# Patient Record
Sex: FEMALE | Race: BLACK OR AFRICAN AMERICAN | NOT HISPANIC OR LATINO
[De-identification: names, ages, dates, MRNs, and addresses within clinical notes are randomized per-mention and may not be internally consistent; named-entity substitution may affect disease eponyms.]

---

## 2017-01-05 ENCOUNTER — OTHER (OUTPATIENT)
Age: 67
End: 2017-01-05

## 2017-02-22 ENCOUNTER — RX RENEWAL (OUTPATIENT)
Age: 67
End: 2017-02-22

## 2017-05-26 ENCOUNTER — MEDICATION RENEWAL (OUTPATIENT)
Age: 67
End: 2017-05-26

## 2017-08-04 ENCOUNTER — MEDICATION RENEWAL (OUTPATIENT)
Age: 67
End: 2017-08-04

## 2017-08-08 ENCOUNTER — RX RENEWAL (OUTPATIENT)
Age: 67
End: 2017-08-08

## 2017-10-18 ENCOUNTER — MEDICATION RENEWAL (OUTPATIENT)
Age: 67
End: 2017-10-18

## 2017-10-18 ENCOUNTER — OTHER (OUTPATIENT)
Age: 67
End: 2017-10-18

## 2017-10-23 ENCOUNTER — APPOINTMENT (OUTPATIENT)
Dept: OPHTHALMOLOGY | Facility: CLINIC | Age: 67
End: 2017-10-23
Payer: MEDICARE

## 2017-10-23 PROCEDURE — 92132 CPTRZD OPH DX IMG ANT SGM: CPT

## 2017-10-23 PROCEDURE — 92004 COMPRE OPH EXAM NEW PT 1/>: CPT

## 2017-10-23 PROCEDURE — 92015 DETERMINE REFRACTIVE STATE: CPT

## 2017-11-13 ENCOUNTER — APPOINTMENT (OUTPATIENT)
Dept: OPHTHALMOLOGY | Facility: CLINIC | Age: 67
End: 2017-11-13
Payer: MEDICARE

## 2017-11-13 PROCEDURE — 92012 INTRM OPH EXAM EST PATIENT: CPT

## 2017-11-13 PROCEDURE — 92083 EXTENDED VISUAL FIELD XM: CPT

## 2017-11-21 ENCOUNTER — APPOINTMENT (OUTPATIENT)
Dept: INTERNAL MEDICINE | Facility: CLINIC | Age: 67
End: 2017-11-21
Payer: MEDICARE

## 2017-11-21 VITALS
SYSTOLIC BLOOD PRESSURE: 130 MMHG | HEIGHT: 62 IN | BODY MASS INDEX: 51.53 KG/M2 | HEART RATE: 82 BPM | OXYGEN SATURATION: 94 % | TEMPERATURE: 98.9 F | DIASTOLIC BLOOD PRESSURE: 70 MMHG | WEIGHT: 280 LBS

## 2017-11-21 PROCEDURE — 90662 IIV NO PRSV INCREASED AG IM: CPT

## 2017-11-21 PROCEDURE — G0008: CPT

## 2017-11-21 PROCEDURE — 36415 COLL VENOUS BLD VENIPUNCTURE: CPT

## 2017-11-21 PROCEDURE — 99214 OFFICE O/P EST MOD 30 MIN: CPT | Mod: 25

## 2017-11-22 LAB
ALBUMIN SERPL ELPH-MCNC: 4 G/DL
ALP BLD-CCNC: 119 U/L
ALT SERPL-CCNC: 11 U/L
ANION GAP SERPL CALC-SCNC: 14 MMOL/L
AST SERPL-CCNC: 12 U/L
BASOPHILS # BLD AUTO: 0.02 K/UL
BASOPHILS NFR BLD AUTO: 0.3 %
BILIRUB SERPL-MCNC: 0.4 MG/DL
BUN SERPL-MCNC: 10 MG/DL
CALCIUM SERPL-MCNC: 9.2 MG/DL
CHLORIDE SERPL-SCNC: 103 MMOL/L
CHOLEST SERPL-MCNC: 160 MG/DL
CHOLEST/HDLC SERPL: 2.9 RATIO
CO2 SERPL-SCNC: 27 MMOL/L
CREAT SERPL-MCNC: 0.71 MG/DL
EOSINOPHIL # BLD AUTO: 0.18 K/UL
EOSINOPHIL NFR BLD AUTO: 3.1
GLUCOSE SERPL-MCNC: 97 MG/DL
HBA1C MFR BLD HPLC: 5.3 %
HCT VFR BLD CALC: 37.3 %
HDLC SERPL-MCNC: 56 MG/DL
HGB BLD-MCNC: 11.6 G/DL
IMM GRANULOCYTES NFR BLD AUTO: 0.2 %
LDLC SERPL CALC-MCNC: 91 MG/DL
LYMPHOCYTES # BLD AUTO: 1.85 K/UL
LYMPHOCYTES NFR BLD AUTO: 31.4 %
MAN DIFF?: NORMAL
MCHC RBC-ENTMCNC: 27 PG
MCHC RBC-ENTMCNC: 31.1 GM/DL
MCV RBC AUTO: 86.7 FL
MONOCYTES # BLD AUTO: 0.56 K/UL
MONOCYTES NFR BLD AUTO: 9.5 %
NEUTROPHILS # BLD AUTO: 3.27 K/UL
NEUTROPHILS NFR BLD AUTO: 55.5 %
PLATELET # BLD AUTO: 320 K/UL
POTASSIUM SERPL-SCNC: 4.4 MMOL/L
PROT SERPL-MCNC: 7.6 G/DL
RBC # BLD: 4.3 M/UL
RBC # FLD: 14.5 %
SODIUM SERPL-SCNC: 144 MMOL/L
TRIGL SERPL-MCNC: 67 MG/DL
TSH SERPL-ACNC: 1.57 UIU/ML
WBC # FLD AUTO: 5.89 K/UL

## 2017-12-18 ENCOUNTER — APPOINTMENT (OUTPATIENT)
Dept: OPHTHALMOLOGY | Facility: CLINIC | Age: 67
End: 2017-12-18
Payer: MEDICARE

## 2017-12-18 PROCEDURE — 92012 INTRM OPH EXAM EST PATIENT: CPT

## 2017-12-18 PROCEDURE — 92132 CPTRZD OPH DX IMG ANT SGM: CPT

## 2017-12-18 PROCEDURE — 92133 CPTRZD OPH DX IMG PST SGM ON: CPT

## 2018-01-30 ENCOUNTER — APPOINTMENT (OUTPATIENT)
Dept: OPHTHALMOLOGY | Facility: CLINIC | Age: 68
End: 2018-01-30

## 2018-03-08 ENCOUNTER — APPOINTMENT (OUTPATIENT)
Dept: OPHTHALMOLOGY | Facility: CLINIC | Age: 68
End: 2018-03-08
Payer: MEDICARE

## 2018-03-08 DIAGNOSIS — H34.8320 TRIBUTARY (BRANCH) RETINAL VEIN OCCLUSION, LEFT EYE, WITH MACULAR EDEMA: ICD-10-CM

## 2018-03-08 PROCEDURE — 92012 INTRM OPH EXAM EST PATIENT: CPT

## 2018-03-08 RX ORDER — BRIMONIDINE TARTRATE 2 MG/MG
0.2 SOLUTION/ DROPS OPHTHALMIC EVERY 8 HOURS
Qty: 15 | Refills: 3 | Status: ACTIVE | COMMUNITY
Start: 2018-03-08 | End: 1900-01-01

## 2018-03-08 RX ORDER — LATANOPROST/PF 0.005 %
0.01 DROPS OPHTHALMIC (EYE)
Qty: 2.5 | Refills: 3 | Status: ACTIVE | COMMUNITY
Start: 2018-03-08 | End: 1900-01-01

## 2018-03-08 RX ORDER — DORZOLAMIDE HYDROCHLORIDE 20 MG/ML
2 SOLUTION OPHTHALMIC 3 TIMES DAILY
Qty: 15 | Refills: 3 | Status: ACTIVE | COMMUNITY
Start: 2018-03-08 | End: 1900-01-01

## 2018-03-09 ENCOUNTER — RX RENEWAL (OUTPATIENT)
Age: 68
End: 2018-03-09

## 2018-03-09 DIAGNOSIS — H40.033 ANATOMICAL NARROW ANGLE, BILATERAL: ICD-10-CM

## 2018-04-09 ENCOUNTER — APPOINTMENT (OUTPATIENT)
Dept: OPHTHALMOLOGY | Facility: CLINIC | Age: 68
End: 2018-04-09

## 2018-05-07 ENCOUNTER — RX RENEWAL (OUTPATIENT)
Age: 68
End: 2018-05-07

## 2018-05-14 ENCOUNTER — APPOINTMENT (OUTPATIENT)
Dept: OPHTHALMOLOGY | Facility: CLINIC | Age: 68
End: 2018-05-14
Payer: MEDICARE

## 2018-05-14 DIAGNOSIS — H25.091 OTHER AGE-RELATED INCIPIENT CATARACT, RIGHT EYE: ICD-10-CM

## 2018-05-14 PROCEDURE — 92083 EXTENDED VISUAL FIELD XM: CPT

## 2018-05-14 PROCEDURE — 92012 INTRM OPH EXAM EST PATIENT: CPT

## 2018-08-10 ENCOUNTER — MEDICATION RENEWAL (OUTPATIENT)
Age: 68
End: 2018-08-10

## 2018-08-10 ENCOUNTER — RX RENEWAL (OUTPATIENT)
Age: 68
End: 2018-08-10

## 2018-08-22 ENCOUNTER — APPOINTMENT (OUTPATIENT)
Dept: INTERNAL MEDICINE | Facility: CLINIC | Age: 68
End: 2018-08-22

## 2018-09-27 ENCOUNTER — RX RENEWAL (OUTPATIENT)
Age: 68
End: 2018-09-27

## 2018-10-09 ENCOUNTER — APPOINTMENT (OUTPATIENT)
Dept: INTERNAL MEDICINE | Facility: CLINIC | Age: 68
End: 2018-10-09
Payer: MEDICARE

## 2018-10-09 VITALS
HEIGHT: 62 IN | WEIGHT: 264 LBS | SYSTOLIC BLOOD PRESSURE: 140 MMHG | DIASTOLIC BLOOD PRESSURE: 70 MMHG | BODY MASS INDEX: 48.58 KG/M2 | HEART RATE: 92 BPM | OXYGEN SATURATION: 95 %

## 2018-10-09 DIAGNOSIS — M79.89 OTHER SPECIFIED SOFT TISSUE DISORDERS: ICD-10-CM

## 2018-10-09 DIAGNOSIS — Z80.9 FAMILY HISTORY OF MALIGNANT NEOPLASM, UNSPECIFIED: ICD-10-CM

## 2018-10-09 DIAGNOSIS — M15.9 POLYOSTEOARTHRITIS, UNSPECIFIED: ICD-10-CM

## 2018-10-09 PROCEDURE — G0444 DEPRESSION SCREEN ANNUAL: CPT | Mod: 59

## 2018-10-09 PROCEDURE — G0008: CPT

## 2018-10-09 PROCEDURE — 90670 PCV13 VACCINE IM: CPT

## 2018-10-09 PROCEDURE — G0439: CPT | Mod: 25

## 2018-10-09 PROCEDURE — G0442 ANNUAL ALCOHOL SCREEN 15 MIN: CPT | Mod: 59

## 2018-10-09 PROCEDURE — G0009: CPT

## 2018-10-09 PROCEDURE — 90662 IIV NO PRSV INCREASED AG IM: CPT

## 2018-10-09 NOTE — PHYSICAL EXAM
[No Acute Distress] : no acute distress [Well Nourished] : well nourished [Well Developed] : well developed [Normal Sclera/Conjunctiva] : normal sclera/conjunctiva [PERRL] : pupils equal round and reactive to light [Normal Outer Ear/Nose] : the outer ears and nose were normal in appearance [Normal Oropharynx] : the oropharynx was normal [No JVD] : no jugular venous distention [Supple] : supple [No Lymphadenopathy] : no lymphadenopathy [No Respiratory Distress] : no respiratory distress  [Clear to Auscultation] : lungs were clear to auscultation bilaterally [No Accessory Muscle Use] : no accessory muscle use [Normal Rate] : normal rate  [Regular Rhythm] : with a regular rhythm [Normal S1, S2] : normal S1 and S2 [No Varicosities] : no varicosities [No Edema] : there was no peripheral edema [No Extremity Clubbing/Cyanosis] : no extremity clubbing/cyanosis [Soft] : abdomen soft [Non Tender] : non-tender [No HSM] : no HSM [Normal Bowel Sounds] : normal bowel sounds [Normal Supraclavicular Nodes] : no supraclavicular lymphadenopathy [Normal Posterior Cervical Nodes] : no posterior cervical lymphadenopathy [Normal Femoral Nodes] : no femoral lymphadenopathy [Normal Anterior Cervical Nodes] : no anterior cervical lymphadenopathy [No CVA Tenderness] : no CVA  tenderness [No Spinal Tenderness] : no spinal tenderness [No Joint Swelling] : no joint swelling [Grossly Normal Strength/Tone] : grossly normal strength/tone [No Rash] : no rash [No Skin Lesions] : no skin lesions [Normal Gait] : normal gait [Coordination Grossly Intact] : coordination grossly intact [No Focal Deficits] : no focal deficits [Speech Grossly Normal] : speech grossly normal [Memory Grossly Normal] : memory grossly normal [Normal Mood] : the mood was normal [Normal Insight/Judgement] : insight and judgment were intact

## 2018-10-09 NOTE — COUNSELING
[Weight management counseling provided] : Weight management [Healthy eating counseling provided] : healthy eating [Activity counseling provided] : activity [ - Annual Alcohol Misuse Screening] : Annual Alcohol Misuse Screening [ - Annual Depression Screening] : Annual Depression Screening

## 2018-10-09 NOTE — REVIEW OF SYSTEMS
[Recent Change In Weight] : ~T recent weight change [Vision Problems] : vision problems [Nasal Discharge] : nasal discharge [Joint Pain] : joint pain [Negative] : Psychiatric [FreeTextEntry2] : lost 15 ibs

## 2018-10-09 NOTE — ASSESSMENT
[FreeTextEntry1] : # CPE \par \par - diet / exercise discussed \par - check lipid and BG \par /- Needs mammo / pap / f.u colonoscopy ( last one had polyps) \par - BMD testing \par - flu shot and prevnar \par - optho f/u \par \par \par # weight loss\par - ? stress / pt does appear stressed but reports no change in appetite \par - will check TSH \par - needs screening for cancer \par \par # L calf is bigger than R \par - no trauma, no pain , no redness \par - get Doppler / if negative may need MRI Leg \par - The lateral side of the L calf is firm \par \par # OA\par - vct use of cane \par - gait and balance training \par - weight loss encouraged \par \par f/u in 6 week

## 2018-10-09 NOTE — HEALTH RISK ASSESSMENT
[Good] : ~his/her~  mood as  good [] : No [No falls in past year] : Patient reported no falls in the past year [0] : 1) Little interest or pleasure doing things: Not at all (0) [1] : 2) Feeling down, depressed, or hopeless for several days (1) [Patient reported colonoscopy was abnormal] : Patient reported colonoscopy was abnormal [MammogramDate] : needs  [BoneDensityDate] : needs  [ColonoscopyDate] : 2014 [ColonoscopyComments] : polyp [Discussed at today's visit] : Advance Directives Discussed at today's visit [Name: ___] : Health Care Proxy's Name: [unfilled]  [Relationship: ___] : Relationship: [unfilled]

## 2018-10-10 LAB
ALBUMIN SERPL ELPH-MCNC: 4.3 G/DL
ALP BLD-CCNC: 116 U/L
ALT SERPL-CCNC: 11 U/L
ANION GAP SERPL CALC-SCNC: 14 MMOL/L
AST SERPL-CCNC: 15 U/L
BASOPHILS # BLD AUTO: 0.02 K/UL
BASOPHILS NFR BLD AUTO: 0.3 %
BILIRUB SERPL-MCNC: 0.6 MG/DL
BUN SERPL-MCNC: 9 MG/DL
CALCIUM SERPL-MCNC: 9.7 MG/DL
CHLORIDE SERPL-SCNC: 103 MMOL/L
CHOLEST SERPL-MCNC: 160 MG/DL
CHOLEST/HDLC SERPL: 2.5 RATIO
CO2 SERPL-SCNC: 25 MMOL/L
CREAT SERPL-MCNC: 0.77 MG/DL
EOSINOPHIL # BLD AUTO: 0.19 K/UL
EOSINOPHIL NFR BLD AUTO: 3 %
GLUCOSE SERPL-MCNC: 102 MG/DL
HBA1C MFR BLD HPLC: 5.4 %
HCT VFR BLD CALC: 39.7 %
HDLC SERPL-MCNC: 63 MG/DL
HGB BLD-MCNC: 11.9 G/DL
IMM GRANULOCYTES NFR BLD AUTO: 0.2 %
LDLC SERPL CALC-MCNC: 84 MG/DL
LYMPHOCYTES # BLD AUTO: 1.92 K/UL
LYMPHOCYTES NFR BLD AUTO: 29.8 %
MAN DIFF?: NORMAL
MCHC RBC-ENTMCNC: 26.7 PG
MCHC RBC-ENTMCNC: 30 GM/DL
MCV RBC AUTO: 89.2 FL
MONOCYTES # BLD AUTO: 0.58 K/UL
MONOCYTES NFR BLD AUTO: 9 %
NEUTROPHILS # BLD AUTO: 3.72 K/UL
NEUTROPHILS NFR BLD AUTO: 57.7 %
PLATELET # BLD AUTO: 358 K/UL
POTASSIUM SERPL-SCNC: 4.5 MMOL/L
PROT SERPL-MCNC: 7.8 G/DL
RBC # BLD: 4.45 M/UL
RBC # FLD: 14.9 %
SODIUM SERPL-SCNC: 142 MMOL/L
TRIGL SERPL-MCNC: 65 MG/DL
TSH SERPL-ACNC: 1.49 UIU/ML
WBC # FLD AUTO: 6.44 K/UL

## 2018-10-24 ENCOUNTER — FORM ENCOUNTER (OUTPATIENT)
Age: 68
End: 2018-10-24

## 2018-10-25 ENCOUNTER — APPOINTMENT (OUTPATIENT)
Dept: ULTRASOUND IMAGING | Facility: CLINIC | Age: 68
End: 2018-10-25
Payer: MEDICARE

## 2018-10-25 ENCOUNTER — OUTPATIENT (OUTPATIENT)
Dept: OUTPATIENT SERVICES | Facility: HOSPITAL | Age: 68
LOS: 1 days | End: 2018-10-25
Payer: MEDICARE

## 2018-10-25 DIAGNOSIS — M79.89 OTHER SPECIFIED SOFT TISSUE DISORDERS: ICD-10-CM

## 2018-10-25 PROCEDURE — 93971 EXTREMITY STUDY: CPT | Mod: 26

## 2018-10-25 PROCEDURE — 93971 EXTREMITY STUDY: CPT

## 2018-11-03 ENCOUNTER — FORM ENCOUNTER (OUTPATIENT)
Age: 68
End: 2018-11-03

## 2018-11-04 ENCOUNTER — OUTPATIENT (OUTPATIENT)
Dept: OUTPATIENT SERVICES | Facility: HOSPITAL | Age: 68
LOS: 1 days | End: 2018-11-04
Payer: MEDICARE

## 2018-11-04 ENCOUNTER — APPOINTMENT (OUTPATIENT)
Dept: MRI IMAGING | Facility: IMAGING CENTER | Age: 68
End: 2018-11-04
Payer: MEDICARE

## 2018-11-04 DIAGNOSIS — Z00.8 ENCOUNTER FOR OTHER GENERAL EXAMINATION: ICD-10-CM

## 2018-11-04 PROCEDURE — 73718 MRI LOWER EXTREMITY W/O DYE: CPT

## 2018-11-04 PROCEDURE — 73718 MRI LOWER EXTREMITY W/O DYE: CPT | Mod: 26,LT

## 2018-11-11 ENCOUNTER — FORM ENCOUNTER (OUTPATIENT)
Age: 68
End: 2018-11-11

## 2018-11-12 ENCOUNTER — APPOINTMENT (OUTPATIENT)
Dept: RADIOLOGY | Facility: IMAGING CENTER | Age: 68
End: 2018-11-12
Payer: MEDICARE

## 2018-11-12 ENCOUNTER — OUTPATIENT (OUTPATIENT)
Dept: OUTPATIENT SERVICES | Facility: HOSPITAL | Age: 68
LOS: 1 days | End: 2018-11-12
Payer: MEDICARE

## 2018-11-12 ENCOUNTER — APPOINTMENT (OUTPATIENT)
Dept: MAMMOGRAPHY | Facility: IMAGING CENTER | Age: 68
End: 2018-11-12
Payer: MEDICARE

## 2018-11-12 DIAGNOSIS — Z00.8 ENCOUNTER FOR OTHER GENERAL EXAMINATION: ICD-10-CM

## 2018-11-12 PROCEDURE — 77080 DXA BONE DENSITY AXIAL: CPT | Mod: 26

## 2018-11-12 PROCEDURE — 77063 BREAST TOMOSYNTHESIS BI: CPT | Mod: 26

## 2018-11-12 PROCEDURE — 77080 DXA BONE DENSITY AXIAL: CPT

## 2018-11-12 PROCEDURE — 77067 SCR MAMMO BI INCL CAD: CPT | Mod: 26

## 2018-11-12 PROCEDURE — 77067 SCR MAMMO BI INCL CAD: CPT

## 2018-11-12 PROCEDURE — 77063 BREAST TOMOSYNTHESIS BI: CPT

## 2018-11-28 ENCOUNTER — FORM ENCOUNTER (OUTPATIENT)
Age: 68
End: 2018-11-28

## 2018-11-29 ENCOUNTER — OUTPATIENT (OUTPATIENT)
Dept: OUTPATIENT SERVICES | Facility: HOSPITAL | Age: 68
LOS: 1 days | End: 2018-11-29
Payer: MEDICARE

## 2018-11-29 ENCOUNTER — APPOINTMENT (OUTPATIENT)
Dept: MAMMOGRAPHY | Facility: IMAGING CENTER | Age: 68
End: 2018-11-29
Payer: MEDICARE

## 2018-11-29 ENCOUNTER — APPOINTMENT (OUTPATIENT)
Dept: ULTRASOUND IMAGING | Facility: IMAGING CENTER | Age: 68
End: 2018-11-29
Payer: MEDICARE

## 2018-11-29 DIAGNOSIS — Z00.8 ENCOUNTER FOR OTHER GENERAL EXAMINATION: ICD-10-CM

## 2018-11-29 PROCEDURE — 76642 ULTRASOUND BREAST LIMITED: CPT | Mod: 26,LT

## 2018-11-29 PROCEDURE — G0279: CPT | Mod: 26

## 2018-11-29 PROCEDURE — 77065 DX MAMMO INCL CAD UNI: CPT

## 2018-11-29 PROCEDURE — G0279: CPT

## 2018-11-29 PROCEDURE — 77065 DX MAMMO INCL CAD UNI: CPT | Mod: 26,LT

## 2018-11-29 PROCEDURE — 76642 ULTRASOUND BREAST LIMITED: CPT

## 2018-12-07 ENCOUNTER — OTHER (OUTPATIENT)
Age: 68
End: 2018-12-07

## 2018-12-07 DIAGNOSIS — Z00.00 ENCOUNTER FOR GENERAL ADULT MEDICAL EXAMINATION W/OUT ABNORMAL FINDINGS: ICD-10-CM

## 2018-12-09 ENCOUNTER — FORM ENCOUNTER (OUTPATIENT)
Age: 68
End: 2018-12-09

## 2018-12-10 ENCOUNTER — RESULT REVIEW (OUTPATIENT)
Age: 68
End: 2018-12-10

## 2018-12-10 ENCOUNTER — APPOINTMENT (OUTPATIENT)
Dept: MAMMOGRAPHY | Facility: IMAGING CENTER | Age: 68
End: 2018-12-10
Payer: MEDICARE

## 2018-12-10 ENCOUNTER — OUTPATIENT (OUTPATIENT)
Dept: OUTPATIENT SERVICES | Facility: HOSPITAL | Age: 68
LOS: 1 days | End: 2018-12-10
Payer: MEDICARE

## 2018-12-10 DIAGNOSIS — Z00.00 ENCOUNTER FOR GENERAL ADULT MEDICAL EXAMINATION WITHOUT ABNORMAL FINDINGS: ICD-10-CM

## 2018-12-10 PROCEDURE — 88305 TISSUE EXAM BY PATHOLOGIST: CPT | Mod: 26

## 2018-12-10 PROCEDURE — 19081 BX BREAST 1ST LESION STRTCTC: CPT | Mod: LT

## 2018-12-10 PROCEDURE — A4648: CPT

## 2018-12-10 PROCEDURE — 77065 DX MAMMO INCL CAD UNI: CPT | Mod: 26,LT

## 2018-12-10 PROCEDURE — 77065 DX MAMMO INCL CAD UNI: CPT

## 2018-12-10 PROCEDURE — 88305 TISSUE EXAM BY PATHOLOGIST: CPT

## 2018-12-10 PROCEDURE — 19081 BX BREAST 1ST LESION STRTCTC: CPT

## 2018-12-11 LAB — SURGICAL PATHOLOGY STUDY: SIGNIFICANT CHANGE UP

## 2019-01-23 ENCOUNTER — APPOINTMENT (OUTPATIENT)
Dept: INTERNAL MEDICINE | Facility: CLINIC | Age: 69
End: 2019-01-23

## 2019-03-26 ENCOUNTER — APPOINTMENT (OUTPATIENT)
Dept: INTERNAL MEDICINE | Facility: CLINIC | Age: 69
End: 2019-03-26
Payer: MEDICARE

## 2019-03-26 VITALS
SYSTOLIC BLOOD PRESSURE: 150 MMHG | WEIGHT: 272 LBS | HEART RATE: 93 BPM | BODY MASS INDEX: 50.05 KG/M2 | DIASTOLIC BLOOD PRESSURE: 80 MMHG | OXYGEN SATURATION: 96 % | HEIGHT: 62 IN

## 2019-03-26 DIAGNOSIS — M17.0 BILATERAL PRIMARY OSTEOARTHRITIS OF KNEE: ICD-10-CM

## 2019-03-26 DIAGNOSIS — R27.0 ATAXIA, UNSPECIFIED: ICD-10-CM

## 2019-03-26 DIAGNOSIS — M54.9 DORSALGIA, UNSPECIFIED: ICD-10-CM

## 2019-03-26 DIAGNOSIS — G89.29 DORSALGIA, UNSPECIFIED: ICD-10-CM

## 2019-03-26 DIAGNOSIS — J45.909 UNSPECIFIED ASTHMA, UNCOMPLICATED: ICD-10-CM

## 2019-03-26 PROCEDURE — 99214 OFFICE O/P EST MOD 30 MIN: CPT

## 2019-03-26 NOTE — ASSESSMENT
[FreeTextEntry1] : 1) HTN \par - add cozaar \par - f/u in 3 month\par \par 2) OA \par - ct tylenol \par \par 3) asthma\par - stable\par -ct meds \par -  will need pneumovax \par \par 4) upper back pain \par - chronic \par - very large breasts \par - support bras have not helped \par - I think that she is a candidate for breast reduction sx

## 2019-03-26 NOTE — REVIEW OF SYSTEMS
[Joint Pain] : joint pain [Muscle Pain] : muscle pain [Back Pain] : back pain [Negative] : Neurological

## 2019-03-26 NOTE — PHYSICAL EXAM
[No Acute Distress] : no acute distress [Well Nourished] : well nourished [Well Developed] : well developed [Normal Outer Ear/Nose] : the outer ears and nose were normal in appearance [Normal Oropharynx] : the oropharynx was normal [No JVD] : no jugular venous distention [Supple] : supple [No Lymphadenopathy] : no lymphadenopathy [No Respiratory Distress] : no respiratory distress  [Clear to Auscultation] : lungs were clear to auscultation bilaterally [No Accessory Muscle Use] : no accessory muscle use [Normal Rate] : normal rate  [Regular Rhythm] : with a regular rhythm [Normal S1, S2] : normal S1 and S2 [Soft] : abdomen soft [Non Tender] : non-tender [No HSM] : no HSM [Normal Bowel Sounds] : normal bowel sounds

## 2019-03-26 NOTE — ADDENDUM
[FreeTextEntry1] : c/o ataxia-  feels like she will fall to one side or the other, zx 6 month\par mostly when walking \par cerebellar testing - nl \par MRI R/o mass

## 2019-03-26 NOTE — HISTORY OF PRESENT ILLNESS
[FreeTextEntry1] : f./u  [de-identified] : 1) HTN \par - reports compliance with meds \par -  not compliant with low salt diet \par \par 2) asthma\par - well controlled\par - no nocturnal awakening , uses rescue inhaler 2 x / week \par - no exposure to smoke / dust/ pets\par \par 3) OA \par - knees , restricted activity \par \par 4) ch upper back pain \par - has large breasts \par - has tried support bras, but hard to find in her size and do not work \par \par \par  mammo / BMD - 12/19 \par - colonoscopy 2014 - polyps , was supposed to get f/u colonoscopy in 3 yrs \par no bleeding , pain \par \par  no chest pain \par very limited activity , occasional wheezing ,

## 2019-03-27 ENCOUNTER — MEDICATION RENEWAL (OUTPATIENT)
Age: 69
End: 2019-03-27

## 2019-05-15 ENCOUNTER — RX RENEWAL (OUTPATIENT)
Age: 69
End: 2019-05-15

## 2019-05-20 ENCOUNTER — FORM ENCOUNTER (OUTPATIENT)
Age: 69
End: 2019-05-20

## 2019-05-21 ENCOUNTER — OUTPATIENT (OUTPATIENT)
Dept: OUTPATIENT SERVICES | Facility: HOSPITAL | Age: 69
LOS: 1 days | End: 2019-05-21
Payer: MEDICARE

## 2019-05-21 ENCOUNTER — APPOINTMENT (OUTPATIENT)
Dept: MRI IMAGING | Facility: IMAGING CENTER | Age: 69
End: 2019-05-21
Payer: MEDICARE

## 2019-05-21 DIAGNOSIS — R27.0 ATAXIA, UNSPECIFIED: ICD-10-CM

## 2019-05-21 PROCEDURE — 70551 MRI BRAIN STEM W/O DYE: CPT

## 2019-05-21 PROCEDURE — 70551 MRI BRAIN STEM W/O DYE: CPT | Mod: 26

## 2019-05-23 ENCOUNTER — APPOINTMENT (OUTPATIENT)
Dept: PLASTIC SURGERY | Facility: CLINIC | Age: 69
End: 2019-05-23
Payer: MEDICARE

## 2019-05-23 ENCOUNTER — OTHER (OUTPATIENT)
Age: 69
End: 2019-05-23

## 2019-05-23 VITALS
OXYGEN SATURATION: 95 % | DIASTOLIC BLOOD PRESSURE: 83 MMHG | HEIGHT: 62 IN | BODY MASS INDEX: 50.42 KG/M2 | HEART RATE: 84 BPM | SYSTOLIC BLOOD PRESSURE: 136 MMHG | RESPIRATION RATE: 19 BRPM | TEMPERATURE: 98.5 F | WEIGHT: 274 LBS

## 2019-05-23 DIAGNOSIS — Z86.69 PERSONAL HISTORY OF OTHER DISEASES OF THE NERVOUS SYSTEM AND SENSE ORGANS: ICD-10-CM

## 2019-05-23 DIAGNOSIS — N62 HYPERTROPHY OF BREAST: ICD-10-CM

## 2019-05-23 PROCEDURE — 99201 OFFICE OUTPATIENT NEW 10 MINUTES: CPT

## 2019-05-23 RX ORDER — ASPIRIN 81 MG/1
81 TABLET ORAL
Refills: 0 | Status: ACTIVE | COMMUNITY
Start: 2019-05-23

## 2019-05-23 RX ORDER — DORZOLAMIDE HYDROCHLORIDE 20 MG/ML
2 SOLUTION OPHTHALMIC TWICE DAILY
Qty: 10 | Refills: 3 | Status: DISCONTINUED | COMMUNITY
Start: 2017-11-13 | End: 2019-05-23

## 2019-05-23 RX ORDER — BRIMONIDINE TARTRATE 2 MG/MG
0.2 SOLUTION/ DROPS OPHTHALMIC EVERY 8 HOURS
Qty: 15 | Refills: 3 | Status: DISCONTINUED | COMMUNITY
Start: 2017-10-23 | End: 2019-05-23

## 2019-05-23 RX ORDER — LATANOPROST/PF 0.005 %
0.01 DROPS OPHTHALMIC (EYE)
Qty: 3 | Refills: 3 | Status: DISCONTINUED | COMMUNITY
Start: 2017-10-23 | End: 2019-05-23

## 2019-05-23 NOTE — PHYSICAL EXAM
[Bra Size: _______] : Bra Size: [unfilled] [de-identified] : Morbidly obese, teary eyed, NAD, A&Ox3 [de-identified] : + Cane

## 2019-05-23 NOTE — REASON FOR VISIT
[Consultation] : a consultation visit [FreeTextEntry1] : Patient present here at the request of Josette Leach for bilateral breast reduction. Patient current bra size 50 DDD. Patient reports bilateral  shoulder indentation, tension headaches,occasional  back pain, neck pain, and denies taking any pain medications.Patient describes pain level 5/10. Patient denies any personnel or family history of breast cancer. Patient denies any rashes. Patient states she has three children with normal delivery. Patient not sure what cup size desired.

## 2019-05-24 ENCOUNTER — APPOINTMENT (OUTPATIENT)
Dept: MRI IMAGING | Facility: HOSPITAL | Age: 69
End: 2019-05-24

## 2019-05-24 ENCOUNTER — FORM ENCOUNTER (OUTPATIENT)
Age: 69
End: 2019-05-24

## 2019-05-25 ENCOUNTER — OUTPATIENT (OUTPATIENT)
Dept: OUTPATIENT SERVICES | Facility: HOSPITAL | Age: 69
LOS: 1 days | End: 2019-05-25
Payer: MEDICARE

## 2019-05-25 ENCOUNTER — APPOINTMENT (OUTPATIENT)
Dept: MRI IMAGING | Facility: IMAGING CENTER | Age: 69
End: 2019-05-25
Payer: MEDICARE

## 2019-05-25 DIAGNOSIS — R90.0 INTRACRANIAL SPACE-OCCUPYING LESION FOUND ON DIAGNOSTIC IMAGING OF CENTRAL NERVOUS SYSTEM: ICD-10-CM

## 2019-05-25 PROCEDURE — A9585: CPT

## 2019-05-25 PROCEDURE — 70553 MRI BRAIN STEM W/O & W/DYE: CPT

## 2019-05-25 PROCEDURE — 70553 MRI BRAIN STEM W/O & W/DYE: CPT | Mod: 26

## 2019-05-28 ENCOUNTER — APPOINTMENT (OUTPATIENT)
Dept: INTERNAL MEDICINE | Facility: CLINIC | Age: 69
End: 2019-05-28
Payer: MEDICARE

## 2019-05-28 VITALS
DIASTOLIC BLOOD PRESSURE: 68 MMHG | TEMPERATURE: 98.4 F | SYSTOLIC BLOOD PRESSURE: 132 MMHG | HEART RATE: 101 BPM | WEIGHT: 274 LBS | BODY MASS INDEX: 50.42 KG/M2 | OXYGEN SATURATION: 94 % | HEIGHT: 62 IN

## 2019-05-28 PROCEDURE — 99213 OFFICE O/P EST LOW 20 MIN: CPT

## 2019-05-28 NOTE — PHYSICAL EXAM
[No Acute Distress] : no acute distress [Well Nourished] : well nourished [Well Developed] : well developed [No Respiratory Distress] : no respiratory distress  [Clear to Auscultation] : lungs were clear to auscultation bilaterally [Normal Rate] : normal rate  [Regular Rhythm] : with a regular rhythm [Normal S1, S2] : normal S1 and S2 [Normal Posterior Cervical Nodes] : no posterior cervical lymphadenopathy [Normal Supraclavicular Nodes] : no supraclavicular lymphadenopathy [Coordination Grossly Intact] : coordination grossly intact [Normal Anterior Cervical Nodes] : no anterior cervical lymphadenopathy [No Focal Deficits] : no focal deficits

## 2019-05-28 NOTE — ASSESSMENT
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

## 2019-05-28 NOTE — HISTORY OF PRESENT ILLNESS
[FreeTextEntry1] : pt was called in to discuss the abn MRI report \par the brain MRI shows possible metastatic lesion and she has now had an MRI w/ contrast \par she has had mammo- w/ bx - 2018 - neg \par colonoscopy - UTD \par  non smoker \par MRI also showed possible nasopharyngeal lesion \par

## 2019-05-31 ENCOUNTER — OTHER (OUTPATIENT)
Age: 69
End: 2019-05-31

## 2019-06-11 ENCOUNTER — APPOINTMENT (OUTPATIENT)
Dept: NEUROSURGERY | Facility: CLINIC | Age: 69
End: 2019-06-11
Payer: MEDICARE

## 2019-06-11 VITALS
DIASTOLIC BLOOD PRESSURE: 74 MMHG | HEART RATE: 98 BPM | HEIGHT: 62 IN | BODY MASS INDEX: 49.69 KG/M2 | SYSTOLIC BLOOD PRESSURE: 150 MMHG | RESPIRATION RATE: 18 BRPM | WEIGHT: 270 LBS

## 2019-06-11 PROCEDURE — 99205 OFFICE O/P NEW HI 60 MIN: CPT

## 2019-06-18 ENCOUNTER — OUTPATIENT (OUTPATIENT)
Dept: OUTPATIENT SERVICES | Facility: HOSPITAL | Age: 69
LOS: 1 days | End: 2019-06-18
Payer: MEDICARE

## 2019-06-18 VITALS
SYSTOLIC BLOOD PRESSURE: 132 MMHG | TEMPERATURE: 98 F | WEIGHT: 270.07 LBS | OXYGEN SATURATION: 97 % | HEIGHT: 62 IN | RESPIRATION RATE: 16 BRPM | DIASTOLIC BLOOD PRESSURE: 78 MMHG | HEART RATE: 76 BPM

## 2019-06-18 DIAGNOSIS — Z87.19 PERSONAL HISTORY OF OTHER DISEASES OF THE DIGESTIVE SYSTEM: Chronic | ICD-10-CM

## 2019-06-18 DIAGNOSIS — Z91.013 ALLERGY TO SEAFOOD: ICD-10-CM

## 2019-06-18 DIAGNOSIS — Z29.9 ENCOUNTER FOR PROPHYLACTIC MEASURES, UNSPECIFIED: ICD-10-CM

## 2019-06-18 DIAGNOSIS — J45.909 UNSPECIFIED ASTHMA, UNCOMPLICATED: ICD-10-CM

## 2019-06-18 DIAGNOSIS — Z87.81 PERSONAL HISTORY OF (HEALED) TRAUMATIC FRACTURE: Chronic | ICD-10-CM

## 2019-06-18 DIAGNOSIS — R90.0 INTRACRANIAL SPACE-OCCUPYING LESION FOUND ON DIAGNOSTIC IMAGING OF CENTRAL NERVOUS SYSTEM: ICD-10-CM

## 2019-06-18 DIAGNOSIS — I10 ESSENTIAL (PRIMARY) HYPERTENSION: ICD-10-CM

## 2019-06-18 DIAGNOSIS — S42.409A UNSPECIFIED FRACTURE OF LOWER END OF UNSPECIFIED HUMERUS, INITIAL ENCOUNTER FOR CLOSED FRACTURE: Chronic | ICD-10-CM

## 2019-06-18 LAB
ANION GAP SERPL CALC-SCNC: 14 MMOL/L — SIGNIFICANT CHANGE UP (ref 5–17)
BLD GP AB SCN SERPL QL: NEGATIVE — SIGNIFICANT CHANGE UP
BUN SERPL-MCNC: 16 MG/DL — SIGNIFICANT CHANGE UP (ref 7–23)
CALCIUM SERPL-MCNC: 9.9 MG/DL — SIGNIFICANT CHANGE UP (ref 8.4–10.5)
CHLORIDE SERPL-SCNC: 100 MMOL/L — SIGNIFICANT CHANGE UP (ref 96–108)
CO2 SERPL-SCNC: 25 MMOL/L — SIGNIFICANT CHANGE UP (ref 22–31)
CREAT SERPL-MCNC: 0.83 MG/DL — SIGNIFICANT CHANGE UP (ref 0.5–1.3)
GLUCOSE SERPL-MCNC: 106 MG/DL — HIGH (ref 70–99)
POTASSIUM SERPL-MCNC: 3.6 MMOL/L — SIGNIFICANT CHANGE UP (ref 3.5–5.3)
POTASSIUM SERPL-SCNC: 3.6 MMOL/L — SIGNIFICANT CHANGE UP (ref 3.5–5.3)
RH IG SCN BLD-IMP: POSITIVE — SIGNIFICANT CHANGE UP
SODIUM SERPL-SCNC: 139 MMOL/L — SIGNIFICANT CHANGE UP (ref 135–145)

## 2019-06-18 PROCEDURE — 86850 RBC ANTIBODY SCREEN: CPT

## 2019-06-18 PROCEDURE — 80048 BASIC METABOLIC PNL TOTAL CA: CPT

## 2019-06-18 PROCEDURE — G0463: CPT

## 2019-06-18 PROCEDURE — 87640 STAPH A DNA AMP PROBE: CPT

## 2019-06-18 PROCEDURE — 86901 BLOOD TYPING SEROLOGIC RH(D): CPT

## 2019-06-18 PROCEDURE — 87641 MR-STAPH DNA AMP PROBE: CPT

## 2019-06-18 PROCEDURE — 86900 BLOOD TYPING SEROLOGIC ABO: CPT

## 2019-06-18 PROCEDURE — 85027 COMPLETE CBC AUTOMATED: CPT

## 2019-06-18 RX ORDER — CEFAZOLIN SODIUM 1 G
3000 VIAL (EA) INJECTION ONCE
Refills: 0 | Status: COMPLETED | OUTPATIENT
Start: 2019-06-24 | End: 2019-06-24

## 2019-06-18 NOTE — H&P PST ADULT - NSICDXPROBLEM_GEN_ALL_CORE_FT
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    Problem: Prophylactic measure  Assessment and Plan: The Caprini score indicates this patient is at risk for a VTE event (score 3-5).  Most surgical patients in this group would benefit from pharmacologic prophylaxis.  The surgical team will determine the balance between VTE risk and bleeding risk    Problem: Asthma  Assessment and Plan: Instructed to continue meds     Problem: HTN (hypertension)  Assessment and Plan: Instructed to continue meds &  take with sips of water in AM the day of surgery

## 2019-06-18 NOTE — H&P PST ADULT - NSICDXPASTSURGICALHX_GEN_ALL_CORE_FT
PAST SURGICAL HISTORY:  Fractured elbow left elbow fracture repair,2007    H/O appendicitis h/o appendicectomy    History of ankle fracture h/o repair

## 2019-06-18 NOTE — H&P PST ADULT - HISTORY OF PRESENT ILLNESS
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

## 2019-06-18 NOTE — H&P PST ADULT - NEGATIVE OPHTHALMOLOGIC SYMPTOMS
no blurred vision L/no loss of vision R/no blurred vision R/no diplopia/no lacrimation L/no lacrimation R/no photophobia/no loss of vision L

## 2019-06-18 NOTE — H&P PST ADULT - NSICDXPASTMEDICALHX_GEN_ALL_CORE_FT
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

## 2019-06-19 ENCOUNTER — NON-APPOINTMENT (OUTPATIENT)
Age: 69
End: 2019-06-19

## 2019-06-19 ENCOUNTER — APPOINTMENT (OUTPATIENT)
Dept: INTERNAL MEDICINE | Facility: CLINIC | Age: 69
End: 2019-06-19
Payer: MEDICARE

## 2019-06-19 VITALS
HEART RATE: 82 BPM | SYSTOLIC BLOOD PRESSURE: 136 MMHG | TEMPERATURE: 98.4 F | BODY MASS INDEX: 49.69 KG/M2 | OXYGEN SATURATION: 95 % | DIASTOLIC BLOOD PRESSURE: 60 MMHG | WEIGHT: 270 LBS | HEIGHT: 62 IN

## 2019-06-19 DIAGNOSIS — H40.10X3 UNSPECIFIED OPEN-ANGLE GLAUCOMA, SEVERE STAGE: ICD-10-CM

## 2019-06-19 DIAGNOSIS — Z01.818 ENCOUNTER FOR OTHER PREPROCEDURAL EXAMINATION: ICD-10-CM

## 2019-06-19 DIAGNOSIS — E66.9 OBESITY, UNSPECIFIED: ICD-10-CM

## 2019-06-19 DIAGNOSIS — Z87.09 PERSONAL HISTORY OF OTHER DISEASES OF THE RESPIRATORY SYSTEM: ICD-10-CM

## 2019-06-19 DIAGNOSIS — I10 ESSENTIAL (PRIMARY) HYPERTENSION: ICD-10-CM

## 2019-06-19 LAB
HCT VFR BLD CALC: 37.2 % — SIGNIFICANT CHANGE UP (ref 34.5–45)
HGB BLD-MCNC: 11.4 G/DL — LOW (ref 11.5–15.5)
MCHC RBC-ENTMCNC: 26.6 PG — LOW (ref 27–34)
MCHC RBC-ENTMCNC: 30.6 GM/DL — LOW (ref 32–36)
MCV RBC AUTO: 86.9 FL — SIGNIFICANT CHANGE UP (ref 80–100)
MRSA PCR RESULT.: SIGNIFICANT CHANGE UP
PLATELET # BLD AUTO: 327 K/UL — SIGNIFICANT CHANGE UP (ref 150–400)
RBC # BLD: 4.28 M/UL — SIGNIFICANT CHANGE UP (ref 3.8–5.2)
RBC # FLD: 14.2 % — SIGNIFICANT CHANGE UP (ref 10.3–14.5)
S AUREUS DNA NOSE QL NAA+PROBE: SIGNIFICANT CHANGE UP
WBC # BLD: 7.07 K/UL — SIGNIFICANT CHANGE UP (ref 3.8–10.5)
WBC # FLD AUTO: 7.07 K/UL — SIGNIFICANT CHANGE UP (ref 3.8–10.5)

## 2019-06-19 PROCEDURE — 93000 ELECTROCARDIOGRAM COMPLETE: CPT

## 2019-06-19 PROCEDURE — 36415 COLL VENOUS BLD VENIPUNCTURE: CPT

## 2019-06-19 PROCEDURE — 99214 OFFICE O/P EST MOD 30 MIN: CPT | Mod: 25

## 2019-06-19 NOTE — PHYSICAL EXAM
[No Acute Distress] : no acute distress [Well Developed] : well developed [Well Nourished] : well nourished [Normal Oropharynx] : the oropharynx was normal [Well-Appearing] : well-appearing [No JVD] : no jugular venous distention [Supple] : supple [No Respiratory Distress] : no respiratory distress  [Clear to Auscultation] : lungs were clear to auscultation bilaterally [No Accessory Muscle Use] : no accessory muscle use [Regular Rhythm] : with a regular rhythm [Normal S1, S2] : normal S1 and S2 [Normal Rate] : normal rate  [No Varicosities] : no varicosities [No Edema] : there was no peripheral edema [Non Tender] : non-tender [Soft] : abdomen soft [No HSM] : no HSM [Normal Supraclavicular Nodes] : no supraclavicular lymphadenopathy [Normal Bowel Sounds] : normal bowel sounds [Normal Anterior Cervical Nodes] : no anterior cervical lymphadenopathy [Normal Posterior Cervical Nodes] : no posterior cervical lymphadenopathy [No CVA Tenderness] : no CVA  tenderness [No Joint Swelling] : no joint swelling [No Spinal Tenderness] : no spinal tenderness [Grossly Normal Strength/Tone] : grossly normal strength/tone

## 2019-06-19 NOTE — PLAN
[FreeTextEntry1] : Will get EKG / lab reports \par HTN / asthma- controlled\par no C/I to sx \par sx of course is relatively high risk given the location of the mass and the obesity \par

## 2019-06-19 NOTE — HISTORY OF PRESENT ILLNESS
[Asthma] : asthma [(Patient denies any chest pain, claudication, dyspnea on exertion, orthopnea, palpitations or syncope)] : Patient denies any chest pain, claudication, dyspnea on exertion, orthopnea, palpitations or syncope [Poor (<4 METs)] : Poor (<4 METs) [Aortic Stenosis] : no aortic stenosis [Atrial Fibrillation] : no atrial fibrillation [Coronary Artery Disease] : no coronary artery disease [Recent Myocardial Infarction] : no recent myocardial infarction [Implantable Device/Pacemaker] : no implantable device/pacemaker [COPD] : no COPD [Sleep Apnea] : no sleep apnea [Smoker] : not a smoker [Family Member] : no family member with adverse anesthesia reaction/sudden death [Self] : no previous adverse anesthesia reaction [FreeTextEntry1] : removal of intracranial tumor  [FreeTextEntry2] : 6/24/19 [FreeTextEntry3] : DR Monroy

## 2019-06-19 NOTE — ASSESSMENT
[As per surgery] : as per surgery [FreeTextEntry4] : no C/I to sx based on H / P \par will review the labs / EKG

## 2019-06-20 ENCOUNTER — FORM ENCOUNTER (OUTPATIENT)
Age: 69
End: 2019-06-20

## 2019-06-20 PROBLEM — I10 ESSENTIAL (PRIMARY) HYPERTENSION: Chronic | Status: ACTIVE | Noted: 2019-06-18

## 2019-06-20 PROBLEM — H40.9 UNSPECIFIED GLAUCOMA: Chronic | Status: ACTIVE | Noted: 2019-06-18

## 2019-06-20 PROBLEM — R42 DIZZINESS AND GIDDINESS: Chronic | Status: ACTIVE | Noted: 2019-06-18

## 2019-06-20 PROBLEM — J45.909 UNSPECIFIED ASTHMA, UNCOMPLICATED: Chronic | Status: ACTIVE | Noted: 2019-06-18

## 2019-06-20 PROBLEM — R90.0: Chronic | Status: ACTIVE | Noted: 2019-06-18

## 2019-06-21 ENCOUNTER — APPOINTMENT (OUTPATIENT)
Dept: CT IMAGING | Facility: HOSPITAL | Age: 69
End: 2019-06-21

## 2019-06-21 ENCOUNTER — OUTPATIENT (OUTPATIENT)
Dept: OUTPATIENT SERVICES | Facility: HOSPITAL | Age: 69
LOS: 1 days | End: 2019-06-21
Payer: MEDICARE

## 2019-06-21 ENCOUNTER — APPOINTMENT (OUTPATIENT)
Dept: MRI IMAGING | Facility: HOSPITAL | Age: 69
End: 2019-06-21

## 2019-06-21 DIAGNOSIS — S42.409A UNSPECIFIED FRACTURE OF LOWER END OF UNSPECIFIED HUMERUS, INITIAL ENCOUNTER FOR CLOSED FRACTURE: Chronic | ICD-10-CM

## 2019-06-21 DIAGNOSIS — Z87.19 PERSONAL HISTORY OF OTHER DISEASES OF THE DIGESTIVE SYSTEM: Chronic | ICD-10-CM

## 2019-06-21 DIAGNOSIS — G93.9 DISORDER OF BRAIN, UNSPECIFIED: ICD-10-CM

## 2019-06-21 DIAGNOSIS — R90.0 INTRACRANIAL SPACE-OCCUPYING LESION FOUND ON DIAGNOSTIC IMAGING OF CENTRAL NERVOUS SYSTEM: ICD-10-CM

## 2019-06-21 DIAGNOSIS — Z87.81 PERSONAL HISTORY OF (HEALED) TRAUMATIC FRACTURE: Chronic | ICD-10-CM

## 2019-06-21 PROCEDURE — 71260 CT THORAX DX C+: CPT

## 2019-06-21 PROCEDURE — 70553 MRI BRAIN STEM W/O & W/DYE: CPT

## 2019-06-21 PROCEDURE — A9585: CPT

## 2019-06-21 PROCEDURE — 74177 CT ABD & PELVIS W/CONTRAST: CPT | Mod: 26

## 2019-06-21 PROCEDURE — 70553 MRI BRAIN STEM W/O & W/DYE: CPT | Mod: 26

## 2019-06-21 PROCEDURE — 71260 CT THORAX DX C+: CPT | Mod: 26

## 2019-06-21 PROCEDURE — 74177 CT ABD & PELVIS W/CONTRAST: CPT

## 2019-06-23 ENCOUNTER — TRANSCRIPTION ENCOUNTER (OUTPATIENT)
Age: 69
End: 2019-06-23

## 2019-06-23 VITALS — HEIGHT: 62 IN | WEIGHT: 270.07 LBS

## 2019-06-23 NOTE — PRE-ANESTHESIA EVALUATION ADULT - NSANTHPMHFT_GEN_ALL_CORE
Morbid obesity  Lightheadedness, gait disturbances leading to finding of 4th ventricle tumor  No hx of cardiac disease

## 2019-06-24 ENCOUNTER — RESULT REVIEW (OUTPATIENT)
Age: 69
End: 2019-06-24

## 2019-06-24 ENCOUNTER — APPOINTMENT (OUTPATIENT)
Dept: NEUROSURGERY | Facility: CLINIC | Age: 69
End: 2019-06-24

## 2019-06-24 ENCOUNTER — INPATIENT (INPATIENT)
Facility: HOSPITAL | Age: 69
LOS: 15 days | Discharge: NURSING, REHAB OR LONG-TERM CARE | DRG: 25 | End: 2019-07-10
Attending: NEUROLOGICAL SURGERY | Admitting: NEUROLOGICAL SURGERY
Payer: MEDICARE

## 2019-06-24 DIAGNOSIS — R90.0 INTRACRANIAL SPACE-OCCUPYING LESION FOUND ON DIAGNOSTIC IMAGING OF CENTRAL NERVOUS SYSTEM: ICD-10-CM

## 2019-06-24 DIAGNOSIS — S42.409A UNSPECIFIED FRACTURE OF LOWER END OF UNSPECIFIED HUMERUS, INITIAL ENCOUNTER FOR CLOSED FRACTURE: Chronic | ICD-10-CM

## 2019-06-24 DIAGNOSIS — Z87.81 PERSONAL HISTORY OF (HEALED) TRAUMATIC FRACTURE: Chronic | ICD-10-CM

## 2019-06-24 DIAGNOSIS — Z87.19 PERSONAL HISTORY OF OTHER DISEASES OF THE DIGESTIVE SYSTEM: Chronic | ICD-10-CM

## 2019-06-24 LAB
APTT BLD: 31.4 SEC — SIGNIFICANT CHANGE UP (ref 27.5–36.3)
GAS PNL BLDA: SIGNIFICANT CHANGE UP
HCT VFR BLD CALC: 36.1 % — SIGNIFICANT CHANGE UP (ref 34.5–45)
HGB BLD-MCNC: 11.5 G/DL — SIGNIFICANT CHANGE UP (ref 11.5–15.5)
INR BLD: 1.08 RATIO — SIGNIFICANT CHANGE UP (ref 0.88–1.16)
MCHC RBC-ENTMCNC: 27.3 PG — SIGNIFICANT CHANGE UP (ref 27–34)
MCHC RBC-ENTMCNC: 31.7 GM/DL — LOW (ref 32–36)
MCV RBC AUTO: 85.9 FL — SIGNIFICANT CHANGE UP (ref 80–100)
PLATELET # BLD AUTO: 288 K/UL — SIGNIFICANT CHANGE UP (ref 150–400)
PROTHROM AB SERPL-ACNC: 12.4 SEC — SIGNIFICANT CHANGE UP (ref 10–12.9)
RBC # BLD: 4.2 M/UL — SIGNIFICANT CHANGE UP (ref 3.8–5.2)
RBC # FLD: 12.8 % — SIGNIFICANT CHANGE UP (ref 10.3–14.5)
RH IG SCN BLD-IMP: POSITIVE — SIGNIFICANT CHANGE UP
WBC # BLD: 14.6 K/UL — HIGH (ref 3.8–10.5)
WBC # FLD AUTO: 14.6 K/UL — HIGH (ref 3.8–10.5)

## 2019-06-24 PROCEDURE — 88307 TISSUE EXAM BY PATHOLOGIST: CPT | Mod: 26

## 2019-06-24 PROCEDURE — 69990 MICROSURGERY ADD-ON: CPT | Mod: 59

## 2019-06-24 PROCEDURE — 88341 IMHCHEM/IMCYTCHM EA ADD ANTB: CPT | Mod: 26,59

## 2019-06-24 PROCEDURE — 88331 PATH CONSLTJ SURG 1 BLK 1SPC: CPT | Mod: 26

## 2019-06-24 PROCEDURE — 61781 SCAN PROC CRANIAL INTRA: CPT

## 2019-06-24 PROCEDURE — 70450 CT HEAD/BRAIN W/O DYE: CPT | Mod: 26

## 2019-06-24 PROCEDURE — 88360 TUMOR IMMUNOHISTOCHEM/MANUAL: CPT | Mod: 26

## 2019-06-24 PROCEDURE — 71045 X-RAY EXAM CHEST 1 VIEW: CPT | Mod: 26

## 2019-06-24 PROCEDURE — 61520 REMOVAL OF BRAIN LESION: CPT | Mod: 22

## 2019-06-24 PROCEDURE — 99291 CRITICAL CARE FIRST HOUR: CPT

## 2019-06-24 PROCEDURE — 70460 CT HEAD/BRAIN W/DYE: CPT | Mod: 26

## 2019-06-24 PROCEDURE — 88342 IMHCHEM/IMCYTCHM 1ST ANTB: CPT | Mod: 26,59

## 2019-06-24 PROCEDURE — 88334 PATH CONSLTJ SURG CYTO XM EA: CPT | Mod: 26,59

## 2019-06-24 RX ORDER — CHLORHEXIDINE GLUCONATE 213 G/1000ML
15 SOLUTION TOPICAL
Refills: 0 | Status: DISCONTINUED | OUTPATIENT
Start: 2019-06-24 | End: 2019-06-28

## 2019-06-24 RX ORDER — CHLORHEXIDINE GLUCONATE 213 G/1000ML
1 SOLUTION TOPICAL
Refills: 0 | Status: DISCONTINUED | OUTPATIENT
Start: 2019-06-24 | End: 2019-06-30

## 2019-06-24 RX ORDER — CEFAZOLIN SODIUM 1 G
2000 VIAL (EA) INJECTION EVERY 8 HOURS
Refills: 0 | Status: COMPLETED | OUTPATIENT
Start: 2019-06-24 | End: 2019-06-25

## 2019-06-24 RX ORDER — ONDANSETRON 8 MG/1
4 TABLET, FILM COATED ORAL EVERY 6 HOURS
Refills: 0 | Status: DISCONTINUED | OUTPATIENT
Start: 2019-06-24 | End: 2019-06-24

## 2019-06-24 RX ORDER — LATANOPROST 0.05 MG/ML
1 SOLUTION/ DROPS OPHTHALMIC; TOPICAL AT BEDTIME
Refills: 0 | Status: DISCONTINUED | OUTPATIENT
Start: 2019-06-24 | End: 2019-06-27

## 2019-06-24 RX ORDER — BRIMONIDINE TARTRATE 2 MG/MG
1 SOLUTION/ DROPS OPHTHALMIC
Refills: 0 | Status: DISCONTINUED | OUTPATIENT
Start: 2019-06-24 | End: 2019-06-27

## 2019-06-24 RX ORDER — NICARDIPINE HYDROCHLORIDE 30 MG/1
5 CAPSULE, EXTENDED RELEASE ORAL
Qty: 40 | Refills: 0 | Status: DISCONTINUED | OUTPATIENT
Start: 2019-06-24 | End: 2019-06-27

## 2019-06-24 RX ORDER — ALBUTEROL 90 UG/1
2 AEROSOL, METERED ORAL EVERY 6 HOURS
Refills: 0 | Status: DISCONTINUED | OUTPATIENT
Start: 2019-06-24 | End: 2019-07-06

## 2019-06-24 RX ORDER — AMLODIPINE BESYLATE 2.5 MG/1
10 TABLET ORAL DAILY
Refills: 0 | Status: DISCONTINUED | OUTPATIENT
Start: 2019-06-24 | End: 2019-06-25

## 2019-06-24 RX ORDER — SODIUM CHLORIDE 9 MG/ML
1000 INJECTION INTRAMUSCULAR; INTRAVENOUS; SUBCUTANEOUS
Refills: 0 | Status: DISCONTINUED | OUTPATIENT
Start: 2019-06-24 | End: 2019-06-26

## 2019-06-24 RX ORDER — SODIUM CHLORIDE 9 MG/ML
3 INJECTION INTRAMUSCULAR; INTRAVENOUS; SUBCUTANEOUS EVERY 8 HOURS
Refills: 0 | Status: DISCONTINUED | OUTPATIENT
Start: 2019-06-24 | End: 2019-06-24

## 2019-06-24 RX ORDER — DEXAMETHASONE 0.5 MG/5ML
6 ELIXIR ORAL EVERY 6 HOURS
Refills: 0 | Status: DISCONTINUED | OUTPATIENT
Start: 2019-06-24 | End: 2019-06-27

## 2019-06-24 RX ORDER — CALCIUM GLUCONATE 100 MG/ML
2 VIAL (ML) INTRAVENOUS ONCE
Refills: 0 | Status: COMPLETED | OUTPATIENT
Start: 2019-06-24 | End: 2019-06-25

## 2019-06-24 RX ORDER — DEXMEDETOMIDINE HYDROCHLORIDE IN 0.9% SODIUM CHLORIDE 4 UG/ML
0.2 INJECTION INTRAVENOUS
Qty: 200 | Refills: 0 | Status: DISCONTINUED | OUTPATIENT
Start: 2019-06-24 | End: 2019-06-27

## 2019-06-24 RX ORDER — CHLORHEXIDINE GLUCONATE 213 G/1000ML
1 SOLUTION TOPICAL ONCE
Refills: 0 | Status: COMPLETED | OUTPATIENT
Start: 2019-06-24 | End: 2019-06-24

## 2019-06-24 RX ORDER — ASPIRIN/CALCIUM CARB/MAGNESIUM 324 MG
1 TABLET ORAL
Qty: 0 | Refills: 0 | DISCHARGE

## 2019-06-24 RX ORDER — LIDOCAINE HCL 20 MG/ML
0.2 VIAL (ML) INJECTION ONCE
Refills: 0 | Status: COMPLETED | OUTPATIENT
Start: 2019-06-24 | End: 2019-06-24

## 2019-06-24 RX ORDER — PREDNISOLONE SODIUM PHOSPHATE 1 %
1 DROPS OPHTHALMIC (EYE)
Refills: 0 | Status: DISCONTINUED | OUTPATIENT
Start: 2019-06-24 | End: 2019-07-02

## 2019-06-24 RX ADMIN — Medication 0.2 MILLILITER(S): at 07:18

## 2019-06-24 RX ADMIN — Medication 100 MILLIGRAM(S): at 21:38

## 2019-06-24 RX ADMIN — LATANOPROST 1 DROP(S): 0.05 SOLUTION/ DROPS OPHTHALMIC; TOPICAL at 21:37

## 2019-06-24 RX ADMIN — CHLORHEXIDINE GLUCONATE 1 APPLICATION(S): 213 SOLUTION TOPICAL at 21:39

## 2019-06-24 RX ADMIN — SODIUM CHLORIDE 75 MILLILITER(S): 9 INJECTION INTRAMUSCULAR; INTRAVENOUS; SUBCUTANEOUS at 21:38

## 2019-06-24 RX ADMIN — SODIUM CHLORIDE 3 MILLILITER(S): 9 INJECTION INTRAMUSCULAR; INTRAVENOUS; SUBCUTANEOUS at 07:18

## 2019-06-24 RX ADMIN — NICARDIPINE HYDROCHLORIDE 25 MG/HR: 30 CAPSULE, EXTENDED RELEASE ORAL at 21:38

## 2019-06-24 NOTE — PROVIDER CONTACT NOTE (OTHER) - ASSESSMENT
Patient from OR with erythema and blister on chin from positioning support. Patient also has swollen tongue, blister on the underside of tongue and bloody gums.

## 2019-06-24 NOTE — HISTORY OF PRESENT ILLNESS
[Unknown] : unknown [FreeTextEntry1] : Intraventricular mass [de-identified] : 69 year old black female who presents with a history of lightheadedness and dizziness for two to three months.  At the time she was under the care  of an ophthalmologist for treatment of glaucoma and medications were being adjusted.  In addition to dizziness, she reports recent balances disturbances.  She denies any other symptoms and reports no vomiting, headaches, tinnitus, no blurry vision, eye pressure or weakness of her extremities.   The dizziness was progressing and her PCP prescribed an MRI of the brain.  The MRI of the brain shows a fourth ventricular mass.  She is here to discuss treatment options and recommendations.

## 2019-06-24 NOTE — PROGRESS NOTE ADULT - ASSESSMENT
----- Message from Carri Pacheco MD sent at 11/28/2018  7:02 PM CST -----  Please let patient know that MRI showed very minimal arthritic changes otherwise normal MRI. If patient still having pain, recommend physical therapy     ASSESSMENT/PLAN:     NEURO:  CT Head in AM, MRI post-op, Surgical drains per NSGY, Pain control  Activity: [] OOB as tolerated [] Bedrest [] PT [] OT [] PMNR    PULM:  Incentive spirometry, mobilize as tolerated    CV:  -150mmHg, d/c a-line in AM    RENAL:  IVF until good PO intake, d/c purdy in AM    GI:  Diet: Dysphagia screen and then advance diet as tolerated  GI prophylaxis [] not indicated [] PPI [] other:  Bowel regimen [] colace [] senna [] other:    ENDO:   Goal euglycemia (-180)    HEME/ONC:  VTE prophylaxis: [] SCDs [] chemoprophylaxis [] hold chemoprophylaxis due to: [] high risk of DVT/PE on admission due to:    ID:  Vikki-op antibiotics    MISC:    SOCIAL/FAMILY:  [] awaiting [] updated at bedside [] family meeting    CODE STATUS:  [] Full Code [] DNR [] DNI [] Palliative/Comfort Care    DISPOSITION:  [] ICU [] Stroke Unit [] Floor [] EMU [] RCU [] PCU    [] Patient is at high risk of neurologic deterioration/death due to:     Time seen:  Time spent: ___ [] critical care minutes    Contact: [phone] ASSESSMENT/PLAN: 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 Monroy  Continue BP meds    RENAL:  IVF for now    GI:  Diet: NPO for possible extubation  GI prophylaxis [] not indicated [x] PPI: vented [] other:  Bowel regimen [] colace [] senna [] other:    ENDO:   Goal euglycemia (-180)    HEME/ONC:  VTE prophylaxis: [x] SCDs [] chemoprophylaxis [x] hold chemoprophylaxis due to: fresh post-op [x] high risk of DVT/PE on admission due to: brain tumour    ID:  Vikki-op antibiotics    MISC:  Ophtho: glaucoma - continue eye drops    SOCIAL/FAMILY:  [x] awaiting [] updated at bedside [] family meeting    CODE STATUS:  [x] Full Code [] DNR [] DNI [] Palliative/Comfort Care    DISPOSITION:  [x] ICU [] Stroke Unit [] Floor [] EMU [] RCU [] PCU    [x] Patient is at high risk of neurologic deterioration/death due to: brain edema, brain compression    Time spent: 30 [x] critical care minutes    Contact: [phone] ASSESSMENT/PLAN: 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 Monroy  Continue BP meds    RENAL:  IVF for now    GI:  Diet: NPO for possible extubation  GI prophylaxis [] not indicated [x] PPI: vented [] other:  Bowel regimen [] colace [] senna [] other:    ENDO:   Goal euglycemia (-180)    HEME/ONC:  VTE prophylaxis: [x] SCDs [] chemoprophylaxis [x] hold chemoprophylaxis due to: fresh post-op [x] high risk of DVT/PE on admission due to: brain tumour    ID:  Vikki-op antibiotics    MISC:  Ophtho: glaucoma - continue eye drops    SOCIAL/FAMILY:  [x] awaiting [] updated at bedside [] family meeting    CODE STATUS:  [x] Full Code [] DNR [] DNI [] Palliative/Comfort Care    DISPOSITION:  [x] ICU [] Stroke Unit [] Floor [] EMU [] RCU [] PCU    [x] Patient is at high risk of neurologic deterioration/death due to: brain edema, brain compression    Time spent: 30 [x] critical care minutes    Contact: [phone]

## 2019-06-24 NOTE — PROVIDER CONTACT NOTE (OTHER) - REASON
Patient from OR with erythema and blister on chin from positioning support. Patient also has swollen tongue, blister on the underside of tongue and bloody gums. Patient also with skin tear from tape removal on right shoulder.

## 2019-06-24 NOTE — PHYSICAL EXAM
[General Appearance - Alert] : alert [General Appearance - In No Acute Distress] : in no acute distress [General Appearance - Well Nourished] : well nourished [General Appearance - Well Developed] : well developed [Oriented To Time, Place, And Person] : oriented to person, place, and time [Impaired Insight] : insight and judgment were intact [Affect] : the affect was normal [Mood] : the mood was normal [Person] : oriented to person [Place] : oriented to place [Time] : oriented to time [Short Term Intact] : short term memory intact [Remote Intact] : remote memory intact [Registration Intact] : recent registration memory intact [Cranial Nerves Oculomotor (III)] : extraocular motion intact [Cranial Nerves Accessory (XI - Cranial And Spinal)] : head turning and shoulder shrug symmetric [Cranial Nerves Hypoglossal (XII)] : there was no tongue deviation with protrusion [Motor Tone] : muscle tone was normal in all four extremities [Motor Strength] : muscle strength was normal in all four extremities [Involuntary Movements] : no involuntary movements were seen [Motor Handedness Right-Handed] : the patient is right hand dominant [Sensation Tactile Decrease] : light touch was intact [Limited Balance] : the patient's balance was impaired [Coordination - Dysmetria Impaired Finger-to-Nose Bilateral] : present bilaterally [No Visual Abnormalities] : no visible abnormailities [Normal] : normal [Sclera] : the sclera and conjunctiva were normal [Outer Ear] : the ears and nose were normal in appearance [Neck Appearance] : the appearance of the neck was normal [] : no respiratory distress [Abnormal Walk] : normal gait [Skin Color & Pigmentation] : normal skin color and pigmentation [Tremor] : no tremor present [FreeTextEntry8] : Unable to walk on toes

## 2019-06-24 NOTE — REASON FOR VISIT
[New Patient Visit] : a new patient visit [Referred By: _________] : Patient was referred by EVELYN [Spouse] : spouse [FreeTextEntry1] : Fourth intraventricular mass

## 2019-06-24 NOTE — ASSESSMENT
[FreeTextEntry1] : 2019\par \par \par \par Re:	Nataliia Aparicio \par :	48\par \par I saw this 69 year old female who was diagnosed with a fourth ventricular tumor and is referred for evaluation.  Ms. Aparicio presented with a recent history of lightheadedness, vertigo and some unstable gait and the primary care ordered a brain MRI, which showed a posterior fossa mass and therefore she is referred for evaluation.  She does not have any other complaints including no double vision, no incontinence and she mentioned that she might have a little memory loss; however, the memory overall is okay. \par \par Past medical history is positive for asthma and hypertension under treatment.  There is no other significant past medical history.  \par \par Details of review of systems and social history, family history and medication is in [employer]. \par \par Neurological exam reveals a patient who is obese, oriented x3.  Cranial nerves:  No deficits.  Visual fields are full to confrontation.  Sensory and motor exam of the extremities is grossly unremarkable.  The gait is overall stable.  However, she mentions that she feels that she is bearing on one side and she might fall, although she has never fallen. \par \par Imaging:  I reviewed in detail the recent brain MRI showing a 2 to 3 cm fourth ventricular mass with heterogeneous enhancement and an encasement of the branches or PICA bilaterally.  The tumor does not have any significant swelling around it.  There is a slight hydrocephalus, which might be due to obstruction with some transependymal resorption of CSF visible on FLARE imaging.  The differential diagnoses are ependymoma, subependymoma, choroid meningioma or a choroid plexus papilloma.  There is also a possibility for a brain metastasis.  I do not feel that any other diagnosis is likely.  \par \par Recommendations:  This 69-year-old woman presents with some relatively minor symptoms; however, presents with a significant lesion in the fourth ventricle with the differential diagnosis discussed above.  I think for the sake of completeness, we need to do an abdominal and pelvis CT scan to rule out the possibility of a metastasis.  I will discuss her case at our Tumor Board tomorrow to get the opinion of the group regarding the management of this lesion.  I do not think that any of our radiation oncologist colleagues would be in favor of direct radiation for this tumor.  A needle biopsy is risky at this location.  The options would basically be observation versus surgical removal.  Considering the size of this mass, the latter is not unreasonable.  However, I will also discuss at our Tumor Board and after finalization of the chest, abdomen, and pelvis CT scan, we will make a determination as to the next step for management.  \par \par Thanks so much for you allowing me to contribute to Nataliia’s care.\par \par Sincerely,\par \par \par \par Joelle Monroy MD, FACS\par \par Addendum:\par \par Tumor board is in favor of surgical removal of the lesion. We will get CT body and plan resection.\par \par Assess:\par Fourth ventricular mass enhancing with transpendomal absorption \par Lightheadedness and dizziness\par \par PLAN:\par Discussed surgical options \par Risks of removal of mass discussed and understood\par Due to her limited symptoms, images will be discussed at tumor board and then treatment options will be reviewed with patient  \par

## 2019-06-24 NOTE — PROGRESS NOTE ADULT - SUBJECTIVE AND OBJECTIVE BOX
SUMMARY:  69 year-old right handed morbidly obese woman with a history of asthma, glaucoma, hypertension and osteoarthritis who presented with three months of light headedness, dizziness and balance disturbance and was found to have posterior fossa mass/fourth ventricular mass for which she underwent stereotatic suboccipital craniotomy for resection on 06/24/19    HOSPITAL COURSE:  6/24: suboccipital craniectomy for mass resection; left intubated given extensive floor of fourth ventricle work; admitted to [facility]    24 HOUR EVENTS:   Left intubated due to possible lower cranial neuropathies due to intra-operative manipulation. Paralytic not reversed.     ALLERGIES: Allergies  No Known Drug Allergies  Shellfish (Angioedema; Rash)    VITALS/DATA/ORDERS: [x] Reviewed    DEVICES:   [] Restraints [] PIVs [] ET tube [] central line [] PICC [] arterial line [] purdy [] NGT/OGT [] EVD [] LD [] JONAH/HMV [] LiCOX [] ICP monitor [] Trach [] PEG [] Chest Tube [] other:    EXAMINATION:  General: No acute distress  HEENT: Anicteric sclerae  Cardiac: [id number]  Lungs: Clear  Abdomen: Soft, non-tender, +BS  Extremities: No c/c/e  Skin/Incision Site: Clean, dry and intact  Neurologic: SUMMARY:  69 year-old right handed morbidly obese woman with a history of asthma, glaucoma, hypertension and osteoarthritis who presented with three months of light headedness, dizziness and balance disturbance and was found to have posterior fossa mass/fourth ventricular mass for which she underwent stereotatic suboccipital craniotomy for resection on 06/24/19    HOSPITAL COURSE:  6/24: suboccipital craniectomy for mass resection; left intubated given extensive floor of fourth ventricle work; admitted to [facility]    24 HOUR EVENTS:   Frozen: subependymoma. Left intubated due to possible lower cranial neuropathies due to intra-operative manipulation. Paralytic not reversed.     ALLERGIES: Allergies  No Known Drug Allergies  Shellfish (Angioedema; Rash)    VITALS/DATA/ORDERS: [x] Reviewed    EXAMINATION:  General: No acute distress  HEENT: Anicteric sclerae  Cardiac: [id number]  Lungs: Clear  Abdomen: Soft, non-tender, +BS  Extremities: No c/c/e  Skin/Incision Site: Clean, dry and intact  Neurologic: SUMMARY:  69 year-old right handed morbidly obese woman with a history of asthma, glaucoma, hypertension and osteoarthritis who presented with three months of light headedness, dizziness and balance disturbance and was found to have posterior fossa mass/fourth ventricular mass for which she underwent stereotatic suboccipital craniotomy for resection on 06/24/19    HOSPITAL COURSE:  6/24: suboccipital craniectomy for mass resection; left intubated given extensive floor of fourth ventricle work; admitted to [facility]    24 HOUR EVENTS:   Frozen: subependymoma. Left intubated due to possible lower cranial neuropathies due to intra-operative manipulation. Paralytic not reversed.     ALLERGIES: Allergies  No Known Drug Allergies  Shellfish (Angioedema; Rash)    VITALS/DATA/ORDERS: [x] Reviewed    EXAMINATION:  General: No acute distress  HEENT: Anicteric sclerae  Cardiac: [id number]  Lungs: Clear  Abdomen: Soft, non-tender, +BS  Extremities: No c/c/e  Skin/Incision Site: Clean, dry and intact  Neurologic: Follows on the right only, PERRL, moves all extremities antigravity arms>legs SUMMARY:  69 year-old right handed morbidly obese woman with a history of asthma, glaucoma, hypertension and osteoarthritis who presented with three months of light headedness, dizziness and balance disturbance and was found to have posterior fossa mass/fourth ventricular mass for which she underwent stereotatic suboccipital craniotomy for resection on 06/24/19    HOSPITAL COURSE:  6/24: suboccipital craniectomy for mass resection; left intubated given extensive floor of fourth ventricle work; admitted to [facility]    24 HOUR EVENTS:   Frozen: subependymoma. Left intubated due to possible lower cranial neuropathies due to intra-operative manipulation. Paralytic not reversed.     ALLERGIES: Allergies  No Known Drug Allergies  Shellfish (Angioedema; Rash)    VITALS/DATA/ORDERS: [x] Reviewed    EXAMINATION:  General: No acute distress  HEENT: Anicteric sclerae  Cardiac: [id number]  Lungs: Clear  Abdomen: Soft, non-tender, +BS  Extremities: No c/c/e  Skin/Incision Site: Clean, dry and intact  Neurologic: Follows, PERRL, +cough/gag, at times apneic but usually breathes above vent set rate, moves all extremities antigravity arms>legs

## 2019-06-25 LAB
ANION GAP SERPL CALC-SCNC: 12 MMOL/L — SIGNIFICANT CHANGE UP (ref 5–17)
ANION GAP SERPL CALC-SCNC: 14 MMOL/L — SIGNIFICANT CHANGE UP (ref 5–17)
BUN SERPL-MCNC: 10 MG/DL — SIGNIFICANT CHANGE UP (ref 7–23)
BUN SERPL-MCNC: 8 MG/DL — SIGNIFICANT CHANGE UP (ref 7–23)
CALCIUM SERPL-MCNC: 8.4 MG/DL — SIGNIFICANT CHANGE UP (ref 8.4–10.5)
CALCIUM SERPL-MCNC: 8.5 MG/DL — SIGNIFICANT CHANGE UP (ref 8.4–10.5)
CHLORIDE SERPL-SCNC: 104 MMOL/L — SIGNIFICANT CHANGE UP (ref 96–108)
CHLORIDE SERPL-SCNC: 104 MMOL/L — SIGNIFICANT CHANGE UP (ref 96–108)
CO2 SERPL-SCNC: 20 MMOL/L — LOW (ref 22–31)
CO2 SERPL-SCNC: 20 MMOL/L — LOW (ref 22–31)
CREAT SERPL-MCNC: 0.6 MG/DL — SIGNIFICANT CHANGE UP (ref 0.5–1.3)
CREAT SERPL-MCNC: 0.6 MG/DL — SIGNIFICANT CHANGE UP (ref 0.5–1.3)
GAS PNL BLDA: SIGNIFICANT CHANGE UP
GLUCOSE SERPL-MCNC: 170 MG/DL — HIGH (ref 70–99)
GLUCOSE SERPL-MCNC: 195 MG/DL — HIGH (ref 70–99)
HCT VFR BLD CALC: 34.7 % — SIGNIFICANT CHANGE UP (ref 34.5–45)
HGB BLD-MCNC: 11.4 G/DL — LOW (ref 11.5–15.5)
MAGNESIUM SERPL-MCNC: 1.7 MG/DL — SIGNIFICANT CHANGE UP (ref 1.6–2.6)
MAGNESIUM SERPL-MCNC: 2.3 MG/DL — SIGNIFICANT CHANGE UP (ref 1.6–2.6)
MCHC RBC-ENTMCNC: 28.2 PG — SIGNIFICANT CHANGE UP (ref 27–34)
MCHC RBC-ENTMCNC: 32.7 GM/DL — SIGNIFICANT CHANGE UP (ref 32–36)
MCV RBC AUTO: 86.2 FL — SIGNIFICANT CHANGE UP (ref 80–100)
PHOSPHATE SERPL-MCNC: 2.5 MG/DL — SIGNIFICANT CHANGE UP (ref 2.5–4.5)
PHOSPHATE SERPL-MCNC: 3.7 MG/DL — SIGNIFICANT CHANGE UP (ref 2.5–4.5)
PLATELET # BLD AUTO: 263 K/UL — SIGNIFICANT CHANGE UP (ref 150–400)
POTASSIUM SERPL-MCNC: 3.7 MMOL/L — SIGNIFICANT CHANGE UP (ref 3.5–5.3)
POTASSIUM SERPL-MCNC: 4 MMOL/L — SIGNIFICANT CHANGE UP (ref 3.5–5.3)
POTASSIUM SERPL-SCNC: 3.7 MMOL/L — SIGNIFICANT CHANGE UP (ref 3.5–5.3)
POTASSIUM SERPL-SCNC: 4 MMOL/L — SIGNIFICANT CHANGE UP (ref 3.5–5.3)
RBC # BLD: 4.03 M/UL — SIGNIFICANT CHANGE UP (ref 3.8–5.2)
RBC # FLD: 12.8 % — SIGNIFICANT CHANGE UP (ref 10.3–14.5)
SODIUM SERPL-SCNC: 136 MMOL/L — SIGNIFICANT CHANGE UP (ref 135–145)
SODIUM SERPL-SCNC: 138 MMOL/L — SIGNIFICANT CHANGE UP (ref 135–145)
WBC # BLD: 17 K/UL — HIGH (ref 3.8–10.5)
WBC # FLD AUTO: 17 K/UL — HIGH (ref 3.8–10.5)

## 2019-06-25 PROCEDURE — 73610 X-RAY EXAM OF ANKLE: CPT | Mod: 26,RT

## 2019-06-25 PROCEDURE — 99292 CRITICAL CARE ADDL 30 MIN: CPT

## 2019-06-25 PROCEDURE — 31575 DIAGNOSTIC LARYNGOSCOPY: CPT

## 2019-06-25 PROCEDURE — 99222 1ST HOSP IP/OBS MODERATE 55: CPT | Mod: 25

## 2019-06-25 PROCEDURE — 73080 X-RAY EXAM OF ELBOW: CPT | Mod: 26,LT

## 2019-06-25 PROCEDURE — 70450 CT HEAD/BRAIN W/O DYE: CPT | Mod: 26

## 2019-06-25 PROCEDURE — 99291 CRITICAL CARE FIRST HOUR: CPT

## 2019-06-25 RX ORDER — MAGNESIUM SULFATE 500 MG/ML
2 VIAL (ML) INJECTION ONCE
Refills: 0 | Status: COMPLETED | OUTPATIENT
Start: 2019-06-25 | End: 2019-06-25

## 2019-06-25 RX ORDER — AMLODIPINE BESYLATE 2.5 MG/1
10 TABLET ORAL DAILY
Refills: 0 | Status: DISCONTINUED | OUTPATIENT
Start: 2019-06-25 | End: 2019-07-08

## 2019-06-25 RX ORDER — LOSARTAN POTASSIUM 100 MG/1
25 TABLET, FILM COATED ORAL DAILY
Refills: 0 | Status: DISCONTINUED | OUTPATIENT
Start: 2019-06-25 | End: 2019-07-09

## 2019-06-25 RX ORDER — POTASSIUM CHLORIDE 20 MEQ
40 PACKET (EA) ORAL ONCE
Refills: 0 | Status: COMPLETED | OUTPATIENT
Start: 2019-06-25 | End: 2019-06-25

## 2019-06-25 RX ORDER — ENOXAPARIN SODIUM 100 MG/ML
30 INJECTION SUBCUTANEOUS
Refills: 0 | Status: DISCONTINUED | OUTPATIENT
Start: 2019-06-25 | End: 2019-06-26

## 2019-06-25 RX ORDER — PANTOPRAZOLE SODIUM 20 MG/1
40 TABLET, DELAYED RELEASE ORAL DAILY
Refills: 0 | Status: DISCONTINUED | OUTPATIENT
Start: 2019-06-25 | End: 2019-06-29

## 2019-06-25 RX ORDER — ACETAMINOPHEN 500 MG
650 TABLET ORAL EVERY 6 HOURS
Refills: 0 | Status: DISCONTINUED | OUTPATIENT
Start: 2019-06-25 | End: 2019-06-26

## 2019-06-25 RX ORDER — ENOXAPARIN SODIUM 100 MG/ML
40 INJECTION SUBCUTANEOUS
Refills: 0 | Status: DISCONTINUED | OUTPATIENT
Start: 2019-06-25 | End: 2019-06-25

## 2019-06-25 RX ADMIN — Medication 50 GRAM(S): at 00:56

## 2019-06-25 RX ADMIN — BRIMONIDINE TARTRATE 1 DROP(S): 2 SOLUTION/ DROPS OPHTHALMIC at 17:22

## 2019-06-25 RX ADMIN — Medication 1 DROP(S): at 23:10

## 2019-06-25 RX ADMIN — Medication 6 MILLIGRAM(S): at 23:09

## 2019-06-25 RX ADMIN — BRIMONIDINE TARTRATE 1 DROP(S): 2 SOLUTION/ DROPS OPHTHALMIC at 05:16

## 2019-06-25 RX ADMIN — Medication 100 MILLIGRAM(S): at 05:17

## 2019-06-25 RX ADMIN — Medication 40 MILLIEQUIVALENT(S): at 00:55

## 2019-06-25 RX ADMIN — Medication 6 MILLIGRAM(S): at 12:24

## 2019-06-25 RX ADMIN — Medication 650 MILLIGRAM(S): at 23:09

## 2019-06-25 RX ADMIN — DEXMEDETOMIDINE HYDROCHLORIDE IN 0.9% SODIUM CHLORIDE 6.12 MICROGRAM(S)/KG/HR: 4 INJECTION INTRAVENOUS at 05:18

## 2019-06-25 RX ADMIN — DEXMEDETOMIDINE HYDROCHLORIDE IN 0.9% SODIUM CHLORIDE 6.12 MICROGRAM(S)/KG/HR: 4 INJECTION INTRAVENOUS at 00:58

## 2019-06-25 RX ADMIN — Medication 1 DROP(S): at 17:22

## 2019-06-25 RX ADMIN — Medication 100 MILLIGRAM(S): at 14:56

## 2019-06-25 RX ADMIN — Medication 6 MILLIGRAM(S): at 17:22

## 2019-06-25 RX ADMIN — NICARDIPINE HYDROCHLORIDE 25 MG/HR: 30 CAPSULE, EXTENDED RELEASE ORAL at 21:11

## 2019-06-25 RX ADMIN — Medication 1 DROP(S): at 14:56

## 2019-06-25 RX ADMIN — ENOXAPARIN SODIUM 30 MILLIGRAM(S): 100 INJECTION SUBCUTANEOUS at 17:23

## 2019-06-25 RX ADMIN — Medication 1 DROP(S): at 05:16

## 2019-06-25 RX ADMIN — Medication 200 GRAM(S): at 01:15

## 2019-06-25 RX ADMIN — SODIUM CHLORIDE 75 MILLILITER(S): 9 INJECTION INTRAMUSCULAR; INTRAVENOUS; SUBCUTANEOUS at 21:12

## 2019-06-25 RX ADMIN — Medication 6 MILLIGRAM(S): at 05:17

## 2019-06-25 RX ADMIN — CHLORHEXIDINE GLUCONATE 1 APPLICATION(S): 213 SOLUTION TOPICAL at 21:12

## 2019-06-25 RX ADMIN — Medication 6 MILLIGRAM(S): at 00:31

## 2019-06-25 RX ADMIN — CHLORHEXIDINE GLUCONATE 15 MILLILITER(S): 213 SOLUTION TOPICAL at 17:22

## 2019-06-25 RX ADMIN — Medication 1 DROP(S): at 00:30

## 2019-06-25 RX ADMIN — LATANOPROST 1 DROP(S): 0.05 SOLUTION/ DROPS OPHTHALMIC; TOPICAL at 21:12

## 2019-06-25 RX ADMIN — CHLORHEXIDINE GLUCONATE 15 MILLILITER(S): 213 SOLUTION TOPICAL at 05:17

## 2019-06-25 RX ADMIN — NICARDIPINE HYDROCHLORIDE 25 MG/HR: 30 CAPSULE, EXTENDED RELEASE ORAL at 12:48

## 2019-06-25 NOTE — PROGRESS NOTE ADULT - SUBJECTIVE AND OBJECTIVE BOX
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 (18 Jun 2019 18:39)    OVERNIGHT EVENTS:   Significant facial/tongue swelling/edema from OR positioning    VITALS:  T(C): , Max: 37.3 (06-24-19 @ 19:00)  HR:  (56 - 96)  BP: --  ABP:  (123/46 - 205/91)  RR:  (14 - 30)  SpO2:  (100% - 100%)  Wt(kg): --  Device: Avea, Mode: AC/ CMV (Assist Control/ Continuous Mandatory Ventilation), RR (machine): 14, TV (machine): 500, FiO2: 40, PEEP: 5, ITime: 1, MAP: 10, PIP: 22    06-24-19 @ 07:01  -  06-25-19 @ 07:00  --------------------------------------------------------  IN: 1576.7 mL / OUT: 805 mL / NET: 771.7 mL    06-25-19 @ 07:01  -  06-25-19 @ 09:51  --------------------------------------------------------  IN: 200 mL / OUT: 100 mL / NET: 100 mL      LABS:  Na: 138 (06-24 @ 23:37)  K: 3.7 (06-24 @ 23:37)  Cl: 104 (06-24 @ 23:37)  CO2: 20 (06-24 @ 23:37)  BUN: 8 (06-24 @ 23:37)  Cr: 0.60 (06-24 @ 23:37)  Glu: 195(06-24 @ 23:37)    Hgb: 11.5 (06-24 @ 23:37)  Hct: 36.1 (06-24 @ 23:37)  WBC: 14.6 (06-24 @ 23:37)  Plt: 288 (06-24 @ 23:37)    INR: 1.08 06-24-19 @ 06:42  PTT: 31.4 06-24-19 @ 06:42    IMAGING:   Recent imaging studies were reviewed.    MEDICATIONS:  ALBUTerol    90 MICROgram(s) HFA Inhaler 2 Puff(s) Inhalation every 6 hours PRN  amLODIPine   Tablet 10 milliGRAM(s) Oral daily  brimonidine 0.2% Ophthalmic Solution 1 Drop(s) Both EYES two times a day  ceFAZolin   IVPB 2000 milliGRAM(s) IV Intermittent every 8 hours  chlorhexidine 0.12% Liquid 15 milliLiter(s) Oral Mucosa two times a day  chlorhexidine 4% Liquid 1 Application(s) Topical <User Schedule>  dexamethasone  Injectable 6 milliGRAM(s) IV Push every 6 hours  dexmedetomidine Infusion 0.2 MICROgram(s)/kG/Hr IV Continuous <Continuous>  latanoprost 0.005% Ophthalmic Solution 1 Drop(s) Both EYES at bedtime  niCARdipine Infusion 5 mG/Hr IV Continuous <Continuous>  prednisoLONE acetate 1% Suspension 1 Drop(s) Both EYES four times a day  sodium chloride 0.9%. 1000 milliLiter(s) IV Continuous <Continuous>    EXAMINATION:  General:  calm  HEENT:  MMM  Neuro:  lethargic, opens eyes to voice, FC, CONTRERAS, bilateral 6th nerve palsies, PERRL  Cards:  RRR  Respiratory:  no respiratory distress  Adomen:  soft  Extremities:  no edema  Skin:  warm/dry

## 2019-06-25 NOTE — PROGRESS NOTE ADULT - ASSESSMENT
Brain tumor status post resection, post-operative day 1  - MRI in AM  - Wean to extubate as tongue swelling lessens  - -150mmHg  - Steroids for cerebral edema  - eye drops for glaucoma  - wound care    30 minutes critical care time

## 2019-06-25 NOTE — PROGRESS NOTE ADULT - ASSESSMENT
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    NEURO:  CT head today with b/l IVH  MRI post-op  Sedation: precedex PRN  Pain control  Steroids for cerebral edema  Activity: [] OOB as tolerated [x] Bedrest for now [] PT [] OT [] PMNR    PULM:  Pressure support trials and attempt to wean  VAP bundle  Aspiration precautions  CXR, ABG  Nebs PRN    CV:  -150mmHg  Continue BP meds    RENAL:  NS @ 75    GI:  Diet: keep NPO though cannot extubate right now  GI prophylaxis [] not indicated [x] PPI: vented [] other:  Bowel regimen [] colace [] senna [] other:    ENDO:   Goal euglycemia (-180)    HEME/ONC:  VTE prophylaxis: [x] SCDs [] chemoprophylaxis [x] hold chemoprophylaxis due to: new IVH [x] high risk of DVT/PE on admission due to: brain tumour    ID:  Vikki-op antibiotics    MISC:  Ophtho: glaucoma - continue eye drops    SOCIAL/FAMILY:  [x] awaiting [] updated at bedside [] family meeting    CODE STATUS:  [x] Full Code [] DNR [] DNI [] Palliative/Comfort Care    DISPOSITION:  [x] ICU [] Stroke Unit [] Floor [] EMU [] RCU [] PCU    [x] Patient is at high risk of neurologic deterioration/death due to: brain edema, brain compression    Time spent: 45 [x] critical care minutes    Contact: [phone]

## 2019-06-25 NOTE — PROGRESS NOTE ADULT - SUBJECTIVE AND OBJECTIVE BOX
New b/l CN VI palsies today. IVH on CT.     Intubated, PERRL, b/l CN VI palsies, follows commands, moves all limbs symmetrically and strongly, tongue swollen, lower gum line soft tissue swelling, blistering on chin

## 2019-06-25 NOTE — CHART NOTE - NSCHARTNOTEFT_GEN_A_CORE
Wound Care Service Note:    RN request for wound care consult for stage 1 pressure injury received. Request referred back to the unit based [facility].    Priya Coburn, RODRIGO-C, CWOCN

## 2019-06-25 NOTE — PROGRESS NOTE ADULT - SUBJECTIVE AND OBJECTIVE BOX
Visit Summary: Patient seen and evaluated at bedside.    Overnight Events: attempting to wean ET tube    Exam:  T(C): 36.8 (06-24-19 @ 23:00), Max: 37.3 (06-24-19 @ 19:00)  HR: 59 (06-25-19 @ 00:09) (59 - 96)  BP: 128/80 (06-24-19 @ 06:52) (128/80 - 128/80)  RR: 15 (06-25-19 @ 00:00) (14 - 30)  SpO2: 100% (06-25-19 @ 00:09) (97% - 100%)  Wt(kg): --    CONTRERAS antigravity  FC RUE  +cough/gag  PERRL  Tracks                         11.5   14.6  )-----------( 288      ( 24 Jun 2019 23:37 )             36.1     06-24    138  |  104  |  8   ----------------------------<  195<H>  3.7   |  20<L>  |  0.60    Ca    8.4      24 Jun 2019 23:37  Phos  3.7     06-24  Mg     1.7     06-24    PT/INR - ( 24 Jun 2019 06:42 )   PT: 12.4 sec;   INR: 1.08 ratio         PTT - ( 24 Jun 2019 06:42 )  PTT:31.4 sec

## 2019-06-26 LAB
ANION GAP SERPL CALC-SCNC: 12 MMOL/L — SIGNIFICANT CHANGE UP (ref 5–17)
BUN SERPL-MCNC: 12 MG/DL — SIGNIFICANT CHANGE UP (ref 7–23)
CALCIUM SERPL-MCNC: 9 MG/DL — SIGNIFICANT CHANGE UP (ref 8.4–10.5)
CHLORIDE SERPL-SCNC: 106 MMOL/L — SIGNIFICANT CHANGE UP (ref 96–108)
CO2 SERPL-SCNC: 24 MMOL/L — SIGNIFICANT CHANGE UP (ref 22–31)
CREAT SERPL-MCNC: 0.59 MG/DL — SIGNIFICANT CHANGE UP (ref 0.5–1.3)
GAS PNL BLDA: SIGNIFICANT CHANGE UP
GLUCOSE BLDC GLUCOMTR-MCNC: 149 MG/DL — HIGH (ref 70–99)
GLUCOSE BLDC GLUCOMTR-MCNC: 158 MG/DL — HIGH (ref 70–99)
GLUCOSE SERPL-MCNC: 172 MG/DL — HIGH (ref 70–99)
MAGNESIUM SERPL-MCNC: 2.3 MG/DL — SIGNIFICANT CHANGE UP (ref 1.6–2.6)
PHOSPHATE SERPL-MCNC: 2.1 MG/DL — LOW (ref 2.5–4.5)
POTASSIUM SERPL-MCNC: 3.9 MMOL/L — SIGNIFICANT CHANGE UP (ref 3.5–5.3)
POTASSIUM SERPL-SCNC: 3.9 MMOL/L — SIGNIFICANT CHANGE UP (ref 3.5–5.3)
SODIUM SERPL-SCNC: 142 MMOL/L — SIGNIFICANT CHANGE UP (ref 135–145)

## 2019-06-26 PROCEDURE — 99292 CRITICAL CARE ADDL 30 MIN: CPT

## 2019-06-26 PROCEDURE — 99291 CRITICAL CARE FIRST HOUR: CPT

## 2019-06-26 PROCEDURE — 71045 X-RAY EXAM CHEST 1 VIEW: CPT | Mod: 26

## 2019-06-26 PROCEDURE — 70450 CT HEAD/BRAIN W/O DYE: CPT | Mod: 26

## 2019-06-26 RX ORDER — SENNA PLUS 8.6 MG/1
5 TABLET ORAL AT BEDTIME
Refills: 0 | Status: DISCONTINUED | OUTPATIENT
Start: 2019-06-26 | End: 2019-07-10

## 2019-06-26 RX ORDER — ENOXAPARIN SODIUM 100 MG/ML
30 INJECTION SUBCUTANEOUS EVERY 12 HOURS
Refills: 0 | Status: DISCONTINUED | OUTPATIENT
Start: 2019-06-26 | End: 2019-07-10

## 2019-06-26 RX ORDER — FUROSEMIDE 40 MG
10 TABLET ORAL ONCE
Refills: 0 | Status: COMPLETED | OUTPATIENT
Start: 2019-06-26 | End: 2019-06-26

## 2019-06-26 RX ORDER — INSULIN LISPRO 100/ML
VIAL (ML) SUBCUTANEOUS EVERY 6 HOURS
Refills: 0 | Status: DISCONTINUED | OUTPATIENT
Start: 2019-06-26 | End: 2019-07-09

## 2019-06-26 RX ORDER — DOCUSATE SODIUM 100 MG
100 CAPSULE ORAL
Refills: 0 | Status: DISCONTINUED | OUTPATIENT
Start: 2019-06-26 | End: 2019-07-10

## 2019-06-26 RX ORDER — ACETAMINOPHEN 500 MG
650 TABLET ORAL EVERY 6 HOURS
Refills: 0 | Status: DISCONTINUED | OUTPATIENT
Start: 2019-06-26 | End: 2019-07-10

## 2019-06-26 RX ADMIN — NICARDIPINE HYDROCHLORIDE 25 MG/HR: 30 CAPSULE, EXTENDED RELEASE ORAL at 07:00

## 2019-06-26 RX ADMIN — Medication 6 MILLIGRAM(S): at 05:02

## 2019-06-26 RX ADMIN — Medication 650 MILLIGRAM(S): at 15:48

## 2019-06-26 RX ADMIN — Medication 1 DROP(S): at 11:45

## 2019-06-26 RX ADMIN — CHLORHEXIDINE GLUCONATE 15 MILLILITER(S): 213 SOLUTION TOPICAL at 05:01

## 2019-06-26 RX ADMIN — Medication 1: at 17:00

## 2019-06-26 RX ADMIN — Medication 650 MILLIGRAM(S): at 05:01

## 2019-06-26 RX ADMIN — LATANOPROST 1 DROP(S): 0.05 SOLUTION/ DROPS OPHTHALMIC; TOPICAL at 23:14

## 2019-06-26 RX ADMIN — PANTOPRAZOLE SODIUM 40 MILLIGRAM(S): 20 TABLET, DELAYED RELEASE ORAL at 11:45

## 2019-06-26 RX ADMIN — Medication 6 MILLIGRAM(S): at 11:45

## 2019-06-26 RX ADMIN — SENNA PLUS 5 MILLILITER(S): 8.6 TABLET ORAL at 22:38

## 2019-06-26 RX ADMIN — Medication 650 MILLIGRAM(S): at 15:18

## 2019-06-26 RX ADMIN — BRIMONIDINE TARTRATE 1 DROP(S): 2 SOLUTION/ DROPS OPHTHALMIC at 17:01

## 2019-06-26 RX ADMIN — SODIUM CHLORIDE 75 MILLILITER(S): 9 INJECTION INTRAMUSCULAR; INTRAVENOUS; SUBCUTANEOUS at 00:05

## 2019-06-26 RX ADMIN — Medication 1 DROP(S): at 05:03

## 2019-06-26 RX ADMIN — LOSARTAN POTASSIUM 25 MILLIGRAM(S): 100 TABLET, FILM COATED ORAL at 05:02

## 2019-06-26 RX ADMIN — Medication 6 MILLIGRAM(S): at 17:01

## 2019-06-26 RX ADMIN — DEXMEDETOMIDINE HYDROCHLORIDE IN 0.9% SODIUM CHLORIDE 6.12 MICROGRAM(S)/KG/HR: 4 INJECTION INTRAVENOUS at 07:00

## 2019-06-26 RX ADMIN — Medication 1 DROP(S): at 17:01

## 2019-06-26 RX ADMIN — Medication 100 MILLIGRAM(S): at 17:01

## 2019-06-26 RX ADMIN — AMLODIPINE BESYLATE 10 MILLIGRAM(S): 2.5 TABLET ORAL at 05:02

## 2019-06-26 RX ADMIN — CHLORHEXIDINE GLUCONATE 15 MILLILITER(S): 213 SOLUTION TOPICAL at 17:01

## 2019-06-26 RX ADMIN — BRIMONIDINE TARTRATE 1 DROP(S): 2 SOLUTION/ DROPS OPHTHALMIC at 05:02

## 2019-06-26 RX ADMIN — NICARDIPINE HYDROCHLORIDE 25 MG/HR: 30 CAPSULE, EXTENDED RELEASE ORAL at 00:04

## 2019-06-26 RX ADMIN — Medication 10 MILLIGRAM(S): at 11:58

## 2019-06-26 RX ADMIN — CHLORHEXIDINE GLUCONATE 1 APPLICATION(S): 213 SOLUTION TOPICAL at 22:38

## 2019-06-26 RX ADMIN — ENOXAPARIN SODIUM 30 MILLIGRAM(S): 100 INJECTION SUBCUTANEOUS at 05:01

## 2019-06-26 RX ADMIN — ENOXAPARIN SODIUM 30 MILLIGRAM(S): 100 INJECTION SUBCUTANEOUS at 17:01

## 2019-06-26 NOTE — DIETITIAN INITIAL EVALUATION ADULT. - DIET TYPE
Pt with nasogastric tube in place; EN feeds initiated today (6/26). Infusing at 10ml/hr at time of RD visit.

## 2019-06-26 NOTE — DIETITIAN INITIAL EVALUATION ADULT. - ENTERAL
Recommend Glucerna 1.2 at 65ml/hr x 24 hrs. To provide: 1560ml, 1872kcal, 94g protein. To provide (based on upper IBW 54.8kg): 34kcal/kg, 1.7g protein/kg.

## 2019-06-26 NOTE — DIETITIAN INITIAL EVALUATION ADULT. - PERTINENT MEDS FT
Lovenox, Peridex, Hibiclens, Humalog, Colace, Norvasc, Cozaar, Protonix, Decadron, Precedex, Nicardipine

## 2019-06-26 NOTE — DIETITIAN INITIAL EVALUATION ADULT. - REASON INDICATOR FOR ASSESSMENT
Nutrition Assessment warranted for length of stay.  Information obtained from: Interdisciplinary medical rounds, PA, RN. Pt intubated/sedated; no family present at bedside.   Per chart: Pt is a 70 yo F with brain tumor s/p suboccipital craniotomy for resection, post-operative day 2. Noted with small IVH post-op. PMH of extrinsic asthma, glaucoma, HTN & OA,  reports having recent light headedness, dizziness & some balance disturbance for about three months.

## 2019-06-26 NOTE — DIETITIAN INITIAL EVALUATION ADULT. - ENERGY NEEDS
Ht: 62"  Wt: 270 lbs   BMI: 49.4 kg/m2   IBW: 110 lbs (+/-10%)    245 % IBW  Edema: none noted        Skin: surgical incision for suboccipital crani

## 2019-06-26 NOTE — PROGRESS NOTE ADULT - SUBJECTIVE AND OBJECTIVE BOX
Visit Summary: Patient seen and evaluated at bedside.    Overnight Events: none    Exam:  T(C): 36.7 (06-25-19 @ 23:00), Max: 37.9 (06-25-19 @ 15:00)  HR: 74 (06-26-19 @ 00:15) (56 - 96)  BP: --  RR: 14 (06-26-19 @ 00:15) (11 - 24)  SpO2: 100% (06-26-19 @ 00:15) (100% - 100%)  Wt(kg): --    Intubated, PERRL, b/l CN VI palsies, follows commands, moves all limbs symmetrically and strongly, tongue swollen                        11.4   17.0  )-----------( 263      ( 25 Jun 2019 20:52 )             34.7     06-25    136  |  104  |  10  ----------------------------<  170<H>  4.0   |  20<L>  |  0.60    Ca    8.5      25 Jun 2019 20:52  Phos  2.5     06-25  Mg     2.3     06-25    PT/INR - ( 24 Jun 2019 06:42 )   PT: 12.4 sec;   INR: 1.08 ratio         PTT - ( 24 Jun 2019 06:42 )  PTT:31.4 sec

## 2019-06-26 NOTE — DIETITIAN INITIAL EVALUATION ADULT. - FEEDING SKILL
Baseline feeding skill unknown at this time. Currently, requires total assistance via nasogastric tube.

## 2019-06-26 NOTE — PROGRESS NOTE ADULT - ASSESSMENT
Brain tumor status post resection, post-operative day 2  - Wean to extubate as tongue swelling lessens  - -150mmHg  - Steroids for cerebral edema  - eye drops for glaucoma  - wound care  - VTE prophylaxis     35 minutes critical care time

## 2019-06-26 NOTE — DIETITIAN INITIAL EVALUATION ADULT. - OTHER INFO
INFORMATION PTA:   Subjective wt/diet history unknown at this time 2/2 pt intubated/sedated, no family present at bedside. All information obtained via comprehensive review of chart/EMR.     Per review of PST H&P, pt with no indication of prior oral vitamin/supplement use. Denied history of wt changes (loss vs gain). Dosing wt 270 lbs; Ht: 62". BMI: 49.4 - obese. Noted with food allergy for shellfish. Baseline tolerance to chewing/swallowing unknown at this time.     INFORMATION THIS ADMISSION  ·Last BM: None noted in-house at this time. On bowel regimen as ordered.     Other: Pt has received 30ml of Glucerna 1.2 (10ml/hr) at time of RD visit.     ·Therapeutic Diet Education Provided: None; inappropriate at this time.

## 2019-06-26 NOTE — DIETITIAN INITIAL EVALUATION ADULT. - PHYSICAL APPEARANCE
BMI 49.4; unable to conduct full Nutrition Focused Physical Assessment, however no overt s/s of body fat or muscle mass depletion based on visual observation./obese

## 2019-06-26 NOTE — DIETITIAN INITIAL EVALUATION ADULT. - ADD RECOMMEND
1) Recommend EN feeds as noted above. Monitor GI tolerance/FSBG. RD to adjust EN formulary, volume/rate PRN. 2) Consider obtaining A1c to determine DM status. 3) Monitor nutrition related labs, skin integrity, hydration status. 4) Obtain subjective diet/wt history as able.

## 2019-06-26 NOTE — PROGRESS NOTE ADULT - SUBJECTIVE AND OBJECTIVE BOX
O:     T(C): 37.3 (06-26-19 @ 07:00), Max: 37.9 (06-25-19 @ 15:00)  HR: 76 (06-26-19 @ 07:00) (60 - 96)  BP: --  RR: 13 (06-26-19 @ 07:00) (5 - 24)  SpO2: 100% (06-26-19 @ 07:00) (100% - 100%)  06-25-19 @ 07:01  -  06-26-19 @ 07:00  --------------------------------------------------------  IN: 2484 mL / OUT: 765 mL / NET: 1719 mL    acetaminophen    Suspension .. 650 milliGRAM(s) Oral every 6 hours  ALBUTerol    90 MICROgram(s) HFA Inhaler 2 Puff(s) Inhalation every 6 hours PRN  amLODIPine   Tablet 10 milliGRAM(s) Oral daily  brimonidine 0.2% Ophthalmic Solution 1 Drop(s) Both EYES two times a day  chlorhexidine 0.12% Liquid 15 milliLiter(s) Oral Mucosa two times a day  chlorhexidine 4% Liquid 1 Application(s) Topical <User Schedule>  dexamethasone  Injectable 6 milliGRAM(s) IV Push every 6 hours  dexmedetomidine Infusion 0.2 MICROgram(s)/kG/Hr IV Continuous <Continuous>  enoxaparin Injectable 30 milliGRAM(s) SubCutaneous <User Schedule>  latanoprost 0.005% Ophthalmic Solution 1 Drop(s) Both EYES at bedtime  losartan 25 milliGRAM(s) Oral daily  niCARdipine Infusion 5 mG/Hr IV Continuous <Continuous>  pantoprazole  Injectable 40 milliGRAM(s) IV Push daily  prednisoLONE acetate 1% Suspension 1 Drop(s) Both EYES four times a day  sodium chloride 0.9%. 1000 milliLiter(s) IV Continuous <Continuous>  Mode: AC/ CMV (Assist Control/ Continuous Mandatory Ventilation), RR (machine): 14, TV (machine): 500, FiO2: 40, PEEP: 5, ITime: 1, MAP: 11, PIP: 25  EXAMINATION:  General:  calm  HEENT:  MMM  Neuro:  lethargic, opens eyes to voice, FC, CONTRERAS, bilateral 6th nerve palsies, PERRL  Cards:  RRR  Respiratory:  no respiratory distress  Adomen:  soft  Extremities:  no edema  Skin:  warm/dry      LABS:  Na: 136 (06-25 @ 20:52), 138 (06-24 @ 23:37)  K: 4.0 (06-25 @ 20:52), 3.7 (06-24 @ 23:37)  Cl: 104 (06-25 @ 20:52), 104 (06-24 @ 23:37)  CO2: 20 (06-25 @ 20:52), 20 (06-24 @ 23:37)  BUN: 10 (06-25 @ 20:52), 8 (06-24 @ 23:37)  Cr: 0.60 (06-25 @ 20:52), 0.60 (06-24 @ 23:37)  Glu: 170(06-25 @ 20:52), 195(06-24 @ 23:37)    Hgb: 11.4 (06-25 @ 20:52), 11.5 (06-24 @ 23:37)  Hct: 34.7 (06-25 @ 20:52), 36.1 (06-24 @ 23:37)  WBC: 17.0 (06-25 @ 20:52), 14.6 (06-24 @ 23:37)  Plt: 263 (06-25 @ 20:52), 288 (06-24 @ 23:37)    INR: 1.08 06-24-19 @ 06:42  PTT: 31.4 06-24-19 @ 06:42            Assessment and Plan:   · Assessment	  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    NEURO:  CT head today with b/l IVH  MRI post-op  Sedation: precedex PRN  Pain control  Steroids for cerebral edema  Activity: [] OOB as tolerated [x] Bedrest for now [] PT [] OT [] PMNR    PULM:  Pressure support trials and attempt to wean  VAP bundle  Aspiration precautions  CXR, ABG  Nebs PRN    CV:  -150mmHg  Continue BP meds    RENAL:  NS @ 75    GI:  Diet: keep NPO though cannot extubate right now  GI prophylaxis [] not indicated [x] PPI: vented [] other:  Bowel regimen [] colace [] senna [] other:    ENDO:   Goal euglycemia (-180)    HEME/ONC:  VTE prophylaxis: [x] SCDs [] chemoprophylaxis [x] hold chemoprophylaxis due to: new IVH [x] high risk of DVT/PE on admission due to: brain tumour    ID:  Vikki-op antibiotics    MISC:  Ophtho: glaucoma - continue eye drops    SOCIAL/FAMILY:  [x] awaiting [] updated at bedside [] family meeting    CODE STATUS:  [x] Full Code [] DNR [] DNI [] Palliative/Comfort Care    DISPOSITION:  [x] ICU [] Stroke Unit [] Floor [] EMU [] RCU [] PCU    [x] Patient is at high risk of neurologic deterioration/death due to: brain edema, brain compression    Time spent: 45 [x] critical care minutes    Contact: [phone] O: low grade fever  no cuff leak     T(C): 37.3 (06-26-19 @ 07:00), Max: 37.9 (06-25-19 @ 15:00)  HR: 76 (06-26-19 @ 07:00) (60 - 96)  BP: --  RR: 13 (06-26-19 @ 07:00) (5 - 24)  SpO2: 100% (06-26-19 @ 07:00) (100% - 100%)  06-25-19 @ 07:01  -  06-26-19 @ 07:00  --------------------------------------------------------  IN: 2484 mL / OUT: 765 mL / NET: 1719 mL    acetaminophen    Suspension .. 650 milliGRAM(s) Oral every 6 hours  ALBUTerol    90 MICROgram(s) HFA Inhaler 2 Puff(s) Inhalation every 6 hours PRN  amLODIPine   Tablet 10 milliGRAM(s) Oral daily  brimonidine 0.2% Ophthalmic Solution 1 Drop(s) Both EYES two times a day  chlorhexidine 0.12% Liquid 15 milliLiter(s) Oral Mucosa two times a day  chlorhexidine 4% Liquid 1 Application(s) Topical <User Schedule>  dexamethasone  Injectable 6 milliGRAM(s) IV Push every 6 hours  dexmedetomidine Infusion 0.2 MICROgram(s)/kG/Hr IV Continuous <Continuous>  enoxaparin Injectable 30 milliGRAM(s) SubCutaneous <User Schedule>  latanoprost 0.005% Ophthalmic Solution 1 Drop(s) Both EYES at bedtime  losartan 25 milliGRAM(s) Oral daily  niCARdipine Infusion 5 mG/Hr IV Continuous <Continuous>  pantoprazole  Injectable 40 milliGRAM(s) IV Push daily  prednisoLONE acetate 1% Suspension 1 Drop(s) Both EYES four times a day  sodium chloride 0.9%. 1000 milliLiter(s) IV Continuous <Continuous>  Mode: AC/ CMV (Assist Control/ Continuous Mandatory Ventilation), RR (machine): 14, TV (machine): 500, FiO2: 40, PEEP: 5, ITime: 1, MAP: 11, PIP: 25    EXAMINATION:  General:  calm  HEENT:  MMM  Neuro:=opens eyes to voice, FC, CONTRERAS, PERRL, antigravity in all 4 extremities, bilateral horizontal gaze palsy  Cards:  RRR  Respiratory:  no respiratory distress  Adomen:  soft  Extremities:  no edema  Skin:  warm/dry      LABS:  Na: 136 (06-25 @ 20:52), 138 (06-24 @ 23:37)  K: 4.0 (06-25 @ 20:52), 3.7 (06-24 @ 23:37)  Cl: 104 (06-25 @ 20:52), 104 (06-24 @ 23:37)  CO2: 20 (06-25 @ 20:52), 20 (06-24 @ 23:37)  BUN: 10 (06-25 @ 20:52), 8 (06-24 @ 23:37)  Cr: 0.60 (06-25 @ 20:52), 0.60 (06-24 @ 23:37)  Glu: 170(06-25 @ 20:52), 195(06-24 @ 23:37)    Hgb: 11.4 (06-25 @ 20:52), 11.5 (06-24 @ 23:37)  Hct: 34.7 (06-25 @ 20:52), 36.1 (06-24 @ 23:37)  WBC: 17.0 (06-25 @ 20:52), 14.6 (06-24 @ 23:37)  Plt: 263 (06-25 @ 20:52), 288 (06-24 @ 23:37)    INR: 1.08 06-24-19 @ 06:42  PTT: 31.4 06-24-19 @ 06:42            Assessment and Plan:   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    NEURO:  CT head today is stable  MRI brain wwo pending  Sedation: precedex PRN  Pain control  decadron  cerebral edema  Activity: [] OOB as tolerated [x] Bedrest for now [] PT [] OT [] PMNR    PULM:  Pressure support trials   no cuff leak, continue decadron, has tongue swelling, will wait till tomorrow   VAP bundle  Aspiration precautions  CXR mild congested   Nebs PRN    CV:  -150mmHg  Continue BP meds    RENAL:  IVL  keep soidum 135-145     GI:  Diet:start TF   NPO after midnight for possible extubation   GI prophylaxis [] not indicated [x] PPI: vented [] other:  Bowel regimen [x] colace [x] senna [] other:    ENDO:   Goal euglycemia (-180)    HEME/ONC:  VTE prophylaxis: [x] SCDs [] chemoprophylaxis [] hold chemoprophylaxis due to: lovenox  [x] high risk of DVT/PE on admission due to: brain tumour    ID:  low grade fever, if spikes will cx     MISC:  Ophtho: glaucoma - continue eye drops    SOCIAL/FAMILY:  [x] awaiting [] updated at bedside [] family meeting    CODE STATUS:  [x] Full Code [] DNR [] DNI [] Palliative/Comfort Care    DISPOSITION:  [x] ICU [] Stroke Unit [] Floor [] EMU [] RCU [] PCU    [x] Patient is at high risk of neurologic deterioration/death due to: brain edema, brain compression    Time spent: 40 [x] critical care minutes    Contact: [phone] O: low grade fever  no cuff leak     T(C): 37.3 (06-26-19 @ 07:00), Max: 37.9 (06-25-19 @ 15:00)  HR: 76 (06-26-19 @ 07:00) (60 - 96)  BP: --  RR: 13 (06-26-19 @ 07:00) (5 - 24)  SpO2: 100% (06-26-19 @ 07:00) (100% - 100%)  06-25-19 @ 07:01  -  06-26-19 @ 07:00  --------------------------------------------------------  IN: 2484 mL / OUT: 765 mL / NET: 1719 mL    acetaminophen    Suspension .. 650 milliGRAM(s) Oral every 6 hours  ALBUTerol    90 MICROgram(s) HFA Inhaler 2 Puff(s) Inhalation every 6 hours PRN  amLODIPine   Tablet 10 milliGRAM(s) Oral daily  brimonidine 0.2% Ophthalmic Solution 1 Drop(s) Both EYES two times a day  chlorhexidine 0.12% Liquid 15 milliLiter(s) Oral Mucosa two times a day  chlorhexidine 4% Liquid 1 Application(s) Topical <User Schedule>  dexamethasone  Injectable 6 milliGRAM(s) IV Push every 6 hours  dexmedetomidine Infusion 0.2 MICROgram(s)/kG/Hr IV Continuous <Continuous>  enoxaparin Injectable 30 milliGRAM(s) SubCutaneous <User Schedule>  latanoprost 0.005% Ophthalmic Solution 1 Drop(s) Both EYES at bedtime  losartan 25 milliGRAM(s) Oral daily  niCARdipine Infusion 5 mG/Hr IV Continuous <Continuous>  pantoprazole  Injectable 40 milliGRAM(s) IV Push daily  prednisoLONE acetate 1% Suspension 1 Drop(s) Both EYES four times a day  sodium chloride 0.9%. 1000 milliLiter(s) IV Continuous <Continuous>  Mode: AC/ CMV (Assist Control/ Continuous Mandatory Ventilation), RR (machine): 14, TV (machine): 500, FiO2: 40, PEEP: 5, ITime: 1, MAP: 11, PIP: 25    EXAMINATION:  General:  calm  HEENT:  MMM  Neuro:=opens eyes to voice, FC, CONTRERAS, PERRL, antigravity in all 4 extremities, bilateral horizontal gaze palsy  Cards:  RRR  Respiratory:  no respiratory distress, intubated  Adomen:  soft  Extremities:  no edema  Skin:  warm/dry      LABS:  Na: 136 (06-25 @ 20:52), 138 (06-24 @ 23:37)  K: 4.0 (06-25 @ 20:52), 3.7 (06-24 @ 23:37)  Cl: 104 (06-25 @ 20:52), 104 (06-24 @ 23:37)  CO2: 20 (06-25 @ 20:52), 20 (06-24 @ 23:37)  BUN: 10 (06-25 @ 20:52), 8 (06-24 @ 23:37)  Cr: 0.60 (06-25 @ 20:52), 0.60 (06-24 @ 23:37)  Glu: 170(06-25 @ 20:52), 195(06-24 @ 23:37)    Hgb: 11.4 (06-25 @ 20:52), 11.5 (06-24 @ 23:37)  Hct: 34.7 (06-25 @ 20:52), 36.1 (06-24 @ 23:37)  WBC: 17.0 (06-25 @ 20:52), 14.6 (06-24 @ 23:37)  Plt: 263 (06-25 @ 20:52), 288 (06-24 @ 23:37)    INR: 1.08 06-24-19 @ 06:42  PTT: 31.4 06-24-19 @ 06:42            Assessment and Plan:   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    NEURO:  neuro checks q 1 hr   CT head today is stable  MRI brain wwo pending  Sedation: precedex PRN  Pain control  decadron  cerebral edema  Activity: [] OOB as tolerated [x] Bedrest for now [] PT [] OT [] PMNR    PULM:  acute respiratory failure, with no cuff leak   Pressure support trials   no cuff leak, continue decadron, has tongue swelling, will wait till tomorrow   VAP bundle  Aspiration precautions  CXR mild congested, if UP doesn't , will give lasix 10 mg once   Nebs PRN    CV:  -150mmHg  Continue BP meds    RENAL:  IVL  keep sodium 135-145     GI:  Diet: start TF   NPO after midnight for possible extubation   GI prophylaxis [] not indicated [x] PPI: vented [] other:  Bowel regimen [x] colace [x] senna [] other:    ENDO:   Goal euglycemia (-180)    HEME/ONC:  VTE prophylaxis: [x] SCDs [] chemoprophylaxis [] hold chemoprophylaxis due to: lovenox  [x] high risk of DVT/PE on admission due to: brain tumour    ID:  low grade fever, if spikes will cx     MISC:  Ophtho: glaucoma - continue eye drops    SOCIAL/FAMILY:  [x] awaiting [] updated at bedside [] family meeting    CODE STATUS:  [x] Full Code [] DNR [] DNI [] Palliative/Comfort Care    DISPOSITION:  [x] ICU [] Stroke Unit [] Floor [] EMU [] RCU [] PCU    [x] Patient is at high risk of neurologic deterioration/death due to: brain edema, brain compression    Time spent: 40 [x] critical care minutes    Contact: [phone] O: low grade fever  no cuff leak     T(C): 37.3 (06-26-19 @ 07:00), Max: 37.9 (06-25-19 @ 15:00)  HR: 76 (06-26-19 @ 07:00) (60 - 96)  BP: --  RR: 13 (06-26-19 @ 07:00) (5 - 24)  SpO2: 100% (06-26-19 @ 07:00) (100% - 100%)  06-25-19 @ 07:01  -  06-26-19 @ 07:00  --------------------------------------------------------  IN: 2484 mL / OUT: 765 mL / NET: 1719 mL    acetaminophen    Suspension .. 650 milliGRAM(s) Oral every 6 hours  ALBUTerol    90 MICROgram(s) HFA Inhaler 2 Puff(s) Inhalation every 6 hours PRN  amLODIPine   Tablet 10 milliGRAM(s) Oral daily  brimonidine 0.2% Ophthalmic Solution 1 Drop(s) Both EYES two times a day  chlorhexidine 0.12% Liquid 15 milliLiter(s) Oral Mucosa two times a day  chlorhexidine 4% Liquid 1 Application(s) Topical <User Schedule>  dexamethasone  Injectable 6 milliGRAM(s) IV Push every 6 hours  dexmedetomidine Infusion 0.2 MICROgram(s)/kG/Hr IV Continuous <Continuous>  enoxaparin Injectable 30 milliGRAM(s) SubCutaneous <User Schedule>  latanoprost 0.005% Ophthalmic Solution 1 Drop(s) Both EYES at bedtime  losartan 25 milliGRAM(s) Oral daily  niCARdipine Infusion 5 mG/Hr IV Continuous <Continuous>  pantoprazole  Injectable 40 milliGRAM(s) IV Push daily  prednisoLONE acetate 1% Suspension 1 Drop(s) Both EYES four times a day  sodium chloride 0.9%. 1000 milliLiter(s) IV Continuous <Continuous>  Mode: AC/ CMV (Assist Control/ Continuous Mandatory Ventilation), RR (machine): 14, TV (machine): 500, FiO2: 40, PEEP: 5, ITime: 1, MAP: 11, PIP: 25    EXAMINATION:  General:  calm  HEENT:  MMM  Neuro:=opens eyes to voice, FC, CONTRERAS, PERRL, antigravity in all 4 extremities, bilateral horizontal gaze palsy  Cards:  RRR  Respiratory:  no respiratory distress, intubated  Adomen:  soft  Extremities:  no edema  Skin:  warm/dry      LABS:  Na: 136 (06-25 @ 20:52), 138 (06-24 @ 23:37)  K: 4.0 (06-25 @ 20:52), 3.7 (06-24 @ 23:37)  Cl: 104 (06-25 @ 20:52), 104 (06-24 @ 23:37)  CO2: 20 (06-25 @ 20:52), 20 (06-24 @ 23:37)  BUN: 10 (06-25 @ 20:52), 8 (06-24 @ 23:37)  Cr: 0.60 (06-25 @ 20:52), 0.60 (06-24 @ 23:37)  Glu: 170(06-25 @ 20:52), 195(06-24 @ 23:37)    Hgb: 11.4 (06-25 @ 20:52), 11.5 (06-24 @ 23:37)  Hct: 34.7 (06-25 @ 20:52), 36.1 (06-24 @ 23:37)  WBC: 17.0 (06-25 @ 20:52), 14.6 (06-24 @ 23:37)  Plt: 263 (06-25 @ 20:52), 288 (06-24 @ 23:37)    INR: 1.08 06-24-19 @ 06:42  PTT: 31.4 06-24-19 @ 06:42            Assessment and Plan:   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  with small IVH post-op    NEURO:  neuro checks q 1 hr   CT head today is stable  MRI brain wwo pending, will discuss with radiology if screws are MRI compatible   Sedation: precedex PRN  Pain control  decadron  cerebral edema  Activity: [] OOB as tolerated [x] Bedrest for now [] PT [] OT [] PMNR    PULM:  acute respiratory failure, with no cuff leak   Pressure support trials   no cuff leak, continue decadron, has tongue swelling, will wait till tomorrow   VAP bundle  Aspiration precautions  CXR mild congested, if UP doesn't , will give lasix 10 mg once   Nebs PRN    CV:  -150mmHg  Continue BP meds    RENAL:  IVL  keep sodium 135-145     GI:  Diet: start TF   NPO after midnight for possible extubation   GI prophylaxis [] not indicated [x] PPI: vented [] other:  Bowel regimen [x] colace [x] senna [] other:    ENDO:   Goal euglycemia (-180)    HEME/ONC:  VTE prophylaxis: [x] SCDs [] chemoprophylaxis [] hold chemoprophylaxis due to: lovenox  [x] high risk of DVT/PE on admission due to: brain tumour    ID:  low grade fever, if spikes will cx     MISC:  Ophtho: glaucoma - continue eye drops    SOCIAL/FAMILY:  [x] awaiting [] updated at bedside [] family meeting    CODE STATUS:  [x] Full Code [] DNR [] DNI [] Palliative/Comfort Care    DISPOSITION:  [x] ICU [] Stroke Unit [] Floor [] EMU [] RCU [] PCU    [x] Patient is at high risk of neurologic deterioration/death due to: brain edema, brain compression    Time spent: 40 [x] critical care minutes    Contact: [phone] O: low grade fever  no cuff leak     T(C): 37.3 (06-26-19 @ 07:00), Max: 37.9 (06-25-19 @ 15:00)  HR: 76 (06-26-19 @ 07:00) (60 - 96)  BP: --  RR: 13 (06-26-19 @ 07:00) (5 - 24)  SpO2: 100% (06-26-19 @ 07:00) (100% - 100%)  06-25-19 @ 07:01  -  06-26-19 @ 07:00  --------------------------------------------------------  IN: 2484 mL / OUT: 765 mL / NET: 1719 mL    acetaminophen    Suspension .. 650 milliGRAM(s) Oral every 6 hours  ALBUTerol    90 MICROgram(s) HFA Inhaler 2 Puff(s) Inhalation every 6 hours PRN  amLODIPine   Tablet 10 milliGRAM(s) Oral daily  brimonidine 0.2% Ophthalmic Solution 1 Drop(s) Both EYES two times a day  chlorhexidine 0.12% Liquid 15 milliLiter(s) Oral Mucosa two times a day  chlorhexidine 4% Liquid 1 Application(s) Topical <User Schedule>  dexamethasone  Injectable 6 milliGRAM(s) IV Push every 6 hours  dexmedetomidine Infusion 0.2 MICROgram(s)/kG/Hr IV Continuous <Continuous>  enoxaparin Injectable 30 milliGRAM(s) SubCutaneous <User Schedule>  latanoprost 0.005% Ophthalmic Solution 1 Drop(s) Both EYES at bedtime  losartan 25 milliGRAM(s) Oral daily  niCARdipine Infusion 5 mG/Hr IV Continuous <Continuous>  pantoprazole  Injectable 40 milliGRAM(s) IV Push daily  prednisoLONE acetate 1% Suspension 1 Drop(s) Both EYES four times a day  sodium chloride 0.9%. 1000 milliLiter(s) IV Continuous <Continuous>  Mode: AC/ CMV (Assist Control/ Continuous Mandatory Ventilation), RR (machine): 14, TV (machine): 500, FiO2: 40, PEEP: 5, ITime: 1, MAP: 11, PIP: 25    EXAMINATION:  General:  calm  HEENT:  MMM  Neuro:=opens eyes to voice, FC, CONTRERAS, PERRL, antigravity in all 4 extremities, bilateral horizontal gaze palsy  Cards:  RRR  Respiratory:  no respiratory distress, intubated  Adomen:  soft  Extremities:  no edema  Skin:  warm/dry      LABS:  Na: 136 (06-25 @ 20:52), 138 (06-24 @ 23:37)  K: 4.0 (06-25 @ 20:52), 3.7 (06-24 @ 23:37)  Cl: 104 (06-25 @ 20:52), 104 (06-24 @ 23:37)  CO2: 20 (06-25 @ 20:52), 20 (06-24 @ 23:37)  BUN: 10 (06-25 @ 20:52), 8 (06-24 @ 23:37)  Cr: 0.60 (06-25 @ 20:52), 0.60 (06-24 @ 23:37)  Glu: 170(06-25 @ 20:52), 195(06-24 @ 23:37)    Hgb: 11.4 (06-25 @ 20:52), 11.5 (06-24 @ 23:37)  Hct: 34.7 (06-25 @ 20:52), 36.1 (06-24 @ 23:37)  WBC: 17.0 (06-25 @ 20:52), 14.6 (06-24 @ 23:37)  Plt: 263 (06-25 @ 20:52), 288 (06-24 @ 23:37)    INR: 1.08 06-24-19 @ 06:42  PTT: 31.4 06-24-19 @ 06:42            Assessment and Plan:   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  with small IVH post-op    NEURO:  neuro checks q 1 hr   CT head today is stable  MRI brain wwo pending, will discuss with radiology if screws are MRI compatible   Sedation: precedex PRN  Pain control  decadron  cerebral edema  Activity: [] OOB as tolerated [x] Bedrest for now [] PT [] OT [] PMNR    PULM:  acute respiratory failure, with no cuff leak   Pressure support trials   no cuff leak, continue decadron, has tongue swelling, will wait till tomorrow   VAP bundle  Aspiration precautions  CXR mild congested, if UP doesn't , will give lasix 10 mg once   Nebs PRN    CV:  -150mmHg  Continue BP meds  TTE pending    RENAL:  IVL  keep sodium 135-145     GI:  Diet: start TF   NPO after midnight for possible extubation   GI prophylaxis [] not indicated [x] PPI: vented [] other:  Bowel regimen [x] colace [x] senna [] other:    ENDO:   Goal euglycemia (-180)    HEME/ONC:  VTE prophylaxis: [x] SCDs [] chemoprophylaxis [] hold chemoprophylaxis due to: lovenox  [x] high risk of DVT/PE on admission due to: brain tumour    ID:  low grade fever, if spikes will cx     MISC:  Ophtho: glaucoma - continue eye drops    SOCIAL/FAMILY:  [x] awaiting [] updated at bedside [] family meeting    CODE STATUS:  [x] Full Code [] DNR [] DNI [] Palliative/Comfort Care    DISPOSITION:  [x] ICU [] Stroke Unit [] Floor [] EMU [] RCU [] PCU    [x] Patient is at high risk of neurologic deterioration/death due to: brain edema, brain compression    Time spent: 40 [x] critical care minutes    Contact: [phone]

## 2019-06-26 NOTE — PROGRESS NOTE ADULT - SUBJECTIVE AND OBJECTIVE BOX
No cuff leak so left intubated.     Intubated, PERRL, b/l CN VI palsies, follows commands, moves all limbs symmetrically and strongly, tongue swollen, lower gum line soft tissue swelling, blistering on chin

## 2019-06-27 LAB
BASE EXCESS BLDA CALC-SCNC: 4.8 MMOL/L — HIGH (ref -2–2)
CO2 BLDA-SCNC: 31 MMOL/L — HIGH (ref 22–30)
GAS PNL BLDA: SIGNIFICANT CHANGE UP
GLUCOSE BLDC GLUCOMTR-MCNC: 136 MG/DL — HIGH (ref 70–99)
GLUCOSE BLDC GLUCOMTR-MCNC: 152 MG/DL — HIGH (ref 70–99)
GLUCOSE BLDC GLUCOMTR-MCNC: 176 MG/DL — HIGH (ref 70–99)
GLUCOSE BLDC GLUCOMTR-MCNC: 186 MG/DL — HIGH (ref 70–99)
HBA1C BLD-MCNC: 5.1 % — SIGNIFICANT CHANGE UP (ref 4–5.6)
HCO3 BLDA-SCNC: 30 MMOL/L — HIGH (ref 21–29)
HOROWITZ INDEX BLDA+IHG-RTO: 40 — SIGNIFICANT CHANGE UP
PCO2 BLDA: 49 MMHG — HIGH (ref 32–46)
PH BLDA: 7.4 — SIGNIFICANT CHANGE UP (ref 7.35–7.45)
PO2 BLDA: 136 MMHG — HIGH (ref 74–108)
SAO2 % BLDA: 99 % — HIGH (ref 92–96)

## 2019-06-27 PROCEDURE — 93306 TTE W/DOPPLER COMPLETE: CPT | Mod: 26

## 2019-06-27 PROCEDURE — 71045 X-RAY EXAM CHEST 1 VIEW: CPT | Mod: 26

## 2019-06-27 PROCEDURE — 99291 CRITICAL CARE FIRST HOUR: CPT

## 2019-06-27 RX ORDER — SODIUM CHLORIDE 9 MG/ML
1000 INJECTION INTRAMUSCULAR; INTRAVENOUS; SUBCUTANEOUS
Refills: 0 | Status: DISCONTINUED | OUTPATIENT
Start: 2019-06-27 | End: 2019-07-01

## 2019-06-27 RX ORDER — DEXAMETHASONE 0.5 MG/5ML
4 ELIXIR ORAL EVERY 6 HOURS
Refills: 0 | Status: DISCONTINUED | OUTPATIENT
Start: 2019-06-27 | End: 2019-06-28

## 2019-06-27 RX ORDER — POTASSIUM PHOSPHATE, MONOBASIC POTASSIUM PHOSPHATE, DIBASIC 236; 224 MG/ML; MG/ML
15 INJECTION, SOLUTION INTRAVENOUS ONCE
Refills: 0 | Status: COMPLETED | OUTPATIENT
Start: 2019-06-27 | End: 2019-06-27

## 2019-06-27 RX ORDER — LABETALOL HCL 100 MG
10 TABLET ORAL ONCE
Refills: 0 | Status: COMPLETED | OUTPATIENT
Start: 2019-06-27 | End: 2019-06-27

## 2019-06-27 RX ADMIN — SENNA PLUS 5 MILLILITER(S): 8.6 TABLET ORAL at 22:03

## 2019-06-27 RX ADMIN — Medication 1 DROP(S): at 01:21

## 2019-06-27 RX ADMIN — ENOXAPARIN SODIUM 30 MILLIGRAM(S): 100 INJECTION SUBCUTANEOUS at 17:10

## 2019-06-27 RX ADMIN — Medication 100 MILLIGRAM(S): at 17:23

## 2019-06-27 RX ADMIN — Medication 1: at 05:58

## 2019-06-27 RX ADMIN — Medication 10 MILLIGRAM(S): at 23:30

## 2019-06-27 RX ADMIN — CHLORHEXIDINE GLUCONATE 15 MILLILITER(S): 213 SOLUTION TOPICAL at 17:10

## 2019-06-27 RX ADMIN — Medication 1: at 01:21

## 2019-06-27 RX ADMIN — Medication 1: at 11:14

## 2019-06-27 RX ADMIN — CHLORHEXIDINE GLUCONATE 15 MILLILITER(S): 213 SOLUTION TOPICAL at 05:03

## 2019-06-27 RX ADMIN — SODIUM CHLORIDE 75 MILLILITER(S): 9 INJECTION INTRAMUSCULAR; INTRAVENOUS; SUBCUTANEOUS at 07:00

## 2019-06-27 RX ADMIN — LOSARTAN POTASSIUM 25 MILLIGRAM(S): 100 TABLET, FILM COATED ORAL at 05:03

## 2019-06-27 RX ADMIN — BRIMONIDINE TARTRATE 1 DROP(S): 2 SOLUTION/ DROPS OPHTHALMIC at 05:04

## 2019-06-27 RX ADMIN — Medication 10 MILLIGRAM(S): at 19:18

## 2019-06-27 RX ADMIN — NICARDIPINE HYDROCHLORIDE 25 MG/HR: 30 CAPSULE, EXTENDED RELEASE ORAL at 07:00

## 2019-06-27 RX ADMIN — Medication 1 DROP(S): at 11:15

## 2019-06-27 RX ADMIN — Medication 1 DROP(S): at 17:10

## 2019-06-27 RX ADMIN — DEXMEDETOMIDINE HYDROCHLORIDE IN 0.9% SODIUM CHLORIDE 6.12 MICROGRAM(S)/KG/HR: 4 INJECTION INTRAVENOUS at 07:00

## 2019-06-27 RX ADMIN — Medication 650 MILLIGRAM(S): at 16:20

## 2019-06-27 RX ADMIN — POTASSIUM PHOSPHATE, MONOBASIC POTASSIUM PHOSPHATE, DIBASIC 62.5 MILLIMOLE(S): 236; 224 INJECTION, SOLUTION INTRAVENOUS at 01:39

## 2019-06-27 RX ADMIN — Medication 650 MILLIGRAM(S): at 15:50

## 2019-06-27 RX ADMIN — Medication 6 MILLIGRAM(S): at 05:03

## 2019-06-27 RX ADMIN — CHLORHEXIDINE GLUCONATE 1 APPLICATION(S): 213 SOLUTION TOPICAL at 22:03

## 2019-06-27 RX ADMIN — Medication 1 DROP(S): at 05:04

## 2019-06-27 RX ADMIN — ENOXAPARIN SODIUM 30 MILLIGRAM(S): 100 INJECTION SUBCUTANEOUS at 05:01

## 2019-06-27 RX ADMIN — Medication 6 MILLIGRAM(S): at 00:09

## 2019-06-27 RX ADMIN — PANTOPRAZOLE SODIUM 40 MILLIGRAM(S): 20 TABLET, DELAYED RELEASE ORAL at 11:15

## 2019-06-27 RX ADMIN — Medication 100 MILLIGRAM(S): at 05:02

## 2019-06-27 RX ADMIN — Medication 650 MILLIGRAM(S): at 05:30

## 2019-06-27 RX ADMIN — Medication 650 MILLIGRAM(S): at 05:00

## 2019-06-27 RX ADMIN — Medication 4 MILLIGRAM(S): at 11:15

## 2019-06-27 RX ADMIN — Medication 4 MILLIGRAM(S): at 17:10

## 2019-06-27 RX ADMIN — AMLODIPINE BESYLATE 10 MILLIGRAM(S): 2.5 TABLET ORAL at 05:02

## 2019-06-27 NOTE — PROGRESS NOTE ADULT - SUBJECTIVE AND OBJECTIVE BOX
NSC[facility] ATTENDING -- ADDITIONAL PROGRESS NOTE    Nighttime rounds were performed -- please refer to earlier Progress Note for HPI details.    T(C): 36.8 (06-27-19 @ 20:00), Max: 37.7 (06-27-19 @ 07:00)  HR: 105 (06-27-19 @ 21:00) (64 - 111)  BP: --  RR: 13 (06-27-19 @ 21:00) (12 - 26)  SpO2: 100% (06-27-19 @ 21:00) (94% - 100%)  Wt(kg): --    Relevant labwork and imaging reviewed.    Extubated today.  Doing well neurologically though still b/l 6th.  Possibly downgrade out of ICU in AM.

## 2019-06-27 NOTE — AIRWAY REMOVAL NOTE  ADULT & PEDS - ARTIFICAL AIRWAY REMOVAL COMMENTS
extubated with rn jessica valentino present after verifying order and id band verification tolerating well o2 sat 100

## 2019-06-27 NOTE — PROGRESS NOTE ADULT - SUBJECTIVE AND OBJECTIVE BOX
· Subjective and Objective:   O: low grade fever  no cuff leak     T(C): 37.3 (06-26-19 @ 07:00), Max: 37.9 (06-25-19 @ 15:00)  HR: 76 (06-26-19 @ 07:00) (60 - 96)  BP: --  RR: 13 (06-26-19 @ 07:00) (5 - 24)  SpO2: 100% (06-26-19 @ 07:00) (100% - 100%)  06-25-19 @ 07:01  -  06-26-19 @ 07:00  --------------------------------------------------------  IN: 2484 mL / OUT: 765 mL / NET: 1719 mL    acetaminophen    Suspension .. 650 milliGRAM(s) Oral every 6 hours  ALBUTerol    90 MICROgram(s) HFA Inhaler 2 Puff(s) Inhalation every 6 hours PRN  amLODIPine   Tablet 10 milliGRAM(s) Oral daily  brimonidine 0.2% Ophthalmic Solution 1 Drop(s) Both EYES two times a day  chlorhexidine 0.12% Liquid 15 milliLiter(s) Oral Mucosa two times a day  chlorhexidine 4% Liquid 1 Application(s) Topical <User Schedule>  dexamethasone  Injectable 6 milliGRAM(s) IV Push every 6 hours  dexmedetomidine Infusion 0.2 MICROgram(s)/kG/Hr IV Continuous <Continuous>  enoxaparin Injectable 30 milliGRAM(s) SubCutaneous <User Schedule>  latanoprost 0.005% Ophthalmic Solution 1 Drop(s) Both EYES at bedtime  losartan 25 milliGRAM(s) Oral daily  niCARdipine Infusion 5 mG/Hr IV Continuous <Continuous>  pantoprazole  Injectable 40 milliGRAM(s) IV Push daily  prednisoLONE acetate 1% Suspension 1 Drop(s) Both EYES four times a day  sodium chloride 0.9%. 1000 milliLiter(s) IV Continuous <Continuous>  Mode: AC/ CMV (Assist Control/ Continuous Mandatory Ventilation), RR (machine): 14, TV (machine): 500, FiO2: 40, PEEP: 5, ITime: 1, MAP: 11, PIP: 25    EXAMINATION:  General:  calm  HEENT:  MMM  Neuro:=opens eyes to voice, FC, CONTRERAS, PERRL, antigravity in all 4 extremities, bilateral horizontal gaze palsy  Cards:  RRR  Respiratory:  no respiratory distress, intubated  Adomen:  soft  Extremities:  no edema  Skin:  warm/dry      LABS:  Na: 136 (06-25 @ 20:52), 138 (06-24 @ 23:37)  K: 4.0 (06-25 @ 20:52), 3.7 (06-24 @ 23:37)  Cl: 104 (06-25 @ 20:52), 104 (06-24 @ 23:37)  CO2: 20 (06-25 @ 20:52), 20 (06-24 @ 23:37)  BUN: 10 (06-25 @ 20:52), 8 (06-24 @ 23:37)  Cr: 0.60 (06-25 @ 20:52), 0.60 (06-24 @ 23:37)  Glu: 170(06-25 @ 20:52), 195(06-24 @ 23:37)    Hgb: 11.4 (06-25 @ 20:52), 11.5 (06-24 @ 23:37)  Hct: 34.7 (06-25 @ 20:52), 36.1 (06-24 @ 23:37)  WBC: 17.0 (06-25 @ 20:52), 14.6 (06-24 @ 23:37)  Plt: 263 (06-25 @ 20:52), 288 (06-24 @ 23:37)    INR: 1.08 06-24-19 @ 06:42  PTT: 31.4 06-24-19 @ 06:42            Assessment and Plan:   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  with small IVH post-op    NEURO:  neuro checks q 1 hr   CT head today is stable  MRI brain wwo pending, will discuss with radiology if screws are MRI compatible   Sedation: precedex PRN  Pain control  decadron  cerebral edema  Activity: [] OOB as tolerated [x] Bedrest for now [] PT [] OT [] PMNR    PULM:  acute respiratory failure, with no cuff leak   Pressure support trials   no cuff leak, continue decadron, has tongue swelling, will wait till tomorrow   VAP bundle  Aspiration precautions  CXR mild congested, if UP doesn't , will give lasix 10 mg once   Nebs PRN    CV:  -150mmHg  Continue BP meds  TTE pending    RENAL:  IVL  keep sodium 135-145     GI:  Diet: start TF   NPO after midnight for possible extubation   GI prophylaxis [] not indicated [x] PPI: vented [] other:  Bowel regimen [x] colace [x] senna [] other:    ENDO:   Goal euglycemia (-180)    HEME/ONC:  VTE prophylaxis: [x] SCDs [] chemoprophylaxis [] hold chemoprophylaxis due to: lovenox  [x] high risk of DVT/PE on admission due to: brain tumour    ID:  low grade fever, if spikes will cx     MISC:  Ophtho: glaucoma - continue eye drops    SOCIAL/FAMILY:  [x] awaiting [] updated at bedside [] family meeting    CODE STATUS:  [x] Full Code [] DNR [] DNI [] Palliative/Comfort Care    DISPOSITION:  [x] ICU [] Stroke Unit [] Floor [] EMU [] RCU [] PCU    [x] Patient is at high risk of neurologic deterioration/death due to: brain edema, brain compression    Time spent: 40 [x] critical care minutes    Contact: [phone] · Subjective and Objective:   O: remained intubated because no cuff leak     T(C): 37.4 (06-27-19 @ 11:00), Max: 37.7 (06-27-19 @ 07:00)  HR: 94 (06-27-19 @ 11:28) (64 - 111)  BP: --  RR: 19 (06-27-19 @ 11:00) (13 - 26)  SpO2: 100% (06-27-19 @ 11:28) (98% - 100%)  06-26-19 @ 07:01  -  06-27-19 @ 07:00  --------------------------------------------------------  IN: 1075 mL / OUT: 2150 mL / NET: -1075 mL    06-27-19 @ 07:01  -  06-27-19 @ 12:00  --------------------------------------------------------  IN: 300 mL / OUT: 280 mL / NET: 20 mL    acetaminophen    Suspension .. 650 milliGRAM(s) Oral every 6 hours PRN  ALBUTerol    90 MICROgram(s) HFA Inhaler 2 Puff(s) Inhalation every 6 hours PRN  amLODIPine   Tablet 10 milliGRAM(s) Oral daily  brimonidine 0.2% Ophthalmic Solution 1 Drop(s) Both EYES two times a day  chlorhexidine 0.12% Liquid 15 milliLiter(s) Oral Mucosa two times a day  chlorhexidine 4% Liquid 1 Application(s) Topical <User Schedule>  dexamethasone  Injectable 4 milliGRAM(s) IV Push every 6 hours  dexmedetomidine Infusion 0.2 MICROgram(s)/kG/Hr IV Continuous <Continuous>  docusate sodium Liquid 100 milliGRAM(s) Oral two times a day  enoxaparin Injectable 30 milliGRAM(s) SubCutaneous every 12 hours  insulin lispro (HumaLOG) corrective regimen sliding scale   SubCutaneous every 6 hours  latanoprost 0.005% Ophthalmic Solution 1 Drop(s) Both EYES at bedtime  losartan 25 milliGRAM(s) Oral daily  niCARdipine Infusion 5 mG/Hr IV Continuous <Continuous>  pantoprazole  Injectable 40 milliGRAM(s) IV Push daily  prednisoLONE acetate 1% Suspension 1 Drop(s) Both EYES four times a day  senna Syrup 5 milliLiter(s) Oral at bedtime  sodium chloride 0.9%. 1000 milliLiter(s) IV Continuous <Continuous>  Mode: CPAP with PS, FiO2: 40, PEEP: 5, PS: 5, MAP: 7, PIP: 11    EXAMINATION:  General:  calm  HEENT:  MMM  Neuro:=opens eyes to voice, FC, CONTRERAS, PERRL, antigravity in all 4 extremities, bilateral horizontal gaze palsy  Cards:  RRR  Respiratory:  no respiratory distress, intubated  Adomen:  soft  Extremities:  no edema  Skin:  warm/dry      LABS:  Na: 142 (06-26 @ 16:16), 136 (06-25 @ 20:52), 138 (06-24 @ 23:37)  K: 3.9 (06-26 @ 16:16), 4.0 (06-25 @ 20:52), 3.7 (06-24 @ 23:37)  Cl: 106 (06-26 @ 16:16), 104 (06-25 @ 20:52), 104 (06-24 @ 23:37)  CO2: 24 (06-26 @ 16:16), 20 (06-25 @ 20:52), 20 (06-24 @ 23:37)  BUN: 12 (06-26 @ 16:16), 10 (06-25 @ 20:52), 8 (06-24 @ 23:37)  Cr: 0.59 (06-26 @ 16:16), 0.60 (06-25 @ 20:52), 0.60 (06-24 @ 23:37)  Glu: 172(06-26 @ 16:16), 170(06-25 @ 20:52), 195(06-24 @ 23:37)    Hgb: 11.4 (06-25 @ 20:52), 11.5 (06-24 @ 23:37)  Hct: 34.7 (06-25 @ 20:52), 36.1 (06-24 @ 23:37)  WBC: 17.0 (06-25 @ 20:52), 14.6 (06-24 @ 23:37)  Plt: 263 (06-25 @ 20:52), 288 (06-24 @ 23:37)    INR:   PTT:             Assessment and Plan:   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  with small IVH post-op    NEURO:  neuro checks q 1 hr   MRI brain wwo pending   Pain control  decadron  cerebral edema  avoid sedatives   Activity: [] OOB as tolerated [x] Bedrest for now [] PT [] OT [] PMNR    PULM:  acute respiratory failure  has cuff leak   Pressure support    dunonebs   VAP bundle  Aspiration precautions  CXR clear today s/p lasix  Mild CO2 retention, but mentating well, will extubate, she might be a chronic CO2 retainer   Nebs PRN    CV:  -150 mmHg  Continue BP meds  TTE EF 77%     RENAL:  IVL 50  keep sodium 135-145     GI:  Diet:   NPO  for possible extubation   GI prophylaxis [] not indicated [x] PPI: vented [] other:  Bowel regimen [x] colace [x] senna [] other:    ENDO:   Goal euglycemia (-180)    HEME/ONC:  VTE prophylaxis: [x] SCDs [] chemoprophylaxis [] hold chemoprophylaxis due to: lovenox  [x] high risk of DVT/PE on admission due to: brain tumour    ID:  low grade fever, if spikes will cx     MISC:  Ophtho: glaucoma - continue eye drops    SOCIAL/FAMILY:  [x] awaiting [] updated at bedside [] family meeting    CODE STATUS:  [x] Full Code [] DNR [] DNI [] Palliative/Comfort Care    DISPOSITION:  [x] ICU [] Stroke Unit [] Floor [] EMU [] RCU [] PCU    [x] Patient is at high risk of neurologic deterioration/death due to: brain edema, brain compression    Time spent: 40 [x] critical care minutes    Contact: [phone]

## 2019-06-27 NOTE — PROGRESS NOTE ADULT - SUBJECTIVE AND OBJECTIVE BOX
Visit Summary: Patient seen and evaluated at bedside.    Overnight Events: none    Exam:  T(C): 37.5 (06-26-19 @ 19:00), Max: 37.5 (06-26-19 @ 19:00)  HR: 90 (06-26-19 @ 22:00) (68 - 111)  BP: --  RR: 14 (06-26-19 @ 22:00) (5 - 24)  SpO2: 100% (06-26-19 @ 22:00) (100% - 100%)  Wt(kg): --    Intubated, PERRL, b/l likely CNVI and PPRF abn, follows commands, moves all limbs symmetrically and strongly, tongue swollen, lower gum line soft tissue swelling, blistering on chin                        11.4   17.0  )-----------( 263      ( 25 Jun 2019 20:52 )             34.7     06-26    142  |  106  |  12  ----------------------------<  172<H>  3.9   |  24  |  0.59    Ca    9.0      26 Jun 2019 16:16  Phos  2.1     06-26  Mg     2.3     06-26

## 2019-06-28 LAB
ANION GAP SERPL CALC-SCNC: 10 MMOL/L — SIGNIFICANT CHANGE UP (ref 5–17)
ANION GAP SERPL CALC-SCNC: 12 MMOL/L — SIGNIFICANT CHANGE UP (ref 5–17)
BUN SERPL-MCNC: 13 MG/DL — SIGNIFICANT CHANGE UP (ref 7–23)
BUN SERPL-MCNC: 13 MG/DL — SIGNIFICANT CHANGE UP (ref 7–23)
CALCIUM SERPL-MCNC: 9.1 MG/DL — SIGNIFICANT CHANGE UP (ref 8.4–10.5)
CALCIUM SERPL-MCNC: 9.2 MG/DL — SIGNIFICANT CHANGE UP (ref 8.4–10.5)
CHLORIDE SERPL-SCNC: 101 MMOL/L — SIGNIFICANT CHANGE UP (ref 96–108)
CHLORIDE SERPL-SCNC: 98 MMOL/L — SIGNIFICANT CHANGE UP (ref 96–108)
CO2 SERPL-SCNC: 28 MMOL/L — SIGNIFICANT CHANGE UP (ref 22–31)
CO2 SERPL-SCNC: 29 MMOL/L — SIGNIFICANT CHANGE UP (ref 22–31)
CREAT SERPL-MCNC: 0.56 MG/DL — SIGNIFICANT CHANGE UP (ref 0.5–1.3)
CREAT SERPL-MCNC: 0.57 MG/DL — SIGNIFICANT CHANGE UP (ref 0.5–1.3)
GAS PNL BLDA: SIGNIFICANT CHANGE UP
GLUCOSE BLDC GLUCOMTR-MCNC: 138 MG/DL — HIGH (ref 70–99)
GLUCOSE BLDC GLUCOMTR-MCNC: 157 MG/DL — HIGH (ref 70–99)
GLUCOSE SERPL-MCNC: 160 MG/DL — HIGH (ref 70–99)
GLUCOSE SERPL-MCNC: 165 MG/DL — HIGH (ref 70–99)
HCT VFR BLD CALC: 36 % — SIGNIFICANT CHANGE UP (ref 34.5–45)
HCT VFR BLD CALC: 37.2 % — SIGNIFICANT CHANGE UP (ref 34.5–45)
HGB BLD-MCNC: 11.7 G/DL — SIGNIFICANT CHANGE UP (ref 11.5–15.5)
HGB BLD-MCNC: 11.9 G/DL — SIGNIFICANT CHANGE UP (ref 11.5–15.5)
MAGNESIUM SERPL-MCNC: 2.3 MG/DL — SIGNIFICANT CHANGE UP (ref 1.6–2.6)
MAGNESIUM SERPL-MCNC: 2.3 MG/DL — SIGNIFICANT CHANGE UP (ref 1.6–2.6)
MCHC RBC-ENTMCNC: 27.4 PG — SIGNIFICANT CHANGE UP (ref 27–34)
MCHC RBC-ENTMCNC: 28.4 PG — SIGNIFICANT CHANGE UP (ref 27–34)
MCHC RBC-ENTMCNC: 31.9 GM/DL — LOW (ref 32–36)
MCHC RBC-ENTMCNC: 32.6 GM/DL — SIGNIFICANT CHANGE UP (ref 32–36)
MCV RBC AUTO: 85.9 FL — SIGNIFICANT CHANGE UP (ref 80–100)
MCV RBC AUTO: 87.3 FL — SIGNIFICANT CHANGE UP (ref 80–100)
PHOSPHATE SERPL-MCNC: 2.3 MG/DL — LOW (ref 2.5–4.5)
PHOSPHATE SERPL-MCNC: 2.3 MG/DL — LOW (ref 2.5–4.5)
PLATELET # BLD AUTO: 296 K/UL — SIGNIFICANT CHANGE UP (ref 150–400)
PLATELET # BLD AUTO: 311 K/UL — SIGNIFICANT CHANGE UP (ref 150–400)
POTASSIUM SERPL-MCNC: 3.8 MMOL/L — SIGNIFICANT CHANGE UP (ref 3.5–5.3)
POTASSIUM SERPL-MCNC: 4.1 MMOL/L — SIGNIFICANT CHANGE UP (ref 3.5–5.3)
POTASSIUM SERPL-SCNC: 3.8 MMOL/L — SIGNIFICANT CHANGE UP (ref 3.5–5.3)
POTASSIUM SERPL-SCNC: 4.1 MMOL/L — SIGNIFICANT CHANGE UP (ref 3.5–5.3)
RBC # BLD: 4.12 M/UL — SIGNIFICANT CHANGE UP (ref 3.8–5.2)
RBC # BLD: 4.33 M/UL — SIGNIFICANT CHANGE UP (ref 3.8–5.2)
RBC # FLD: 12.4 % — SIGNIFICANT CHANGE UP (ref 10.3–14.5)
RBC # FLD: 12.5 % — SIGNIFICANT CHANGE UP (ref 10.3–14.5)
SODIUM SERPL-SCNC: 139 MMOL/L — SIGNIFICANT CHANGE UP (ref 135–145)
SODIUM SERPL-SCNC: 139 MMOL/L — SIGNIFICANT CHANGE UP (ref 135–145)
WBC # BLD: 12.8 K/UL — HIGH (ref 3.8–10.5)
WBC # BLD: 20.6 K/UL — HIGH (ref 3.8–10.5)
WBC # FLD AUTO: 12.8 K/UL — HIGH (ref 3.8–10.5)
WBC # FLD AUTO: 20.6 K/UL — HIGH (ref 3.8–10.5)

## 2019-06-28 PROCEDURE — 70551 MRI BRAIN STEM W/O DYE: CPT | Mod: 26

## 2019-06-28 PROCEDURE — 99291 CRITICAL CARE FIRST HOUR: CPT

## 2019-06-28 PROCEDURE — 93010 ELECTROCARDIOGRAM REPORT: CPT

## 2019-06-28 PROCEDURE — 70450 CT HEAD/BRAIN W/O DYE: CPT | Mod: 26

## 2019-06-28 RX ORDER — QUETIAPINE FUMARATE 200 MG/1
12.5 TABLET, FILM COATED ORAL AT BEDTIME
Refills: 0 | Status: COMPLETED | OUTPATIENT
Start: 2019-06-28 | End: 2019-06-29

## 2019-06-28 RX ORDER — POTASSIUM CHLORIDE 20 MEQ
20 PACKET (EA) ORAL ONCE
Refills: 0 | Status: COMPLETED | OUTPATIENT
Start: 2019-06-28 | End: 2019-06-28

## 2019-06-28 RX ORDER — QUETIAPINE FUMARATE 200 MG/1
12.5 TABLET, FILM COATED ORAL AT BEDTIME
Refills: 0 | Status: DISCONTINUED | OUTPATIENT
Start: 2019-06-28 | End: 2019-06-28

## 2019-06-28 RX ORDER — DEXAMETHASONE 0.5 MG/5ML
4 ELIXIR ORAL EVERY 8 HOURS
Refills: 0 | Status: DISCONTINUED | OUTPATIENT
Start: 2019-06-28 | End: 2019-06-29

## 2019-06-28 RX ORDER — DEXMEDETOMIDINE HYDROCHLORIDE IN 0.9% SODIUM CHLORIDE 4 UG/ML
0.2 INJECTION INTRAVENOUS
Qty: 200 | Refills: 0 | Status: DISCONTINUED | OUTPATIENT
Start: 2019-06-28 | End: 2019-06-29

## 2019-06-28 RX ORDER — POTASSIUM PHOSPHATE, MONOBASIC POTASSIUM PHOSPHATE, DIBASIC 236; 224 MG/ML; MG/ML
15 INJECTION, SOLUTION INTRAVENOUS ONCE
Refills: 0 | Status: COMPLETED | OUTPATIENT
Start: 2019-06-28 | End: 2019-06-28

## 2019-06-28 RX ADMIN — CHLORHEXIDINE GLUCONATE 15 MILLILITER(S): 213 SOLUTION TOPICAL at 05:53

## 2019-06-28 RX ADMIN — AMLODIPINE BESYLATE 10 MILLIGRAM(S): 2.5 TABLET ORAL at 05:50

## 2019-06-28 RX ADMIN — Medication 20 MILLIEQUIVALENT(S): at 01:49

## 2019-06-28 RX ADMIN — Medication 4 MILLIGRAM(S): at 14:53

## 2019-06-28 RX ADMIN — Medication 4 MILLIGRAM(S): at 22:05

## 2019-06-28 RX ADMIN — Medication 4 MILLIGRAM(S): at 00:56

## 2019-06-28 RX ADMIN — DEXMEDETOMIDINE HYDROCHLORIDE IN 0.9% SODIUM CHLORIDE 6.12 MICROGRAM(S)/KG/HR: 4 INJECTION INTRAVENOUS at 01:08

## 2019-06-28 RX ADMIN — Medication 1 DROP(S): at 18:32

## 2019-06-28 RX ADMIN — LOSARTAN POTASSIUM 25 MILLIGRAM(S): 100 TABLET, FILM COATED ORAL at 05:52

## 2019-06-28 RX ADMIN — Medication 100 MILLIGRAM(S): at 05:53

## 2019-06-28 RX ADMIN — CHLORHEXIDINE GLUCONATE 1 APPLICATION(S): 213 SOLUTION TOPICAL at 22:05

## 2019-06-28 RX ADMIN — Medication 1 DROP(S): at 12:52

## 2019-06-28 RX ADMIN — SODIUM CHLORIDE 50 MILLILITER(S): 9 INJECTION INTRAMUSCULAR; INTRAVENOUS; SUBCUTANEOUS at 22:06

## 2019-06-28 RX ADMIN — SODIUM CHLORIDE 50 MILLILITER(S): 9 INJECTION INTRAMUSCULAR; INTRAVENOUS; SUBCUTANEOUS at 17:54

## 2019-06-28 RX ADMIN — PANTOPRAZOLE SODIUM 40 MILLIGRAM(S): 20 TABLET, DELAYED RELEASE ORAL at 12:52

## 2019-06-28 RX ADMIN — Medication 1 DROP(S): at 05:52

## 2019-06-28 RX ADMIN — ENOXAPARIN SODIUM 30 MILLIGRAM(S): 100 INJECTION SUBCUTANEOUS at 17:50

## 2019-06-28 RX ADMIN — Medication 62.5 MILLIMOLE(S): at 23:38

## 2019-06-28 RX ADMIN — CHLORHEXIDINE GLUCONATE 15 MILLILITER(S): 213 SOLUTION TOPICAL at 17:53

## 2019-06-28 RX ADMIN — Medication 1: at 12:58

## 2019-06-28 RX ADMIN — POTASSIUM PHOSPHATE, MONOBASIC POTASSIUM PHOSPHATE, DIBASIC 62.5 MILLIMOLE(S): 236; 224 INJECTION, SOLUTION INTRAVENOUS at 01:49

## 2019-06-28 RX ADMIN — Medication 1 DROP(S): at 00:56

## 2019-06-28 RX ADMIN — Medication 1: at 05:51

## 2019-06-28 RX ADMIN — Medication 100 MILLIGRAM(S): at 17:53

## 2019-06-28 RX ADMIN — ENOXAPARIN SODIUM 30 MILLIGRAM(S): 100 INJECTION SUBCUTANEOUS at 05:52

## 2019-06-28 RX ADMIN — Medication 4 MILLIGRAM(S): at 05:50

## 2019-06-28 NOTE — OCCUPATIONAL THERAPY INITIAL EVALUATION ADULT - COGNITIVE, VISUAL PERCEPTUAL, OT EVAL
GOAL: Pt will utilize visual scanning techniques to identify 5/5 objects correctly t/o visual field in 4 weeks

## 2019-06-28 NOTE — OCCUPATIONAL THERAPY INITIAL EVALUATION ADULT - BALANCE TRAINING, PT EVAL
GOAL: Pt will increase dynamic standing balance to good (-) to increase participation in ADLs in 4 weeks
numerical 0-10

## 2019-06-28 NOTE — PHYSICAL THERAPY INITIAL EVALUATION ADULT - PERTINENT HX OF CURRENT PROBLEM, REHAB EVAL
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

## 2019-06-28 NOTE — PROGRESS NOTE ADULT - SUBJECTIVE AND OBJECTIVE BOX
· Subjective and Objective:   O: extubated yesterday  very agitated overnight placed on precedex, she started desaturating, placed on nonrebreather     T(C): 36.4 (06-28-19 @ 07:00), Max: 37.6 (06-27-19 @ 15:00)  HR: 79 (06-28-19 @ 09:00) (67 - 115)  BP: --  RR: 16 (06-28-19 @ 09:00) (7 - 26)  SpO2: 100% (06-28-19 @ 07:00) (91% - 100%)  06-27-19 @ 07:01  -  06-28-19 @ 07:00  --------------------------------------------------------  IN: 1623.9 mL / OUT: 2830 mL / NET: -1206.1 mL    acetaminophen    Suspension .. 650 milliGRAM(s) Oral every 6 hours PRN  ALBUTerol    90 MICROgram(s) HFA Inhaler 2 Puff(s) Inhalation every 6 hours PRN  amLODIPine   Tablet 10 milliGRAM(s) Oral daily  chlorhexidine 0.12% Liquid 15 milliLiter(s) Oral Mucosa two times a day  chlorhexidine 4% Liquid 1 Application(s) Topical <User Schedule>  dexamethasone  Injectable 4 milliGRAM(s) IV Push every 8 hours  dexmedetomidine Infusion 0.2 MICROgram(s)/kG/Hr IV Continuous <Continuous>  docusate sodium Liquid 100 milliGRAM(s) Oral two times a day  enoxaparin Injectable 30 milliGRAM(s) SubCutaneous every 12 hours  insulin lispro (HumaLOG) corrective regimen sliding scale   SubCutaneous every 6 hours  losartan 25 milliGRAM(s) Oral daily  pantoprazole  Injectable 40 milliGRAM(s) IV Push daily  prednisoLONE acetate 1% Suspension 1 Drop(s) Both EYES four times a day  senna Syrup 5 milliLiter(s) Oral at bedtime  sodium chloride 0.9%. 1000 milliLiter(s) IV Continuous <Continuous>  Mode: CPAP with PS, FiO2: 40, PEEP: 5, PS: 5, MAP: 7, PIP: 11      EXAMINATION:  General:  calm  HEENT:  MMM  Neuro agitated, sleepy, oriented x 1, limited eye abduction and adduction, RAJAT, moving all 4 extremities antigravity   Cards:  RRR  Respiratory:  no respiratory distress, intubated  Adomen:  soft  Extremities:  no edema  Skin:  warm/dry      LABS:  Na: 139 (06-28 @ 00:33), 142 (06-26 @ 16:16), 136 (06-25 @ 20:52)  K: 3.8 (06-28 @ 00:33), 3.9 (06-26 @ 16:16), 4.0 (06-25 @ 20:52)  Cl: 101 (06-28 @ 00:33), 106 (06-26 @ 16:16), 104 (06-25 @ 20:52)  CO2: 28 (06-28 @ 00:33), 24 (06-26 @ 16:16), 20 (06-25 @ 20:52)  BUN: 13 (06-28 @ 00:33), 12 (06-26 @ 16:16), 10 (06-25 @ 20:52)  Cr: 0.56 (06-28 @ 00:33), 0.59 (06-26 @ 16:16), 0.60 (06-25 @ 20:52)  Glu: 165(06-28 @ 00:33), 172(06-26 @ 16:16), 170(06-25 @ 20:52)    Hgb: 11.7 (06-28 @ 00:33), 11.4 (06-25 @ 20:52)  Hct: 36.0 (06-28 @ 00:33), 34.7 (06-25 @ 20:52)  WBC: 12.8 (06-28 @ 00:33), 17.0 (06-25 @ 20:52)  Plt: 296 (06-28 @ 00:33), 263 (06-25 @ 20:52)    INR:   PTT:       EXAM:  CT BRAIN                            PROCEDURE DATE:  06/28/2019            INTERPRETATION:  Noncontrast CT of the brain.    CLINICAL INDICATION:  s/p crani for tumor resection    TECHNIQUE : Axial CT scanning of the brain was obtained from the skull   base to the vertex without the administration of intravenous contrast.      COMPARISON: CT brain 6/26/2019    FINDINGS:      Study is limited by motion.    Redemonstration of midline suboccipital craniectomy and surgical clips in   the region of the left mesial cerebellar hemisphere and cerebellar vermis.    Minimal hemorrhage layering in the occipital lobes is slightly decreased.   Ventricles are stable in size, no large hydrocephalus. No midline shift.    No gross evidence for parenchymal hemorrhage or acute territorial infarct.    IMPRESSION:    Slightly decreased minimal hemorrhage layering in the occipital horns.   Interval stable in size. No large hydrocephalus. No midline shift.      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  with small IVH post-op    NEURO:  neuro checks q 1 hr   MRI brain wwo pending   Pain control  decadron for cerebral edema, decreased to 4 mg q 8 hrs   requiring precedex for agitation  will give seroquel 12.5 mg at night for delirium   Activity: [] OOB as tolerated [x] Bedrest for now [] PT [] OT [] PMNR    PULM:  respiratory depression with precedex, will decrease the dosage     dunonebs   on nonrebreather for desaturation     CV:  -150 mmHg  Continue BP meds  TTE EF 77%     RENAL:  IVL 50 ml/hr  keep sodium 135-145     GI:  Diet:   start TF when comes back from MRI   GI prophylaxis [] not indicated [x] PPI: vented [] other:  Bowel regimen [x] colace [x] senna [] other:    ENDO:   Goal euglycemia (-180)    HEME/ONC:  VTE prophylaxis: [x] SCDs [x] chemoprophylaxis lovenox  [x] high risk of DVT/PE on admission due to: brain tumour    ID:  afebrile      MISC:  Ophtho: glaucoma - continue eye drops    SOCIAL/FAMILY:  [x] awaiting [] updated at bedside [] family meeting    CODE STATUS:  [x] Full Code [] DNR [] DNI [] Palliative/Comfort Care    DISPOSITION:  [x] ICU [] Stroke Unit [] Floor [] EMU [] RCU [] PCU    [x] Patient is at high risk of neurologic deterioration/death due to: brain edema, brain compression    Time spent: 35 [x] critical care minutes    Contact: [phone]

## 2019-06-28 NOTE — PHYSICAL THERAPY INITIAL EVALUATION ADULT - ADDITIONAL COMMENTS
6/28/19 CT brain: Slightly decreased minimal hemorrhage layering in the occipital horns. Interval stable in size. No large hydrocephalus. No midline shift.

## 2019-06-28 NOTE — PROGRESS NOTE ADULT - SUBJECTIVE AND OBJECTIVE BOX
Visit Summary: Patient seen and evaluated at bedside.    Overnight Events: none    Exam:  T(C): 36.8 (06-27-19 @ 20:00), Max: 37.7 (06-27-19 @ 07:00)  HR: 105 (06-27-19 @ 21:00) (64 - 111)  BP: --  RR: 13 (06-27-19 @ 21:00) (12 - 26)  SpO2: 100% (06-27-19 @ 21:00) (94% - 100%)  Wt(kg): --    AOX3, pupils 3R ,FC, CONTRERAS , B/L 6NP vs conjugate gaze deficit                           11.7   12.8  )-----------( 296      ( 28 Jun 2019 00:33 )             36.0     06-28    139  |  101  |  13  ----------------------------<  165<H>  3.8   |  28  |  0.56    Ca    9.1      28 Jun 2019 00:33  Phos  2.3     06-28  Mg     2.3     06-28

## 2019-06-28 NOTE — PROGRESS NOTE ADULT - SUBJECTIVE AND OBJECTIVE BOX
NSCU ATTENDING -- ADDITIONAL PROGRESS NOTE    Nighttime rounds were performed -- please refer to earlier Progress Note for HPI details.    T(C): 37.2 (06-28-19 @ 19:00), Max: 37.2 (06-28-19 @ 19:00)  HR: 111 (06-28-19 @ 22:00) (67 - 115)  BP: 161/74 (06-28-19 @ 22:00) (138/88 - 162/83)  RR: 20 (06-28-19 @ 22:00) (7 - 26)  SpO2: 95% (06-28-19 @ 22:00) (84% - 100%)  Wt(kg): --    Relevant labwork and imaging reviewed.    NPO after midnight for MRI with anesthesia tomorrow.  Neurologically unchanged.

## 2019-06-28 NOTE — PHYSICAL THERAPY INITIAL EVALUATION ADULT - CRITERIA FOR SKILLED THERAPEUTIC INTERVENTIONS
functional limitations in following categories/impairments found/rehab potential/predicted duration of therapy intervention/risk reduction/prevention/anticipated discharge recommendation

## 2019-06-28 NOTE — OCCUPATIONAL THERAPY INITIAL EVALUATION ADULT - PERTINENT HX OF CURRENT PROBLEM, REHAB EVAL
69F morbidly obese, , PMH of extrinsic asthma, glaucoma, HTN & OA,  reports having recent light headedness, dizziness & some balance disturbance for about three months,  PMD ordered a brain MRI which showed a 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Craniotomy for brain tumor using navigation system 24-Jun-2019

## 2019-06-28 NOTE — PHYSICAL THERAPY INITIAL EVALUATION ADULT - LIVES WITH, PROFILE
children/As per chart patient resides with spouse, daughter,and grandchildren in a private home with 6 steps to enter, no stairs inside. Patient was independent in ADLs and ambulation prior to admission. At time of evaluation pt reports 3-4 steps to enter with bilateral handrails and cane for ambulation.

## 2019-06-28 NOTE — OCCUPATIONAL THERAPY INITIAL EVALUATION ADULT - PLANNED THERAPY INTERVENTIONS, OT EVAL
ADL retraining/balance training/bed mobility training/strengthening/cognitive, visual perceptual/transfer training

## 2019-06-29 LAB
ANION GAP SERPL CALC-SCNC: 11 MMOL/L — SIGNIFICANT CHANGE UP (ref 5–17)
BUN SERPL-MCNC: 15 MG/DL — SIGNIFICANT CHANGE UP (ref 7–23)
CALCIUM SERPL-MCNC: 9.1 MG/DL — SIGNIFICANT CHANGE UP (ref 8.4–10.5)
CHLORIDE SERPL-SCNC: 98 MMOL/L — SIGNIFICANT CHANGE UP (ref 96–108)
CO2 SERPL-SCNC: 28 MMOL/L — SIGNIFICANT CHANGE UP (ref 22–31)
CREAT SERPL-MCNC: 0.54 MG/DL — SIGNIFICANT CHANGE UP (ref 0.5–1.3)
GLUCOSE BLDC GLUCOMTR-MCNC: 130 MG/DL — HIGH (ref 70–99)
GLUCOSE BLDC GLUCOMTR-MCNC: 134 MG/DL — HIGH (ref 70–99)
GLUCOSE BLDC GLUCOMTR-MCNC: 139 MG/DL — HIGH (ref 70–99)
GLUCOSE BLDC GLUCOMTR-MCNC: 155 MG/DL — HIGH (ref 70–99)
GLUCOSE BLDC GLUCOMTR-MCNC: 156 MG/DL — HIGH (ref 70–99)
GLUCOSE SERPL-MCNC: 169 MG/DL — HIGH (ref 70–99)
HCT VFR BLD CALC: 38.6 % — SIGNIFICANT CHANGE UP (ref 34.5–45)
HGB BLD-MCNC: 12.3 G/DL — SIGNIFICANT CHANGE UP (ref 11.5–15.5)
MAGNESIUM SERPL-MCNC: 2.3 MG/DL — SIGNIFICANT CHANGE UP (ref 1.6–2.6)
MCHC RBC-ENTMCNC: 27.5 PG — SIGNIFICANT CHANGE UP (ref 27–34)
MCHC RBC-ENTMCNC: 31.8 GM/DL — LOW (ref 32–36)
MCV RBC AUTO: 86.2 FL — SIGNIFICANT CHANGE UP (ref 80–100)
PHOSPHATE SERPL-MCNC: 1.8 MG/DL — LOW (ref 2.5–4.5)
PLATELET # BLD AUTO: 286 K/UL — SIGNIFICANT CHANGE UP (ref 150–400)
POTASSIUM SERPL-MCNC: 3.5 MMOL/L — SIGNIFICANT CHANGE UP (ref 3.5–5.3)
POTASSIUM SERPL-SCNC: 3.5 MMOL/L — SIGNIFICANT CHANGE UP (ref 3.5–5.3)
RBC # BLD: 4.47 M/UL — SIGNIFICANT CHANGE UP (ref 3.8–5.2)
RBC # FLD: 12.7 % — SIGNIFICANT CHANGE UP (ref 10.3–14.5)
SODIUM SERPL-SCNC: 137 MMOL/L — SIGNIFICANT CHANGE UP (ref 135–145)
WBC # BLD: 15.5 K/UL — HIGH (ref 3.8–10.5)
WBC # FLD AUTO: 15.5 K/UL — HIGH (ref 3.8–10.5)

## 2019-06-29 PROCEDURE — 99233 SBSQ HOSP IP/OBS HIGH 50: CPT

## 2019-06-29 PROCEDURE — 71045 X-RAY EXAM CHEST 1 VIEW: CPT | Mod: 26

## 2019-06-29 RX ORDER — DEXAMETHASONE 0.5 MG/5ML
4 ELIXIR ORAL EVERY 6 HOURS
Refills: 0 | Status: DISCONTINUED | OUTPATIENT
Start: 2019-06-29 | End: 2019-06-30

## 2019-06-29 RX ORDER — POTASSIUM PHOSPHATE, MONOBASIC POTASSIUM PHOSPHATE, DIBASIC 236; 224 MG/ML; MG/ML
30 INJECTION, SOLUTION INTRAVENOUS ONCE
Refills: 0 | Status: COMPLETED | OUTPATIENT
Start: 2019-06-29 | End: 2019-06-29

## 2019-06-29 RX ORDER — POTASSIUM CHLORIDE 20 MEQ
40 PACKET (EA) ORAL ONCE
Refills: 0 | Status: COMPLETED | OUTPATIENT
Start: 2019-06-29 | End: 2019-06-29

## 2019-06-29 RX ORDER — SODIUM CHLORIDE 9 MG/ML
500 INJECTION INTRAMUSCULAR; INTRAVENOUS; SUBCUTANEOUS ONCE
Refills: 0 | Status: COMPLETED | OUTPATIENT
Start: 2019-06-29 | End: 2019-06-29

## 2019-06-29 RX ORDER — SODIUM CHLORIDE 9 MG/ML
1000 INJECTION INTRAMUSCULAR; INTRAVENOUS; SUBCUTANEOUS ONCE
Refills: 0 | Status: COMPLETED | OUTPATIENT
Start: 2019-06-29 | End: 2019-06-29

## 2019-06-29 RX ORDER — NYSTATIN 500MM UNIT
500000 POWDER (EA) MISCELLANEOUS
Refills: 0 | Status: DISCONTINUED | OUTPATIENT
Start: 2019-06-29 | End: 2019-07-01

## 2019-06-29 RX ADMIN — Medication 1: at 18:25

## 2019-06-29 RX ADMIN — Medication 1 DROP(S): at 20:37

## 2019-06-29 RX ADMIN — Medication 1 DROP(S): at 12:18

## 2019-06-29 RX ADMIN — Medication 4 MILLIGRAM(S): at 05:05

## 2019-06-29 RX ADMIN — QUETIAPINE FUMARATE 12.5 MILLIGRAM(S): 200 TABLET, FILM COATED ORAL at 00:42

## 2019-06-29 RX ADMIN — Medication 4 MILLIGRAM(S): at 13:37

## 2019-06-29 RX ADMIN — Medication 40 MILLIEQUIVALENT(S): at 23:02

## 2019-06-29 RX ADMIN — SODIUM CHLORIDE 1000 MILLILITER(S): 9 INJECTION INTRAMUSCULAR; INTRAVENOUS; SUBCUTANEOUS at 06:50

## 2019-06-29 RX ADMIN — Medication 1 DROP(S): at 00:41

## 2019-06-29 RX ADMIN — SODIUM CHLORIDE 1000 MILLILITER(S): 9 INJECTION INTRAMUSCULAR; INTRAVENOUS; SUBCUTANEOUS at 10:17

## 2019-06-29 RX ADMIN — CHLORHEXIDINE GLUCONATE 1 APPLICATION(S): 213 SOLUTION TOPICAL at 21:02

## 2019-06-29 RX ADMIN — Medication 1 DROP(S): at 23:02

## 2019-06-29 RX ADMIN — SENNA PLUS 5 MILLILITER(S): 8.6 TABLET ORAL at 00:42

## 2019-06-29 RX ADMIN — ENOXAPARIN SODIUM 30 MILLIGRAM(S): 100 INJECTION SUBCUTANEOUS at 05:05

## 2019-06-29 RX ADMIN — LOSARTAN POTASSIUM 25 MILLIGRAM(S): 100 TABLET, FILM COATED ORAL at 05:06

## 2019-06-29 RX ADMIN — Medication 1: at 23:17

## 2019-06-29 RX ADMIN — Medication 1 DROP(S): at 05:06

## 2019-06-29 RX ADMIN — SODIUM CHLORIDE 50 MILLILITER(S): 9 INJECTION INTRAMUSCULAR; INTRAVENOUS; SUBCUTANEOUS at 18:55

## 2019-06-29 RX ADMIN — Medication 100 MILLIGRAM(S): at 05:05

## 2019-06-29 RX ADMIN — Medication 4 MILLIGRAM(S): at 17:43

## 2019-06-29 RX ADMIN — ENOXAPARIN SODIUM 30 MILLIGRAM(S): 100 INJECTION SUBCUTANEOUS at 17:43

## 2019-06-29 RX ADMIN — Medication 1 DROP(S): at 17:44

## 2019-06-29 RX ADMIN — Medication 4 MILLIGRAM(S): at 23:02

## 2019-06-29 RX ADMIN — AMLODIPINE BESYLATE 10 MILLIGRAM(S): 2.5 TABLET ORAL at 05:06

## 2019-06-29 RX ADMIN — POTASSIUM PHOSPHATE, MONOBASIC POTASSIUM PHOSPHATE, DIBASIC 83.33 MILLIMOLE(S): 236; 224 INJECTION, SOLUTION INTRAVENOUS at 23:16

## 2019-06-29 RX ADMIN — Medication 500000 UNIT(S): at 16:29

## 2019-06-29 NOTE — PROGRESS NOTE ADULT - SUBJECTIVE AND OBJECTIVE BOX
Visit Summary: Patient seen and evaluated at bedside.    Overnight Events: none    Exam:  T(C): 37.8 (06-28-19 @ 23:00), Max: 37.8 (06-28-19 @ 23:00)  HR: 118 (06-28-19 @ 23:00) (67 - 118)  BP: 147/71 (06-28-19 @ 23:00) (138/88 - 162/83)  RR: 18 (06-28-19 @ 23:00) (14 - 26)  SpO2: 94% (06-28-19 @ 23:00) (84% - 100%)  Wt(kg): --    AOX3, pupils 3R ,FC, CONTRERAS , B/L 6NP with PPRF deficit                         11.9   20.6  )-----------( 311      ( 28 Jun 2019 21:48 )             37.2     06-28    139  |  98  |  13  ----------------------------<  160<H>  4.1   |  29  |  0.57    Ca    9.2      28 Jun 2019 21:48  Phos  2.3     06-28  Mg     2.3     06-28

## 2019-06-29 NOTE — PROGRESS NOTE ADULT - SUBJECTIVE AND OBJECTIVE BOX
NSCU ATTENDING -- ADDITIONAL PROGRESS NOTE    Nighttime rounds were performed -- please refer to earlier Progress Note for HPI details.    T(C): 38.1 (06-29-19 @ 19:00), Max: 38.1 (06-29-19 @ 19:00)  HR: 118 (06-29-19 @ 19:00) (93 - 118)  BP: 173/81 (06-29-19 @ 19:00) (100/53 - 173/81)  RR: 21 (06-29-19 @ 19:00) (15 - 27)  SpO2: 94% (06-29-19 @ 19:00) (92% - 100%)  Wt(kg): --    Relevant labwork and imaging reviewed.    Awaiting bed out of the ICU.  Protected sleep time [id number].  Off seroquel for now.

## 2019-06-29 NOTE — PROGRESS NOTE ADULT - ASSESSMENT
neuro checks q 4 hr   MRI brain wwo pending   Pain control  decadron  cerebral edema  avoid sedatives   Activity: [] OOB as tolerated [x] Bedrest for now [] PT [] OT [] PMNR    PULM:  extubated  Aspiration precautions  Nebs PRN    CV:  -160 mmHg  Continue BP meds  TTE EF 77%   sinus tach-in negative balance; will volume resuscitate    RENAL:    keep sodium 135-145     GI:  Diet: glucerna at 65; DC Protonix  GI prophylaxis [] not indicated [] PPI: vented [] other:  Bowel regimen [x] colace [x] senna [] other:    ENDO:   Goal euglycemia (-180)    HEME/ONC:  VTE prophylaxis: [x] SCDs [] chemoprophylaxis [] hold chemoprophylaxis due to:  [x] high risk of DVT/PE on admission due to: brain tumour  lovebox 3oBID  ID:  low grade fever, if spikes will cx     MISC:  Ophtho: glaucoma - continue eye drops  ID-nystratin for oral thrush    SOCIAL/FAMILY:  [x] awaiting [] updated at bedside [] family meeting    CODE STATUS:  [x] Full Code [] DNR [] DNI [] Palliative/Comfort Care    DISPOSITION:  [ICU [] Stroke Unit [x] Floor [] EMU [] RCU [] PCU

## 2019-06-29 NOTE — PROGRESS NOTE ADULT - SUBJECTIVE AND OBJECTIVE BOX
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 (18 Jun 2019 18:39)    SURGERY: Craniotomy for brain tumor using navigation system    PAST MEDICAL HISTORY: Intracranial mass  Light-headedness  Glaucoma  Asthma  HTN (hypertension)    PAST SURGICAL HISTORY: History of ankle fracture  Fractured elbow  H/O appendicitis    FAMILY HISTORY:    ALLERGIES: No Known Drug Allergies  shellfish (Angioedema; Rash)    **************************************  **************************************    OVERNIGHT EVENTS: [] None    ROS  Unobtainable due to mental status[] Negative []  Positives:    ADMISSION SCORES: GCS: HH: MF: NIHSS: RASS: CAM-ICU: ICP:    ICU Vital Signs Last 24 Hrs  T(C): 37.4 (29 Jun 2019 07:00), Max: 37.8 (28 Jun 2019 23:00)  T(F): 99.4 (29 Jun 2019 07:00), Max: 100 (28 Jun 2019 23:00)  HR: 107 (29 Jun 2019 08:00) (82 - 118)  BP: 121/68 (29 Jun 2019 08:00) (115/54 - 162/83)  BP(mean): 77 (29 Jun 2019 08:00) (70 - 117)  ABP: 156/65 (28 Jun 2019 16:00) (135/54 - 156/65)  ABP(mean): 98 (28 Jun 2019 16:00) (81 - 100)  RR: 20 (29 Jun 2019 08:00) (15 - 27)  SpO2: 96% (29 Jun 2019 08:00) (91% - 100%)     06-28 @ 07:01  -  06-29 @ 07:00  --------------------------------------------------------  IN: 2150 mL / OUT: 2300 mL / NET: -150 mL    06-29 @ 07:01  - 06-29 @ 10:07  --------------------------------------------------------  IN: 50 mL / OUT: 0 mL / NET: 50 mL           DEVICES: [] Restraints [] JONAH/HMV []LD [] ET tube [] Trach [] Chest Tube [] A-line [] Marcos [] NGT [] Rectal Tube [] EVD [] CVL  [] ICP/LiCOx    NEUROIMAGING:     EEG REPORT:     MEDICATIONS:  acetaminophen    Suspension .. 650 milliGRAM(s) Oral every 6 hours PRN  ALBUTerol    90 MICROgram(s) HFA Inhaler 2 Puff(s) Inhalation every 6 hours PRN  amLODIPine   Tablet 10 milliGRAM(s) Oral daily  chlorhexidine 4% Liquid 1 Application(s) Topical <User Schedule>  dexamethasone  Injectable 4 milliGRAM(s) IV Push every 8 hours  docusate sodium Liquid 100 milliGRAM(s) Oral two times a day  enoxaparin Injectable 30 milliGRAM(s) SubCutaneous every 12 hours  insulin lispro (HumaLOG) corrective regimen sliding scale   SubCutaneous every 6 hours  losartan 25 milliGRAM(s) Oral daily  pantoprazole  Injectable 40 milliGRAM(s) IV Push daily  prednisoLONE acetate 1% Suspension 1 Drop(s) Both EYES four times a day  senna Syrup 5 milliLiter(s) Oral at bedtime  sodium chloride 0.9%. 1000 milliLiter(s) IV Continuous <Continuous>      PHYSICAL EXAM:  General:  calm  HEENT:  MMM  Neuro:=opens eyes to voice, FC, CONRTERAS, PERRL, antigravity in all 4 extremities, bilateral horizontal gaze palsy  Cards:  RRR  Respiratory:  no respiratory distress, intubated  Adomen:  soft  Extremities:  no edema  Skin:  warm/dry    LABS:                        11.9   20.6  )-----------( 311      ( 28 Jun 2019 21:48 )             37.2    06-28    139  |  98  |  13  ----------------------------<  160<H>  4.1   |  29  |  0.57    Ca    9.2      28 Jun 2019 21:48  Phos  2.3     06-28  Mg     2.3     06-28     ABG - ( 28 Jun 2019 05:09 )  pH, Arterial: 7.40  pH, Blood: x     /  pCO2: 52    /  pO2: 83    / HCO3: 32    / Base Excess: 6.4   /  SaO2: 97

## 2019-06-30 LAB
GLUCOSE BLDC GLUCOMTR-MCNC: 136 MG/DL — HIGH (ref 70–99)
GLUCOSE BLDC GLUCOMTR-MCNC: 136 MG/DL — HIGH (ref 70–99)
GLUCOSE BLDC GLUCOMTR-MCNC: 144 MG/DL — HIGH (ref 70–99)

## 2019-06-30 PROCEDURE — 99233 SBSQ HOSP IP/OBS HIGH 50: CPT

## 2019-06-30 PROCEDURE — 93970 EXTREMITY STUDY: CPT | Mod: 26

## 2019-06-30 RX ORDER — DEXAMETHASONE 0.5 MG/5ML
4 ELIXIR ORAL EVERY 8 HOURS
Refills: 0 | Status: DISCONTINUED | OUTPATIENT
Start: 2019-06-30 | End: 2019-07-01

## 2019-06-30 RX ADMIN — Medication 500000 UNIT(S): at 17:54

## 2019-06-30 RX ADMIN — Medication 1 DROP(S): at 11:18

## 2019-06-30 RX ADMIN — ENOXAPARIN SODIUM 30 MILLIGRAM(S): 100 INJECTION SUBCUTANEOUS at 05:19

## 2019-06-30 RX ADMIN — AMLODIPINE BESYLATE 10 MILLIGRAM(S): 2.5 TABLET ORAL at 05:19

## 2019-06-30 RX ADMIN — Medication 1 DROP(S): at 05:19

## 2019-06-30 RX ADMIN — Medication 1 DROP(S): at 18:23

## 2019-06-30 RX ADMIN — Medication 1 DROP(S): at 07:55

## 2019-06-30 RX ADMIN — Medication 4 MILLIGRAM(S): at 05:19

## 2019-06-30 RX ADMIN — SENNA PLUS 5 MILLILITER(S): 8.6 TABLET ORAL at 21:54

## 2019-06-30 RX ADMIN — Medication 4 MILLIGRAM(S): at 11:16

## 2019-06-30 RX ADMIN — Medication 1 DROP(S): at 02:10

## 2019-06-30 RX ADMIN — LOSARTAN POTASSIUM 25 MILLIGRAM(S): 100 TABLET, FILM COATED ORAL at 05:19

## 2019-06-30 RX ADMIN — Medication 1 DROP(S): at 17:54

## 2019-06-30 RX ADMIN — Medication 4 MILLIGRAM(S): at 14:40

## 2019-06-30 RX ADMIN — Medication 100 MILLIGRAM(S): at 05:19

## 2019-06-30 RX ADMIN — ENOXAPARIN SODIUM 30 MILLIGRAM(S): 100 INJECTION SUBCUTANEOUS at 17:54

## 2019-06-30 RX ADMIN — SODIUM CHLORIDE 50 MILLILITER(S): 9 INJECTION INTRAMUSCULAR; INTRAVENOUS; SUBCUTANEOUS at 14:42

## 2019-06-30 RX ADMIN — SODIUM CHLORIDE 50 MILLILITER(S): 9 INJECTION INTRAMUSCULAR; INTRAVENOUS; SUBCUTANEOUS at 06:08

## 2019-06-30 RX ADMIN — Medication 650 MILLIGRAM(S): at 02:37

## 2019-06-30 RX ADMIN — Medication 1 DROP(S): at 11:17

## 2019-06-30 RX ADMIN — Medication 650 MILLIGRAM(S): at 03:07

## 2019-06-30 RX ADMIN — Medication 4 MILLIGRAM(S): at 21:53

## 2019-06-30 RX ADMIN — Medication 500000 UNIT(S): at 05:19

## 2019-06-30 RX ADMIN — Medication 100 MILLIGRAM(S): at 17:54

## 2019-06-30 NOTE — PROGRESS NOTE ADULT - SUBJECTIVE AND OBJECTIVE BOX
Visit Summary: Patient seen and evaluated at bedside.    Overnight Events: none    Vital Signs Last 24 Hrs  T(C): 37.3 (29 Jun 2019 23:00), Max: 38.1 (29 Jun 2019 19:00)  T(F): 99.1 (29 Jun 2019 23:00), Max: 100.6 (29 Jun 2019 19:00)  HR: 112 (29 Jun 2019 23:00) (93 - 118)  BP: 162/67 (29 Jun 2019 23:00) (100/53 - 173/81)  BP(mean): 92 (29 Jun 2019 23:00) (68 - 117)  RR: 19 (29 Jun 2019 23:00) (15 - 27)  SpO2: 94% (29 Jun 2019 23:00) (92% - 100%)    AOX3, pupils 3R ,FC, CONTRERAS , B/L 6NP with PPRF deficit                                    12.3   15.5  )-----------( 286      ( 29 Jun 2019 21:40 )             38.6   06-29    137  |  98  |  15  ----------------------------<  169<H>  3.5   |  28  |  0.54    Ca    9.1      29 Jun 2019 21:40  Phos  1.8     06-29  Mg     2.3     06-29

## 2019-06-30 NOTE — PROGRESS NOTE ADULT - SUBJECTIVE AND OBJECTIVE BOX
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 (18 Jun 2019 18:39)    SURGERY: Craniotomy for brain tumor using navigation system    PAST MEDICAL HISTORY: Intracranial mass  Light-headedness  Glaucoma  Asthma  HTN (hypertension)    PAST SURGICAL HISTORY: History of ankle fracture  Fractured elbow  H/O appendicitis    FAMILY HISTORY:    ALLERGIES: No Known Drug Allergies  shellfish (Angioedema; Rash)    **************************************  **************************************    OVERNIGHT EVENTS: [x] None    ROS  Unobtainable due to mental status[] Negative []  Positives:    ADMISSION SCORES: GCS: HH: MF: NIHSS: RASS: CAM-ICU: ICP:    ICU Vital Signs Last 24 Hrs  T(C): 37.7 (30 Jun 2019 09:00), Max: 38.1 (29 Jun 2019 19:00)  T(F): 99.9 (30 Jun 2019 09:00), Max: 100.6 (29 Jun 2019 19:00)  HR: 100 (30 Jun 2019 07:00) (100 - 118)  BP: 140/72 (30 Jun 2019 07:00) (130/71 - 162/67)  BP(mean): 88 (30 Jun 2019 07:00) (87 - 101)  ABP: --  ABP(mean): --  RR: 16 (30 Jun 2019 07:00) (16 - 21)  SpO2: 100% (30 Jun 2019 07:00) (92% - 100%)     06-29 @ 07:01  -  06-30 @ 07:00  --------------------------------------------------------  IN: 2730 mL / OUT: 2800 mL / NET: -70 mL    06-30 @ 07:01  -  06-30 @ 11:19  --------------------------------------------------------  IN: 150 mL / OUT: 0 mL / NET: 150 mL           DEVICES: [] Restraints [] JONAH/HMV []LD [] ET tube [] Trach [] Chest Tube [] A-line [] Marcos [] NGT [] Rectal Tube [] EVD [] CVL  [] ICP/LiCOx    NEUROIMAGING:     EEG REPORT:     MEDICATIONS:  acetaminophen    Suspension .. 650 milliGRAM(s) Oral every 6 hours PRN  ALBUTerol    90 MICROgram(s) HFA Inhaler 2 Puff(s) Inhalation every 6 hours PRN  amLODIPine   Tablet 10 milliGRAM(s) Oral daily  artificial  tears Solution 1 Drop(s) Right EYE every 6 hours  chlorhexidine 4% Liquid 1 Application(s) Topical <User Schedule>  dexamethasone  Injectable 4 milliGRAM(s) IV Push every 6 hours  docusate sodium Liquid 100 milliGRAM(s) Oral two times a day  enoxaparin Injectable 30 milliGRAM(s) SubCutaneous every 12 hours  insulin lispro (HumaLOG) corrective regimen sliding scale   SubCutaneous every 6 hours  losartan 25 milliGRAM(s) Oral daily  nystatin    Suspension 791962 Unit(s) Oral two times a day  prednisoLONE acetate 1% Suspension 1 Drop(s) Both EYES four times a day  senna Syrup 5 milliLiter(s) Oral at bedtime  sodium chloride 0.9%. 1000 milliLiter(s) IV Continuous <Continuous>      PHYSICAL EXAM:  General:  calm  HEENT:  MMM  Neuro:=opens eyes to voice, FC, CONTRERAS, PERRL, antigravity in all 4 extremities, bilateral horizontal gaze palsy  Cards:  RRR  Respiratory:  no respiratory distress, intubated  Adomen:  soft  Extremities:  no edema  Skin:  warm/dry      LABS:                        12.3   15.5  )-----------( 286      ( 29 Jun 2019 21:40 )             38.6    06-29    137  |  98  |  15  ----------------------------<  169<H>  3.5   |  28  |  0.54    Ca    9.1      29 Jun 2019 21:40  Phos  1.8     06-29  Mg     2.3     06-29

## 2019-06-30 NOTE — PROGRESS NOTE ADULT - ASSESSMENT
neuro checks q 4 hr   MRI brain wwo pending   Pain control  decadron 4q 8 from q6 cerebral edema  avoid sedatives   Activity: [] OOB as tolerated [x] Bedrest for now [] PT [] OT [] PMNR    PULM:  Aspiration precautions  Nebs PRN    CV:  -160 mmHg  Continue BP meds  TTE EF 77%     RENAL:  keep sodium 135-145     GI: s/s consult-may require PEG  Diet: glucerna at 65;   GI prophylaxis [] not indicated [] PPI: vented [] other:  Bowel regimen [x] colace [x] senna [] other:    ENDO:   Goal euglycemia (-180)    HEME/ONC:  VTE prophylaxis: [x] SCDs [] chemoprophylaxis [] hold chemoprophylaxis due to:  [x] high risk of DVT/PE on admission due to: brain tumour  Lovenox 30 BID    ID:  low grade fever, if spikes will cx     MISC:  Ophtho: glaucoma - continue eye drops  ID-nystratin for oral thrush    SOCIAL/FAMILY:  [x] awaiting [] updated at bedside [] family meeting    CODE STATUS:  [x] Full Code [] DNR [] DNI [] Palliative/Comfort Care    DISPOSITION:  [ICU [] Stroke Unit [x] Floor [] EMU [] RCU [] PCU

## 2019-07-01 LAB
ANION GAP SERPL CALC-SCNC: 11 MMOL/L — SIGNIFICANT CHANGE UP (ref 5–17)
BUN SERPL-MCNC: 15 MG/DL — SIGNIFICANT CHANGE UP (ref 7–23)
CALCIUM SERPL-MCNC: 8.9 MG/DL — SIGNIFICANT CHANGE UP (ref 8.4–10.5)
CHLORIDE SERPL-SCNC: 93 MMOL/L — LOW (ref 96–108)
CO2 SERPL-SCNC: 31 MMOL/L — SIGNIFICANT CHANGE UP (ref 22–31)
CREAT SERPL-MCNC: 0.54 MG/DL — SIGNIFICANT CHANGE UP (ref 0.5–1.3)
GLUCOSE BLDC GLUCOMTR-MCNC: 125 MG/DL — HIGH (ref 70–99)
GLUCOSE BLDC GLUCOMTR-MCNC: 136 MG/DL — HIGH (ref 70–99)
GLUCOSE BLDC GLUCOMTR-MCNC: 140 MG/DL — HIGH (ref 70–99)
GLUCOSE BLDC GLUCOMTR-MCNC: 150 MG/DL — HIGH (ref 70–99)
GLUCOSE SERPL-MCNC: 143 MG/DL — HIGH (ref 70–99)
HCT VFR BLD CALC: 37.9 % — SIGNIFICANT CHANGE UP (ref 34.5–45)
HGB BLD-MCNC: 12.4 G/DL — SIGNIFICANT CHANGE UP (ref 11.5–15.5)
MCHC RBC-ENTMCNC: 28.3 PG — SIGNIFICANT CHANGE UP (ref 27–34)
MCHC RBC-ENTMCNC: 32.8 GM/DL — SIGNIFICANT CHANGE UP (ref 32–36)
MCV RBC AUTO: 86.2 FL — SIGNIFICANT CHANGE UP (ref 80–100)
PLATELET # BLD AUTO: 305 K/UL — SIGNIFICANT CHANGE UP (ref 150–400)
POTASSIUM SERPL-MCNC: 4.1 MMOL/L — SIGNIFICANT CHANGE UP (ref 3.5–5.3)
POTASSIUM SERPL-SCNC: 4.1 MMOL/L — SIGNIFICANT CHANGE UP (ref 3.5–5.3)
RBC # BLD: 4.4 M/UL — SIGNIFICANT CHANGE UP (ref 3.8–5.2)
RBC # FLD: 12.8 % — SIGNIFICANT CHANGE UP (ref 10.3–14.5)
SODIUM SERPL-SCNC: 135 MMOL/L — SIGNIFICANT CHANGE UP (ref 135–145)
WBC # BLD: 19.2 K/UL — HIGH (ref 3.8–10.5)
WBC # FLD AUTO: 19.2 K/UL — HIGH (ref 3.8–10.5)

## 2019-07-01 PROCEDURE — 99222 1ST HOSP IP/OBS MODERATE 55: CPT | Mod: GC

## 2019-07-01 PROCEDURE — 99223 1ST HOSP IP/OBS HIGH 75: CPT

## 2019-07-01 RX ORDER — LATANOPROST 0.05 MG/ML
1 SOLUTION/ DROPS OPHTHALMIC; TOPICAL AT BEDTIME
Refills: 0 | Status: DISCONTINUED | OUTPATIENT
Start: 2019-07-01 | End: 2019-07-10

## 2019-07-01 RX ORDER — NYSTATIN 500MM UNIT
500000 POWDER (EA) MISCELLANEOUS EVERY 6 HOURS
Refills: 0 | Status: COMPLETED | OUTPATIENT
Start: 2019-07-01 | End: 2019-07-02

## 2019-07-01 RX ORDER — BRIMONIDINE TARTRATE 2 MG/MG
1 SOLUTION/ DROPS OPHTHALMIC
Refills: 0 | Status: DISCONTINUED | OUTPATIENT
Start: 2019-07-01 | End: 2019-07-02

## 2019-07-01 RX ORDER — DEXAMETHASONE 0.5 MG/5ML
3 ELIXIR ORAL EVERY 8 HOURS
Refills: 0 | Status: DISCONTINUED | OUTPATIENT
Start: 2019-07-01 | End: 2019-07-02

## 2019-07-01 RX ADMIN — Medication 1 DROP(S): at 17:34

## 2019-07-01 RX ADMIN — AMLODIPINE BESYLATE 10 MILLIGRAM(S): 2.5 TABLET ORAL at 05:28

## 2019-07-01 RX ADMIN — Medication 1 APPLICATION(S): at 22:57

## 2019-07-01 RX ADMIN — Medication 1 DROP(S): at 12:51

## 2019-07-01 RX ADMIN — LATANOPROST 1 DROP(S): 0.05 SOLUTION/ DROPS OPHTHALMIC; TOPICAL at 22:57

## 2019-07-01 RX ADMIN — ENOXAPARIN SODIUM 30 MILLIGRAM(S): 100 INJECTION SUBCUTANEOUS at 05:28

## 2019-07-01 RX ADMIN — Medication 1 DROP(S): at 05:29

## 2019-07-01 RX ADMIN — Medication 1 DROP(S): at 00:27

## 2019-07-01 RX ADMIN — Medication 500000 UNIT(S): at 05:29

## 2019-07-01 RX ADMIN — ENOXAPARIN SODIUM 30 MILLIGRAM(S): 100 INJECTION SUBCUTANEOUS at 17:35

## 2019-07-01 RX ADMIN — Medication 500000 UNIT(S): at 17:34

## 2019-07-01 RX ADMIN — Medication 4 MILLIGRAM(S): at 13:09

## 2019-07-01 RX ADMIN — Medication 4 MILLIGRAM(S): at 05:27

## 2019-07-01 RX ADMIN — LOSARTAN POTASSIUM 25 MILLIGRAM(S): 100 TABLET, FILM COATED ORAL at 05:28

## 2019-07-01 RX ADMIN — Medication 1 DROP(S): at 05:28

## 2019-07-01 RX ADMIN — Medication 100 MILLIGRAM(S): at 17:34

## 2019-07-01 RX ADMIN — Medication 3 MILLIGRAM(S): at 22:57

## 2019-07-01 RX ADMIN — SENNA PLUS 5 MILLILITER(S): 8.6 TABLET ORAL at 22:58

## 2019-07-01 NOTE — PROGRESS NOTE ADULT - SUBJECTIVE AND OBJECTIVE BOX
SUBJECTIVE:  Pt evaluated and seen at bedside. Pt complained of dizziness that she believes is due to her brimonidine eye drop and refuses to take that medication. Pt denies headache, blurry vision, N/V, chest pain, shortness of breath, abdominal pain.    Vital Signs Last 24 Hrs  T(C): 37.4 (19 @ 12:19), Max: 37.7 (19 @ 23:19)  T(F): 99.4 (19 @ 12:19), Max: 99.9 (19 @ 23:19)  HR: 104 (19 @ 12:19) (67 - 115)  BP: 147/74 (19 @ 12:19) (142/83 - 156/83)  BP(mean): --  RR: 18 (19 @ 12:19) (18 - 20)  SpO2: 97% (19 @ :19) (94% - 100%)    PHYSICAL EXAM:    Constitutional: No Acute Distress     Neurological: Awake, alert, oriented to person, place and time, speech hypophonic, poor annunciation of words, strength 5/5 all extremities, briskly following commands, no drift, moves all extremities with 5/5 strength, sensation intact to light touch throughout, b/l 6th and 3rd nerve palsy. Only move both eyes up and down. Can not close Rt eye completely. Can not close right side of the mouth. Rt 7 nerve palsy.     Incision: staples CDI    Pulmonary: Clear to Auscultation, No rales, No rhonchi, No wheezes     Cardiovascular: S1, S2, Regular rate and rhythm     Gastrointestinal: Soft, Non-tender, Non-distended, +bowel sounds x 4    Extremities: No calf tenderness bilaterally, no cyanosis, clubbing or edema          LABS:                          12.4   19.2  )-----------( 305      ( 2019 08:21 )             37.9    07-    135  |  93<L>  |  15  ----------------------------<  143<H>  4.1   |  31  |  0.54    Ca    8.9      2019 08:21  Phos  1.8     06-29  Mg     2.3      @ 07: @ 07:00  --------------------------------------------------------  IN: 1895 mL / OUT: 1900 mL / NET: -5 mL     @ 07: @ 13:54  --------------------------------------------------------  IN: 0 mL / OUT: 0 mL / NET: 0 mL        IMAGIN/28 MR Head no contrast noted    MEDICATIONS:  Antibiotics:  nystatin    Suspension 003461 Unit(s) Oral two times a day    Neuro:  acetaminophen    Suspension .. 650 milliGRAM(s) Oral every 6 hours PRN Temp greater or equal to 38.5C (101.3F), Mild Pain (1 - 3)    Cardiac:  amLODIPine   Tablet 10 milliGRAM(s) Oral daily  losartan 25 milliGRAM(s) Oral daily    Pulm:  ALBUTerol    90 MICROgram(s) HFA Inhaler 2 Puff(s) Inhalation every 6 hours PRN for shortness of breath and/or wheezing    GI/:  docusate sodium Liquid 100 milliGRAM(s) Oral two times a day  senna Syrup 5 milliLiter(s) Oral at bedtime    Other:   artificial  tears Solution 1 Drop(s) Right EYE every 6 hours  brimonidine 0.2% Ophthalmic Solution 1 Drop(s) Both EYES four times a day  dexamethasone  Injectable 4 milliGRAM(s) IV Push every 8 hours  enoxaparin Injectable 30 milliGRAM(s) SubCutaneous every 12 hours  insulin lispro (HumaLOG) corrective regimen sliding scale   SubCutaneous every 6 hours  latanoprost 0.005% Ophthalmic Solution 1 Drop(s) Both EYES at bedtime  prednisoLONE acetate 1% Suspension 1 Drop(s) Both EYES four times a day        DIET: NPO after midnight for MRI tomorrow. On tube feeds via NGT

## 2019-07-01 NOTE — CONSULT NOTE ADULT - ATTENDING COMMENTS
Seen and examined with resident. Agree with note.   69y Female who was found to have a posterior fossa mass s/p resection who  is now with dysarthria, dysphagia gait, ADLs, and functional deficits.   Patient will need acute rehabilitation when stable.

## 2019-07-01 NOTE — CONSULT NOTE ADULT - SUBJECTIVE AND OBJECTIVE BOX
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 Course complicated with stage 1 pressure injury, currently on TFs.     Pt currently sitting up in bed, awake, able to follow commands. Questioning why she has needs an NG tube and "why all of this is happening".     Intermittent low grade fever since over the last 2-3 days.     SURGERY: Craniotomy for brain tumor using navigation system    PAST MEDICAL HISTORY: Intracranial mass  Light-headedness  Glaucoma  Asthma  HTN (hypertension)    PAST SURGICAL HISTORY: History of ankle fracture  Fractured elbow  H/O appendicitis    FAMILY HISTORY: No h/o brain tumor in first degree relatives    ALLERGIES: No Known Drug Allergies  shellfish (Angioedema; Rash)    SH: No h/o substance abuse    Vital Signs Last 24 Hrs  T(C): 36.6 (01 Jul 2019 08:58), Max: 37.7 (30 Jun 2019 23:19)  T(F): 97.8 (01 Jul 2019 08:58), Max: 99.9 (30 Jun 2019 23:19)  HR: 67 (01 Jul 2019 08:58) (67 - 115)  BP: 144/81 (01 Jul 2019 08:58) (142/83 - 156/83)  BP(mean): --  RR: 18 (01 Jul 2019 08:58) (18 - 20)  SpO2: 97% (01 Jul 2019 08:58) (94% - 100%)                        12.4   19.2  )-----------( 305      ( 01 Jul 2019 08:21 )             37.9     07-01    135  |  93<L>  |  15  ----------------------------<  143<H>  4.1   |  31  |  0.54    Ca    8.9      01 Jul 2019 08:21  Phos  1.8     06-29  Mg     2.3     06-29      MEDICATIONS  (STANDING):  amLODIPine   Tablet 10 milliGRAM(s) Oral daily  artificial  tears Solution 1 Drop(s) Right EYE every 6 hours  dexamethasone  Injectable 4 milliGRAM(s) IV Push every 8 hours  docusate sodium Liquid 100 milliGRAM(s) Oral two times a day  enoxaparin Injectable 30 milliGRAM(s) SubCutaneous every 12 hours  insulin lispro (HumaLOG) corrective regimen sliding scale   SubCutaneous every 6 hours  losartan 25 milliGRAM(s) Oral daily  nystatin    Suspension 610718 Unit(s) Oral two times a day  prednisoLONE acetate 1% Suspension 1 Drop(s) Both EYES four times a day  senna Syrup 5 milliLiter(s) Oral at bedtime  sodium chloride 0.9%. 1000 milliLiter(s) (50 mL/Hr) IV Continuous <Continuous>    MEDICATIONS  (PRN):  acetaminophen    Suspension .. 650 milliGRAM(s) Oral every 6 hours PRN Temp greater or equal to 38.5C (101.3F), Mild Pain (1 - 3)  ALBUTerol    90 MICROgram(s) HFA Inhaler 2 Puff(s) Inhalation every 6 hours PRN for shortness of breath and/or wheezing    Home Medications:  amLODIPine 10 mg oral tablet: 1 tab(s) orally once a day (24 Jun 2019 06:23)  aspirin 81 mg oral delayed release tablet: 1 tab(s) orally once a day (24 Jun 2019 06:23)  brimonidine 0.2% ophthalmic solution: 1 drop(s) to each affected eye 4 times a day (24 Jun 2019 06:23)  Flovent  mcg/inh inhalation aerosol: 2 puff(s) inhaled 2 times a day, As Needed - for shortness of breath and/or wheezing (24 Jun 2019 06:23)  latanoprost 0.005% ophthalmic solution: 1 drop(s) to each affected eye once a day (in the evening) (24 Jun 2019 06:23)  prednisoLONE acetate 1% ophthalmic suspension: 1 drop(s) to each affected eye 4 times a day (24 Jun 2019 06:23)  ProAir HFA 90 mcg/inh inhalation aerosol: 2 puff(s) inhaled 4 times a day, As Needed - for shortness of breath and/or wheezing (24 Jun 2019 06:23)

## 2019-07-01 NOTE — SWALLOW BEDSIDE ASSESSMENT ADULT - SWALLOW EVAL: DIAGNOSIS
Pt presents with 1) oropharyngeal dysphagia characterized by poor oral management skills, delayed pharyngeal swallows and reduced hyolaryngeal excursion upon palpation. Pt presents with 1) oropharyngeal dysphagia characterized by poor oral management skills, delayed pharyngeal swallows and reduced hyolaryngeal excursion upon palpation. 2) Moderate dysarthria

## 2019-07-01 NOTE — SWALLOW BEDSIDE ASSESSMENT ADULT - ORAL PHASE
Stasis in anterior sulcus/Lingual stasis/Decreased anterior-posterior movement of the bolus/Delayed oral transit time

## 2019-07-01 NOTE — CONSULT NOTE ADULT - SUBJECTIVE AND OBJECTIVE BOX
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 Monroy. Course complicated with stage 1 pressure injury, dysphagia s/p PEG currently on TFs.       REVIEW OF SYSTEMS  [X] Constitutional WNL       [X] HEENT WNL  [X] Cardio WNL              [X] Resp WNL            [X] GI WNL                            [X]  WNL                               [X] MSK WNL            [X] Neuro WNL   [X] Pain WNL  [X] Cognitive WNL   [X] Psych WNL  [X] Skin WNL      [X] Heme WNL              [X] Endo WNL    PAST MEDICAL & SURGICAL HISTORY  Intracranial mass  Light-headedness  Glaucoma  Asthma  HTN (hypertension)  History of ankle fracture  Fractured elbow  H/O appendicitis      SOCIAL HISTORY  Smoking - Denied  EtOH - Denied   Drugs - Denied    FUNCTIONAL HISTORY   As per chart patient resides with spouse, daughter,and grandchildren in a private home with 6 steps to enter, no stairs inside. At time of evaluation pt reports 3-4 steps to enter with bilateral handrails and cane for ambulation.    Independent prior with Ambulation and ADLs.     CURRENT FUNCTIONAL STATUS  - Bed Mobility: min A   - Transfers: min a   - Gait: mod a with 2 person from bed to chair   - ADLs: Pt tolerated 45 min OT initial evaluation well. Pt AxO x4, following all commands. P/w garbled speech, difficulty with tracking/scanning t/o visual field. ?strabismus noted. Pt p/w decreased strength, vision, and balance impeding on ADLs and functional mobility.  Pt completed supine->sit with mod A x2, and sit->stand with min A x2. Pt completed grooming I and UB dressing with min A. Bed->chair with mod A x2.     ALLERGIES  No Known Drug Allergies  shellfish (Angioedema; Rash)      FAMILY HISTORY       MEDICATIONS   acetaminophen    Suspension .. 650 milliGRAM(s) Oral every 6 hours PRN  ALBUTerol    90 MICROgram(s) HFA Inhaler 2 Puff(s) Inhalation every 6 hours PRN  amLODIPine   Tablet 10 milliGRAM(s) Oral daily  artificial  tears Solution 1 Drop(s) Right EYE every 6 hours  dexamethasone  Injectable 4 milliGRAM(s) IV Push every 8 hours  docusate sodium Liquid 100 milliGRAM(s) Oral two times a day  enoxaparin Injectable 30 milliGRAM(s) SubCutaneous every 12 hours  insulin lispro (HumaLOG) corrective regimen sliding scale   SubCutaneous every 6 hours  losartan 25 milliGRAM(s) Oral daily  nystatin    Suspension 906445 Unit(s) Oral two times a day  prednisoLONE acetate 1% Suspension 1 Drop(s) Both EYES four times a day  senna Syrup 5 milliLiter(s) Oral at bedtime  sodium chloride 0.9%. 1000 milliLiter(s) IV Continuous <Continuous>      VITALS  T(C): 36.6 (07-01-19 @ 08:58), Max: 37.7 (06-30-19 @ 23:19)  HR: 67 (07-01-19 @ 08:58) (67 - 115)  BP: 144/81 (07-01-19 @ 08:58) (120/68 - 156/83)  RR: 18 (07-01-19 @ 08:58) (18 - 20)  SpO2: 97% (07-01-19 @ 08:58) (94% - 100%)  Wt(kg): --    RECENT LABS/IMAGING  CBC Full  -  ( 01 Jul 2019 08:21 )  WBC Count : 19.2 K/uL  RBC Count : 4.40 M/uL  Hemoglobin : 12.4 g/dL  Hematocrit : 37.9 %  Platelet Count - Automated : 305 K/uL  Mean Cell Volume : 86.2 fl  Mean Cell Hemoglobin : 28.3 pg  Mean Cell Hemoglobin Concentration : 32.8 gm/dL  Auto Neutrophil # : x  Auto Lymphocyte # : x  Auto Monocyte # : x  Auto Eosinophil # : x  Auto Basophil # : x  Auto Neutrophil % : x  Auto Lymphocyte % : x  Auto Monocyte % : x  Auto Eosinophil % : x  Auto Basophil % : x    07-01    135  |  93<L>  |  15  ----------------------------<  143<H>  4.1   |  31  |  0.54    Ca    8.9      01 Jul 2019 08:21  Phos  1.8     06-29  Mg     2.3     06-29          ---------------------------------------------------------------------------------------- Patient is a 69y old  Female who presents with a chief complaint of "light headed & dizzy  for about 3 months" (18 Jun 2019 18:39)      HPI:  69 year old Woman right hand Dominant, with a PMH of morbidly obesity, asthma, HTN & OA, who had been having light headedness, dizziness & some balance disturbance for about three months,  PMD ordered a brain MRI which showed a 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 Monroy. Course complicated with stage 1 pressure injury, dysphagia.       REVIEW OF SYSTEMS  limited due to +neuro deficits. denies pain.     PAST MEDICAL & SURGICAL HISTORY  Intracranial mass  Light-headedness  Glaucoma  Asthma  HTN (hypertension)  History of ankle fracture  Fractured elbow  H/O appendicitis      SOCIAL HISTORY  Smoking - Denied  EtOH - Denied   Drugs - Denied    FUNCTIONAL HISTORY   As per chart patient resides with spouse, daughter, and grandchildren in a private home with 6 steps to enter, no stairs inside. At time of evaluation pt reports 3-4 steps to enter with bilateral handrails and cane for ambulation.    Independent prior with Ambulation and ADLs.     CURRENT FUNCTIONAL STATUS  - Bed Mobility: min A   - Transfers: min a   - Gait: mod a with 2 person from bed to chair   - ADLs: Pt tolerated 45 min OT initial evaluation well. Pt AxO x4, following all commands. P/w garbled speech, difficulty with tracking/scanning t/o visual field. ?strabismus noted. Pt p/w decreased strength, vision, and balance impeding on ADLs and functional mobility.  Pt completed supine->sit with mod A x2, and sit->stand with min A x2. Pt completed grooming I and UB dressing with min A. Bed->chair with mod A x2.     ALLERGIES  No Known Drug Allergies  shellfish (Angioedema; Rash)      FAMILY HISTORY       MEDICATIONS   acetaminophen    Suspension .. 650 milliGRAM(s) Oral every 6 hours PRN  ALBUTerol    90 MICROgram(s) HFA Inhaler 2 Puff(s) Inhalation every 6 hours PRN  amLODIPine   Tablet 10 milliGRAM(s) Oral daily  artificial  tears Solution 1 Drop(s) Right EYE every 6 hours  dexamethasone  Injectable 4 milliGRAM(s) IV Push every 8 hours  docusate sodium Liquid 100 milliGRAM(s) Oral two times a day  enoxaparin Injectable 30 milliGRAM(s) SubCutaneous every 12 hours  insulin lispro (HumaLOG) corrective regimen sliding scale   SubCutaneous every 6 hours  losartan 25 milliGRAM(s) Oral daily  nystatin    Suspension 955763 Unit(s) Oral two times a day  prednisoLONE acetate 1% Suspension 1 Drop(s) Both EYES four times a day  senna Syrup 5 milliLiter(s) Oral at bedtime  sodium chloride 0.9%. 1000 milliLiter(s) IV Continuous <Continuous>      VITALS  T(C): 36.6 (07-01-19 @ 08:58), Max: 37.7 (06-30-19 @ 23:19)  HR: 67 (07-01-19 @ 08:58) (67 - 115)  BP: 144/81 (07-01-19 @ 08:58) (120/68 - 156/83)  RR: 18 (07-01-19 @ 08:58) (18 - 20)  SpO2: 97% (07-01-19 @ 08:58) (94% - 100%)  Wt(kg): --    RECENT LABS/IMAGING  CBC Full  -  ( 01 Jul 2019 08:21 )  WBC Count : 19.2 K/uL  RBC Count : 4.40 M/uL  Hemoglobin : 12.4 g/dL  Hematocrit : 37.9 %  Platelet Count - Automated : 305 K/uL  Mean Cell Volume : 86.2 fl  Mean Cell Hemoglobin : 28.3 pg  Mean Cell Hemoglobin Concentration : 32.8 gm/dL  Auto Neutrophil # : x  Auto Lymphocyte # : x  Auto Monocyte # : x  Auto Eosinophil # : x  Auto Basophil # : x  Auto Neutrophil % : x  Auto Lymphocyte % : x  Auto Monocyte % : x  Auto Eosinophil % : x  Auto Basophil % : x    07-01    135  |  93<L>  |  15  ----------------------------<  143<H>  4.1   |  31  |  0.54    Ca    8.9      01 Jul 2019 08:21  Phos  1.8     06-29  Mg     2.3     06-29          ---------------------------------------------------------------------------------------- Patient is a 69y old  Female who presents with a chief complaint of "light headed & dizzy  for about 3 months" (18 Jun 2019 18:39)      HPI:  69 year old Woman right hand Dominant, with a PMH of morbidly obesity, asthma, HTN & OA, who had been having light headedness, dizziness & some balance disturbance for about three months,  PMD ordered a brain MRI which showed a 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 Monroy. Course complicated with stage 1 pressure injury, dysphagia.       REVIEW OF SYSTEMS  limited due to +neuro deficits. denies pain.     PAST MEDICAL & SURGICAL HISTORY  Intracranial mass  Light-headedness  Glaucoma  Asthma  HTN (hypertension)  History of ankle fracture  Fractured elbow  H/O appendicitis      SOCIAL HISTORY  Smoking - Denied  EtOH - Denied   Drugs - Denied    FUNCTIONAL HISTORY   As per chart patient resides with spouse, daughter, and grandchildren in a private home with 6 steps to enter, no stairs inside. At time of evaluation pt reports 3-4 steps to enter with bilateral handrails and cane for ambulation.    Independent prior with Ambulation and ADLs.     CURRENT FUNCTIONAL STATUS  - Bed Mobility: min A   - Transfers: min a   - Gait: mod a with 2 person from bed to chair   - ADLs: Pt tolerated 45 min OT initial evaluation well. Pt AxO x4, following all commands. P/w garbled speech, difficulty with tracking/scanning t/o visual field. ?strabismus noted. Pt p/w decreased strength, vision, and balance impeding on ADLs and functional mobility.  Pt completed supine->sit with mod A x2, and sit->stand with min A x2. Pt completed grooming I and UB dressing with min A. Bed->chair with mod A x2.     ALLERGIES  No Known Drug Allergies  shellfish (Angioedema; Rash)      FAMILY HISTORY       MEDICATIONS   acetaminophen    Suspension .. 650 milliGRAM(s) Oral every 6 hours PRN  ALBUTerol    90 MICROgram(s) HFA Inhaler 2 Puff(s) Inhalation every 6 hours PRN  amLODIPine   Tablet 10 milliGRAM(s) Oral daily  artificial  tears Solution 1 Drop(s) Right EYE every 6 hours  dexamethasone  Injectable 4 milliGRAM(s) IV Push every 8 hours  docusate sodium Liquid 100 milliGRAM(s) Oral two times a day  enoxaparin Injectable 30 milliGRAM(s) SubCutaneous every 12 hours  insulin lispro (HumaLOG) corrective regimen sliding scale   SubCutaneous every 6 hours  losartan 25 milliGRAM(s) Oral daily  nystatin    Suspension 857350 Unit(s) Oral two times a day  prednisoLONE acetate 1% Suspension 1 Drop(s) Both EYES four times a day  senna Syrup 5 milliLiter(s) Oral at bedtime  sodium chloride 0.9%. 1000 milliLiter(s) IV Continuous <Continuous>      VITALS  T(C): 36.6 (07-01-19 @ 08:58), Max: 37.7 (06-30-19 @ 23:19)  HR: 67 (07-01-19 @ 08:58) (67 - 115)  BP: 144/81 (07-01-19 @ 08:58) (120/68 - 156/83)  RR: 18 (07-01-19 @ 08:58) (18 - 20)  SpO2: 97% (07-01-19 @ 08:58) (94% - 100%)  Wt(kg): --    RECENT LABS/IMAGING  CBC Full  -  ( 01 Jul 2019 08:21 )  WBC Count : 19.2 K/uL  RBC Count : 4.40 M/uL  Hemoglobin : 12.4 g/dL  Hematocrit : 37.9 %  Platelet Count - Automated : 305 K/uL  Mean Cell Volume : 86.2 fl  Mean Cell Hemoglobin : 28.3 pg  Mean Cell Hemoglobin Concentration : 32.8 gm/dL  Auto Neutrophil # : x  Auto Lymphocyte # : x  Auto Monocyte # : x  Auto Eosinophil # : x  Auto Basophil # : x  Auto Neutrophil % : x  Auto Lymphocyte % : x  Auto Monocyte % : x  Auto Eosinophil % : x  Auto Basophil % : x    07-01    135  |  93<L>  |  15  ----------------------------<  143<H>  4.1   |  31  |  0.54    Ca    8.9      01 Jul 2019 08:21  Phos  1.8     06-29  Mg     2.3     06-29          ----------------------------------------------------------------------------------------        PHYSICAL EXAM  Constitutional - NAD, Comfortable, foul breath  Cardio:  no edema  Pulm: no respiratory distress  GI: +NGT    Neuro Exam:                    Cognitive - AAOx3   Communication - Fluent with dysarthria      Attention- intact - Memory Short term - recall 3/3 and repetition intact.      CN - unable to abduct eyes bilateral, +facial droop      Motor - No focal deficits     Sensory - Intact to LT  Extremities - No calf tenderness  Psychiatric - Mood WNL, Affect WNL    ASSESSMENT/PLAN  69y Female who was found to have a posterior fossa mass s/p resection who  is now with dysarthria, dysphagia gait, ADLs, and functional deficits.     Recommend - Acute Rehab  - Disposition: ACUTE inpatient rehabilitation for functional deficits when the patient is medically stable. The patient will benefit from 3 hours of therapy per day and 24 hour nursing care. The patient will benefit  from daily PMR physician oversight for comorbid medical management morbid obesity and has a high level of medical complexity necessitating the need for daily physician oversight. Patient can tolerate intensive therapy consisting of PT/OT and if needed SLP.  Will continue to follow for ongoing rehab needs and recommendations.  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 Monroy. Course complicated with stage 1 pressure injury, dysphagia.     REVIEW OF SYSTEMS  limited due to +neuro deficits. denies pain.     PAST MEDICAL & SURGICAL HISTORY  Intracranial mass  Light-headedness  Glaucoma  Asthma  HTN (hypertension)  History of ankle fracture  Fractured elbow  H/O appendicitis      SOCIAL HISTORY  Smoking - Denied  EtOH - Denied   Drugs - Denied    FUNCTIONAL HISTORY   As per chart patient resides with spouse, daughter, and grandchildren in a private home with 6 steps to enter, no stairs inside. At time of evaluation pt reports 3-4 steps to enter with bilateral handrails and cane for ambulation.    Independent prior with Ambulation and ADLs.     CURRENT FUNCTIONAL STATUS  - Bed Mobility: min A   - Transfers: min a   - Gait: mod a with 2 person from bed to chair   - ADLs: Pt tolerated 45 min OT initial evaluation well. Pt AxO x4, following all commands. P/w garbled speech, difficulty with tracking/scanning t/o visual field. ?strabismus noted. Pt p/w decreased strength, vision, and balance impeding on ADLs and functional mobility.  Pt completed supine->sit with mod A x2, and sit->stand with min A x2. Pt completed grooming I and UB dressing with min A. Bed->chair with mod A x2.     ALLERGIES  No Known Drug Allergies  shellfish (Angioedema; Rash)      FAMILY HISTORY       MEDICATIONS   acetaminophen    Suspension .. 650 milliGRAM(s) Oral every 6 hours PRN  ALBUTerol    90 MICROgram(s) HFA Inhaler 2 Puff(s) Inhalation every 6 hours PRN  amLODIPine   Tablet 10 milliGRAM(s) Oral daily  artificial  tears Solution 1 Drop(s) Right EYE every 6 hours  dexamethasone  Injectable 4 milliGRAM(s) IV Push every 8 hours  docusate sodium Liquid 100 milliGRAM(s) Oral two times a day  enoxaparin Injectable 30 milliGRAM(s) SubCutaneous every 12 hours  insulin lispro (HumaLOG) corrective regimen sliding scale   SubCutaneous every 6 hours  losartan 25 milliGRAM(s) Oral daily  nystatin    Suspension 167805 Unit(s) Oral two times a day  prednisoLONE acetate 1% Suspension 1 Drop(s) Both EYES four times a day  senna Syrup 5 milliLiter(s) Oral at bedtime  sodium chloride 0.9%. 1000 milliLiter(s) IV Continuous <Continuous>      VITALS  T(C): 36.6 (07-01-19 @ 08:58), Max: 37.7 (06-30-19 @ 23:19)  HR: 67 (07-01-19 @ 08:58) (67 - 115)  BP: 144/81 (07-01-19 @ 08:58) (120/68 - 156/83)  RR: 18 (07-01-19 @ 08:58) (18 - 20)  SpO2: 97% (07-01-19 @ 08:58) (94% - 100%)  Wt(kg): --    RECENT LABS/IMAGING  CBC Full  -  ( 01 Jul 2019 08:21 )  WBC Count : 19.2 K/uL  RBC Count : 4.40 M/uL  Hemoglobin : 12.4 g/dL  Hematocrit : 37.9 %  Platelet Count - Automated : 305 K/uL  Mean Cell Volume : 86.2 fl  Mean Cell Hemoglobin : 28.3 pg  Mean Cell Hemoglobin Concentration : 32.8 gm/dL  Auto Neutrophil # : x  Auto Lymphocyte # : x  Auto Monocyte # : x  Auto Eosinophil # : x  Auto Basophil # : x  Auto Neutrophil % : x  Auto Lymphocyte % : x  Auto Monocyte % : x  Auto Eosinophil % : x  Auto Basophil % : x    07-01    135  |  93<L>  |  15  ----------------------------<  143<H>  4.1   |  31  |  0.54    Ca    8.9      01 Jul 2019 08:21  Phos  1.8     06-29  Mg     2.3     06-29          ----------------------------------------------------------------------------------------        PHYSICAL EXAM  Constitutional - NAD, Comfortable, foul breath  Cardio:  no edema  Pulm: no respiratory distress  GI: +NGT    Neuro Exam:                    Cognitive - AAOx3   Communication - Fluent with dysarthria      Attention- intact - Memory Short term - recall 3/3 and repetition intact.      CN - unable to abduct eyes bilateral, +facial droop      Motor - No focal deficits     Sensory - Intact to LT  Extremities - No calf tenderness  Psychiatric - Mood WNL, Affect WNL    ASSESSMENT/PLAN  69y Female who was found to have a posterior fossa mass s/p resection who  is now with dysarthria, dysphagia gait, ADLs, and functional deficits.     Recommend - Acute Rehab  - Disposition: ACUTE inpatient rehabilitation for functional deficits when the patient is medically stable. The patient will benefit from 3 hours of therapy per day and 24 hour nursing care. The patient will benefit  from daily PMR physician oversight for comorbid medical management morbid obesity and has a high level of medical complexity necessitating the need for daily physician oversight. Patient can tolerate intensive therapy consisting of PT/OT and if needed SLP.  Will continue to follow for ongoing rehab needs and recommendations.  - c/w PT for Bed Mobility, Transfers, Ambulation with AD   - c/w OT for ADLs  - Turn Q2hrs while in bed to prevent Pressure Injury and OOB to chair when possible

## 2019-07-01 NOTE — PROGRESS NOTE ADULT - ASSESSMENT
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    [X]  GCS  15     PLAN:     Neuro:  - MRI Brain with contrast with anesthesia for conscious sedation tomorrow (7/2/19); NPO after midnight  - c/w decadron slow tapering    CV:  - stable  - continue with BP meds    Pulm:  - incentive spirometry  - stable  - aspiration precautions    GI:  - on tube feeds  - NPO after midnight for MRI Brain with contrast and anesthesia tomorrow  - follow up MBS for dysphagia on 7/3/19    Renal:  - IV locked    Heme/onc:  - c/w DVT ppx: Lovenox 30 q12h    Endo:  - c/w insulin sliding scale    ID:  - afebrile  - c/w nystatin for oral thrush until stop date of 7/2/19 HPI:  69 year old right handed morbidly obese female , PMH of extrinsic asthma, glaucoma, HTN & OA,  reports having recent light headedness, dizziness & some balance disturbance for about three months,  PMD ordered a brain MRI which showed a 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

## 2019-07-01 NOTE — CONSULT NOTE ADULT - ASSESSMENT
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 Course complicated with stage 1 pressure injury, dysphagia, currently on TFs.     Intermittent low grade fever since over the last 2-3 days.     1. Posterior fossa tumor- S/p craniotomy and resection as above. F/u path. Rpt MRI under anesthesia tomorrow.     2. Dysphagia    3. Low grade fever    4. HTN    5. 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 Course complicated with stage 1 pressure injury, dysphagia, currently on TFs.     Intermittent low grade fever since over the last 2-3 days.     1. 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 Rpt MRI under anesthesia tomorrow.     2. Dysphagia- NG tube in place. ? Plan for MBS tomorrow.     3. Low grade fever- No clear source of infection. CXR from 6/29 unremarkable. UA ordered.     4. HTN- Continue current meds.     5. Leukocytosis- Likely due to Decadron. Taper per primary team.

## 2019-07-02 DIAGNOSIS — R22.0 LOCALIZED SWELLING, MASS AND LUMP, HEAD: ICD-10-CM

## 2019-07-02 LAB
GLUCOSE BLDC GLUCOMTR-MCNC: 115 MG/DL — HIGH (ref 70–99)
GLUCOSE BLDC GLUCOMTR-MCNC: 136 MG/DL — HIGH (ref 70–99)
GLUCOSE BLDC GLUCOMTR-MCNC: 148 MG/DL — HIGH (ref 70–99)

## 2019-07-02 PROCEDURE — 70543 MRI ORBT/FAC/NCK W/O &W/DYE: CPT | Mod: 26

## 2019-07-02 PROCEDURE — 99233 SBSQ HOSP IP/OBS HIGH 50: CPT

## 2019-07-02 PROCEDURE — 70553 MRI BRAIN STEM W/O & W/DYE: CPT | Mod: 26

## 2019-07-02 RX ORDER — PREDNISOLONE SODIUM PHOSPHATE 1 %
1 DROPS OPHTHALMIC (EYE)
Refills: 0 | Status: DISCONTINUED | OUTPATIENT
Start: 2019-07-02 | End: 2019-07-10

## 2019-07-02 RX ORDER — DEXAMETHASONE 0.5 MG/5ML
2 ELIXIR ORAL EVERY 8 HOURS
Refills: 0 | Status: DISCONTINUED | OUTPATIENT
Start: 2019-07-02 | End: 2019-07-03

## 2019-07-02 RX ORDER — DEXTROSE MONOHYDRATE, SODIUM CHLORIDE, AND POTASSIUM CHLORIDE 50; .745; 4.5 G/1000ML; G/1000ML; G/1000ML
1000 INJECTION, SOLUTION INTRAVENOUS
Refills: 0 | Status: DISCONTINUED | OUTPATIENT
Start: 2019-07-02 | End: 2019-07-03

## 2019-07-02 RX ORDER — ACETAMINOPHEN 500 MG
1000 TABLET ORAL ONCE
Refills: 0 | Status: COMPLETED | OUTPATIENT
Start: 2019-07-02 | End: 2019-07-02

## 2019-07-02 RX ADMIN — Medication 500000 UNIT(S): at 12:08

## 2019-07-02 RX ADMIN — Medication 1 DROP(S): at 05:43

## 2019-07-02 RX ADMIN — LATANOPROST 1 DROP(S): 0.05 SOLUTION/ DROPS OPHTHALMIC; TOPICAL at 23:00

## 2019-07-02 RX ADMIN — Medication 100 MILLIGRAM(S): at 05:42

## 2019-07-02 RX ADMIN — Medication 500000 UNIT(S): at 05:41

## 2019-07-02 RX ADMIN — Medication 1 DROP(S): at 12:08

## 2019-07-02 RX ADMIN — ENOXAPARIN SODIUM 30 MILLIGRAM(S): 100 INJECTION SUBCUTANEOUS at 05:42

## 2019-07-02 RX ADMIN — Medication 500000 UNIT(S): at 00:41

## 2019-07-02 RX ADMIN — Medication 400 MILLIGRAM(S): at 14:10

## 2019-07-02 RX ADMIN — ENOXAPARIN SODIUM 30 MILLIGRAM(S): 100 INJECTION SUBCUTANEOUS at 23:00

## 2019-07-02 RX ADMIN — DEXTROSE MONOHYDRATE, SODIUM CHLORIDE, AND POTASSIUM CHLORIDE 75 MILLILITER(S): 50; .745; 4.5 INJECTION, SOLUTION INTRAVENOUS at 03:16

## 2019-07-02 RX ADMIN — Medication 2 MILLIGRAM(S): at 23:00

## 2019-07-02 RX ADMIN — Medication 1000 MILLIGRAM(S): at 14:56

## 2019-07-02 RX ADMIN — AMLODIPINE BESYLATE 10 MILLIGRAM(S): 2.5 TABLET ORAL at 05:42

## 2019-07-02 RX ADMIN — Medication 1 APPLICATION(S): at 23:00

## 2019-07-02 RX ADMIN — LOSARTAN POTASSIUM 25 MILLIGRAM(S): 100 TABLET, FILM COATED ORAL at 05:42

## 2019-07-02 RX ADMIN — Medication 1 DROP(S): at 00:41

## 2019-07-02 RX ADMIN — Medication 1 DROP(S): at 05:41

## 2019-07-02 RX ADMIN — Medication 3 MILLIGRAM(S): at 05:42

## 2019-07-02 RX ADMIN — Medication 2 MILLIGRAM(S): at 13:55

## 2019-07-02 RX ADMIN — Medication 1 DROP(S): at 23:00

## 2019-07-02 NOTE — PROGRESS NOTE ADULT - SUBJECTIVE AND OBJECTIVE BOX
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 Awake, able to follow commands. C/o throat pain and L ear pain. States keeping eyes closed as the lights bother her. Daughter and  by bedside.     Vital Signs Last 24 Hrs  T(C): 37 (02 Jul 2019 11:29), Max: 37.4 (02 Jul 2019 04:00)  T(F): 98.6 (02 Jul 2019 11:29), Max: 99.4 (02 Jul 2019 04:00)  HR: 91 (02 Jul 2019 11:29) (91 - 106)  BP: 143/81 (02 Jul 2019 11:29) (128/72 - 145/75)  BP(mean): --  RR: 18 (02 Jul 2019 11:29) (17 - 20)  SpO2: 98% (02 Jul 2019 11:29) (97% - 98%)                          12.4   19.2  )-----------( 305      ( 01 Jul 2019 08:21 )             37.9     07-01    135  |  93<L>  |  15  ----------------------------<  143<H>  4.1   |  31  |  0.54    Ca    8.9      01 Jul 2019 08:21      MEDICATIONS  (STANDING):  amLODIPine   Tablet 10 milliGRAM(s) Oral daily  artificial  tears Solution 1 Drop(s) Right EYE every 6 hours  dexamethasone  Injectable 2 milliGRAM(s) IV Push every 8 hours  docusate sodium Liquid 100 milliGRAM(s) Oral two times a day  enoxaparin Injectable 30 milliGRAM(s) SubCutaneous every 12 hours  insulin lispro (HumaLOG) corrective regimen sliding scale   SubCutaneous every 6 hours  latanoprost 0.005% Ophthalmic Solution 1 Drop(s) Both EYES at bedtime  losartan 25 milliGRAM(s) Oral daily  petrolatum Ophthalmic Ointment 1 Application(s) Right EYE at bedtime  prednisoLONE acetate 1% Suspension 1 Drop(s) Both EYES four times a day  senna Syrup 5 milliLiter(s) Oral at bedtime  sodium chloride 0.9% with potassium chloride 20 mEq/L 1000 milliLiter(s) (75 mL/Hr) IV Continuous <Continuous>    MEDICATIONS  (PRN):  acetaminophen    Suspension .. 650 milliGRAM(s) Oral every 6 hours PRN Temp greater or equal to 38.5C (101.3F), Mild Pain (1 - 3)  ALBUTerol    90 MICROgram(s) HFA Inhaler 2 Puff(s) Inhalation every 6 hours PRN for shortness of breath and/or wheezing

## 2019-07-02 NOTE — PROGRESS NOTE ADULT - ASSESSMENT
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      Intermittent low grade fever post-op, now resolved.     1. 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 Rpt MRI under anesthesia today.     2. Dysphagia- NG tube in place. Plan for MBS tomorrow.     3. Low grade fever- Now resolved.    4. HTN- Continue current meds.     5. Leukocytosis- Likely due to Decadron. Taper per primary team.     6. Throat and L ear pain- CT soft tissue neck recommended by ENT. (On indirect laryngoscopy, L base of tongue mass noted and edematous ae fold and arytenoid possibly due to traumatic intubation).

## 2019-07-02 NOTE — PROGRESS NOTE ADULT - ASSESSMENT
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 C/w eye lubricants    CV:  - stable  - c/w medications    Pulm:  - stable; continue with albuterol  - incentive spirometry    GI:  - bowel regimen  - stable  - NPO today 7/2/19, for MRI Brain with contrast with anesthesia  - resume tube feeds post MRI Brain with contrast with anesthesia    Renal:  - c/w normal saline with potassium 20 IV fluids at 75 ml/hr    Heme/onc:  - c/w lovenox    ID:  - Nystatin swish ends today 7/2/19 with continued sore throat pain and new right ear pain  - will consult with ENT    Endo:  - c/w insulin sliding scale HPI:  69 year old right handed morbidly obese female , PMH of extrinsic asthma, glaucoma, HTN & OA,  reports having recent light headedness, dizziness & some balance disturbance for about three months,  PMD ordered a brain MRI which showed a 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 C/w eye lubricants    CV:  - stable  - c/w medications    Pulm:  - stable; continue with albuterol  - incentive spirometry    GI:  - bowel regimen  - stable  - NPO today 7/2/19, for MRI Brain with contrast with anesthesia  - resume tube feeds post MRI Brain with contrast with anesthesia  - S+S reevaluation, possible PEG Friday, 7/5/19    Renal:  - c/w normal saline with potassium 20 IV fluids at 75 ml/hr    Heme/onc:  - c/w lovenox    ID:  - Nystatin swish ends today 7/2/19 with continued sore throat pain and new right ear pain  - will consult with ENT  - leukocytosis 2/2 steroid    Endo:  - c/w insulin sliding scale    dispo: acute rehab for PT and OT HPI:  69 year old right handed morbidly obese female , PMH of extrinsic asthma, glaucoma, HTN & OA,  reports having recent light headedness, dizziness & some balance disturbance for about three months,  PMD ordered a brain MRI which showed a 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     Please Check When Present   [X]  GCS  E   V  M 15    Heart Failure: []Acute, [] acute on chronic , []chronic  Heart Failure:  [] Diastolic (HFpEF), [] Systolic (HFrEF), []Combined (HFpEF and HFrEF), [] RHF, [] Pulm HTN, [] Other    [] BRENDA, [] ATN, [] AIN, [] other  [] CKD1, [] CKD2, [] CKD 3, [] CKD 4, [] CKD 5, []ESRD    Encephalopathy: [] Metabolic, [] Hepatic, [] toxic, [] Neurological, [] Other    Abnormal Nurtitional Status: [] malnurtition (see nutrition note), [ ]underweight: BMI < 19, [X] morbid obesity: BMI >40, [] Cachexia    [] Sepsis  [] hypovolemic shock,[] cardiogenic shock, [] hemorrhagic shock, [] neuogenic shock  [] Acute Respiratory Failure  [X]Cerebral edema, [] Brain compression/ herniation,   [] Functional quadriplegia  [] Acute blood loss anemia            PLAN:     Neuro:  - c/w decadron taper: decadron dose changed to 2q8  - MRI brain with contrast NPO anesthesia today 7/2/19  - Seen by oculoplastics, no gold weight needed now. C/w eye lubricants    CV:  - stable  - c/w medications    Pulm:  - stable; continue with albuterol  - incentive spirometry    GI:  - bowel regimen  - stable  - NPO today 7/2/19, for MRI Brain with contrast with anesthesia  - resume tube feeds post MRI Brain with contrast with anesthesia  - S+S reevaluation, possible PEG Friday, 7/5/19    Renal:  - c/w normal saline with potassium 20 IV fluids at 75 ml/hr    Heme/onc:  - c/w lovenox    ID:  - Nystatin swish ends today 7/2/19 with continued sore throat pain and new right ear pain  - will consult with ENT  - leukocytosis 2/2 steroid    Endo:  - c/w insulin sliding scale    dispo: acute rehab for PT and OT

## 2019-07-02 NOTE — SWALLOW BEDSIDE ASSESSMENT ADULT - CONSISTENCIES ADMINISTERED
PO trials deferred at this time secondary to MD order NPO pending MRI with anesthesia
crushed ice chips x2, 1/2 teaspoon applesauce/puree thin

## 2019-07-02 NOTE — SWALLOW VFSS/MBS ASSESSMENT ADULT - DIAGNOSTIC IMPRESSIONS
As per d/w neurosurgery MARIAM Lao, ASHLEY cancelled at this time. Pt seen by ENT 2/2 c/o ear pain and odynophagia. Per PA, + mass on base of tongue noted. ASHLEY therefore cancelled an patient for possible further workup. This service will continue to follow. As per d/w neurosurgery MARIAM Lao and MARIAM Nieves, MBS cancelled at this time. Pt seen by ENT 2/2 c/o ear pain and odynophagia. Per PA, + mass on base of tongue noted. MBS therefore cancelled. Patient for possible further workup. This service will continue to follow as clinically indicated.

## 2019-07-02 NOTE — SWALLOW BEDSIDE ASSESSMENT ADULT - SWALLOW EVAL: DIAGNOSIS
Patient presents with gonzalez-pharyngeal dysphagia Patient presents with gonzalez-pharyngeal dysphagia characterized by reduced labial/lingual/buccal ROM/strength/coordination, and inability to initiate a dry swallow of secretions. When cued to cough, patient presented with wet cough quality suggestive of possible pooling/penetration of secretions. Patient continues to present with poor oral management skills characterized by reduced labial/lingual/buccal ROM/strength/coordination, and inability to initiate a dry swallow of secretions. When cued to cough, patient presented with wet cough quality suggestive of possible pooling/penetration of secretions. Patient utilizing [employer] 2/2 reports of increased secretions in oral cavity and pharynx. No PO trials given at this time 2/2 MD Order NPO pending MRI with anesthesia.

## 2019-07-02 NOTE — SWALLOW BEDSIDE ASSESSMENT ADULT - ASR SWALLOW RECOMMEND DIAG
VFSS/MBS/pending re-eval of motor function tomorrow morning VFSS/MBS/pending re-eval of swallow mechanism

## 2019-07-02 NOTE — CONSULT NOTE ADULT - PROBLEM SELECTOR RECOMMENDATION 9
-f/u CT soft tissue neck with contrast  -pending Dr. Thakur indirect laryngoscopy -f/u MRI with contrast to R/O BOT mass vs, swelling  No thrush seen on exam

## 2019-07-02 NOTE — SWALLOW BEDSIDE ASSESSMENT ADULT - COMMENTS
Continued: 7/24: s/p SOC for 4th ventricular lesion.   Significant facial/tongue swelling/edema from OR positioning. + Wound care.   New b/l CN VI palsies. +IVH post-op. Wean to extubate as tongue swelling lessens. On steroids for cerebral edema.   Extubated 6/27.   PT/OT evaluations 6/28 recommending acute rehab.
Significant facial/tongue swelling/edema from OR positioning. + Wound care.   New b/l CN VI palsies. +IVH post-op. Wean to extubate as tongue swelling lessens. On steroids for cerebral edema.   Extubated 6/27.   PT/OT evaluations 6/28 recommending acute rehab.  7/1 hospitalist noted low grade fever- No clear source of infection. CXR from 6/29 unremarkable. UA ordered. 7/2 plastic surgery consulted noting: facial symmetry is reasonable rt brow weak, & while the facial nerve action is weaker than normal, she has good [eye] closure and may not need gold weight. 7/2 ENT consulted 2/2 Pt. c/o new left ear pain. As per care coordination, PEG placement (scheduled for 7/5) pending results of MBS.

## 2019-07-02 NOTE — SWALLOW BEDSIDE ASSESSMENT ADULT - SWALLOW EVAL: RECOMMENDED DIET
continue NPO 2/2 pending MRI with anesthesia continue NPO 2/2 pending MRI with anesthesia. This service to re-eval in am on 7/3.

## 2019-07-02 NOTE — SWALLOW BEDSIDE ASSESSMENT ADULT - ASR SWALLOW LINGUAL MOBILITY
impaired left lateral movement/impaired right lateral movement/impaired protrusion/impaired anterior elevation impaired protrusion/impaired left lateral movement/impaired right lateral movement/PA reports possible bite marks on tongue. Pt. noted to have area of white coating and edema along lingual dorsum./impaired anterior elevation

## 2019-07-02 NOTE — SWALLOW BEDSIDE ASSESSMENT ADULT - ASR SWALLOW LABIAL MOBILITY
impaired retraction/impaired coordination/impaired seal/impaired pursing
minimal labial movement. significantly reduced oral aperture, absent labial seal

## 2019-07-02 NOTE — SWALLOW BEDSIDE ASSESSMENT ADULT - SLP GENERAL OBSERVATIONS
Pt found in bed, lethargic. Eyes open to verbal stimulation. +Difficulty remaining engaged in session 2/2 fluctuating attention. +KFT. +Dysarthria, fair speech intelligibility.
Pt. seen in bed A+Ox4 in fair spirits with c/o pain in her left ear; nursing/MD aware. +KFT, +dysarthria with fair speech intelligibility (absent for bilabial sounds). Compared to previous evaluation, today patient was able to attend to task for duration of re-evaluation.

## 2019-07-02 NOTE — CONSULT NOTE ADULT - SUBJECTIVE AND OBJECTIVE BOX
pt observed sleeping with eyes fully closed  pt awakend for exam  facial symmetry is reasonable rt brow weka  rt upper lid closes 95% with volition. rt lower lid without significant ptosis  while the facial nerve action is weaker than normal, she has good closure and may not need gold weight  observe for evolution of facial weakness  continue topical lubrication

## 2019-07-02 NOTE — CONSULT NOTE ADULT - ASSESSMENT
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 PT on steroids and has been treated with nystatin swish and spit for ? thrush without relief x5days. On indirect laryngoscopy, L base of tongue mass noted and edematous ae fold and arytenoid possibly due to traumatic intubation. 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 PT on steroids and has been treated with nystatin swish and spit for ? thrush without relief x5days. On indirect laryngoscopy, L base of tongue mass vs. swelling  noted and edematous ae fold and arytenoid possibly due to traumatic intubation, left Vocal cord paralysis was also noted. Dr. Thakur requested Neck CT with contrast  (pt. was sent to MRI for scan so we added a neck MRI with contrast instead of CT).  Ear exam was normal so the left ear pain is referred pain.

## 2019-07-02 NOTE — PROGRESS NOTE ADULT - SUBJECTIVE AND OBJECTIVE BOX
SUBJECTIVE: Pt seen and evaluated at bedside. Pt complained of Rt ear pain and sore throat she gets when she has to swallow. Pt is holding suction to help with her secretions since she is unable to control her sections. Pt denies headaches, N/V, abdominal pain, chest pain, shortness of breath.    Vital Signs Last 24 Hrs  T(C): 37.4 (19 @ 07:14), Max: 37.4 (19 @ 12:19)  T(F): 99.4 (19 @ 07:14), Max: 99.4 (19 @ 12:19)  HR: 92 (19 @ 07:14) (92 - 106)  BP: 133/78 (19 @ 07:14) (128/72 - 147/74)  BP(mean): --  RR: 18 (19 @ 07:14) (17 - 20)  SpO2: 97% (19 @ 07:14) (97% - 98%)    PHYSICAL EXAM:    Constitutional: No Acute Distress     Neurological: A/Ox3, pupils 3R, b/l 6th nerve palsy, RT 7th nerve palsy (can't close Rt eyelid), FC, no dysmetria, CONTRERAS 5/5    Incision: staples CDI    Pulmonary: Clear to Auscultation, No rales, No rhonchi, No wheezes     Cardiovascular: S1, S2, Regular rate and rhythm     Gastrointestinal: Soft, Non-tender, Non-distended, +bowel sounds x 4    Extremities: No calf tenderness bilaterally, no cyanosis, clubbing or edema          LABS:                          12.4   19.2  )-----------( 305      ( 2019 08:21 )             37.9    07-    135  |  93<L>  |  15  ----------------------------<  143<H>  4.1   |  31  |  0.54    Ca    8.9      2019 08:21        07-01 @ 07:01  -  07-02 @ 07:00  --------------------------------------------------------  IN: 625 mL / OUT: 350 mL / NET: 275 mL        IMAGIN/28 MRI noted    MEDICATIONS:  Antibiotics:  nystatin    Suspension 373993 Unit(s) Oral every 6 hours    Neuro:  acetaminophen    Suspension .. 650 milliGRAM(s) Oral every 6 hours PRN Temp greater or equal to 38.5C (101.3F), Mild Pain (1 - 3)    Cardiac:  amLODIPine   Tablet 10 milliGRAM(s) Oral daily  losartan 25 milliGRAM(s) Oral daily    Pulm:  ALBUTerol    90 MICROgram(s) HFA Inhaler 2 Puff(s) Inhalation every 6 hours PRN for shortness of breath and/or wheezing    GI/:  docusate sodium Liquid 100 milliGRAM(s) Oral two times a day  senna Syrup 5 milliLiter(s) Oral at bedtime    Other:   artificial  tears Solution 1 Drop(s) Right EYE every 6 hours  dexamethasone  Injectable 3 milliGRAM(s) IV Push every 8 hours  enoxaparin Injectable 30 milliGRAM(s) SubCutaneous every 12 hours  insulin lispro (HumaLOG) corrective regimen sliding scale   SubCutaneous every 6 hours  latanoprost 0.005% Ophthalmic Solution 1 Drop(s) Both EYES at bedtime  petrolatum Ophthalmic Ointment 1 Application(s) Right EYE at bedtime  prednisoLONE acetate 1% Suspension 1 Drop(s) Both EYES four times a day  sodium chloride 0.9% with potassium chloride 20 mEq/L 1000 milliLiter(s) IV Continuous <Continuous>        DIET:

## 2019-07-03 LAB
ANION GAP SERPL CALC-SCNC: 11 MMOL/L — SIGNIFICANT CHANGE UP (ref 5–17)
BUN SERPL-MCNC: 26 MG/DL — HIGH (ref 7–23)
CALCIUM SERPL-MCNC: 9.1 MG/DL — SIGNIFICANT CHANGE UP (ref 8.4–10.5)
CHLORIDE SERPL-SCNC: 97 MMOL/L — SIGNIFICANT CHANGE UP (ref 96–108)
CO2 SERPL-SCNC: 30 MMOL/L — SIGNIFICANT CHANGE UP (ref 22–31)
CREAT SERPL-MCNC: 0.72 MG/DL — SIGNIFICANT CHANGE UP (ref 0.5–1.3)
GLUCOSE BLDC GLUCOMTR-MCNC: 112 MG/DL — HIGH (ref 70–99)
GLUCOSE BLDC GLUCOMTR-MCNC: 128 MG/DL — HIGH (ref 70–99)
GLUCOSE BLDC GLUCOMTR-MCNC: 130 MG/DL — HIGH (ref 70–99)
GLUCOSE BLDC GLUCOMTR-MCNC: 137 MG/DL — HIGH (ref 70–99)
GLUCOSE SERPL-MCNC: 134 MG/DL — HIGH (ref 70–99)
HCT VFR BLD CALC: 41.2 % — SIGNIFICANT CHANGE UP (ref 34.5–45)
HGB BLD-MCNC: 13.3 G/DL — SIGNIFICANT CHANGE UP (ref 11.5–15.5)
MCHC RBC-ENTMCNC: 28.2 PG — SIGNIFICANT CHANGE UP (ref 27–34)
MCHC RBC-ENTMCNC: 32.2 GM/DL — SIGNIFICANT CHANGE UP (ref 32–36)
MCV RBC AUTO: 87.6 FL — SIGNIFICANT CHANGE UP (ref 80–100)
PLATELET # BLD AUTO: 352 K/UL — SIGNIFICANT CHANGE UP (ref 150–400)
POTASSIUM SERPL-MCNC: 4.5 MMOL/L — SIGNIFICANT CHANGE UP (ref 3.5–5.3)
POTASSIUM SERPL-SCNC: 4.5 MMOL/L — SIGNIFICANT CHANGE UP (ref 3.5–5.3)
RBC # BLD: 4.7 M/UL — SIGNIFICANT CHANGE UP (ref 3.8–5.2)
RBC # FLD: 13.3 % — SIGNIFICANT CHANGE UP (ref 10.3–14.5)
SODIUM SERPL-SCNC: 138 MMOL/L — SIGNIFICANT CHANGE UP (ref 135–145)
WBC # BLD: 18.2 K/UL — HIGH (ref 3.8–10.5)
WBC # FLD AUTO: 18.2 K/UL — HIGH (ref 3.8–10.5)

## 2019-07-03 PROCEDURE — 31575 DIAGNOSTIC LARYNGOSCOPY: CPT

## 2019-07-03 PROCEDURE — 99223 1ST HOSP IP/OBS HIGH 75: CPT

## 2019-07-03 PROCEDURE — 99232 SBSQ HOSP IP/OBS MODERATE 35: CPT

## 2019-07-03 PROCEDURE — 99232 SBSQ HOSP IP/OBS MODERATE 35: CPT | Mod: 25

## 2019-07-03 RX ORDER — CEFAZOLIN SODIUM 1 G
2000 VIAL (EA) INJECTION ONCE
Refills: 0 | Status: DISCONTINUED | OUTPATIENT
Start: 2019-07-05 | End: 2019-07-07

## 2019-07-03 RX ORDER — DEXAMETHASONE 0.5 MG/5ML
8 ELIXIR ORAL EVERY 8 HOURS
Refills: 0 | Status: DISCONTINUED | OUTPATIENT
Start: 2019-07-03 | End: 2019-07-03

## 2019-07-03 RX ORDER — DEXAMETHASONE 0.5 MG/5ML
8 ELIXIR ORAL EVERY 8 HOURS
Refills: 0 | Status: COMPLETED | OUTPATIENT
Start: 2019-07-03 | End: 2019-07-05

## 2019-07-03 RX ORDER — DEXAMETHASONE 0.5 MG/5ML
2 ELIXIR ORAL EVERY 12 HOURS
Refills: 0 | Status: DISCONTINUED | OUTPATIENT
Start: 2019-07-03 | End: 2019-07-03

## 2019-07-03 RX ORDER — SODIUM CHLORIDE 9 MG/ML
500 INJECTION INTRAMUSCULAR; INTRAVENOUS; SUBCUTANEOUS ONCE
Refills: 0 | Status: COMPLETED | OUTPATIENT
Start: 2019-07-03 | End: 2019-07-03

## 2019-07-03 RX ADMIN — LATANOPROST 1 DROP(S): 0.05 SOLUTION/ DROPS OPHTHALMIC; TOPICAL at 21:40

## 2019-07-03 RX ADMIN — SENNA PLUS 5 MILLILITER(S): 8.6 TABLET ORAL at 21:40

## 2019-07-03 RX ADMIN — Medication 1 DROP(S): at 11:43

## 2019-07-03 RX ADMIN — Medication 1 DROP(S): at 00:00

## 2019-07-03 RX ADMIN — ENOXAPARIN SODIUM 30 MILLIGRAM(S): 100 INJECTION SUBCUTANEOUS at 05:11

## 2019-07-03 RX ADMIN — Medication 1 APPLICATION(S): at 21:40

## 2019-07-03 RX ADMIN — AMLODIPINE BESYLATE 10 MILLIGRAM(S): 2.5 TABLET ORAL at 05:10

## 2019-07-03 RX ADMIN — Medication 100 MILLIGRAM(S): at 05:11

## 2019-07-03 RX ADMIN — SENNA PLUS 5 MILLILITER(S): 8.6 TABLET ORAL at 01:48

## 2019-07-03 RX ADMIN — Medication 1 DROP(S): at 05:11

## 2019-07-03 RX ADMIN — SODIUM CHLORIDE 500 MILLILITER(S): 9 INJECTION INTRAMUSCULAR; INTRAVENOUS; SUBCUTANEOUS at 20:45

## 2019-07-03 RX ADMIN — Medication 101.6 MILLIGRAM(S): at 21:40

## 2019-07-03 RX ADMIN — Medication 1 DROP(S): at 17:15

## 2019-07-03 RX ADMIN — LOSARTAN POTASSIUM 25 MILLIGRAM(S): 100 TABLET, FILM COATED ORAL at 05:11

## 2019-07-03 RX ADMIN — Medication 1 DROP(S): at 17:16

## 2019-07-03 RX ADMIN — ENOXAPARIN SODIUM 30 MILLIGRAM(S): 100 INJECTION SUBCUTANEOUS at 17:16

## 2019-07-03 RX ADMIN — Medication 2 MILLIGRAM(S): at 05:11

## 2019-07-03 RX ADMIN — Medication 2 MILLIGRAM(S): at 17:16

## 2019-07-03 RX ADMIN — DEXTROSE MONOHYDRATE, SODIUM CHLORIDE, AND POTASSIUM CHLORIDE 75 MILLILITER(S): 50; .745; 4.5 INJECTION, SOLUTION INTRAVENOUS at 01:50

## 2019-07-03 RX ADMIN — Medication 100 MILLIGRAM(S): at 17:16

## 2019-07-03 NOTE — PROGRESS NOTE ADULT - ASSESSMENT
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      Intermittent low grade fever post-op, now resolved.     1. 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     Rpt MRI on 7/3- redemonstration of suboccipital craniotomy, extracalvarial CSF signal intensity collection approximating the craniotomy is slightly increased in size, currently measuring 10 mm in greatest depth, previously measuring 7 mm, small amount of hemorrhage and edema in the region of the surgical bed, residual nonspecific 0.8 x 0.5 x 2.1 cm (anterior-posterior by transverse by craniocaudad) focus of enhancement along the posterior aspect of the inferior fourth ventricle may represent postsurgical changes.     Steroid taper per primary team.     2. Dysphagia- NG tube in place. PEG on Friday.    3. Low grade fever- Now resolved.    4. HTN- Continue current meds.     5. Leukocytosis- Likely due to Decadron. Monitor. Rpt labs pending.     6. Throat and L ear pain- Seen by ENT- Indirect laryngoscopy- L base of tongue mass noted and edematous ae fold and arytenoid possibly due to traumatic intubation. MRI soft tissue next done, results pending.

## 2019-07-03 NOTE — CONSULT NOTE ADULT - CONSULT REASON
Left ear pain and odynophagia 2/2 thrush
Medical management
dysphagia
right facial weakness, eyelid closure
Evaluate Rehabilitation Needs

## 2019-07-03 NOTE — CHART NOTE - NSCHARTNOTEFT_GEN_A_CORE
** Left base of tongue mass vs. edema **    MRI reviewed and some BOT asymmetry noted, however, poor quality MRI bc  of patient motion. At this point, recommend steroids x48 hrs (8mg q8) Will reassess Monday 7/8 with rescope and if still asymmetry will recommend CT neck w contrast    Bridgett Kim PA-C  ENT Surgery [id number]

## 2019-07-03 NOTE — PROGRESS NOTE ADULT - SUBJECTIVE AND OBJECTIVE BOX
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 Still hypophonic.     Vital Signs Last 24 Hrs  T(C): 37.2 (03 Jul 2019 11:50), Max: 37.7 (03 Jul 2019 07:09)  T(F): 98.9 (03 Jul 2019 11:50), Max: 99.9 (03 Jul 2019 07:09)  HR: 108 (03 Jul 2019 11:50) (73 - 108)  BP: 143/84 (03 Jul 2019 14:02) (121/78 - 143/84)  BP(mean): --  RR: 18 (03 Jul 2019 11:50) (18 - 20)  SpO2: 97% (03 Jul 2019 11:50) (92% - 100%)    MEDICATIONS  (STANDING):  amLODIPine   Tablet 10 milliGRAM(s) Oral daily  artificial  tears Solution 1 Drop(s) Right EYE every 6 hours  dexamethasone  Injectable 2 milliGRAM(s) IV Push every 12 hours  docusate sodium Liquid 100 milliGRAM(s) Oral two times a day  enoxaparin Injectable 30 milliGRAM(s) SubCutaneous every 12 hours  insulin lispro (HumaLOG) corrective regimen sliding scale   SubCutaneous every 6 hours  latanoprost 0.005% Ophthalmic Solution 1 Drop(s) Both EYES at bedtime  losartan 25 milliGRAM(s) Oral daily  petrolatum Ophthalmic Ointment 1 Application(s) Right EYE at bedtime  prednisoLONE acetate 1% Suspension 1 Drop(s) Both EYES four times a day  senna Syrup 5 milliLiter(s) Oral at bedtime    MEDICATIONS  (PRN):  acetaminophen    Suspension .. 650 milliGRAM(s) Oral every 6 hours PRN Temp greater or equal to 38.5C (101.3F), Mild Pain (1 - 3)  ALBUTerol    90 MICROgram(s) HFA Inhaler 2 Puff(s) Inhalation every 6 hours PRN for shortness of breath and/or wheezing

## 2019-07-03 NOTE — PROGRESS NOTE ADULT - SUBJECTIVE AND OBJECTIVE BOX
SUBJECTIVE: Pt seen and examined in chair. Pt complained of "slight neck pain," and ear pain but otherwise had no complaints. Pt denied headaches, chest pain, shortness of breath, and abdominal pain.    Vital Signs Last 24 Hrs  T(C): 37.2 (07-03-19 @ 11:50), Max: 37.7 (07-03-19 @ 07:09)  T(F): 98.9 (07-03-19 @ 11:50), Max: 99.9 (07-03-19 @ 07:09)  HR: 108 (07-03-19 @ 11:50) (73 - 108)  BP: 143/84 (07-03-19 @ 14:02) (121/78 - 143/84)  BP(mean): --  RR: 18 (07-03-19 @ 11:50) (18 - 20)  SpO2: 97% (07-03-19 @ 11:50) (92% - 100%)    PHYSICAL EXAM:    Constitutional: No Acute Distress     Neurological: A/Ox3 3R, B/L 6NP, Rt 7NP - can't close eyelidm FC, CONTRERAS 5/5    Throat: white, granulated 1 cm patch on Rt side of tongue    Pulmonary: Clear to Auscultation, No rales, No rhonchi, No wheezes     Cardiovascular: S1, S2, Regular rate and rhythm     Gastrointestinal: Soft, Non-tender, Non-distended, +bowel sounds x 4    Extremities: No calf tenderness bilaterally, no cyanosis, clubbing or edema          LABS:             07-02 @ 07:01  -  07-03 @ 07:00  --------------------------------------------------------  IN: 0 mL / OUT: 1000 mL / NET: -1000 mL        IMAGING: < from: MR Head w/wo IV Cont (07.02.19 @ 19:41) >  EXAM:  MR BRAIN WAW IC                            PROCEDURE DATE:  07/02/2019            INTERPRETATION:  Contrast-enhanced MRI of the brain.    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 0 cc were discarded.    COMPARISON: MRI brain 6/28/2019    FINDINGS:      Redemonstration of suboccipital craniotomy. Extracalvarial CSF signal   intensity collection approximating the craniotomy is slightly increased   in size, currently measuring 10 mm in greatest depth, previously   measuring 7 mm.    Small amount of hemorrhage and edema in the region of the surgical bed.   Focus of restricted diffusion along the posterior aspect of the   pontomedullary junction consistent with postsurgical devitalized tissue.    Residual nonspecific 0.8 x 0.5 x 2.1 cm (anterior-posterior by transverse   by craniocaudad) focus of enhancement along the posterior aspect of the   inferior fourth ventricle may represent postsurgical changes.    Ventricles similar in size. No large hydrocephalus. No supratentorial   midline shift. Signal voids are seen within the major intracranial   vessels consistent with their patency.    Bilateral sphenoid sinus mucosal thickening. Mastoid air cells clear.   Orbits and sellar/suprasellar structures unremarkable. 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    IMPRESSION:    Redemonstration of suboccipital craniotomy. Extracalvarial CSF signal   intensity collection approximating the craniotomy is slightly increased   in size, currently measuring 10 mm in greatest depth, previously   measuring 7 mm.    Small amount of hemorrhage and edema in the region of the surgical bed.   Focus of restricted diffusion along the posterior aspect of the   pontomedullary junction consistent with postsurgical devitalized tissue.    Residual nonspecific 0.8 x 0.5 x 2.1 cm (anterior-posterior by transverse   by craniocaudad) focus of enhancement along the posterior aspect of the   inferior fourth ventricle may represent postsurgical changes. Continued   MRI surveillance is recommended.    No large hydrocephalus. No supratentorial midline shift.    < end of copied text >      MEDICATIONS:  Antibiotics:    Neuro:  acetaminophen    Suspension .. 650 milliGRAM(s) Oral every 6 hours PRN Temp greater or equal to 38.5C (101.3F), Mild Pain (1 - 3)    Cardiac:  amLODIPine   Tablet 10 milliGRAM(s) Oral daily  losartan 25 milliGRAM(s) Oral daily    Pulm:  ALBUTerol    90 MICROgram(s) HFA Inhaler 2 Puff(s) Inhalation every 6 hours PRN for shortness of breath and/or wheezing    GI/:  docusate sodium Liquid 100 milliGRAM(s) Oral two times a day  senna Syrup 5 milliLiter(s) Oral at bedtime    Other:   artificial  tears Solution 1 Drop(s) Right EYE every 6 hours  dexamethasone  Injectable 2 milliGRAM(s) IV Push every 12 hours  enoxaparin Injectable 30 milliGRAM(s) SubCutaneous every 12 hours  insulin lispro (HumaLOG) corrective regimen sliding scale   SubCutaneous every 6 hours  latanoprost 0.005% Ophthalmic Solution 1 Drop(s) Both EYES at bedtime  petrolatum Ophthalmic Ointment 1 Application(s) Right EYE at bedtime  prednisoLONE acetate 1% Suspension 1 Drop(s) Both EYES four times a day        DIET: Tube Feeds: Glucerna 1.2

## 2019-07-03 NOTE — PROVIDER CONTACT NOTE (OTHER) - ASSESSMENT
Pt is Tachycardic, Hypotensive: 115, 96/65. Pt Neurologically intact A&Ox4, strong x4. In no apparent distress

## 2019-07-03 NOTE — PROGRESS NOTE ADULT - ASSESSMENT
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 PT on steroids and has been treated with nystatin swish and spit for ? thrush without relief x5days. On indirect laryngoscopy 7/2, L base of tongue mass vs. swelling  noted and edematous ae fold and arytenoid possibly due to traumatic intubation, left Vocal cord paralysis was also noted. Dr. Thakur requested Neck CT with contrast  (pt. was sent to MRI for scan so we added a neck MRI with contrast instead of CT).  Ear exam was normal so the left ear pain is referred pain. 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 PT on steroids and has been treated with nystatin swish and spit for ? thrush without relief x5days. On indirect laryngoscopy 7/2, L base of tongue mass vs. swelling  noted and edematous ae fold and arytenoid possibly due to traumatic intubation, left Vocal cord paralysis was also noted. Dr. Thakur requested Neck CT with contrast  (pt. was sent to MRI for scan so we added a neck MRI with contrast instead of CT).  Ear exam was normal so the left ear pain is referred pain. 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 PT on steroids and has been treated with nystatin swish and spit for ? thrush without relief x5days. On indirect laryngoscopy 7/2, L base of tongue mass vs. swelling  noted and edematous ae fold and arytenoid possibly due to traumatic intubation, left Vocal cord paralysis was also noted. Dr. Thakur requested Neck CT with contrast  (pt. was sent to MRI for scan so we added a neck MRI with contrast instead of CT).  Ear exam was normal so the left ear pain is referred pain.     ***MRI was inconclusive as pt was moving around too much.

## 2019-07-03 NOTE — PROGRESS NOTE ADULT - SUBJECTIVE AND OBJECTIVE BOX
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 Monroy, course complicated by stage 1 pressure injury. ENT consulted on pt after c/o L ear pain and odynophagia thought to be 2/2 thrush with no improvement w nystatin. Laryngoscopy done yesterday 7/2 suspicious for base of tongue fullness and asymmetry [mass vs. swelling] and L AE fold edema and erythema. Denies external tenderness, n/v, tinnitus, dizziness, congestion, recent URI, otorrhea, hearing loss, hx of sx or trauma or recent travel. Pt presently NPO w tube feed. 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  Light-headedness  Glaucoma  Asthma: controlled with meds  HTN (hypertension)  History of ankle fracture: h/o repair  Fractured elbow: left elbow fracture repair,2007  H/O appendicitis: h/o appendicectomy    Allergies    No Known Drug Allergies  shellfish (Angioedema; Rash)    Intolerances      MEDICATIONS  (STANDING):  amLODIPine   Tablet 10 milliGRAM(s) Oral daily  artificial  tears Solution 1 Drop(s) Right EYE every 6 hours  dexamethasone  Injectable 2 milliGRAM(s) IV Push every 8 hours  docusate sodium Liquid 100 milliGRAM(s) Oral two times a day  enoxaparin Injectable 30 milliGRAM(s) SubCutaneous every 12 hours  insulin lispro (HumaLOG) corrective regimen sliding scale   SubCutaneous every 6 hours  latanoprost 0.005% Ophthalmic Solution 1 Drop(s) Both EYES at bedtime  losartan 25 milliGRAM(s) Oral daily  petrolatum Ophthalmic Ointment 1 Application(s) Right EYE at bedtime  prednisoLONE acetate 1% Suspension 1 Drop(s) Both EYES four times a day  senna Syrup 5 milliLiter(s) Oral at bedtime  sodium chloride 0.9% with potassium chloride 20 mEq/L 1000 milliLiter(s) (75 mL/Hr) IV Continuous <Continuous>    MEDICATIONS  (PRN):  acetaminophen    Suspension .. 650 milliGRAM(s) Oral every 6 hours PRN Temp greater or equal to 38.5C (101.3F), Mild Pain (1 - 3)  ALBUTerol    90 MICROgram(s) HFA Inhaler 2 Puff(s) Inhalation every 6 hours PRN for shortness of breath and/or wheezing        ROS:   ENT: all negative except as noted in HPI   Pulm: denies SOB, cough, hemoptysis  Neuro: denies numbness/tingling, loss of sensation  Endo: denies heat/cold intolerance, excessive sweating      Vital Signs Last 24 Hrs  T(C): 37.7 (03 Jul 2019 07:09), Max: 37.7 (03 Jul 2019 07:09)  T(F): 99.9 (03 Jul 2019 07:09), Max: 99.9 (03 Jul 2019 07:09)  HR: 107 (03 Jul 2019 07:09) (73 - 107)  BP: 121/78 (03 Jul 2019 07:09) (121/78 - 143/81)  BP(mean): --  RR: 18 (03 Jul 2019 07:09) (18 - 20)  SpO2: 92% (03 Jul 2019 07:09) (92% - 100%)                          12.4   19.2  )-----------( 305      ( 01 Jul 2019 08:21 )             37.9    07-01    135  |  93<L>  |  15  ----------------------------<  143<H>  4.1   |  31  |  0.54    Ca    8.9      01 Jul 2019 08:21         PHYSICAL EXAM:  Gen: NAD, difficult to understand when she speaks  Skin: No rashes, bruises, or lesions  Head: Normocephalic  Face: ecchymosis noted on chin s/p traumatic intubation, no edema, erythema, or fluctuance. Parotid glands soft without mass  Eyes: no scleral injection  Nose: Nares bilaterally patent, no discharge, Keofeed tube in place  Mouth: Ulceration on buccal side of mandibular & tongue, adherent yellow exudate across anterior tongue, no Stridor / Drooling / Trismus. Mucosa moist, uvula midline.  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 Monroy, course complicated by stage 1 pressure injury. ENT consulted on pt after c/o L ear pain and odynophagia thought to be 2/2 thrush with no improvement w nystatin. Laryngoscopy done yesterday 7/2 suspicious for base of tongue fullness and asymmetry [mass vs. swelling] and L AE fold edema and erythema. Denies external tenderness, n/v, tinnitus, dizziness, congestion, recent URI, otorrhea, hearing loss, hx of sx or trauma or recent travel. Pt presently NPO w tube feed. 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  Light-headedness  Glaucoma  Asthma: controlled with meds  HTN (hypertension)  History of ankle fracture: h/o repair  Fractured elbow: left elbow fracture repair,2007  H/O appendicitis: h/o appendicectomy    Allergies    No Known Drug Allergies  shellfish (Angioedema; Rash)    Intolerances      MEDICATIONS  (STANDING):  amLODIPine   Tablet 10 milliGRAM(s) Oral daily  artificial  tears Solution 1 Drop(s) Right EYE every 6 hours  dexamethasone  Injectable 2 milliGRAM(s) IV Push every 8 hours  docusate sodium Liquid 100 milliGRAM(s) Oral two times a day  enoxaparin Injectable 30 milliGRAM(s) SubCutaneous every 12 hours  insulin lispro (HumaLOG) corrective regimen sliding scale   SubCutaneous every 6 hours  latanoprost 0.005% Ophthalmic Solution 1 Drop(s) Both EYES at bedtime  losartan 25 milliGRAM(s) Oral daily  petrolatum Ophthalmic Ointment 1 Application(s) Right EYE at bedtime  prednisoLONE acetate 1% Suspension 1 Drop(s) Both EYES four times a day  senna Syrup 5 milliLiter(s) Oral at bedtime  sodium chloride 0.9% with potassium chloride 20 mEq/L 1000 milliLiter(s) (75 mL/Hr) IV Continuous <Continuous>    MEDICATIONS  (PRN):  acetaminophen    Suspension .. 650 milliGRAM(s) Oral every 6 hours PRN Temp greater or equal to 38.5C (101.3F), Mild Pain (1 - 3)  ALBUTerol    90 MICROgram(s) HFA Inhaler 2 Puff(s) Inhalation every 6 hours PRN for shortness of breath and/or wheezing        ROS:   ENT: all negative except as noted in HPI   Pulm: denies SOB, cough, hemoptysis  Neuro: denies numbness/tingling, loss of sensation  Endo: denies heat/cold intolerance, excessive sweating      Vital Signs Last 24 Hrs  T(C): 37.7 (03 Jul 2019 07:09), Max: 37.7 (03 Jul 2019 07:09)  T(F): 99.9 (03 Jul 2019 07:09), Max: 99.9 (03 Jul 2019 07:09)  HR: 107 (03 Jul 2019 07:09) (73 - 107)  BP: 121/78 (03 Jul 2019 07:09) (121/78 - 143/81)  BP(mean): --  RR: 18 (03 Jul 2019 07:09) (18 - 20)  SpO2: 92% (03 Jul 2019 07:09) (92% - 100%)                          12.4   19.2  )-----------( 305      ( 01 Jul 2019 08:21 )             37.9    07-01    135  |  93<L>  |  15  ----------------------------<  143<H>  4.1   |  31  |  0.54    Ca    8.9      01 Jul 2019 08:21         PHYSICAL EXAM:  Gen: NAD, difficult to understand when she speaks  Skin: No rashes, bruises, or lesions  Head: Normocephalic  Face: ecchymosis noted on chin s/p traumatic intubation, no edema, erythema, or fluctuance. Parotid glands soft without mass  Eyes: no scleral injection  Nose: Nares bilaterally patent, no discharge, Keofeed tube in place  Mouth: Ulceration on buccal side of mandibular & tongue, adherent yellow exudate across anterior tongue, no Stridor / Drooling / Trismus. Mucosa moist, uvula midline.  Neck: Flat, supple, no lymphadenopathy, trachea midline, no masses  Lymphatic: No lymphadenopathy  Resp: no stridor  CV: no peripheral edema/cyanosis  GI: nondistended   Peripheral vascular: no JVD or edema  Neuro: b/l 6th nerve palsy, R 7th nerve palsy      Laryngoscopy;  Reason for Laryngoscopy: odynophagia    Patient was unable to cooperate with mirror.  Nasopharynx clear, no bleeding. Improving L base of tongue mass vs. swelling noted, visualization of epiglottis. Posterior pharyngeal wall appears normal. Left ae fold and arytenoid edematous and erythematic, possible traumatic intubation, consistent w yesterdays findings. Airway patent, no foreign body visualized. No glottic/supraglottic edema. Pooling of secretions.  Left vocal cord appears paralyzed.      EXAM: MR NECK SOFT TISSUE ONLY [facility]       PROCEDURE DATE: 07/02/2019     INTERPRETATION: CLINICAL INFORMATION: Status post suboccipital craniotomy   for fourth ventricular tumor. History of mass at base of tongue.     TECHNIQUE: Contrast-enhanced MRI of the neck was performed.     Coronal and axial STIR, T1, post contrast-enhanced T1 fat suppression   sagittal STIR sequences were obtained.     10 mls of Gadavist was administered intravenously without complication and 0   mls were discarded.     COMPARISON: MRI brain of the same day, 7/2/2019 MRI brain 5/21/2019.     FINDINGS:     Compared with MRI of the brain of same date 7/2/2019, unchanged suboccipital   craniotomy and C1 laminectomy with subjacent extracalvarial fluid collection   measuring 4.7 x 6.3 x 6 cm, AP, TR, CC better seen on brain MRI, and poorly   delineated on neck MRI because of motion. Differential for fluid collection   includes a CSF leak, meningocele, resolving seroma, hematoma, superimposed   infectious or inflammatory change with abscess formation not excluded.     There is redemonstration of a subjacent subdural collection along the left   posterior fossa measuring 1.3 cm in maximum AP dimension, with resolving   hemorrhage, with edema and mass effect along the midline cerebellar tonsils,   and extension of the hemorrhage into the fourth ventricle, with hemorrhage   layering in the posterior occipital horns of the lateral ventricles. Please   see corresponding MRI brain for brain findings.     There is a partially visualized nasogastric tube in situ. There is dental   susceptibility artifact limiting assessment of the oral cavity. There is a   heterogeneous left lobe of thyroid deviating the trachea rightward, that   could better assessed with ultrasound, the neck soft tissues are severely   limited in assessment due to motion. There are bilateral mastoid air cell   effusions. There is a retention cyst or polyp in the left maxillary sinus   with opacification and bubbly secretions and air-fluid level in the sphenoid   sinus. Please see separate MRI brain report for findings in the posterior   fossa.     IMPRESSION:     Status post suboccipital craniotomy and C1 laminectomy with 4.7 x 6.3 x 6 cm   AP, TR, CC subjacent extra calvarial fluid collection, differential includes   CSF leak, meningocele, resolving seroma, hematoma, subcutaneous infection   infection with abscess formation not excluded. Please see corresponding MRI   brain for findings in the posterior fossa. 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 Monroy, course complicated by stage 1 pressure injury. ENT consulted on pt after c/o L ear pain and odynophagia thought to be 2/2 thrush with no improvement w nystatin. Laryngoscopy done yesterday 7/2 suspicious for base of tongue fullness and asymmetry [mass vs. swelling] and L AE fold edema and erythema. Denies external tenderness, n/v, tinnitus, dizziness, congestion, recent URI, otorrhea, hearing loss, hx of sx or trauma or recent travel. Pt presently NPO w tube feed. 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  Light-headedness  Glaucoma  Asthma: controlled with meds  HTN (hypertension)  History of ankle fracture: h/o repair  Fractured elbow: left elbow fracture repair,2007  H/O appendicitis: h/o appendicectomy    Allergies    No Known Drug Allergies  shellfish (Angioedema; Rash)    Intolerances      MEDICATIONS  (STANDING):  amLODIPine   Tablet 10 milliGRAM(s) Oral daily  artificial  tears Solution 1 Drop(s) Right EYE every 6 hours  dexamethasone  Injectable 2 milliGRAM(s) IV Push every 8 hours  docusate sodium Liquid 100 milliGRAM(s) Oral two times a day  enoxaparin Injectable 30 milliGRAM(s) SubCutaneous every 12 hours  insulin lispro (HumaLOG) corrective regimen sliding scale   SubCutaneous every 6 hours  latanoprost 0.005% Ophthalmic Solution 1 Drop(s) Both EYES at bedtime  losartan 25 milliGRAM(s) Oral daily  petrolatum Ophthalmic Ointment 1 Application(s) Right EYE at bedtime  prednisoLONE acetate 1% Suspension 1 Drop(s) Both EYES four times a day  senna Syrup 5 milliLiter(s) Oral at bedtime  sodium chloride 0.9% with potassium chloride 20 mEq/L 1000 milliLiter(s) (75 mL/Hr) IV Continuous <Continuous>    MEDICATIONS  (PRN):  acetaminophen    Suspension .. 650 milliGRAM(s) Oral every 6 hours PRN Temp greater or equal to 38.5C (101.3F), Mild Pain (1 - 3)  ALBUTerol    90 MICROgram(s) HFA Inhaler 2 Puff(s) Inhalation every 6 hours PRN for shortness of breath and/or wheezing        ROS:   ENT: all negative except as noted in HPI   Pulm: denies SOB, cough, hemoptysis  Neuro: denies numbness/tingling, loss of sensation  Endo: denies heat/cold intolerance, excessive sweating      Vital Signs Last 24 Hrs  T(C): 37.7 (03 Jul 2019 07:09), Max: 37.7 (03 Jul 2019 07:09)  T(F): 99.9 (03 Jul 2019 07:09), Max: 99.9 (03 Jul 2019 07:09)  HR: 107 (03 Jul 2019 07:09) (73 - 107)  BP: 121/78 (03 Jul 2019 07:09) (121/78 - 143/81)  BP(mean): --  RR: 18 (03 Jul 2019 07:09) (18 - 20)  SpO2: 92% (03 Jul 2019 07:09) (92% - 100%)                          12.4   19.2  )-----------( 305      ( 01 Jul 2019 08:21 )             37.9    07-01    135  |  93<L>  |  15  ----------------------------<  143<H>  4.1   |  31  |  0.54    Ca    8.9      01 Jul 2019 08:21         PHYSICAL EXAM:  Gen: NAD, difficult to understand when she speaks  Skin: No rashes, bruises, or lesions  Head: Normocephalic  Face: ecchymosis noted on chin s/p traumatic intubation, no edema, erythema, or fluctuance. Parotid glands soft without mass  Eyes: no scleral injection  Nose: Nares bilaterally patent, no discharge, Keofeed tube in place  Mouth: Ulceration on buccal side of mandibular & tongue, adherent yellow exudate across anterior tongue, no Stridor / Drooling / Trismus. Mucosa moist, uvula midline.  Neck: Flat, supple, no lymphadenopathy, trachea midline, no masses  Lymphatic: No lymphadenopathy  Resp: no stridor  CV: no peripheral edema/cyanosis  GI: nondistended   Peripheral vascular: no JVD or edema  Neuro: b/l 6th nerve palsy, R 7th nerve palsy      Laryngoscopy;  Reason for Laryngoscopy: odynophagia    Patient was unable to cooperate with mirror.  Nasopharynx clear, no bleeding. Minimally improving L base of tongue mass vs. swelling noted, visualization of epiglottis. Posterior pharyngeal wall appears normal. Left ae fold and arytenoid edematous and erythematic, possible traumatic intubation, consistent w yesterdays findings. Airway patent, no foreign body visualized. No glottic/supraglottic edema. Pooling of secretions.  Left vocal cord appears paralyzed.      EXAM: MR NECK SOFT TISSUE ONLY [facility]       PROCEDURE DATE: 07/02/2019     INTERPRETATION: CLINICAL INFORMATION: Status post suboccipital craniotomy   for fourth ventricular tumor. History of mass at base of tongue.     TECHNIQUE: Contrast-enhanced MRI of the neck was performed.     Coronal and axial STIR, T1, post contrast-enhanced T1 fat suppression   sagittal STIR sequences were obtained.     10 mls of Gadavist was administered intravenously without complication and 0   mls were discarded.     COMPARISON: MRI brain of the same day, 7/2/2019 MRI brain 5/21/2019.     FINDINGS:     Compared with MRI of the brain of same date 7/2/2019, unchanged suboccipital   craniotomy and C1 laminectomy with subjacent extracalvarial fluid collection   measuring 4.7 x 6.3 x 6 cm, AP, TR, CC better seen on brain MRI, and poorly   delineated on neck MRI because of motion. Differential for fluid collection   includes a CSF leak, meningocele, resolving seroma, hematoma, superimposed   infectious or inflammatory change with abscess formation not excluded.     There is redemonstration of a subjacent subdural collection along the left   posterior fossa measuring 1.3 cm in maximum AP dimension, with resolving   hemorrhage, with edema and mass effect along the midline cerebellar tonsils,   and extension of the hemorrhage into the fourth ventricle, with hemorrhage   layering in the posterior occipital horns of the lateral ventricles. Please   see corresponding MRI brain for brain findings.     There is a partially visualized nasogastric tube in situ. There is dental   susceptibility artifact limiting assessment of the oral cavity. There is a   heterogeneous left lobe of thyroid deviating the trachea rightward, that   could better assessed with ultrasound, the neck soft tissues are severely   limited in assessment due to motion. There are bilateral mastoid air cell   effusions. There is a retention cyst or polyp in the left maxillary sinus   with opacification and bubbly secretions and air-fluid level in the sphenoid   sinus. Please see separate MRI brain report for findings in the posterior   fossa.     IMPRESSION:     Status post suboccipital craniotomy and C1 laminectomy with 4.7 x 6.3 x 6 cm   AP, TR, CC subjacent extra calvarial fluid collection, differential includes   CSF leak, meningocele, resolving seroma, hematoma, subcutaneous infection   infection with abscess formation not excluded. Please see corresponding MRI   brain for findings in the posterior fossa.

## 2019-07-03 NOTE — PROGRESS NOTE ADULT - PROBLEM SELECTOR PLAN 1
-f/u MRI with contrast to R/O BOT mass vs, swelling  No thrush seen on exam. Likely edema given mild improvement, Cont w Decadron  ENT will continue to follow Likely edema given mild improvement, Cont w Decadron for 48 hours total  ENT will continue to follow

## 2019-07-03 NOTE — PROVIDER CONTACT NOTE (OTHER) - SITUATION
RN questions NG tube placement. Prior charting has NG tube at 60 mm. Current position is at 64 mm. Audible sounds by air injection.

## 2019-07-03 NOTE — PROGRESS NOTE ADULT - ASSESSMENT
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    Please Check When Present   []  GCS  E   V  M     Heart Failure: []Acute, [] acute on chronic , []chronic  Heart Failure:  [] Diastolic (HFpEF), [] Systolic (HFrEF), []Combined (HFpEF and HFrEF), [] RHF, [] Pulm HTN, [] Other    [] BRENDA, [] ATN, [] AIN, [] other  [] CKD1, [] CKD2, [] CKD 3, [] CKD 4, [] CKD 5, []ESRD    Encephalopathy: [] Metabolic, [] Hepatic, [] toxic, [] Neurological, [] Other    Abnormal Nurtitional Status: [] malnurtition (see nutrition note), [ ]underweight: BMI < 19, [X] morbid obesity: BMI >40, [] Cachexia    [] Sepsis  [] hypovolemic shock,[] cardiogenic shock, [] hemorrhagic shock, [] neuogenic shock  [] Acute Respiratory Failure  [X]Cerebral edema, [] Brain compression/ herniation,   [] Functional quadriplegia  [] Acute blood loss anemia      PLAN:     Neuro:  - f/u on official read of MR soft tissue of neck to evaluate if the back of tongue is a mass vs swelling   - MR Head w/wo Contrast demonstrates post surgical changes but no hydrocephalus or supratentorial midline shift. Small amount of hemorrhage and edema in surgical bed. MRI surveillance is recommended.  - c/w decadron slow taper: currently tapered to Decadron 2 q12h  - c/w eye lubricants since [employer] ([employer]) said gold weight is not needed now for Rt eye    ENT:  - ENT to follow up on white patch on side of tongue and official read of MR soft tissue of neck to evaluate if the back of tongue is a mass vs swelling    CV:  - stable  - c/w medications    Pulm:  - stable  - c/w medication    GI:  - PEG procedure planned for this friday, 7/5  - pt will be NPO after midnight the night before surgery    Renal:  - IVL    Endo:  - c/w insulin sliding scale    ID:  - afebrile  - stable    Heme/onc:  - c/w lovenox

## 2019-07-03 NOTE — CONSULT NOTE ADULT - ASSESSMENT
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 Monroy, course complicated with stage 1 pressure injury. Now c/o L ear pain and odynophagia.  Left vocal cord paralysis and possible ?swelling vs mass at left base of tongue (ENT following)    Has failed multiple SLP evaluations, recommend NPO with non oral means for meds and feeds; difficulty managing oral secretions    RECS  -NGT for feeds/meds  -Will discuss with daughter and review risks/benefits/alternatives and if in agreement to proceed with PEG; plan for PEG on Friday  -NPO after MN Thursday for PEG Friday  -check CBC, BMP, coags in am  -Ancef 2 grams IV on call for PEG Friday  -ENT following, work-up/evaluation in progress      Discussed with pt and Neurosurgery team  Thank you for the courtesy of this consult.    Andi Amaya PA-C    [facility]  [phone]  After hours and weekend coverage [phone]

## 2019-07-03 NOTE — CONSULT NOTE ADULT - SUBJECTIVE AND OBJECTIVE BOX
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 Monroy, course complicated with stage 1 pressure injury. Now c/o L ear pain and odynophagia ? 2/2 possible thrush. Pt has been treated with nystatin swish and spit without relief x5days.     s/p laryngoscopy yesterday: Nasopharynx clear, no bleeding. Positive L base of tongue mass vs. swelling noted, visualization of epiglottis. Posterior pharyngeal wall appears normal. Right ae fold and arytenoid edematous and erythematic, possible traumatic intubation. Airway patent, no foreign body visualized. No glottic/supraglottic edema. Pooling of secretions.  Left vocal cord appears paralyzed.      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  Light-headedness  Glaucoma  Asthma: controlled with meds  HTN (hypertension)  History of ankle fracture: h/o repair  Fractured elbow: left elbow fracture repair,2007  H/O appendicitis: h/o appendicectomy      Allergies  No Known Drug Allergies  shellfish (Angioedema; Rash)      MEDICATIONS  (STANDING):  amLODIPine   Tablet 10 milliGRAM(s) Oral daily  artificial  tears Solution 1 Drop(s) Right EYE every 6 hours  dexamethasone  Injectable 2 milliGRAM(s) IV Push every 12 hours  docusate sodium Liquid 100 milliGRAM(s) Oral two times a day  enoxaparin Injectable 30 milliGRAM(s) SubCutaneous every 12 hours  insulin lispro (HumaLOG) corrective regimen sliding scale   SubCutaneous every 6 hours  latanoprost 0.005% Ophthalmic Solution 1 Drop(s) Both EYES at bedtime  losartan 25 milliGRAM(s) Oral daily  petrolatum Ophthalmic Ointment 1 Application(s) Right EYE at bedtime  prednisoLONE acetate 1% Suspension 1 Drop(s) Both EYES four times a day  senna Syrup 5 milliLiter(s) Oral at bedtime    MEDICATIONS  (PRN):  acetaminophen    Suspension .. 650 milliGRAM(s) Oral every 6 hours PRN Temp greater or equal to 38.5C (101.3F), Mild Pain (1 - 3)  ALBUTerol    90 MICROgram(s) HFA Inhaler 2 Puff(s) Inhalation every 6 hours PRN for shortness of breath and/or wheezing      Social History:  no tobacco or ETOH      Advanced Directives: (  X   ) None    (      ) DNR    (     ) DNI    (     ) Health Care Proxy:     Review of Systems:    General:  No wt loss, fevers, chills, night sweats  CV:  No pain, palpitations, hypo/hypertension  Resp:  No dyspnea,  tachypnea, wheezing +vocal cord paralysis  GI:  +dysphagia, on NGTF,  :  No pain, bleeding, incontinence, nocturia  Muscle:  No pain, weakness  Neuro:  see HPI  Psych:  No fatigue, insomnia, mood problems, depression  Endocrine:  No polyuria, polydypsia, cold/heat intolerance  Heme:  No petechiae, ecchymosis, easy bruisability  Skin:  No rash, tattoos, scars, edema      Vital Signs Last 24 Hrs  T(C): 37.2 (03 Jul 2019 11:50), Max: 37.7 (03 Jul 2019 07:09)  T(F): 98.9 (03 Jul 2019 11:50), Max: 99.9 (03 Jul 2019 07:09)  HR: 108 (03 Jul 2019 11:50) (73 - 108)  BP: 141/81 (03 Jul 2019 11:50) (121/78 - 141/81)  BP(mean): --  RR: 18 (03 Jul 2019 11:50) (18 - 20)  SpO2: 97% (03 Jul 2019 11:50) (92% - 100%)    PHYSICAL EXAM:    Constitutional: NAD, +mumbled speech difficult to understand  +NGT +healing ecchymosis on chin +some drooling/secretions  Neck: No LAD, supple  Respiratory: grossly clear, no stridor no wheeze  Cardiovascular: S1 and S2, tachy  Gastrointestinal: BS+, soft, NT/ND  Extremities: No peripheral edema, neg clubbing, cyanosis  Vascular: 2+ peripheral pulses  Neurological: responsive +right sided palsy, unable to close right eyelid  Psychiatric: Normal mood, normal affect  Skin: healing ecchymosis on chin        LABS:    Complete Blood Count (07.01.19 @ 08:21)    WBC Count: 19.2 K/uL    RBC Count: 4.40 M/uL    Hemoglobin: 12.4 g/dL    Hematocrit: 37.9 %    Mean Cell Volume: 86.2 fl    Mean Cell Hemoglobin: 28.3 pg    Mean Cell Hemoglobin Conc: 32.8 gm/dL    Red Cell Distrib Width: 12.8 %    Platelet Count - Automated: 305 K/uL        Prothrombin Time and INR, Plasma in AM (06.24.19 @ 06:42)    Prothrombin Time, Plasma: 12.4:     INR: 1.08        RADIOLOGY & ADDITIONAL TESTS:  < from: MR Head w/wo IV Cont (07.02.19 @ 19:41) >  FINDINGS:      Redemonstration of suboccipital craniotomy. Extracalvarial CSF signal   intensity collection approximating the craniotomy is slightly increased   in size, currently measuring 10 mm in greatest depth, previously   measuring 7 mm.    Small amount of hemorrhage and edema in the region of the surgical bed.   Focus of restricted diffusion along the posterior aspect of the   pontomedullary junction consistent with postsurgical devitalized tissue.    Residual nonspecific 0.8 x 0.5 x 2.1 cm (anterior-posterior by transverse   by craniocaudad) focus of enhancement along the posterior aspect of the   inferior fourth ventricle may represent postsurgical changes.    Ventricles similar in size. No large hydrocephalus. No supratentorial   midline shift. Signal voids are seen within the major intracranial   vessels consistent with their patency.    Bilateral sphenoid sinus mucosal thickening. Mastoid air cells clear.   Orbits and sellar/suprasellar structures unremarkable. 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    IMPRESSION:    Redemonstration of suboccipital craniotomy. Extracalvarial CSF signal   intensity collection approximating the craniotomy is slightly increased   in size, currently measuring 10 mm in greatest depth, previously   measuring 7 mm.    Small amount of hemorrhage and edema in the region of the surgical bed.   Focus of restricted diffusion along the posterior aspect of the   pontomedullary junction consistent with postsurgical devitalized tissue.    Residual nonspecific 0.8 x 0.5 x 2.1 cm (anterior-posterior by transverse   by craniocaudad) focus of enhancement along the posterior aspect of the   inferior fourth ventricle may represent postsurgical changes. Continued   MRI surveillance is recommended.    No large hydrocephalus. No supratentorial midline shift. 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 Monroy, course complicated with stage 1 pressure injury. Now c/o L ear pain and odynophagia ? 2/2 possible thrush. Pt has been treated with nystatin swish and spit without relief x5days.     s/p laryngoscopy yesterday: Nasopharynx clear, no bleeding. Positive L base of tongue mass vs. swelling noted, visualization of epiglottis. Posterior pharyngeal wall appears normal. Right ae fold and arytenoid edematous and erythematic, possible traumatic intubation. Airway patent, no foreign body visualized. No glottic/supraglottic edema. Pooling of secretions.  Left vocal cord appears paralyzed.      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  Light-headedness  Glaucoma  Asthma: controlled with meds  HTN (hypertension)  History of ankle fracture: h/o repair  Fractured elbow: left elbow fracture repair,2007  H/O appendicitis: h/o appendicectomy      Allergies  No Known Drug Allergies  shellfish (Angioedema; Rash)      MEDICATIONS  (STANDING):  amLODIPine   Tablet 10 milliGRAM(s) Oral daily  artificial  tears Solution 1 Drop(s) Right EYE every 6 hours  dexamethasone  Injectable 2 milliGRAM(s) IV Push every 12 hours  docusate sodium Liquid 100 milliGRAM(s) Oral two times a day  enoxaparin Injectable 30 milliGRAM(s) SubCutaneous every 12 hours  insulin lispro (HumaLOG) corrective regimen sliding scale   SubCutaneous every 6 hours  latanoprost 0.005% Ophthalmic Solution 1 Drop(s) Both EYES at bedtime  losartan 25 milliGRAM(s) Oral daily  petrolatum Ophthalmic Ointment 1 Application(s) Right EYE at bedtime  prednisoLONE acetate 1% Suspension 1 Drop(s) Both EYES four times a day  senna Syrup 5 milliLiter(s) Oral at bedtime    MEDICATIONS  (PRN):  acetaminophen    Suspension .. 650 milliGRAM(s) Oral every 6 hours PRN Temp greater or equal to 38.5C (101.3F), Mild Pain (1 - 3)  ALBUTerol    90 MICROgram(s) HFA Inhaler 2 Puff(s) Inhalation every 6 hours PRN for shortness of breath and/or wheezing      Social History:  no tobacco or ETOH      Advanced Directives: (  X   ) None    (      ) DNR    (     ) DNI    (     ) Health Care Proxy:     Review of Systems:    General:  No wt loss, fevers, chills, night sweats  CV:  No pain, palpitations, hypo/hypertension  Resp:  No dyspnea,  tachypnea, wheezing +vocal cord paralysis  GI:  +dysphagia, on NGTF,  :  No pain, bleeding, incontinence, nocturia  Muscle:  No pain, weakness  Neuro:  see HPI  Psych:  No fatigue, insomnia, mood problems, depression  Endocrine:  No polyuria, polydypsia, cold/heat intolerance  Heme:  No petechiae, ecchymosis, easy bruisability  Skin:  No rash, tattoos, scars, edema      Vital Signs Last 24 Hrs  T(C): 37.2 (03 Jul 2019 11:50), Max: 37.7 (03 Jul 2019 07:09)  T(F): 98.9 (03 Jul 2019 11:50), Max: 99.9 (03 Jul 2019 07:09)  HR: 108 (03 Jul 2019 11:50) (73 - 108)  BP: 141/81 (03 Jul 2019 11:50) (121/78 - 141/81)  BP(mean): --  RR: 18 (03 Jul 2019 11:50) (18 - 20)  SpO2: 97% (03 Jul 2019 11:50) (92% - 100%)    PHYSICAL EXAM:    Constitutional: NAD, +mumbled speech difficult to understand  +NGT +healing ecchymosis on chin +some drooling/secretions  HEENT: anicteric  Neck: No LAD, supple  Respiratory: grossly clear, no stridor no wheeze  Cardiovascular: S1 and S2, tachy  Gastrointestinal: BS+, soft, NT/ND  Extremities: No peripheral edema, neg clubbing, cyanosis  Vascular: 2+ peripheral pulses  Neurological: responsive +right sided palsy, unable to close right eyelid  Psychiatric: Normal mood, normal affect  Skin: healing ecchymosis on chin, normal turgor        LABS:    Complete Blood Count (07.01.19 @ 08:21)    WBC Count: 19.2 K/uL    RBC Count: 4.40 M/uL    Hemoglobin: 12.4 g/dL    Hematocrit: 37.9 %    Mean Cell Volume: 86.2 fl    Mean Cell Hemoglobin: 28.3 pg    Mean Cell Hemoglobin Conc: 32.8 gm/dL    Red Cell Distrib Width: 12.8 %    Platelet Count - Automated: 305 K/uL        Prothrombin Time and INR, Plasma in AM (06.24.19 @ 06:42)    Prothrombin Time, Plasma: 12.4:     INR: 1.08        RADIOLOGY & ADDITIONAL TESTS:  < from: MR Head w/wo IV Cont (07.02.19 @ 19:41) >  FINDINGS:      Redemonstration of suboccipital craniotomy. Extracalvarial CSF signal   intensity collection approximating the craniotomy is slightly increased   in size, currently measuring 10 mm in greatest depth, previously   measuring 7 mm.    Small amount of hemorrhage and edema in the region of the surgical bed.   Focus of restricted diffusion along the posterior aspect of the   pontomedullary junction consistent with postsurgical devitalized tissue.    Residual nonspecific 0.8 x 0.5 x 2.1 cm (anterior-posterior by transverse   by craniocaudad) focus of enhancement along the posterior aspect of the   inferior fourth ventricle may represent postsurgical changes.    Ventricles similar in size. No large hydrocephalus. No supratentorial   midline shift. Signal voids are seen within the major intracranial   vessels consistent with their patency.    Bilateral sphenoid sinus mucosal thickening. Mastoid air cells clear.   Orbits and sellar/suprasellar structures unremarkable. 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    IMPRESSION:    Redemonstration of suboccipital craniotomy. Extracalvarial CSF signal   intensity collection approximating the craniotomy is slightly increased   in size, currently measuring 10 mm in greatest depth, previously   measuring 7 mm.    Small amount of hemorrhage and edema in the region of the surgical bed.   Focus of restricted diffusion along the posterior aspect of the   pontomedullary junction consistent with postsurgical devitalized tissue.    Residual nonspecific 0.8 x 0.5 x 2.1 cm (anterior-posterior by transverse   by craniocaudad) focus of enhancement along the posterior aspect of the   inferior fourth ventricle may represent postsurgical changes. Continued   MRI surveillance is recommended.    No large hydrocephalus. No supratentorial midline shift.

## 2019-07-04 LAB
ANION GAP SERPL CALC-SCNC: 10 MMOL/L — SIGNIFICANT CHANGE UP (ref 5–17)
APTT BLD: 30.2 SEC — SIGNIFICANT CHANGE UP (ref 27.5–36.3)
BUN SERPL-MCNC: 25 MG/DL — HIGH (ref 7–23)
CALCIUM SERPL-MCNC: 9 MG/DL — SIGNIFICANT CHANGE UP (ref 8.4–10.5)
CHLORIDE SERPL-SCNC: 99 MMOL/L — SIGNIFICANT CHANGE UP (ref 96–108)
CO2 SERPL-SCNC: 29 MMOL/L — SIGNIFICANT CHANGE UP (ref 22–31)
CREAT SERPL-MCNC: 0.64 MG/DL — SIGNIFICANT CHANGE UP (ref 0.5–1.3)
GLUCOSE BLDC GLUCOMTR-MCNC: 136 MG/DL — HIGH (ref 70–99)
GLUCOSE BLDC GLUCOMTR-MCNC: 137 MG/DL — HIGH (ref 70–99)
GLUCOSE BLDC GLUCOMTR-MCNC: 138 MG/DL — HIGH (ref 70–99)
GLUCOSE BLDC GLUCOMTR-MCNC: 153 MG/DL — HIGH (ref 70–99)
GLUCOSE BLDC GLUCOMTR-MCNC: 157 MG/DL — HIGH (ref 70–99)
GLUCOSE SERPL-MCNC: 152 MG/DL — HIGH (ref 70–99)
HCT VFR BLD CALC: 38 % — SIGNIFICANT CHANGE UP (ref 34.5–45)
HGB BLD-MCNC: 11.5 G/DL — SIGNIFICANT CHANGE UP (ref 11.5–15.5)
INR BLD: 1.12 RATIO — SIGNIFICANT CHANGE UP (ref 0.88–1.16)
MCHC RBC-ENTMCNC: 26.4 PG — LOW (ref 27–34)
MCHC RBC-ENTMCNC: 30.3 GM/DL — LOW (ref 32–36)
MCV RBC AUTO: 87.4 FL — SIGNIFICANT CHANGE UP (ref 80–100)
PLATELET # BLD AUTO: 332 K/UL — SIGNIFICANT CHANGE UP (ref 150–400)
POTASSIUM SERPL-MCNC: 5 MMOL/L — SIGNIFICANT CHANGE UP (ref 3.5–5.3)
POTASSIUM SERPL-SCNC: 5 MMOL/L — SIGNIFICANT CHANGE UP (ref 3.5–5.3)
PROTHROM AB SERPL-ACNC: 12.8 SEC — SIGNIFICANT CHANGE UP (ref 10–13.1)
RBC # BLD: 4.35 M/UL — SIGNIFICANT CHANGE UP (ref 3.8–5.2)
RBC # FLD: 14.2 % — SIGNIFICANT CHANGE UP (ref 10.3–14.5)
SODIUM SERPL-SCNC: 138 MMOL/L — SIGNIFICANT CHANGE UP (ref 135–145)
WBC # BLD: 18.36 K/UL — HIGH (ref 3.8–10.5)
WBC # FLD AUTO: 18.36 K/UL — HIGH (ref 3.8–10.5)

## 2019-07-04 PROCEDURE — 99233 SBSQ HOSP IP/OBS HIGH 50: CPT

## 2019-07-04 RX ADMIN — LOSARTAN POTASSIUM 25 MILLIGRAM(S): 100 TABLET, FILM COATED ORAL at 05:20

## 2019-07-04 RX ADMIN — Medication 1 DROP(S): at 17:59

## 2019-07-04 RX ADMIN — ALBUTEROL 2 PUFF(S): 90 AEROSOL, METERED ORAL at 10:51

## 2019-07-04 RX ADMIN — Medication 101.6 MILLIGRAM(S): at 05:20

## 2019-07-04 RX ADMIN — Medication 650 MILLIGRAM(S): at 05:23

## 2019-07-04 RX ADMIN — AMLODIPINE BESYLATE 10 MILLIGRAM(S): 2.5 TABLET ORAL at 05:20

## 2019-07-04 RX ADMIN — Medication 1: at 00:25

## 2019-07-04 RX ADMIN — Medication 1 APPLICATION(S): at 22:10

## 2019-07-04 RX ADMIN — LATANOPROST 1 DROP(S): 0.05 SOLUTION/ DROPS OPHTHALMIC; TOPICAL at 22:10

## 2019-07-04 RX ADMIN — Medication 101.6 MILLIGRAM(S): at 13:16

## 2019-07-04 RX ADMIN — Medication 1 DROP(S): at 23:50

## 2019-07-04 RX ADMIN — Medication 1 DROP(S): at 05:20

## 2019-07-04 RX ADMIN — Medication 101.6 MILLIGRAM(S): at 22:10

## 2019-07-04 RX ADMIN — Medication 1: at 17:58

## 2019-07-04 RX ADMIN — ENOXAPARIN SODIUM 30 MILLIGRAM(S): 100 INJECTION SUBCUTANEOUS at 18:00

## 2019-07-04 RX ADMIN — Medication 1 DROP(S): at 11:03

## 2019-07-04 RX ADMIN — Medication 1 DROP(S): at 00:25

## 2019-07-04 RX ADMIN — Medication 1 DROP(S): at 18:00

## 2019-07-04 RX ADMIN — ALBUTEROL 2 PUFF(S): 90 AEROSOL, METERED ORAL at 17:59

## 2019-07-04 RX ADMIN — ENOXAPARIN SODIUM 30 MILLIGRAM(S): 100 INJECTION SUBCUTANEOUS at 05:20

## 2019-07-04 NOTE — PROGRESS NOTE ADULT - SUBJECTIVE AND OBJECTIVE BOX
SUBJECTIVE:  Patient seen and examined.  She is agitated.  She refuses to talk to me but follows commands on all 4 but hits my hands away when trying to get her to answer questions.      Vital Signs Last 24 Hrs  T(C): 37.1 (04 Jul 2019 07:14), Max: 38.1 (04 Jul 2019 04:31)  T(F): 98.7 (04 Jul 2019 07:14), Max: 100.5 (04 Jul 2019 04:31)  HR: 75 (04 Jul 2019 07:14) (75 - 115)  BP: 145/75 (04 Jul 2019 07:14) (96/65 - 145/75)  BP(mean): --  RR: 18 (04 Jul 2019 07:14) (18 - 20)  SpO2: 99% (04 Jul 2019 07:14) (97% - 99%)    PHYSICAL EXAM:    Constitutional: No Acute Distress     Neurological: Alert not answering questions, Following Commands, Moving all Extremities with good strength unable to close right eye   Sensation: [x] intact to light touch  [] decreased:   will no open mouth to assess mouth  Pulmonary: Clear to Auscultation, No rales, No rhonchi, No wheezes     Cardiovascular: S1, S2, Regular rate and rhythm     Gastrointestinal: Soft, Non-tender, Non-distended     Extremities: No calf tenderness     Incision: c/d/i  LABS:                        13.3   18.2  )-----------( 352      ( 03 Jul 2019 17:05 )             41.2    07-04    138  |  99  |  25<H>  ----------------------------<  152<H>  5.0   |  29  |  0.64    Ca    9.0      04 Jul 2019 06:30    PT/INR - ( 04 Jul 2019 08:53 )   PT: 12.8 sec;   INR: 1.12 ratio         PTT - ( 04 Jul 2019 08:53 )  PTT:30.2 secHemoglobin A1C, Whole Blood: 5.1 % (06-26-19 @ 22:52)      07-03 @ 07:01  -  07-04 @ 07:00  --------------------------------------------------------  IN: 695 mL / OUT: 450 mL / NET: 245 mL      MEDICATIONS:  Anticoagulation:   enoxaparin Injectable 30 milliGRAM(s) SubCutaneous every 12 hours    Antibiotics:    Endo:  dexamethasone  IVPB 8 milliGRAM(s) IV Intermittent every 8 hours  insulin lispro (HumaLOG) corrective regimen sliding scale   SubCutaneous every 6 hours    Neuro:  acetaminophen    Suspension .. 650 milliGRAM(s) Oral every 6 hours PRN Temp greater or equal to 38.5C (101.3F), Mild Pain (1 - 3)    Cardiac:  amLODIPine   Tablet 10 milliGRAM(s) Oral daily  losartan 25 milliGRAM(s) Oral daily    Pulm:  ALBUTerol    90 MICROgram(s) HFA Inhaler 2 Puff(s) Inhalation every 6 hours PRN for shortness of breath and/or wheezing    GI/:  docusate sodium Liquid 100 milliGRAM(s) Oral two times a day  senna Syrup 5 milliLiter(s) Oral at bedtime    Other:   artificial  tears Solution 1 Drop(s) Right EYE every 6 hours  latanoprost 0.005% Ophthalmic Solution 1 Drop(s) Both EYES at bedtime  petrolatum Ophthalmic Ointment 1 Application(s) Right EYE at bedtime  prednisoLONE acetate 1% Suspension 1 Drop(s) Both EYES four times a day    DIET: glucerna @ 65

## 2019-07-04 NOTE — PROGRESS NOTE ADULT - ASSESSMENT
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      Intermittent low grade fever post-op, now resolved.     1. 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     Rpt MRI on 7/3- redemonstration of suboccipital craniotomy, extracalvarial CSF signal intensity collection approximating the craniotomy is slightly increased in size, currently measuring 10 mm in greatest depth, previously measuring 7 mm, small amount of hemorrhage and edema in the region of the surgical bed, residual nonspecific 0.8 x 0.5 x 2.1 cm (anterior-posterior by transverse by craniocaudad) focus of enhancement along the posterior aspect of the inferior fourth ventricle may represent postsurgical changes.     Steroid taper per primary team.     2. Dysphagia- NG tube in place. PEG on Friday.    3. Low grade fever- tmax of 100.5, monitor for now    4. HTN- Continue current meds.     5. Leukocytosis- Likely due to Decadron. stable, repeat cbc in the am    6. Throat and L ear pain- Seen by ENT- Indirect laryngoscopy- L base of tongue mass noted and edematous ae fold and arytenoid possibly due to traumatic intubation. MRI soft tissue done but poor quality may need ct of the neck.  decadron 8 q 8 for 48 hours

## 2019-07-04 NOTE — PROGRESS NOTE ADULT - SUBJECTIVE AND OBJECTIVE BOX
Patient denies diarrhea, no pain with urination. She is not coughing but does use suctioning occasionally. She is not experiencing any chills. No abdominal pain, no rashes.    Tmax of 100.5 this morning.    GENERAL: No fevers, no chills.  EYES: No blurry vision,  No photophobia  ENT: No sore throat.  No dysphagia  Cardiovascular: No chest pain, palpitations, orthopnea  Pulmonary: No cough, no wheezing. No shortness of breath  Gastrointestinal: No abdominal pain, no diarrhea, no constipation.    : No dysuria.  No hematuria  Musculoskeletal: No weakness.  No myalgias.  Dermatology:  No rashes.  Neuro: No Headache.  No vertigo.  No dizziness.  Psych: No anxiety, no depression.  Denies suicidal thoughts.    Vital Signs Last 24 Hrs  T(C): 36.9 (04 Jul 2019 13:22), Max: 38.1 (04 Jul 2019 04:31)  T(F): 98.4 (04 Jul 2019 13:22), Max: 100.5 (04 Jul 2019 04:31)  HR: 106 (04 Jul 2019 13:22) (75 - 115)  BP: 131/78 (04 Jul 2019 13:22) (96/65 - 145/75)  BP(mean): --  RR: 18 (04 Jul 2019 13:22) (18 - 20)  SpO2: 98% (04 Jul 2019 13:22) (97% - 99%)    GENERAL: NAD, obese  HEAD:  Atraumatic, Normocephalic  EYES: EOMI, PERRLA, conjunctiva and sclera clear  ENT: Pharynx not erythematous  PULMONARY: Clear to auscultation bilaterally; No wheeze  CARDIOVASCULAR: Regular rate and rhythm; No murmurs, rubs, or gallops  ABDOMEN: Soft, Nontender, Nondistended; Bowel sounds present  EXTREMITIES:  2+ Peripheral Pulses, No clubbing, cyanosis, or edema  MUSCULOSKELETAL: generally weak but strength equal  PSYCH: AAOx3, slow affect  SKIN: warm and dry, No rashes or lesions

## 2019-07-04 NOTE — PROGRESS NOTE ADULT - ASSESSMENT
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  decadron 8 q 8 for 48 hours   - unable to close right eye, continue eye drops plastic surgery saw   Respiratory: incentive spirometry  CV: keep sbp 100-160 continue norvasc and losartan for hypertension  Endocrine: euyglycemia   Heme/Onc: a1c 5.1, hiss while on high dose steroids            DVT ppx: lovenox and scds  Renal: fluids tonight when NPO for peg  ID: afebrile  GI:  peg in am continue colace and senna   PT/OT: acute rehab  Will discuss with Dr. Monroy      Assessment:  Please Check When Present   [] Acute blood loss anemia  []Cerebral edema, [] Brain compression/ herniation,     []  GCS  E   V  M     Heart Failure: []Acute, [] acute on chronic , []chronic  Heart Failure:  [] Diastolic (HFpEF), [] Systolic (HFrEF), []Combined (HFpEF and HFrEF), [] RHF, [] Pulm HTN, [] Other    [] BRENDA, [] ATN, [] AIN, [] other  [] CKD1, [] CKD2, [] CKD 3, [] CKD 4, [] CKD 5, []ESRD    Encephalopathy: [] Metabolic, [] Hepatic, [] toxic, [] Neurological, [] Other    Abnormal Nurtitional Status: [] malnurtition (see nutrition note), [ ]underweight: BMI < 19, [] morbid obesity: BMI >40, [] Cachexia  [] Functional quadriplegia  [] Sepsis  [] hypovolemic shock,[] cardiogenic shock, [] hemorrhagic shock, [] neuogenic shock  [] Acute Respiratory Failure          Spectralink # [id number]

## 2019-07-05 LAB
ANION GAP SERPL CALC-SCNC: 12 MMOL/L — SIGNIFICANT CHANGE UP (ref 5–17)
ANION GAP SERPL CALC-SCNC: 14 MMOL/L — SIGNIFICANT CHANGE UP (ref 5–17)
ANION GAP SERPL CALC-SCNC: 14 MMOL/L — SIGNIFICANT CHANGE UP (ref 5–17)
ANION GAP SERPL CALC-SCNC: 9 MMOL/L — SIGNIFICANT CHANGE UP (ref 5–17)
APTT BLD: 30.4 SEC — SIGNIFICANT CHANGE UP (ref 27.5–36.3)
BLD GP AB SCN SERPL QL: NEGATIVE — SIGNIFICANT CHANGE UP
BUN SERPL-MCNC: 24 MG/DL — HIGH (ref 7–23)
BUN SERPL-MCNC: 25 MG/DL — HIGH (ref 7–23)
CALCIUM SERPL-MCNC: 8.9 MG/DL — SIGNIFICANT CHANGE UP (ref 8.4–10.5)
CALCIUM SERPL-MCNC: 9.1 MG/DL — SIGNIFICANT CHANGE UP (ref 8.4–10.5)
CALCIUM SERPL-MCNC: 9.3 MG/DL — SIGNIFICANT CHANGE UP (ref 8.4–10.5)
CALCIUM SERPL-MCNC: 9.3 MG/DL — SIGNIFICANT CHANGE UP (ref 8.4–10.5)
CHLORIDE SERPL-SCNC: 96 MMOL/L — SIGNIFICANT CHANGE UP (ref 96–108)
CHLORIDE SERPL-SCNC: 97 MMOL/L — SIGNIFICANT CHANGE UP (ref 96–108)
CHLORIDE SERPL-SCNC: 98 MMOL/L — SIGNIFICANT CHANGE UP (ref 96–108)
CHLORIDE SERPL-SCNC: 98 MMOL/L — SIGNIFICANT CHANGE UP (ref 96–108)
CO2 SERPL-SCNC: 21 MMOL/L — LOW (ref 22–31)
CO2 SERPL-SCNC: 26 MMOL/L — SIGNIFICANT CHANGE UP (ref 22–31)
CO2 SERPL-SCNC: 31 MMOL/L — SIGNIFICANT CHANGE UP (ref 22–31)
CO2 SERPL-SCNC: 31 MMOL/L — SIGNIFICANT CHANGE UP (ref 22–31)
CREAT SERPL-MCNC: 0.5 MG/DL — SIGNIFICANT CHANGE UP (ref 0.5–1.3)
CREAT SERPL-MCNC: 0.62 MG/DL — SIGNIFICANT CHANGE UP (ref 0.5–1.3)
CREAT SERPL-MCNC: 0.62 MG/DL — SIGNIFICANT CHANGE UP (ref 0.5–1.3)
CREAT SERPL-MCNC: 0.63 MG/DL — SIGNIFICANT CHANGE UP (ref 0.5–1.3)
GLUCOSE BLDC GLUCOMTR-MCNC: 120 MG/DL — HIGH (ref 70–99)
GLUCOSE BLDC GLUCOMTR-MCNC: 133 MG/DL — HIGH (ref 70–99)
GLUCOSE BLDC GLUCOMTR-MCNC: 148 MG/DL — HIGH (ref 70–99)
GLUCOSE SERPL-MCNC: 132 MG/DL — HIGH (ref 70–99)
GLUCOSE SERPL-MCNC: 145 MG/DL — HIGH (ref 70–99)
GLUCOSE SERPL-MCNC: 148 MG/DL — HIGH (ref 70–99)
GLUCOSE SERPL-MCNC: 149 MG/DL — HIGH (ref 70–99)
HCT VFR BLD CALC: 39.9 % — SIGNIFICANT CHANGE UP (ref 34.5–45)
HGB BLD-MCNC: 12.3 G/DL — SIGNIFICANT CHANGE UP (ref 11.5–15.5)
INR BLD: 1.09 RATIO — SIGNIFICANT CHANGE UP (ref 0.88–1.16)
MCHC RBC-ENTMCNC: 27.6 PG — SIGNIFICANT CHANGE UP (ref 27–34)
MCHC RBC-ENTMCNC: 30.9 GM/DL — LOW (ref 32–36)
MCV RBC AUTO: 89.1 FL — SIGNIFICANT CHANGE UP (ref 80–100)
PLATELET # BLD AUTO: 370 K/UL — SIGNIFICANT CHANGE UP (ref 150–400)
POTASSIUM SERPL-MCNC: 4.8 MMOL/L — SIGNIFICANT CHANGE UP (ref 3.5–5.3)
POTASSIUM SERPL-MCNC: 5.1 MMOL/L — SIGNIFICANT CHANGE UP (ref 3.5–5.3)
POTASSIUM SERPL-MCNC: 5.8 MMOL/L — HIGH (ref 3.5–5.3)
POTASSIUM SERPL-MCNC: 5.9 MMOL/L — HIGH (ref 3.5–5.3)
POTASSIUM SERPL-SCNC: 4.8 MMOL/L — SIGNIFICANT CHANGE UP (ref 3.5–5.3)
POTASSIUM SERPL-SCNC: 5.1 MMOL/L — SIGNIFICANT CHANGE UP (ref 3.5–5.3)
POTASSIUM SERPL-SCNC: 5.8 MMOL/L — HIGH (ref 3.5–5.3)
POTASSIUM SERPL-SCNC: 5.9 MMOL/L — HIGH (ref 3.5–5.3)
PROTHROM AB SERPL-ACNC: 12.6 SEC — SIGNIFICANT CHANGE UP (ref 10–12.9)
RBC # BLD: 4.47 M/UL — SIGNIFICANT CHANGE UP (ref 3.8–5.2)
RBC # FLD: 13.7 % — SIGNIFICANT CHANGE UP (ref 10.3–14.5)
RH IG SCN BLD-IMP: POSITIVE — SIGNIFICANT CHANGE UP
SODIUM SERPL-SCNC: 133 MMOL/L — LOW (ref 135–145)
SODIUM SERPL-SCNC: 137 MMOL/L — SIGNIFICANT CHANGE UP (ref 135–145)
SODIUM SERPL-SCNC: 138 MMOL/L — SIGNIFICANT CHANGE UP (ref 135–145)
SODIUM SERPL-SCNC: 139 MMOL/L — SIGNIFICANT CHANGE UP (ref 135–145)
WBC # BLD: 25.1 K/UL — HIGH (ref 3.8–10.5)
WBC # FLD AUTO: 25.1 K/UL — HIGH (ref 3.8–10.5)

## 2019-07-05 PROCEDURE — 43246 EGD PLACE GASTROSTOMY TUBE: CPT | Mod: GC

## 2019-07-05 RX ORDER — LABETALOL HCL 100 MG
10 TABLET ORAL ONCE
Refills: 0 | Status: COMPLETED | OUTPATIENT
Start: 2019-07-05 | End: 2019-07-05

## 2019-07-05 RX ORDER — DEXAMETHASONE 0.5 MG/5ML
2 ELIXIR ORAL EVERY 8 HOURS
Refills: 0 | Status: DISCONTINUED | OUTPATIENT
Start: 2019-07-06 | End: 2019-07-06

## 2019-07-05 RX ORDER — SODIUM CHLORIDE 9 MG/ML
1000 INJECTION INTRAMUSCULAR; INTRAVENOUS; SUBCUTANEOUS
Refills: 0 | Status: DISCONTINUED | OUTPATIENT
Start: 2019-07-05 | End: 2019-07-06

## 2019-07-05 RX ADMIN — AMLODIPINE BESYLATE 10 MILLIGRAM(S): 2.5 TABLET ORAL at 05:42

## 2019-07-05 RX ADMIN — Medication 650 MILLIGRAM(S): at 11:35

## 2019-07-05 RX ADMIN — Medication 1 DROP(S): at 18:14

## 2019-07-05 RX ADMIN — Medication 1 APPLICATION(S): at 21:21

## 2019-07-05 RX ADMIN — Medication 1 DROP(S): at 05:43

## 2019-07-05 RX ADMIN — Medication 101.6 MILLIGRAM(S): at 05:43

## 2019-07-05 RX ADMIN — Medication 650 MILLIGRAM(S): at 12:15

## 2019-07-05 RX ADMIN — LOSARTAN POTASSIUM 25 MILLIGRAM(S): 100 TABLET, FILM COATED ORAL at 05:42

## 2019-07-05 RX ADMIN — Medication 1 DROP(S): at 23:52

## 2019-07-05 RX ADMIN — SODIUM CHLORIDE 75 MILLILITER(S): 9 INJECTION INTRAMUSCULAR; INTRAVENOUS; SUBCUTANEOUS at 19:23

## 2019-07-05 RX ADMIN — Medication 1 DROP(S): at 12:44

## 2019-07-05 RX ADMIN — Medication 10 MILLIGRAM(S): at 23:51

## 2019-07-05 RX ADMIN — Medication 101.6 MILLIGRAM(S): at 18:14

## 2019-07-05 RX ADMIN — LATANOPROST 1 DROP(S): 0.05 SOLUTION/ DROPS OPHTHALMIC; TOPICAL at 21:21

## 2019-07-05 RX ADMIN — ENOXAPARIN SODIUM 30 MILLIGRAM(S): 100 INJECTION SUBCUTANEOUS at 05:43

## 2019-07-05 RX ADMIN — ENOXAPARIN SODIUM 30 MILLIGRAM(S): 100 INJECTION SUBCUTANEOUS at 18:14

## 2019-07-05 RX ADMIN — SODIUM CHLORIDE 75 MILLILITER(S): 9 INJECTION INTRAMUSCULAR; INTRAVENOUS; SUBCUTANEOUS at 08:45

## 2019-07-05 NOTE — PROGRESS NOTE ADULT - ASSESSMENT
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 Patient  failed multiple SLP evaluations, has been tolerating NG tube feeds , NG tube feeds now on hold for pending PEG placement   Labs noted with Leukocytosis  WBC is 25.1  , noted patient on Decadron may be a contributing factor ,, will discuss with GI attending     Recommendations :   NPO/ NG tube feeds held   PEG placement planned for today  Ancef 2 grams IV on call for PEG  Spouse bedside updated on above      Zelda Coto [facility]

## 2019-07-05 NOTE — PROGRESS NOTE ADULT - SUBJECTIVE AND OBJECTIVE BOX
INTERVAL HPI/OVERNIGHT EVENTS:  Patient positioned in bed for comfort and safety, spouse bedside NG tube have been held for pending PEG placement      MEDICATIONS  (STANDING):  amLODIPine   Tablet 10 milliGRAM(s) Oral daily  artificial  tears Solution 1 Drop(s) Right EYE every 6 hours  ceFAZolin   IVPB 2000 milliGRAM(s) IV Intermittent once  dexamethasone  IVPB 8 milliGRAM(s) IV Intermittent every 8 hours  docusate sodium Liquid 100 milliGRAM(s) Oral two times a day  enoxaparin Injectable 30 milliGRAM(s) SubCutaneous every 12 hours  insulin lispro (HumaLOG) corrective regimen sliding scale   SubCutaneous every 6 hours  latanoprost 0.005% Ophthalmic Solution 1 Drop(s) Both EYES at bedtime  losartan 25 milliGRAM(s) Oral daily  petrolatum Ophthalmic Ointment 1 Application(s) Right EYE at bedtime  prednisoLONE acetate 1% Suspension 1 Drop(s) Both EYES four times a day  senna Syrup 5 milliLiter(s) Oral at bedtime  sodium chloride 0.9%. 1000 milliLiter(s) (75 mL/Hr) IV Continuous <Continuous>    MEDICATIONS  (PRN):  acetaminophen    Suspension .. 650 milliGRAM(s) Oral every 6 hours PRN Temp greater or equal to 38.5C (101.3F), Mild Pain (1 - 3)  ALBUTerol    90 MICROgram(s) HFA Inhaler 2 Puff(s) Inhalation every 6 hours PRN for shortness of breath and/or wheezing      Allergies    No Known Drug Allergies  shellfish (Angioedema; Rash)    Intolerances        Review of Systems:  Unable to obtain from patient   General:  No, fevers or chills reported    ENT:  NG tube intact , + dysphagia  Resp:  No dyspnea, cough or  wheezing        Vital Signs Last 24 Hrs  T(C): 36.7 (05 Jul 2019 08:26), Max: 37.4 (04 Jul 2019 20:02)  T(F): 98.1 (05 Jul 2019 08:26), Max: 99.4 (04 Jul 2019 20:02)  HR: 90 (05 Jul 2019 08:26) (90 - 108)  BP: 154/82 (05 Jul 2019 08:26) (113/73 - 154/82)  BP(mean): --  RR: 21 (05 Jul 2019 08:26) (17 - 21)  SpO2: 97% (05 Jul 2019 08:26) (94% - 99%)    PHYSICAL EXAM:    Constitutional: non toxic , NAD, obese, female   Neck:  supple  Respiratory:  anterior chest wall clear to auscultation   Cardiovascular: S1 and S2, RRR, no murmur  Gastrointestinal: soft distended girth +BS   Extremities: No  cyanosis  Skin: No visible rashes      LABS:                        12.3   25.1  )-----------( 370      ( 05 Jul 2019 07:06 )             39.9     07-05    137  |  97  |  25<H>  ----------------------------<  132<H>  5.9<H>   |  26  |  0.63    Ca    9.3      05 Jul 2019 07:06      PT/INR - ( 05 Jul 2019 07:07 )   PT: 12.6 sec;   INR: 1.09 ratio         PTT - ( 05 Jul 2019 07:07 )  PTT:30.4 sec        RADIOLOGY & ADDITIONAL TESTS:

## 2019-07-05 NOTE — PROGRESS NOTE ADULT - SUBJECTIVE AND OBJECTIVE BOX
SUBJECTIVE:  Less agitated today on exam        Vital Signs Last 24 Hrs  T(C): 36.7 (05 Jul 2019 08:26), Max: 37.4 (04 Jul 2019 20:02)  T(F): 98.1 (05 Jul 2019 08:26), Max: 99.4 (04 Jul 2019 20:02)  HR: 90 (05 Jul 2019 08:26) (90 - 108)  BP: 154/82 (05 Jul 2019 08:26) (113/73 - 154/82)  BP(mean): --  RR: 21 (05 Jul 2019 08:26) (17 - 21)  SpO2: 97% (05 Jul 2019 08:26) (94% - 99%)    PHYSICAL EXAM:    Constitutional: No Acute Distress     Neurological: Alert oriented to name and place not date , Following Commands, Moving all Extremities with good strength unable to close right eye                                                  Sensation: [x] intact to light touch  [] decreased:     Pulmonary: Clear to Auscultation, No rales, No rhonchi, No wheezes     Cardiovascular: S1, S2, Regular rate and rhythm     Gastrointestinal: Soft, Non-tender, Non-distended     Extremities: No calf tenderness     Incision: c/d/i   LABS:                        12.3   25.1  )-----------( 370      ( 05 Jul 2019 07:06 )             39.9    07-05    137  |  97  |  25<H>  ----------------------------<  132<H>  5.9<H>   |  26  |  0.63    Ca    9.3      05 Jul 2019 07:06    PT/INR - ( 05 Jul 2019 07:07 )   PT: 12.6 sec;   INR: 1.09 ratio         PTT - ( 05 Jul 2019 07:07 )  PTT:30.4 secHemoglobin A1C, Whole Blood: 5.1 % (06-26-19 @ 22:52)      07-04 @ 07:01  -  07-05 @ 07:00  --------------------------------------------------------  IN: 325 mL / OUT: 0 mL / NET: 325 mL      DRAINS:     MEDICATIONS:  Anticoagulation:   enoxaparin Injectable 30 milliGRAM(s) SubCutaneous every 12 hours    Antibiotics:  ceFAZolin   IVPB 2000 milliGRAM(s) IV Intermittent once    Endo:  dexamethasone  IVPB 8 milliGRAM(s) IV Intermittent every 8 hours  insulin lispro (HumaLOG) corrective regimen sliding scale   SubCutaneous every 6 hours    Neuro:  acetaminophen    Suspension .. 650 milliGRAM(s) Oral every 6 hours PRN Temp greater or equal to 38.5C (101.3F), Mild Pain (1 - 3)    Cardiac:  amLODIPine   Tablet 10 milliGRAM(s) Oral daily  losartan 25 milliGRAM(s) Oral daily    Pulm:  ALBUTerol    90 MICROgram(s) HFA Inhaler 2 Puff(s) Inhalation every 6 hours PRN for shortness of breath and/or wheezing    GI/:  docusate sodium Liquid 100 milliGRAM(s) Oral two times a day  senna Syrup 5 milliLiter(s) Oral at bedtime    Other:   artificial  tears Solution 1 Drop(s) Right EYE every 6 hours  latanoprost 0.005% Ophthalmic Solution 1 Drop(s) Both EYES at bedtime  petrolatum Ophthalmic Ointment 1 Application(s) Right EYE at bedtime  prednisoLONE acetate 1% Suspension 1 Drop(s) Both EYES four times a day  sodium chloride 0.9%. 1000 milliLiter(s) IV Continuous <Continuous>    DIET: NPO

## 2019-07-05 NOTE — PROGRESS NOTE ADULT - ASSESSMENT
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  decadron 8 q 8 for 48 hours than 2 q 8for 24 hours than 2 q 12   - unable to close right eye, continue eye drops plastic surgery saw   Respiratory: incentive spirometry  CV: keep sbp 100-160 continue norvasc and losartan for hypertension  Endocrine: euyglycemia   Heme/Onc: a1c 5.1, hiss while on high dose steroids  hyperkalemia repeat bmp           DVT ppx: lovenox and scds  Renal: fluids tonight when NPO for peg  ID: afebrile wbc elevated secondary to high dose steroids   GI:  peg in am continue colace and senna   PT/OT: acute rehab  Will discuss with Dr. Monroy      Assessment:  Please Check When Present   [] Acute blood loss anemia  []Cerebral edema, [] Brain compression/ herniation,     []  GCS  E   V  M     Heart Failure: []Acute, [] acute on chronic , []chronic  Heart Failure:  [] Diastolic (HFpEF), [] Systolic (HFrEF), []Combined (HFpEF and HFrEF), [] RHF, [] Pulm HTN, [] Other    [] BRENDA, [] ATN, [] AIN, [] other  [] CKD1, [] CKD2, [] CKD 3, [] CKD 4, [] CKD 5, []ESRD    Encephalopathy: [] Metabolic, [] Hepatic, [] toxic, [] Neurological, [] Other    Abnormal Nurtitional Status: [] malnurtition (see nutrition note), [ ]underweight: BMI < 19, [] morbid obesity: BMI >40, [] Cachexia  [] Functional quadriplegia  [] Sepsis  [] hypovolemic shock,[] cardiogenic shock, [] hemorrhagic shock, [] neuogenic shock  [] Acute Respiratory Failure          Spectralink # [id number]

## 2019-07-06 LAB
GLUCOSE BLDC GLUCOMTR-MCNC: 107 MG/DL — HIGH (ref 70–99)
GLUCOSE BLDC GLUCOMTR-MCNC: 115 MG/DL — HIGH (ref 70–99)
GLUCOSE BLDC GLUCOMTR-MCNC: 122 MG/DL — HIGH (ref 70–99)
GLUCOSE BLDC GLUCOMTR-MCNC: 133 MG/DL — HIGH (ref 70–99)
HCT VFR BLD CALC: 36.6 % — SIGNIFICANT CHANGE UP (ref 34.5–45)
HGB BLD-MCNC: 11.5 G/DL — SIGNIFICANT CHANGE UP (ref 11.5–15.5)
MCHC RBC-ENTMCNC: 27.7 PG — SIGNIFICANT CHANGE UP (ref 27–34)
MCHC RBC-ENTMCNC: 31.5 GM/DL — LOW (ref 32–36)
MCV RBC AUTO: 88 FL — SIGNIFICANT CHANGE UP (ref 80–100)
PLATELET # BLD AUTO: 375 K/UL — SIGNIFICANT CHANGE UP (ref 150–400)
RBC # BLD: 4.16 M/UL — SIGNIFICANT CHANGE UP (ref 3.8–5.2)
RBC # FLD: 13.6 % — SIGNIFICANT CHANGE UP (ref 10.3–14.5)
WBC # BLD: 17.7 K/UL — HIGH (ref 3.8–10.5)
WBC # FLD AUTO: 17.7 K/UL — HIGH (ref 3.8–10.5)

## 2019-07-06 PROCEDURE — 99232 SBSQ HOSP IP/OBS MODERATE 35: CPT | Mod: GC

## 2019-07-06 PROCEDURE — 99232 SBSQ HOSP IP/OBS MODERATE 35: CPT

## 2019-07-06 RX ORDER — ALBUTEROL 90 UG/1
2.5 AEROSOL, METERED ORAL EVERY 6 HOURS
Refills: 0 | Status: DISCONTINUED | OUTPATIENT
Start: 2019-07-06 | End: 2019-07-10

## 2019-07-06 RX ORDER — DEXAMETHASONE 0.5 MG/5ML
2 ELIXIR ORAL EVERY 12 HOURS
Refills: 0 | Status: DISCONTINUED | OUTPATIENT
Start: 2019-07-07 | End: 2019-07-10

## 2019-07-06 RX ADMIN — ENOXAPARIN SODIUM 30 MILLIGRAM(S): 100 INJECTION SUBCUTANEOUS at 17:36

## 2019-07-06 RX ADMIN — Medication 1 DROP(S): at 17:36

## 2019-07-06 RX ADMIN — ENOXAPARIN SODIUM 30 MILLIGRAM(S): 100 INJECTION SUBCUTANEOUS at 05:44

## 2019-07-06 RX ADMIN — Medication 100 MILLIGRAM(S): at 17:36

## 2019-07-06 RX ADMIN — Medication 1 DROP(S): at 13:05

## 2019-07-06 RX ADMIN — AMLODIPINE BESYLATE 10 MILLIGRAM(S): 2.5 TABLET ORAL at 05:43

## 2019-07-06 RX ADMIN — Medication 1 DROP(S): at 05:44

## 2019-07-06 RX ADMIN — Medication 650 MILLIGRAM(S): at 17:57

## 2019-07-06 RX ADMIN — SENNA PLUS 5 MILLILITER(S): 8.6 TABLET ORAL at 21:32

## 2019-07-06 RX ADMIN — Medication 2 MILLIGRAM(S): at 13:06

## 2019-07-06 RX ADMIN — Medication 1 APPLICATION(S): at 21:33

## 2019-07-06 RX ADMIN — Medication 1 DROP(S): at 05:43

## 2019-07-06 RX ADMIN — ALBUTEROL 2.5 MILLIGRAM(S): 90 AEROSOL, METERED ORAL at 17:36

## 2019-07-06 RX ADMIN — Medication 2 MILLIGRAM(S): at 05:43

## 2019-07-06 RX ADMIN — LATANOPROST 1 DROP(S): 0.05 SOLUTION/ DROPS OPHTHALMIC; TOPICAL at 21:33

## 2019-07-06 RX ADMIN — Medication 100 MILLIGRAM(S): at 05:43

## 2019-07-06 RX ADMIN — Medication 1 DROP(S): at 23:32

## 2019-07-06 RX ADMIN — LOSARTAN POTASSIUM 25 MILLIGRAM(S): 100 TABLET, FILM COATED ORAL at 05:43

## 2019-07-06 NOTE — PROGRESS NOTE ADULT - PROBLEM SELECTOR PLAN 3
ENT following, will continue steroids  Post-op day 3 1 s/p PEG, will start feeds today  Nutrition consult: pend

## 2019-07-06 NOTE — PROGRESS NOTE ADULT - ASSESSMENT
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   Please Check When Present   [x]  GCS  E4   V5  M6     Heart Failure: []Acute, [] acute on chronic , []chronic  Heart Failure:  [] Diastolic (HFpEF), [] Systolic (HFrEF), []Combined (HFpEF and HFrEF), [] RHF, [] Pulm HTN, [] Other    [] BRENDA, [] ATN, [] AIN, [] other  [] CKD1, [] CKD2, [] CKD 3, [] CKD 4, [] CKD 5, []ESRD    Encephalopathy: [] Metabolic, [] Hepatic, [] toxic, [] Neurological, [] Other    Abnormal Nurtitional Status: [] malnurtition (see nutrition note), [ ]underweight: BMI < 19, [] morbid obesity: BMI >40, [] Cachexia    [] Sepsis  [] hypovolemic shock,[] cardiogenic shock, [] hemorrhagic shock, [] neuogenic shock  [] Acute Respiratory Failure  []Cerebral edema, [] Brain compression/ herniation,   [] Functional quadriplegia  [] Acute blood loss anemia

## 2019-07-06 NOTE — PROGRESS NOTE ADULT - SUBJECTIVE AND OBJECTIVE BOX
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    Allergies:  No Known Drug Allergies  shellfish (Angioedema; Rash)        Hospital Medications:  acetaminophen    Suspension .. 650 milliGRAM(s) Oral every 6 hours PRN  ALBUTerol    90 MICROgram(s) HFA Inhaler 2 Puff(s) Inhalation every 6 hours PRN  amLODIPine   Tablet 10 milliGRAM(s) Oral daily  artificial  tears Solution 1 Drop(s) Right EYE every 6 hours  ceFAZolin   IVPB 2000 milliGRAM(s) IV Intermittent once  dexamethasone     Tablet 2 milliGRAM(s) Oral every 8 hours  docusate sodium Liquid 100 milliGRAM(s) Oral two times a day  enoxaparin Injectable 30 milliGRAM(s) SubCutaneous every 12 hours  insulin lispro (HumaLOG) corrective regimen sliding scale   SubCutaneous every 6 hours  latanoprost 0.005% Ophthalmic Solution 1 Drop(s) Both EYES at bedtime  losartan 25 milliGRAM(s) Oral daily  petrolatum Ophthalmic Ointment 1 Application(s) Right EYE at bedtime  prednisoLONE acetate 1% Suspension 1 Drop(s) Both EYES four times a day  senna Syrup 5 milliLiter(s) Oral at bedtime      PMHX/PSHX:  Intracranial mass  Light-headedness  Glaucoma  Asthma  HTN (hypertension)  History of ankle fracture  Fractured elbow  H/O appendicitis      Family history:    There is no family history of peptic ulcer disease, gastric cancer, colon polyps, colon cancer, celiac disease, biliary, hepatic, or pancreatic disease.  None of the female relatives have breast, uterine, or ovarian cancer.     PHYSICAL EXAM:   Vital Signs:  Vital Signs Last 24 Hrs  T(C): 36.3 (06 Jul 2019 07:58), Max: 37 (05 Jul 2019 15:56)  T(F): 97.4 (06 Jul 2019 07:58), Max: 98.6 (05 Jul 2019 15:56)  HR: 82 (06 Jul 2019 07:58) (82 - 96)  BP: 106/66 (06 Jul 2019 07:58) (106/66 - 181/84)  BP(mean): --  RR: 20 (06 Jul 2019 07:58) (18 - 20)  SpO2: 96% (06 Jul 2019 07:58) (96% - 100%)  Daily     Daily     Constitutional: non toxic , NAD, obese, female   Neck:  supple  Respiratory:  anterior chest wall clear to auscultation   Cardiovascular: S1 and S2, RRR, no murmur  Gastrointestinal: soft distended girth +BS , external bumper at 5.5cm, dressing c/d/i  Extremities: No  cyanosis  Skin: No visible rashes    LABS:                        12.3   25.1  )-----------( 370      ( 05 Jul 2019 07:06 )             39.9       07-05    138  |  98  |  25<H>  ----------------------------<  148<H>  4.8   |  31  |  0.62    Ca    9.1      05 Jul 2019 12:44        PT/INR - ( 05 Jul 2019 07:07 )   PT: 12.6 sec;   INR: 1.09 ratio         PTT - ( 05 Jul 2019 07:07 )  PTT:30.4 sec                            12.3   25.1  )-----------( 370      ( 05 Jul 2019 07:06 )             39.9                         11.5   18.36 )-----------( 332      ( 04 Jul 2019 09:43 )             38.0                         13.3   18.2  )-----------( 352      ( 03 Jul 2019 17:05 )             41.2     Imaging:  < from: Upper Endoscopy (07.05.19 @ 13:27) >  Findings:       Moderately severe esophagitis with no bleeding was found in the lower third of the esophagus.       Diffuse mild inflammation characterized by erythema was found in the entire examined stomach.        The patient was placed in the supine position for PEG placement. The stomach was insufflated        to appose gastric and abdominal walls. A site was located in the body of the stomach with        excellent transillumination and manual external pressure for placement. The abdominal wall        was marked and prepped in a sterile manner. The area was anesthetized with 6 mL of 0.5%        lidocaine. The trocar needle was introduced through the abdominal wall and into the stomach        under direct endoscopic view. A snare was introduced through the endoscope and opened in the        gastric lumen. The guide wire was passed through the trocar and into the open snare. The        snare was closed around the guide wire. The endoscope and snare were removed, pulling the        wire out through the mouth. A skin incision was madeat the site of needle insertion. The        externally removable 20 Fr EndoVive Safety gastrostomy tube was lubricated. The G-tube was        tied to the guide wire and pulled through the mouth and into the stomach. The trocar needle        was removed, and the gastrostomy tube was pulled out from the stomach through the skin. The        external bumper was attached to the gastrostomy tube, and the tube was cut to remove the        guide wire. The final position of the gastrostomy tube was confirmed by relook endoscopy, and        skin marking noted to be 5.5 cm at the external bumper. The final tension and compression of        the abdominal wall by the PEG tube and external bumper were checked and revealed that the        bumper was loose and lightly touching the skin. The feeding tube was capped, and the tube        site cleaned and dressed.       Patchy moderate inflammation characterized by erosions, erythema, friability and linear        erosions was found in the duodenal bulb and in the second part of the duodenum.                                                                                                        Impression:          - Moderately severe esophagitis.                       - Gastritis.                       - Duodenitis.                       - An externally removable PEG placement was successfully completed.                       - No specimens collected.  Recommendation:      - [facility] patient to [facility].                       - Please follow the post-PEG recommendations including: Nutrition consult for                        formula and volume, change dressing once per day, check site for bleeding q 4                        hrs, clean site with soap and water daily and dry thoroughly, dry dressing                        only, external bolster 1 cm from abdominal wall, NPO x4 hrs then water today.                       - After GI fellow sees the patient tomorrow AM, may use PEG tomorrow for                        feedings.                      - PPI once daily.    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    Allergies:  No Known Drug Allergies  shellfish (Angioedema; Rash)        Hospital Medications:  acetaminophen    Suspension .. 650 milliGRAM(s) Oral every 6 hours PRN  ALBUTerol    90 MICROgram(s) HFA Inhaler 2 Puff(s) Inhalation every 6 hours PRN  amLODIPine   Tablet 10 milliGRAM(s) Oral daily  artificial  tears Solution 1 Drop(s) Right EYE every 6 hours  ceFAZolin   IVPB 2000 milliGRAM(s) IV Intermittent once  dexamethasone     Tablet 2 milliGRAM(s) Oral every 8 hours  docusate sodium Liquid 100 milliGRAM(s) Oral two times a day  enoxaparin Injectable 30 milliGRAM(s) SubCutaneous every 12 hours  insulin lispro (HumaLOG) corrective regimen sliding scale   SubCutaneous every 6 hours  latanoprost 0.005% Ophthalmic Solution 1 Drop(s) Both EYES at bedtime  losartan 25 milliGRAM(s) Oral daily  petrolatum Ophthalmic Ointment 1 Application(s) Right EYE at bedtime  prednisoLONE acetate 1% Suspension 1 Drop(s) Both EYES four times a day  senna Syrup 5 milliLiter(s) Oral at bedtime      PMHX/PSHX:  Intracranial mass  Light-headedness  Glaucoma  Asthma  HTN (hypertension)  History of ankle fracture  Fractured elbow  H/O appendicitis      Family history:    There is no family history of peptic ulcer disease, gastric cancer, colon polyps, colon cancer, celiac disease, biliary, hepatic, or pancreatic disease.  None of the female relatives have breast, uterine, or ovarian cancer.     PHYSICAL EXAM:   Vital Signs:  Vital Signs Last 24 Hrs  T(C): 36.3 (06 Jul 2019 07:58), Max: 37 (05 Jul 2019 15:56)  T(F): 97.4 (06 Jul 2019 07:58), Max: 98.6 (05 Jul 2019 15:56)  HR: 82 (06 Jul 2019 07:58) (82 - 96)  BP: 106/66 (06 Jul 2019 07:58) (106/66 - 181/84)  BP(mean): --  RR: 20 (06 Jul 2019 07:58) (18 - 20)  SpO2: 96% (06 Jul 2019 07:58) (96% - 100%)  Daily     Daily     Constitutional: non toxic , NAD, obese, female   Neck:  supple  Respiratory:  anterior chest wall clear to auscultation   Cardiovascular: S1 and S2, RRR, no murmur  Gastrointestinal: soft, obese, non-tender; +BS , PEG external bumper at 5.5cm, dressing c/d/i  Extremities: No  cyanosis  Skin: No visible rashes    LABS:                        12.3   25.1  )-----------( 370      ( 05 Jul 2019 07:06 )             39.9       07-05    138  |  98  |  25<H>  ----------------------------<  148<H>  4.8   |  31  |  0.62    Ca    9.1      05 Jul 2019 12:44        PT/INR - ( 05 Jul 2019 07:07 )   PT: 12.6 sec;   INR: 1.09 ratio         PTT - ( 05 Jul 2019 07:07 )  PTT:30.4 sec                            12.3   25.1  )-----------( 370      ( 05 Jul 2019 07:06 )             39.9                         11.5   18.36 )-----------( 332      ( 04 Jul 2019 09:43 )             38.0                         13.3   18.2  )-----------( 352      ( 03 Jul 2019 17:05 )             41.2     Imaging:  < from: Upper Endoscopy (07.05.19 @ 13:27) >  Findings:       Moderately severe esophagitis with no bleeding was found in the lower third of the esophagus.       Diffuse mild inflammation characterized by erythema was found in the entire examined stomach.        The patient was placed in the supine position for PEG placement. The stomach was insufflated        to appose gastric and abdominal walls. A site was located in the body of the stomach with        excellent transillumination and manual external pressure for placement. The abdominal wall        was marked and prepped in a sterile manner. The area was anesthetized with 6 mL of 0.5%        lidocaine. The trocar needle was introduced through the abdominal wall and into the stomach        under direct endoscopic view. A snare was introduced through the endoscope and opened in the        gastric lumen. The guide wire was passed through the trocar and into the open snare. The        snare was closed around the guide wire. The endoscope and snare were removed, pulling the        wire out through the mouth. A skin incision was madeat the site of needle insertion. The        externally removable 20 Fr EndoVive Safety gastrostomy tube was lubricated. The G-tube was        tied to the guide wire and pulled through the mouth and into the stomach. The trocar needle        was removed, and the gastrostomy tube was pulled out from the stomach through the skin. The        external bumper was attached to the gastrostomy tube, and the tube was cut to remove the        guide wire. The final position of the gastrostomy tube was confirmed by relook endoscopy, and        skin marking noted to be 5.5 cm at the external bumper. The final tension and compression of        the abdominal wall by the PEG tube and external bumper were checked and revealed that the        bumper was loose and lightly touching the skin. The feeding tube was capped, and the tube        site cleaned and dressed.       Patchy moderate inflammation characterized by erosions, erythema, friability and linear        erosions was found in the duodenal bulb and in the second part of the duodenum.                                                                                                        Impression:          - Moderately severe esophagitis.                       - Gastritis.                       - Duodenitis.                       - An externally removable PEG placement was successfully completed.                       - No specimens collected.  Recommendation:      - Return patient to [facility] for [facility].                       - Please follow the post-PEG recommendations including: Nutrition consult for                        formula and volume, change dressing once per day, check site for bleeding q 4                        hrs, clean site with soap and water daily and dry thoroughly, dry dressing                        only, external bolster 1 cm from abdominal wall, NPO x4 hrs then water today.                       - After GI fellow sees the patient tomorrow AM, may use PEG tomorrow for                        feedings.                      - PPI once daily.    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    Allergies:  No Known Drug Allergies  shellfish (Angioedema; Rash)        Hospital Medications:  acetaminophen    Suspension .. 650 milliGRAM(s) Oral every 6 hours PRN  ALBUTerol    90 MICROgram(s) HFA Inhaler 2 Puff(s) Inhalation every 6 hours PRN  amLODIPine   Tablet 10 milliGRAM(s) Oral daily  artificial  tears Solution 1 Drop(s) Right EYE every 6 hours  ceFAZolin   IVPB 2000 milliGRAM(s) IV Intermittent once  dexamethasone     Tablet 2 milliGRAM(s) Oral every 8 hours  docusate sodium Liquid 100 milliGRAM(s) Oral two times a day  enoxaparin Injectable 30 milliGRAM(s) SubCutaneous every 12 hours  insulin lispro (HumaLOG) corrective regimen sliding scale   SubCutaneous every 6 hours  latanoprost 0.005% Ophthalmic Solution 1 Drop(s) Both EYES at bedtime  losartan 25 milliGRAM(s) Oral daily  petrolatum Ophthalmic Ointment 1 Application(s) Right EYE at bedtime  prednisoLONE acetate 1% Suspension 1 Drop(s) Both EYES four times a day  senna Syrup 5 milliLiter(s) Oral at bedtime      PMHX/PSHX:  Intracranial mass  Light-headedness  Glaucoma  Asthma  HTN (hypertension)  History of ankle fracture  Fractured elbow  H/O appendicitis      Family history:    There is no family history of peptic ulcer disease, gastric cancer, colon polyps, colon cancer, celiac disease, biliary, hepatic, or pancreatic disease.  None of the female relatives have breast, uterine, or ovarian cancer.     PHYSICAL EXAM:   Vital Signs:  Vital Signs Last 24 Hrs  T(C): 36.3 (06 Jul 2019 07:58), Max: 37 (05 Jul 2019 15:56)  T(F): 97.4 (06 Jul 2019 07:58), Max: 98.6 (05 Jul 2019 15:56)  HR: 82 (06 Jul 2019 07:58) (82 - 96)  BP: 106/66 (06 Jul 2019 07:58) (106/66 - 181/84)  BP(mean): --  RR: 20 (06 Jul 2019 07:58) (18 - 20)  SpO2: 96% (06 Jul 2019 07:58) (96% - 100%)  Daily     Daily     Constitutional: non toxic , NAD, obese, female   Neck:  supple  Respiratory:  anterior chest wall clear to auscultation   Cardiovascular: S1 and S2, RRR, no murmur  Gastrointestinal: soft, obese, non-tender; +BS , PEG external bumper at 5.5cm loose and lightly touching skin, dressing c/d/i  Extremities: No  cyanosis  Skin: No visible rashes    LABS:                        12.3   25.1  )-----------( 370      ( 05 Jul 2019 07:06 )             39.9       07-05    138  |  98  |  25<H>  ----------------------------<  148<H>  4.8   |  31  |  0.62    Ca    9.1      05 Jul 2019 12:44        PT/INR - ( 05 Jul 2019 07:07 )   PT: 12.6 sec;   INR: 1.09 ratio         PTT - ( 05 Jul 2019 07:07 )  PTT:30.4 sec                            12.3   25.1  )-----------( 370      ( 05 Jul 2019 07:06 )             39.9                         11.5   18.36 )-----------( 332      ( 04 Jul 2019 09:43 )             38.0                         13.3   18.2  )-----------( 352      ( 03 Jul 2019 17:05 )             41.2     Imaging:  < from: Upper Endoscopy (07.05.19 @ 13:27) >  Findings:       Moderately severe esophagitis with no bleeding was found in the lower third of the esophagus.       Diffuse mild inflammation characterized by erythema was found in the entire examined stomach.        The patient was placed in the supine position for PEG placement. The stomach was insufflated        to appose gastric and abdominal walls. A site was located in the body of the stomach with        excellent transillumination and manual external pressure for placement. The abdominal wall        was marked and prepped in a sterile manner. The area was anesthetized with 6 mL of 0.5%        lidocaine. The trocar needle was introduced through the abdominal wall and into the stomach        under direct endoscopic view. A snare was introduced through the endoscope and opened in the        gastric lumen. The guide wire was passed through the trocar and into the open snare. The        snare was closed around the guide wire. The endoscope and snare were removed, pulling the        wire out through the mouth. A skin incision was madeat the site of needle insertion. The        externally removable 20 Fr EndoVive Safety gastrostomy tube was lubricated. The G-tube was        tied to the guide wire and pulled through the mouth and into the stomach. The trocar needle        was removed, and the gastrostomy tube was pulled out from the stomach through the skin. The        external bumper was attached to the gastrostomy tube, and the tube was cut to remove the        guide wire. The final position of the gastrostomy tube was confirmed by relook endoscopy, and        skin marking noted to be 5.5 cm at the external bumper. The final tension and compression of        the abdominal wall by the PEG tube and external bumper were checked and revealed that the        bumper was loose and lightly touching the skin. The feeding tube was capped, and the tube        site cleaned and dressed.       Patchy moderate inflammation characterized by erosions, erythema, friability and linear        erosions was found in the duodenal bulb and in the second part of the duodenum.                                                                                                        Impression:          - Moderately severe esophagitis.                       - Gastritis.                       - Duodenitis.                       - An externally removable PEG placement was successfully completed.                       - No specimens collected.  Recommendation:      - Return patient to [facility].                       - Please follow the post-PEG recommendations including: Nutrition consult for                        formula and volume, change dressing once per day, check site for bleeding q 4                        hrs, clean site with soap and water daily and dry thoroughly, dry dressing                        only, external bolster 1 cm from abdominal wall, NPO x4 hrs then water today.                       - After GI fellow sees the patient tomorrow AM, may use PEG tomorrow for                        feedings.                      - PPI once daily.    < end of copied text >

## 2019-07-06 NOTE — PROGRESS NOTE ADULT - ASSESSMENT
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      Intermittent low grade fever post-op, now resolved.     1. 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     Rpt MRI on 7/3- redemonstration of suboccipital craniotomy, extracalvarial CSF signal intensity collection approximating the craniotomy is slightly increased in size, currently measuring 10 mm in greatest depth, previously measuring 7 mm, small amount of hemorrhage and edema in the region of the surgical bed, residual nonspecific 0.8 x 0.5 x 2.1 cm (anterior-posterior by transverse by craniocaudad) focus of enhancement along the posterior aspect of the inferior fourth ventricle may represent postsurgical changes.     Steroid taper per primary team.     2. Dysphagia- peg tube placed friday, feeds started today    3. leukocytosis- likely reactive 2' steroids, improving today; repeat cbc with diff in the am     4. HTN- Continue current meds.     5. Throat and L ear pain- Seen by ENT- Indirect laryngoscopy- L base of tongue mass noted and edematous ae fold and arytenoid possibly due to traumatic intubation. MRI soft tissue done but poor quality may need ct of the neck.  c/w decadron taper.    discussed with neurosurgery PA at [phone]

## 2019-07-06 NOTE — PROGRESS NOTE ADULT - SUBJECTIVE AND OBJECTIVE BOX
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    Overnight event: none    Vital Signs Last 24 Hrs  T(C): 37 (06 Jul 2019 13:12), Max: 37 (05 Jul 2019 15:56)  T(F): 98.6 (06 Jul 2019 13:12), Max: 98.6 (05 Jul 2019 15:56)  HR: 73 (06 Jul 2019 13:12) (73 - 96)  BP: 115/72 (06 Jul 2019 13:12) (106/66 - 181/84)  BP(mean): --  RR: 20 (06 Jul 2019 13:12) (18 - 20)  SpO2: 98% (06 Jul 2019 13:12) (96% - 100%)                          11.5   17.7  )-----------( 375      ( 06 Jul 2019 11:38 )             36.6    07-05    138  |  98  |  25<H>  ----------------------------<  148<H>  4.8   |  31  |  0.62    Ca    9.1      05 Jul 2019 12:44    PT/INR - ( 05 Jul 2019 07:07 )   PT: 12.6 sec;   INR: 1.09 ratio         PTT - ( 05 Jul 2019 07:07 )  PTT:30.4 sec   Stroke Core Measures    Hemoglobin A1C, Whole Blood: 5.1 % (06-26 @ 22:52)    DRAIN OUTPUT:     NEUROIMAGING:     PHYSICAL EXAM:    General: No Acute Distress     Neurological: Awake, alert oriented to person, place and time, Following Commands, Pupils 3mm reactive, bilateral  6th NP, Rt 7NP, Face Symmetrical, Speech dysarthric, Moving all extremities, Muscle Strength normal in all four extremities, No Drift, Sensation to Light Touch Intact    Pulmonary: Clear to Auscultation, No Rales, No Rhonchi, No Wheezes     Cardiovascular: S1, S2, Regular Rate and Rhythm     Gastrointestinal: Soft, Nontender, Nondistended     Incision:     MEDICATIONS:   Antibiotics:  ceFAZolin   IVPB 2000 milliGRAM(s) IV Intermittent once    Neuro:  acetaminophen    Suspension .. 650 milliGRAM(s) Oral every 6 hours PRN Temp greater or equal to 38.5C (101.3F), Mild Pain (1 - 3)    Anticoagulation:  enoxaparin Injectable 30 milliGRAM(s) SubCutaneous every 12 hours    Cardiology:  amLODIPine   Tablet 10 milliGRAM(s) Oral daily  losartan 25 milliGRAM(s) Oral daily    Endo:   dexamethasone     Tablet 2 milliGRAM(s) Oral every 8 hours  insulin lispro (HumaLOG) corrective regimen sliding scale   SubCutaneous every 6 hours    Pulm:  ALBUTerol    90 MICROgram(s) HFA Inhaler 2 Puff(s) Inhalation every 6 hours PRN    GI/:  docusate sodium Liquid 100 milliGRAM(s) Oral two times a day  senna Syrup 5 milliLiter(s) Oral at bedtime    Other:  artificial  tears Solution 1 Drop(s) Right EYE every 6 hours  latanoprost 0.005% Ophthalmic Solution 1 Drop(s) Both EYES at bedtime  petrolatum Ophthalmic Ointment 1 Application(s) Right EYE at bedtime  prednisoLONE acetate 1% Suspension 1 Drop(s) Both EYES four times a day

## 2019-07-06 NOTE — PROGRESS NOTE ADULT - ASSESSMENT
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

## 2019-07-06 NOTE — PROGRESS NOTE ADULT - SUBJECTIVE AND OBJECTIVE BOX
Patient denies pain/ burning in urination. Denies diarrhea, no cough, no sputum production.    GENERAL: No fevers, no chills.  EYES: No blurry vision,  No photophobia  ENT: No sore throat.  No dysphagia  Cardiovascular: No chest pain, palpitations, orthopnea  Pulmonary: No cough, no wheezing. No shortness of breath  Gastrointestinal: No abdominal pain, no diarrhea, no constipation.  Musculoskeletal: No weakness.  No myalgias.  Dermatology:  No rashes.  Neuro: No Headache.  No vertigo.  No dizziness.  Psych: No anxiety, no depression.  Denies suicidal thoughts.    Vital Signs Last 24 Hrs  T(C): 37 (06 Jul 2019 13:12), Max: 37 (05 Jul 2019 15:56)  T(F): 98.6 (06 Jul 2019 13:12), Max: 98.6 (05 Jul 2019 15:56)  HR: 73 (06 Jul 2019 13:12) (73 - 96)  BP: 115/72 (06 Jul 2019 13:12) (106/66 - 181/84)  BP(mean): --  RR: 20 (06 Jul 2019 13:12) (18 - 20)  SpO2: 98% (06 Jul 2019 13:12) (96% - 100%)    GENERAL: NAD, obese  HEAD:  Atraumatic, Normocephalic  EYES: EOMI, PERRLA, conjunctiva and sclera clear  ENT: Pharynx not erythematous  PULMONARY: Clear to auscultation bilaterally; No wheeze  CARDIOVASCULAR: Regular rate and rhythm; No murmurs, rubs, or gallops  ABDOMEN: Soft, Nontender, Nondistended; Bowel sounds present  EXTREMITIES:  2+ Peripheral Pulses, No clubbing, cyanosis, or edema  MUSCULOSKELETAL: generally weak but strength equal  PSYCH: AAOx3, slow affect  SKIN: warm and dry, No rashes or lesions

## 2019-07-07 LAB
GLUCOSE BLDC GLUCOMTR-MCNC: 114 MG/DL — HIGH (ref 70–99)
GLUCOSE BLDC GLUCOMTR-MCNC: 129 MG/DL — HIGH (ref 70–99)
GLUCOSE BLDC GLUCOMTR-MCNC: 130 MG/DL — HIGH (ref 70–99)
GLUCOSE BLDC GLUCOMTR-MCNC: 130 MG/DL — HIGH (ref 70–99)
GLUCOSE BLDC GLUCOMTR-MCNC: 166 MG/DL — HIGH (ref 70–99)
HCT VFR BLD CALC: 36.3 % — SIGNIFICANT CHANGE UP (ref 34.5–45)
HGB BLD-MCNC: 11.6 G/DL — SIGNIFICANT CHANGE UP (ref 11.5–15.5)
MCHC RBC-ENTMCNC: 27.7 PG — SIGNIFICANT CHANGE UP (ref 27–34)
MCHC RBC-ENTMCNC: 31.8 GM/DL — LOW (ref 32–36)
MCV RBC AUTO: 87.1 FL — SIGNIFICANT CHANGE UP (ref 80–100)
PLATELET # BLD AUTO: 366 K/UL — SIGNIFICANT CHANGE UP (ref 150–400)
RBC # BLD: 4.17 M/UL — SIGNIFICANT CHANGE UP (ref 3.8–5.2)
RBC # FLD: 13.5 % — SIGNIFICANT CHANGE UP (ref 10.3–14.5)
WBC # BLD: 15.7 K/UL — HIGH (ref 3.8–10.5)
WBC # FLD AUTO: 15.7 K/UL — HIGH (ref 3.8–10.5)

## 2019-07-07 PROCEDURE — 99232 SBSQ HOSP IP/OBS MODERATE 35: CPT

## 2019-07-07 RX ADMIN — LOSARTAN POTASSIUM 25 MILLIGRAM(S): 100 TABLET, FILM COATED ORAL at 05:41

## 2019-07-07 RX ADMIN — Medication 1 DROP(S): at 17:20

## 2019-07-07 RX ADMIN — Medication 1 DROP(S): at 11:09

## 2019-07-07 RX ADMIN — Medication 1 DROP(S): at 00:06

## 2019-07-07 RX ADMIN — AMLODIPINE BESYLATE 10 MILLIGRAM(S): 2.5 TABLET ORAL at 05:40

## 2019-07-07 RX ADMIN — Medication 100 MILLIGRAM(S): at 17:19

## 2019-07-07 RX ADMIN — ALBUTEROL 2.5 MILLIGRAM(S): 90 AEROSOL, METERED ORAL at 17:19

## 2019-07-07 RX ADMIN — LATANOPROST 1 DROP(S): 0.05 SOLUTION/ DROPS OPHTHALMIC; TOPICAL at 22:26

## 2019-07-07 RX ADMIN — Medication 2 MILLIGRAM(S): at 17:19

## 2019-07-07 RX ADMIN — Medication 650 MILLIGRAM(S): at 17:19

## 2019-07-07 RX ADMIN — Medication 650 MILLIGRAM(S): at 17:30

## 2019-07-07 RX ADMIN — Medication 1 DROP(S): at 11:08

## 2019-07-07 RX ADMIN — Medication 1 DROP(S): at 05:41

## 2019-07-07 RX ADMIN — Medication 1: at 06:51

## 2019-07-07 RX ADMIN — Medication 1 APPLICATION(S): at 22:26

## 2019-07-07 RX ADMIN — ENOXAPARIN SODIUM 30 MILLIGRAM(S): 100 INJECTION SUBCUTANEOUS at 17:19

## 2019-07-07 RX ADMIN — ALBUTEROL 2.5 MILLIGRAM(S): 90 AEROSOL, METERED ORAL at 11:09

## 2019-07-07 RX ADMIN — ENOXAPARIN SODIUM 30 MILLIGRAM(S): 100 INJECTION SUBCUTANEOUS at 05:41

## 2019-07-07 RX ADMIN — ALBUTEROL 2.5 MILLIGRAM(S): 90 AEROSOL, METERED ORAL at 00:05

## 2019-07-07 RX ADMIN — Medication 100 MILLIGRAM(S): at 05:41

## 2019-07-07 RX ADMIN — ALBUTEROL 2.5 MILLIGRAM(S): 90 AEROSOL, METERED ORAL at 05:41

## 2019-07-07 RX ADMIN — Medication 2 MILLIGRAM(S): at 05:40

## 2019-07-07 RX ADMIN — SENNA PLUS 5 MILLILITER(S): 8.6 TABLET ORAL at 22:25

## 2019-07-07 NOTE — PROGRESS NOTE ADULT - SUBJECTIVE AND OBJECTIVE BOX
SUBJECTIVE: Patient seen and examined no acute distress     Vital Signs Last 24 Hrs  T(C): 37.5 (07 Jul 2019 08:00), Max: 37.5 (07 Jul 2019 08:00)  T(F): 99.5 (07 Jul 2019 08:00), Max: 99.5 (07 Jul 2019 08:00)  HR: 97 (07 Jul 2019 08:00) (73 - 97)  BP: 99/55 (07 Jul 2019 08:00) (99/55 - 132/73)  BP(mean): --  RR: 18 (07 Jul 2019 08:00) (16 - 20)  SpO2: 97% (07 Jul 2019 08:00) (94% - 99%)    PHYSICAL EXAM:    Constitutional: No Acute Distress     Neurological: Alert oriented to name and place not date , Following Commands, Moving all Extremities with good strength unable to close right eye but attempts to, b/l 6 and 7 th nerve palsy   Pulmonary: Clear to Auscultation, No rales, No rhonchi, No wheezes     Cardiovascular: S1, S2, Regular rate and rhythm     Gastrointestinal: Soft, Non-tender, Non-distended     Extremities: No calf tenderness     Incision: c/d/i   LABS:                        11.5   17.7  )-----------( 375      ( 06 Jul 2019 11:38 )             36.6    07-05    138  |  98  |  25<H>  ----------------------------<  148<H>  4.8   |  31  |  0.62    Ca    9.1      05 Jul 2019 12:44    Hemoglobin A1C, Whole Blood: 5.1 % (06-26-19 @ 22:52)      07-06 @ 07:01  -  07-07 @ 07:00  --------------------------------------------------------  IN: 1245 mL / OUT: 1000 mL / NET: 245 mL    MEDICATIONS:  Anticoagulation:   enoxaparin Injectable 30 milliGRAM(s) SubCutaneous every 12 hours    Antibiotics:    Endo:  dexamethasone     Tablet 2 milliGRAM(s) Oral every 12 hours  insulin lispro (HumaLOG) corrective regimen sliding scale   SubCutaneous every 6 hours    Neuro:  acetaminophen    Suspension .. 650 milliGRAM(s) Oral every 6 hours PRN Temp greater or equal to 38.5C (101.3F), Mild Pain (1 - 3)    Cardiac:  amLODIPine   Tablet 10 milliGRAM(s) Oral daily  losartan 25 milliGRAM(s) Oral daily    Pulm:  ALBUTerol    0.083% 2.5 milliGRAM(s) Nebulizer every 6 hours    GI/:  docusate sodium Liquid 100 milliGRAM(s) Oral two times a day  senna Syrup 5 milliLiter(s) Oral at bedtime    Other:   artificial  tears Solution 1 Drop(s) Right EYE every 6 hours  latanoprost 0.005% Ophthalmic Solution 1 Drop(s) Both EYES at bedtime  petrolatum Ophthalmic Ointment 1 Application(s) Right EYE at bedtime  prednisoLONE acetate 1% Suspension 1 Drop(s) Both EYES four times a day    DIET: peg feeds

## 2019-07-07 NOTE — PROGRESS NOTE ADULT - ASSESSMENT
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      Intermittent low grade fever post-op, now resolved.     1. 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     Rpt MRI on 7/3- redemonstration of suboccipital craniotomy, extracalvarial CSF signal intensity collection approximating the craniotomy is slightly increased in size, currently measuring 10 mm in greatest depth, previously measuring 7 mm, small amount of hemorrhage and edema in the region of the surgical bed, residual nonspecific 0.8 x 0.5 x 2.1 cm (anterior-posterior by transverse by craniocaudad) focus of enhancement along the posterior aspect of the inferior fourth ventricle may represent postsurgical changes.     Steroid taper per primary team.     2. Dysphagia- peg tube placed friday, feeds started saturday    3. leukocytosis- likely reactive 2' steroids, improving today; repeat cbc with diff in the am     4. HTN- Continue current meds.     5. Throat and L ear pain- Seen by ENT- Indirect laryngoscopy- L base of tongue mass noted and edematous ae fold and arytenoid possibly due to traumatic intubation. MRI soft tissue done but poor quality may need ct of the neck.  c/w decadron taper.    discussed with neurosurgery PA at [phone]

## 2019-07-07 NOTE — PROGRESS NOTE ADULT - SUBJECTIVE AND OBJECTIVE BOX
Patient denies pain/ burning in urination. Denies diarrhea, no cough, no sputum production.    GENERAL: No fevers, no chills.  EYES: No blurry vision,  No photophobia  ENT: No sore throat.  No dysphagia  Cardiovascular: No chest pain, palpitations, orthopnea  Pulmonary: No cough, no wheezing. No shortness of breath  Gastrointestinal: No abdominal pain, no diarrhea, no constipation.  Musculoskeletal: No weakness.  No myalgias.  Dermatology:  No rashes.  Neuro: No Headache.  No vertigo.  No dizziness.  Psych: No anxiety, no depression.  Denies suicidal thoughts.  Vital Signs Last 24 Hrs  T(C): 37.5 (07 Jul 2019 08:00), Max: 37.5 (07 Jul 2019 08:00)  T(F): 99.5 (07 Jul 2019 08:00), Max: 99.5 (07 Jul 2019 08:00)  HR: 97 (07 Jul 2019 08:00) (87 - 97)  BP: 99/55 (07 Jul 2019 08:00) (99/55 - 132/73)  BP(mean): --  RR: 18 (07 Jul 2019 08:00) (16 - 19)  SpO2: 97% (07 Jul 2019 08:00) (94% - 99%)    GENERAL: NAD, obese  HEAD:  Atraumatic, Normocephalic  EYES: EOMI, PERRLA, conjunctiva and sclera clear  ENT: Pharynx not erythematous  PULMONARY: Clear to auscultation bilaterally; No wheeze  CARDIOVASCULAR: Regular rate and rhythm; No murmurs, rubs, or gallops  ABDOMEN: Soft, Nontender, Nondistended; Bowel sounds present  EXTREMITIES:  2+ Peripheral Pulses, No clubbing, cyanosis, or edema  MUSCULOSKELETAL: generally weak but strength equal  NEURO: AAOx3, slightly lethargic today in comparison to yesterday, non focal  SKIN: warm and dry, No rashes or lesions

## 2019-07-07 NOTE — PROGRESS NOTE ADULT - ASSESSMENT
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  continue decadron 2 q 12 and than on 7/9 decrease to 1 q 12, ent to scope in am    - unable to close right eye, continue eye drops plastic surgery saw no need for gold weight   Respiratory: incentive spirometry  CV: keep sbp 100-160 continue norvasc and losartan for hypertension  Endocrine: euyglycemia   Heme/Onc: a1c 5.1, hiss while on steroids         DVT ppx: lovenox and scds  Renal: ivl voiding   ID: afebrile wbc elevated secondary to high dose steroids follow up am labs   GI:  peg feeds continue colace and senna   PT/OT: acute rehab   Will discuss with Dr. Monroy      Assessment:  Please Check When Present   [] Acute blood loss anemia  []Cerebral edema, [] Brain compression/ herniation,     []  GCS  E   V  M     Heart Failure: []Acute, [] acute on chronic , []chronic  Heart Failure:  [] Diastolic (HFpEF), [] Systolic (HFrEF), []Combined (HFpEF and HFrEF), [] RHF, [] Pulm HTN, [] Other    [] BRENDA, [] ATN, [] AIN, [] other  [] CKD1, [] CKD2, [] CKD 3, [] CKD 4, [] CKD 5, []ESRD    Encephalopathy: [] Metabolic, [] Hepatic, [] toxic, [] Neurological, [] Other    Abnormal Nurtitional Status: [] malnurtition (see nutrition note), [ ]underweight: BMI < 19, [] morbid obesity: BMI >40, [] Cachexia  [] Functional quadriplegia  [] Sepsis  [] hypovolemic shock,[] cardiogenic shock, [] hemorrhagic shock, [] neuogenic shock  [] Acute Respiratory Failure          Spectralink # [id number]

## 2019-07-08 LAB
ANION GAP SERPL CALC-SCNC: 10 MMOL/L — SIGNIFICANT CHANGE UP (ref 5–17)
BUN SERPL-MCNC: 31 MG/DL — HIGH (ref 7–23)
CALCIUM SERPL-MCNC: 9.9 MG/DL — SIGNIFICANT CHANGE UP (ref 8.4–10.5)
CHLORIDE SERPL-SCNC: 95 MMOL/L — LOW (ref 96–108)
CO2 SERPL-SCNC: 31 MMOL/L — SIGNIFICANT CHANGE UP (ref 22–31)
CREAT SERPL-MCNC: 0.81 MG/DL — SIGNIFICANT CHANGE UP (ref 0.5–1.3)
GLUCOSE BLDC GLUCOMTR-MCNC: 126 MG/DL — HIGH (ref 70–99)
GLUCOSE BLDC GLUCOMTR-MCNC: 129 MG/DL — HIGH (ref 70–99)
GLUCOSE BLDC GLUCOMTR-MCNC: 139 MG/DL — HIGH (ref 70–99)
GLUCOSE SERPL-MCNC: 140 MG/DL — HIGH (ref 70–99)
POTASSIUM SERPL-MCNC: 4.8 MMOL/L — SIGNIFICANT CHANGE UP (ref 3.5–5.3)
POTASSIUM SERPL-SCNC: 4.8 MMOL/L — SIGNIFICANT CHANGE UP (ref 3.5–5.3)
SODIUM SERPL-SCNC: 136 MMOL/L — SIGNIFICANT CHANGE UP (ref 135–145)

## 2019-07-08 PROCEDURE — 31575 DIAGNOSTIC LARYNGOSCOPY: CPT

## 2019-07-08 PROCEDURE — 99233 SBSQ HOSP IP/OBS HIGH 50: CPT

## 2019-07-08 PROCEDURE — 99232 SBSQ HOSP IP/OBS MODERATE 35: CPT | Mod: 25

## 2019-07-08 PROCEDURE — 99232 SBSQ HOSP IP/OBS MODERATE 35: CPT

## 2019-07-08 RX ORDER — AMLODIPINE BESYLATE 2.5 MG/1
5 TABLET ORAL DAILY
Refills: 0 | Status: DISCONTINUED | OUTPATIENT
Start: 2019-07-09 | End: 2019-07-10

## 2019-07-08 RX ADMIN — Medication 2 MILLIGRAM(S): at 17:10

## 2019-07-08 RX ADMIN — Medication 1 DROP(S): at 06:03

## 2019-07-08 RX ADMIN — ALBUTEROL 2.5 MILLIGRAM(S): 90 AEROSOL, METERED ORAL at 11:03

## 2019-07-08 RX ADMIN — Medication 1 DROP(S): at 11:03

## 2019-07-08 RX ADMIN — Medication 1 DROP(S): at 23:41

## 2019-07-08 RX ADMIN — Medication 1 APPLICATION(S): at 23:40

## 2019-07-08 RX ADMIN — ALBUTEROL 2.5 MILLIGRAM(S): 90 AEROSOL, METERED ORAL at 17:11

## 2019-07-08 RX ADMIN — Medication 1 DROP(S): at 23:38

## 2019-07-08 RX ADMIN — ENOXAPARIN SODIUM 30 MILLIGRAM(S): 100 INJECTION SUBCUTANEOUS at 06:04

## 2019-07-08 RX ADMIN — LATANOPROST 1 DROP(S): 0.05 SOLUTION/ DROPS OPHTHALMIC; TOPICAL at 23:39

## 2019-07-08 RX ADMIN — Medication 1 DROP(S): at 01:51

## 2019-07-08 RX ADMIN — ENOXAPARIN SODIUM 30 MILLIGRAM(S): 100 INJECTION SUBCUTANEOUS at 17:10

## 2019-07-08 RX ADMIN — Medication 1 DROP(S): at 17:10

## 2019-07-08 RX ADMIN — ALBUTEROL 2.5 MILLIGRAM(S): 90 AEROSOL, METERED ORAL at 06:03

## 2019-07-08 RX ADMIN — AMLODIPINE BESYLATE 10 MILLIGRAM(S): 2.5 TABLET ORAL at 06:03

## 2019-07-08 RX ADMIN — Medication 1 DROP(S): at 06:04

## 2019-07-08 RX ADMIN — LOSARTAN POTASSIUM 25 MILLIGRAM(S): 100 TABLET, FILM COATED ORAL at 06:04

## 2019-07-08 RX ADMIN — ALBUTEROL 2.5 MILLIGRAM(S): 90 AEROSOL, METERED ORAL at 01:52

## 2019-07-08 RX ADMIN — Medication 100 MILLIGRAM(S): at 06:04

## 2019-07-08 RX ADMIN — ALBUTEROL 2.5 MILLIGRAM(S): 90 AEROSOL, METERED ORAL at 23:39

## 2019-07-08 RX ADMIN — Medication 2 MILLIGRAM(S): at 06:04

## 2019-07-08 NOTE — PROGRESS NOTE ADULT - SUBJECTIVE AND OBJECTIVE BOX
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 Monroy, course complicated by stage 1 pressure injury. ENT consulted on pt after c/o L ear pain and odynophagia thought to be 2/2 thrush with no improvement w nystatin. Laryngoscopy done yesterday 7/2 suspicious for base of tongue fullness and asymmetry [mass vs. swelling] and L AE fold edema and erythema. Denies external tenderness, n/v, tinnitus, dizziness, congestion, recent URI, otorrhea, hearing loss, hx of sx or trauma or recent travel. Pt presently NPO w tube feed. 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  Light-headedness  Glaucoma  Asthma: controlled with meds  HTN (hypertension)  History of ankle fracture: h/o repair  Fractured elbow: left elbow fracture repair,2007  H/O appendicitis: h/o appendicectomy    Allergies    No Known Drug Allergies  shellfish (Angioedema; Rash)    Intolerances      MEDICATIONS  (STANDING):  ALBUTerol    0.083% 2.5 milliGRAM(s) Nebulizer every 6 hours  amLODIPine   Tablet 10 milliGRAM(s) Oral daily  artificial  tears Solution 1 Drop(s) Right EYE every 6 hours  dexamethasone     Tablet 2 milliGRAM(s) Oral every 12 hours  docusate sodium Liquid 100 milliGRAM(s) Oral two times a day  enoxaparin Injectable 30 milliGRAM(s) SubCutaneous every 12 hours  insulin lispro (HumaLOG) corrective regimen sliding scale   SubCutaneous every 6 hours  latanoprost 0.005% Ophthalmic Solution 1 Drop(s) Both EYES at bedtime  losartan 25 milliGRAM(s) Oral daily  petrolatum Ophthalmic Ointment 1 Application(s) Right EYE at bedtime  prednisoLONE acetate 1% Suspension 1 Drop(s) Both EYES four times a day  senna Syrup 5 milliLiter(s) Oral at bedtime    MEDICATIONS  (PRN):  acetaminophen    Suspension .. 650 milliGRAM(s) Oral every 6 hours PRN Temp greater or equal to 38.5C (101.3F), Mild Pain (1 - 3)      social history: see consult     family history: see consult     ROS:   ENT: all negative except as noted in HPI   Pulm: denies SOB, cough, hemoptysis  Neuro: denies numbness/tingling, loss of sensation  Endo: denies heat/cold intolerance, excessive sweating      Vital Signs Last 24 Hrs  T(C): 37.3 (08 Jul 2019 04:41), Max: 37.5 (07 Jul 2019 08:00)  T(F): 99.2 (08 Jul 2019 04:41), Max: 99.5 (07 Jul 2019 08:00)  HR: 97 (08 Jul 2019 04:41) (91 - 99)  BP: 129/71 (08 Jul 2019 04:41) (99/55 - 129/71)  BP(mean): --  RR: 18 (08 Jul 2019 04:41) (18 - 18)  SpO2: 100% (08 Jul 2019 04:41) (96% - 100%)                          11.6   15.7  )-----------( 366      ( 07 Jul 2019 12:56 )             36.3             PHYSICAL EXAM:  Gen: NAD, difficult to understand when she speaks  Skin: No rashes, bruises, or lesions  Head: Normocephalic  Face: ecchymosis noted on chin s/p traumatic intubation, no edema, erythema, or fluctuance. Parotid glands soft without mass  Eyes: no scleral injection  Nose: Nares bilaterally patent, no discharge, Keofeed tube in place  Mouth: Ulceration on buccal side of mandibular & tongue, adherent yellow exudate across anterior tongue, no Stridor / Drooling / Trismus. Mucosa moist, uvula midline.  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 Monroy, course complicated by stage 1 pressure injury. ENT consulted on pt after c/o L ear pain and odynophagia thought to be 2/2 thrush with no improvement w nystatin. Laryngoscopy done yesterday 7/2 suspicious for base of tongue fullness and asymmetry [mass vs. swelling] and L AE fold edema and erythema. Denies external tenderness, n/v, tinnitus, dizziness, congestion, recent URI, otorrhea, hearing loss, hx of sx or trauma or recent travel. Pt presently NPO w tube feed. 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  Light-headedness  Glaucoma  Asthma: controlled with meds  HTN (hypertension)  History of ankle fracture: h/o repair  Fractured elbow: left elbow fracture repair,2007  H/O appendicitis: h/o appendicectomy    Allergies    No Known Drug Allergies  shellfish (Angioedema; Rash)    Intolerances      MEDICATIONS  (STANDING):  ALBUTerol    0.083% 2.5 milliGRAM(s) Nebulizer every 6 hours  amLODIPine   Tablet 10 milliGRAM(s) Oral daily  artificial  tears Solution 1 Drop(s) Right EYE every 6 hours  dexamethasone     Tablet 2 milliGRAM(s) Oral every 12 hours  docusate sodium Liquid 100 milliGRAM(s) Oral two times a day  enoxaparin Injectable 30 milliGRAM(s) SubCutaneous every 12 hours  insulin lispro (HumaLOG) corrective regimen sliding scale   SubCutaneous every 6 hours  latanoprost 0.005% Ophthalmic Solution 1 Drop(s) Both EYES at bedtime  losartan 25 milliGRAM(s) Oral daily  petrolatum Ophthalmic Ointment 1 Application(s) Right EYE at bedtime  prednisoLONE acetate 1% Suspension 1 Drop(s) Both EYES four times a day  senna Syrup 5 milliLiter(s) Oral at bedtime    MEDICATIONS  (PRN):  acetaminophen    Suspension .. 650 milliGRAM(s) Oral every 6 hours PRN Temp greater or equal to 38.5C (101.3F), Mild Pain (1 - 3)      social history: see consult     family history: see consult     ROS:   ENT: all negative except as noted in HPI   Pulm: denies SOB, cough, hemoptysis  Neuro: denies numbness/tingling, loss of sensation  Endo: denies heat/cold intolerance, excessive sweating      Vital Signs Last 24 Hrs  T(C): 37.3 (08 Jul 2019 04:41), Max: 37.5 (07 Jul 2019 08:00)  T(F): 99.2 (08 Jul 2019 04:41), Max: 99.5 (07 Jul 2019 08:00)  HR: 97 (08 Jul 2019 04:41) (91 - 99)  BP: 129/71 (08 Jul 2019 04:41) (99/55 - 129/71)  BP(mean): --  RR: 18 (08 Jul 2019 04:41) (18 - 18)  SpO2: 100% (08 Jul 2019 04:41) (96% - 100%)                          11.6   15.7  )-----------( 366      ( 07 Jul 2019 12:56 )             36.3             PHYSICAL EXAM:  Gen: NAD, difficult to understand when she speaks  Skin: No rashes, bruises, or lesions  Head: Normocephalic  Face: ecchymosis noted on chin s/p traumatic intubation, no edema, erythema, or fluctuance. Parotid glands soft without mass  Eyes: no scleral injection  Nose: Nares bilaterally patent, no discharge, Keofeed tube in place  Mouth: Ulceration on buccal side of mandibular & tongue, adherent yellow exudate across anterior tongue, no Stridor / Drooling / Trismus. Mucosa moist, uvula midline.  Neck: Flat, supple, no lymphadenopathy, trachea midline, no masses  Lymphatic: No lymphadenopathy  Resp: no stridor  CV: no peripheral edema/cyanosis  GI: nondistended   Peripheral vascular: no JVD or edema  Neuro: b/l 6th nerve palsy, R 7th nerve palsy      Reason for Laryngoscopy:     Patient was unable to cooperate with mirror.  Nasopharynx, posterior pharyngeal wall, vallecula, epiglottis, and subglottis appear normal. No erythema, edema, pooling of secretions, masses or lesions. Airway patent, no foreign body visualized. No glottic/supraglottic edema. True vocal cords, arytenoids, vestibular folds, ventricles, pyriform sinuses, and aryepiglottic folds appear normal bilaterally. Vocal cords mobile with good contact b/l.    L Base of tongue mass still noted along with sig secretions. 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 Monroy, course complicated by stage 1 pressure injury. ENT consulted on pt after c/o L ear pain and odynophagia thought to be 2/2 thrush with no improvement w nystatin. Laryngoscopy done yesterday 7/2 suspicious for base of tongue fullness and asymmetry [mass vs. swelling] and L AE fold edema and erythema. Denies external tenderness, n/v, tinnitus, dizziness, congestion, recent URI, otorrhea, hearing loss, hx of sx or trauma or recent travel. Pt presently NPO w tube feed. 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  Light-headedness  Glaucoma  Asthma: controlled with meds  HTN (hypertension)  History of ankle fracture: h/o repair  Fractured elbow: left elbow fracture repair,2007  H/O appendicitis: h/o appendicectomy    Allergies    No Known Drug Allergies  shellfish (Angioedema; Rash)    Intolerances      MEDICATIONS  (STANDING):  ALBUTerol    0.083% 2.5 milliGRAM(s) Nebulizer every 6 hours  amLODIPine   Tablet 10 milliGRAM(s) Oral daily  artificial  tears Solution 1 Drop(s) Right EYE every 6 hours  dexamethasone     Tablet 2 milliGRAM(s) Oral every 12 hours  docusate sodium Liquid 100 milliGRAM(s) Oral two times a day  enoxaparin Injectable 30 milliGRAM(s) SubCutaneous every 12 hours  insulin lispro (HumaLOG) corrective regimen sliding scale   SubCutaneous every 6 hours  latanoprost 0.005% Ophthalmic Solution 1 Drop(s) Both EYES at bedtime  losartan 25 milliGRAM(s) Oral daily  petrolatum Ophthalmic Ointment 1 Application(s) Right EYE at bedtime  prednisoLONE acetate 1% Suspension 1 Drop(s) Both EYES four times a day  senna Syrup 5 milliLiter(s) Oral at bedtime    MEDICATIONS  (PRN):  acetaminophen    Suspension .. 650 milliGRAM(s) Oral every 6 hours PRN Temp greater or equal to 38.5C (101.3F), Mild Pain (1 - 3)      social history: see consult     family history: see consult     ROS:   ENT: all negative except as noted in HPI   Pulm: denies SOB, cough, hemoptysis  Neuro: denies numbness/tingling, loss of sensation  Endo: denies heat/cold intolerance, excessive sweating      Vital Signs Last 24 Hrs  T(C): 37.3 (08 Jul 2019 04:41), Max: 37.5 (07 Jul 2019 08:00)  T(F): 99.2 (08 Jul 2019 04:41), Max: 99.5 (07 Jul 2019 08:00)  HR: 97 (08 Jul 2019 04:41) (91 - 99)  BP: 129/71 (08 Jul 2019 04:41) (99/55 - 129/71)  BP(mean): --  RR: 18 (08 Jul 2019 04:41) (18 - 18)  SpO2: 100% (08 Jul 2019 04:41) (96% - 100%)                          11.6   15.7  )-----------( 366      ( 07 Jul 2019 12:56 )             36.3             PHYSICAL EXAM:  Gen: NAD, difficult to understand when she speaks  Skin: No rashes, bruises, or lesions  Head: Normocephalic  Face: ecchymosis noted on chin s/p traumatic intubation, no edema, erythema, or fluctuance. Parotid glands soft without mass  Eyes: no scleral injection  Nose: Nares bilaterally patent, no discharge, Keofeed tube in place  Mouth: Ulceration on buccal side of mandibular & tongue, adherent yellow exudate across anterior tongue, no Stridor / Drooling / Trismus. Mucosa moist, uvula midline.  Neck: Flat, supple, no lymphadenopathy, trachea midline, no masses  Lymphatic: No lymphadenopathy  Resp: no stridor  CV: no peripheral edema/cyanosis  GI: nondistended   Peripheral vascular: no JVD or edema  Neuro: b/l 6th nerve palsy, R 7th nerve palsy      Reason for Laryngoscopy:     Patient was unable to cooperate with mirror.  Nasopharynx, posterior pharyngeal wall, vallecula, epiglottis, and subglottis appear normal. No erythema, edema, pooling of secretions, masses or lesions. Airway patent, no foreign body visualized. No glottic/supraglottic edema. True vocal cords, arytenoids, vestibular folds, ventricles, pyriform sinuses, and aryepiglottic folds appear normal bilaterally.  Left TVC with almost no movement noted, difficult exam due to secretions - possible severe paresis vs paralysis.   L Base of tongue mass still noted along with significant secretions.

## 2019-07-08 NOTE — PROGRESS NOTE ADULT - ASSESSMENT
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        1. 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     Rpt MRI on 7/3- redemonstration of suboccipital craniotomy, extracalvarial CSF signal intensity collection approximating the craniotomy is slightly increased in size, currently measuring 10 mm in greatest depth, previously measuring 7 mm, small amount of hemorrhage and edema in the region of the surgical bed, residual nonspecific 0.8 x 0.5 x 2.1 cm (anterior-posterior by transverse by craniocaudad) focus of enhancement along the posterior aspect of the inferior fourth ventricle may represent postsurgical changes.     Steroid taper per primary team.     2. Dysphagia- S/p PEG on 7/5, tolerating feeds.     3. HTN- On Amlodipine and Losartan. BP borderline low since yesterday. Amlodipine dose decreased, w holding parameters.     4. Throat and L ear pain- Seen by ENT- Indirect laryngoscopy- L base of tongue mass noted and edematous ae fold and arytenoid possibly due to traumatic intubation. MRI soft tissue done but poor quality due to motion. For repeat scope today. 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        1. 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     Rpt MRI on 7/3- redemonstration of suboccipital craniotomy, extracalvarial CSF signal intensity collection approximating the craniotomy is slightly increased in size, currently measuring 10 mm in greatest depth, previously measuring 7 mm, small amount of hemorrhage and edema in the region of the surgical bed, residual nonspecific 0.8 x 0.5 x 2.1 cm (anterior-posterior by transverse by craniocaudad) focus of enhancement along the posterior aspect of the inferior fourth ventricle may represent postsurgical changes.     Steroid taper per primary team.     2. Dysphagia- S/p PEG on 7/5, tolerating feeds.     3. HTN- On Amlodipine and Losartan. BP borderline low since yesterday. Amlodipine dose decreased, w holding parameters.     4. Throat and L ear pain- Seen by ENT- Indirect laryngoscopy- L base of tongue mass noted and edematous ae fold and arytenoid possibly due to traumatic intubation. MRI soft tissue done but poor quality due to motion. Awaiting CT.

## 2019-07-08 NOTE — PROGRESS NOTE ADULT - SUBJECTIVE AND OBJECTIVE BOX
SUBJECTIVE:     Vital Signs Last 24 Hrs  T(C): 37.3 (07-08-19 @ 12:18), Max: 37.4 (07-08-19 @ 09:01)  T(F): 99.1 (07-08-19 @ 12:18), Max: 99.4 (07-08-19 @ 09:01)  HR: 90 (07-08-19 @ 12:20) (90 - 99)  BP: 102/67 (07-08-19 @ 12:20) (102/67 - 129/71)  BP(mean): --  RR: 18 (07-08-19 @ 12:18) (18 - 18)  SpO2: 97% (07-08-19 @ 12:20) (96% - 100%)    PHYSICAL EXAM:    Constitutional: No Acute Distress     Neurological: Awake, alert, oriented to person, place and time, speech hypophonic and muffled, b/l 6th nerve palsy, Rt 7th nerve palsy, unable to close right eye, moves all extremities with 5/5 strength, sensation intact to light touch throughout    Incision: staples CDI    Pulmonary: Clear to Auscultation, No rales, No rhonchi, No wheezes     Cardiovascular: S1, S2, Regular rate and rhythm     Gastrointestinal: Soft, Non-tender, Non-distended, +bowel sounds x 4    Extremities: No calf tenderness bilaterally, no cyanosis, clubbing or edema          LABS:                          11.6   15.7  )-----------( 366      ( 07 Jul 2019 12:56 )             36.3            07-07 @ 07:01  -  07-08 @ 07:00  --------------------------------------------------------  IN: 0 mL / OUT: 100 mL / NET: -100 mL    07-08 @ 07:01  -  07-08 @ 14:03  --------------------------------------------------------  IN: 353 mL / OUT: 0 mL / NET: 353 mL        IMAGING: < from: MR Neck Soft Tissue Only w/wo IV Cont (07.02.19 @ 19:56) >  EXAM:  MR NECK SOFT TISSUE ONLY [facility]                            PROCEDURE DATE:  07/02/2019            INTERPRETATION:  CLINICAL INFORMATION: 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    TECHNIQUE: Contrast-enhanced MRI of the neck was performed.    Coronal and axial STIR, T1, post contrast-enhanced T1 fat suppression   sagittal STIR sequences were obtained.    10 mls of Gadavist was administered intravenously without complication   and 0 mls were discarded.    COMPARISON: MRI brain of the same day, 7/2/2019 MRI brain 5/21/2019.    FINDINGS:    Compared with MRI of the brain of same date 7/2/2019, unchanged   suboccipital craniotomy and C1 laminectomy with subjacent extracalvarial   fluid collection measuring 4.7 x 6.3 x 6 cm, AP, TR, CC better seen on   brain MRI, and poorly delineated on neck MRI because of motion.   Differential for fluid collection includes a CSF leak, meningocele,   resolving seroma, hematoma, superimposed infectious or inflammatory   change with abscess formation not excluded.    There is redemonstration of a subjacent subdural collection along the   left posterior fossa measuring 1.3 cm in maximum AP dimension, with   resolving hemorrhage, with edema and mass effect along the midline   cerebellar tonsils, and extension of the hemorrhage into the fourth   ventricle, with hemorrhage layering in the posterior occipital horns of   the lateral ventricles. Please see corresponding MRI brain for brain   findings.    There is a partially visualized nasogastric tube in situ. There is dental   susceptibility artifact limiting assessment of the oral cavity. There is   a heterogeneous left lobe of thyroid deviating the trachea rightward,   that couldbetter assessed with ultrasound, the neck soft tissues are   severely limited in assessment due to motion. There are bilateral mastoid   air cell effusions. There is a retention cyst or polyp in the left   maxillary sinus with opacification and bubbly secretions and air-fluid   level in the sphenoid sinus. Please see separate MRI brain report for   findings in the posterior fossa.    IMPRESSION:    Status post suboccipital craniotomy and C1 laminectomy with 4.7 x 6.3 x 6   cm AP, TR, CC subjacentextra calvarial fluid collection, differential   includes CSF leak, meningocele, resolving seroma, hematoma, subcutaneous   infection infection with abscess formation not excluded. Please see   corresponding MRI brain for findings in the posterior fossa.                ADITHYA JOSE M.D., RADIOLOGY RESIDENT  This document has been electronically signed.  HALLE HAND M.D., ATTENDING RADIOLOGIST  This document has been electronically signed. Jul  3 2019  5:20PM                < end of copied text >      MEDICATIONS:  Antibiotics:    Neuro:  acetaminophen    Suspension .. 650 milliGRAM(s) Oral every 6 hours PRN Temp greater or equal to 38.5C (101.3F), Mild Pain (1 - 3)    Cardiac:  amLODIPine   Tablet 10 milliGRAM(s) Oral daily  losartan 25 milliGRAM(s) Oral daily    Pulm:  ALBUTerol    0.083% 2.5 milliGRAM(s) Nebulizer every 6 hours    GI/:  docusate sodium Liquid 100 milliGRAM(s) Oral two times a day  senna Syrup 5 milliLiter(s) Oral at bedtime    Other:   artificial  tears Solution 1 Drop(s) Right EYE every 6 hours  dexamethasone     Tablet 2 milliGRAM(s) Oral every 12 hours  enoxaparin Injectable 30 milliGRAM(s) SubCutaneous every 12 hours  insulin lispro (HumaLOG) corrective regimen sliding scale   SubCutaneous every 6 hours  latanoprost 0.005% Ophthalmic Solution 1 Drop(s) Both EYES at bedtime  petrolatum Ophthalmic Ointment 1 Application(s) Right EYE at bedtime  prednisoLONE acetate 1% Suspension 1 Drop(s) Both EYES four times a day        DIET: Tube feeds (Glucerna 1.2) via PEG SUBJECTIVE:     Vital Signs Last 24 Hrs  T(C): 37.3 (07-08-19 @ 12:18), Max: 37.4 (07-08-19 @ 09:01)  T(F): 99.1 (07-08-19 @ 12:18), Max: 99.4 (07-08-19 @ 09:01)  HR: 90 (07-08-19 @ 12:20) (90 - 99)  BP: 102/67 (07-08-19 @ 12:20) (102/67 - 129/71)  BP(mean): --  RR: 18 (07-08-19 @ 12:18) (18 - 18)  SpO2: 97% (07-08-19 @ 12:20) (96% - 100%)    PHYSICAL EXAM:    Constitutional: No Acute Distress     Neurological: Awake, alert, oriented to person, place and time, speech hypophonic and muffled, b/l 6th nerve palsy, Rt 7th nerve palsy, unable to close right eye, moves all extremities with 5/5 strength, sensation intact to light touch throughout    Incision: sutures CDI    Pulmonary: Clear to Auscultation, No rales, No rhonchi, No wheezes     Cardiovascular: S1, S2, Regular rate and rhythm     Gastrointestinal: Soft, Non-tender, Non-distended, +bowel sounds x 4    Extremities: No calf tenderness bilaterally, no cyanosis, clubbing or edema          LABS:                          11.6   15.7  )-----------( 366      ( 07 Jul 2019 12:56 )             36.3            07-07 @ 07:01  -  07-08 @ 07:00  --------------------------------------------------------  IN: 0 mL / OUT: 100 mL / NET: -100 mL    07-08 @ 07:01  -  07-08 @ 14:03  --------------------------------------------------------  IN: 353 mL / OUT: 0 mL / NET: 353 mL        IMAGING: < from: MR Neck Soft Tissue Only w/wo IV Cont (07.02.19 @ 19:56) >  EXAM:  MR NECK SOFT TISSUE ONLY [facility]                            PROCEDURE DATE:  07/02/2019            INTERPRETATION:  CLINICAL INFORMATION: 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    TECHNIQUE: Contrast-enhanced MRI of the neck was performed.    Coronal and axial STIR, T1, post contrast-enhanced T1 fat suppression   sagittal STIR sequences were obtained.    10 mls of Gadavist was administered intravenously without complication   and 0 mls were discarded.    COMPARISON: MRI brain of the same day, 7/2/2019 MRI brain 5/21/2019.    FINDINGS:    Compared with MRI of the brain of same date 7/2/2019, unchanged   suboccipital craniotomy and C1 laminectomy with subjacent extracalvarial   fluid collection measuring 4.7 x 6.3 x 6 cm, AP, TR, CC better seen on   brain MRI, and poorly delineated on neck MRI because of motion.   Differential for fluid collection includes a CSF leak, meningocele,   resolving seroma, hematoma, superimposed infectious or inflammatory   change with abscess formation not excluded.    There is redemonstration of a subjacent subdural collection along the   left posterior fossa measuring 1.3 cm in maximum AP dimension, with   resolving hemorrhage, with edema and mass effect along the midline   cerebellar tonsils, and extension of the hemorrhage into the fourth   ventricle, with hemorrhage layering in the posterior occipital horns of   the lateral ventricles. Please see corresponding MRI brain for brain   findings.    There is a partially visualized nasogastric tube in situ. There is dental   susceptibility artifact limiting assessment of the oral cavity. There is   a heterogeneous left lobe of thyroid deviating the trachea rightward,   that couldbetter assessed with ultrasound, the neck soft tissues are   severely limited in assessment due to motion. There are bilateral mastoid   air cell effusions. There is a retention cyst or polyp in the left   maxillary sinus with opacification and bubbly secretions and air-fluid   level in the sphenoid sinus. Please see separate MRI brain report for   findings in the posterior fossa.    IMPRESSION:    Status post suboccipital craniotomy and C1 laminectomy with 4.7 x 6.3 x 6   cm AP, TR, CC subjacentextra calvarial fluid collection, differential   includes CSF leak, meningocele, resolving seroma, hematoma, subcutaneous   infection infection with abscess formation not excluded. Please see   corresponding MRI brain for findings in the posterior fossa.                ADITHYA JOSE M.D., RADIOLOGY RESIDENT  This document has been electronically signed.  HALLE HAND M.D., ATTENDING RADIOLOGIST  This document has been electronically signed. Jul  3 2019  5:20PM                < end of copied text >      MEDICATIONS:  Antibiotics:    Neuro:  acetaminophen    Suspension .. 650 milliGRAM(s) Oral every 6 hours PRN Temp greater or equal to 38.5C (101.3F), Mild Pain (1 - 3)    Cardiac:  amLODIPine   Tablet 10 milliGRAM(s) Oral daily  losartan 25 milliGRAM(s) Oral daily    Pulm:  ALBUTerol    0.083% 2.5 milliGRAM(s) Nebulizer every 6 hours    GI/:  docusate sodium Liquid 100 milliGRAM(s) Oral two times a day  senna Syrup 5 milliLiter(s) Oral at bedtime    Other:   artificial  tears Solution 1 Drop(s) Right EYE every 6 hours  dexamethasone     Tablet 2 milliGRAM(s) Oral every 12 hours  enoxaparin Injectable 30 milliGRAM(s) SubCutaneous every 12 hours  insulin lispro (HumaLOG) corrective regimen sliding scale   SubCutaneous every 6 hours  latanoprost 0.005% Ophthalmic Solution 1 Drop(s) Both EYES at bedtime  petrolatum Ophthalmic Ointment 1 Application(s) Right EYE at bedtime  prednisoLONE acetate 1% Suspension 1 Drop(s) Both EYES four times a day        DIET: Tube feeds (Glucerna 1.2) via PEG

## 2019-07-08 NOTE — PROGRESS NOTE ADULT - SUBJECTIVE AND OBJECTIVE BOX
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 (07 Jul 2019 13:34)      INTERVAL HPI/OVERNIGHT EVENTS:  no events reported over weekend  tolerating PEG feeds  no diarrhea      MEDICATIONS  (STANDING):  ALBUTerol    0.083% 2.5 milliGRAM(s) Nebulizer every 6 hours  amLODIPine   Tablet 10 milliGRAM(s) Oral daily  artificial  tears Solution 1 Drop(s) Right EYE every 6 hours  dexamethasone     Tablet 2 milliGRAM(s) Oral every 12 hours  docusate sodium Liquid 100 milliGRAM(s) Oral two times a day  enoxaparin Injectable 30 milliGRAM(s) SubCutaneous every 12 hours  insulin lispro (HumaLOG) corrective regimen sliding scale   SubCutaneous every 6 hours  latanoprost 0.005% Ophthalmic Solution 1 Drop(s) Both EYES at bedtime  losartan 25 milliGRAM(s) Oral daily  petrolatum Ophthalmic Ointment 1 Application(s) Right EYE at bedtime  prednisoLONE acetate 1% Suspension 1 Drop(s) Both EYES four times a day  senna Syrup 5 milliLiter(s) Oral at bedtime    MEDICATIONS  (PRN):  acetaminophen    Suspension .. 650 milliGRAM(s) Oral every 6 hours PRN Temp greater or equal to 38.5C (101.3F), Mild Pain (1 - 3)    Allergies  No Known Drug Allergies  shellfish (Angioedema; Rash)      Review of Systems:  unable to obtain    Vital Signs Last 24 Hrs  T(C): 37.3 (08 Jul 2019 12:18), Max: 37.4 (08 Jul 2019 09:01)  T(F): 99.1 (08 Jul 2019 12:18), Max: 99.4 (08 Jul 2019 09:01)  HR: 90 (08 Jul 2019 12:20) (90 - 99)  BP: 102/67 (08 Jul 2019 12:20) (102/67 - 129/71)  BP(mean): --  RR: 18 (08 Jul 2019 12:18) (18 - 18)  SpO2: 97% (08 Jul 2019 12:20) (96% - 100%)    PHYSICAL EXAM:  Constitutional: non toxic , NAD, obese, female OOB to chair. responsive but drowsy  Neck:  supple  Respiratory:  anterior chest wall clear to auscultation   Cardiovascular: S1 and S2, RRR, no murmur  Gastrointestinal: soft, obese, non-tender; +BS , PEG external bumper at 5.5cm lightly touching skin and  spins freely 360 degrees  Extremities: No  cyanosis  Skin: no rash    LABS:                        11.6   15.7  )-----------( 366      ( 07 Jul 2019 12:56 )             36.3           RADIOLOGY & ADDITIONAL TESTS:

## 2019-07-08 NOTE — PROGRESS NOTE ADULT - PROBLEM SELECTOR PLAN 1
Likely edema given mild improvement, Cont w Decadron for 48 hours total  ENT will continue to follow. Likely edema given mild improvement  ENT will rescope today - f/u CT soft tissue neck with contrast   - will continue to follow   - call ent prn

## 2019-07-08 NOTE — PROVIDER CONTACT NOTE (OTHER) - ASSESSMENT
BP 98/53, HR 98, Temp 99 F, Sp02 95%. No s/s of distress. Pt resting comfortably in bed. Bleeding at PEG site reported from day nurse but no bleeding noted at this time. PEG site dry & intact.

## 2019-07-08 NOTE — PROGRESS NOTE ADULT - ASSESSMENT
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    Please Check When Present   []  GCS  E   V  M     Heart Failure: []Acute, [] acute on chronic , []chronic  Heart Failure:  [] Diastolic (HFpEF), [] Systolic (HFrEF), []Combined (HFpEF and HFrEF), [] RHF, [] Pulm HTN, [] Other    [] BRENDA, [] ATN, [] AIN, [] other  [] CKD1, [] CKD2, [] CKD 3, [] CKD 4, [] CKD 5, []ESRD    Encephalopathy: [] Metabolic, [] Hepatic, [] toxic, [] Neurological, [] Other    Abnormal Nurtitional Status: [] malnurtition (see nutrition note), [ ]underweight: BMI < 19, [] morbid obesity: BMI >40, [] Cachexia    [] Sepsis  [] hypovolemic shock,[] cardiogenic shock, [] hemorrhagic shock, [] neuogenic shock  [] Acute Respiratory Failure  []Cerebral edema, [] Brain compression/ herniation,   [] Functional quadriplegia  [] Acute blood loss anemia      PLAN:     Neuro:  - Neuro/vitals q4h  - on slow decadron taper: will taper decadron 1 q12h tomorrow (7/9/19)  - Tylenol prn for pain  - Due to Rt facial droop and inability to close right eye, pt is on artifical tears solution 1 drop right eye q6h and petrolatum ophthalmic ointment 1 application right eye at bedtime    Optho:  - Due to history of glaucoma, pt is currently on prednisolone acetate 1% suspension 1 drop both eyes 4x a day, latanoprost 0.005% ophthalmic solution 1 drop both eyes at bedtime    ENT:  - ENT is following  - As per ENT, L base of tongue mass with sig secretions still noted. They recommend a CT neck soft tissue w/wo IV contrast to better evaluate tongue mass  - previous MR neck soft tissue only w/wo IV contrast done but limited ability to evaluate because of artifact (pt was moving)    CV:  - Amlodipine 10 mg PO QD and losartan 25 mg for HTN  - stable    Pulm:  - due to history of asthma, pt continue with albuterol 2.5 mg nebulizer every 6h    GI:  - senna and colace for bowel regimen  - as per GI, monitor for bowel movements  - Pt is post op day 3 from PEG tube placement  - Gastro following    Renal:  - IVL    ID:  - afebrile    Heme/onc:  - lovenox 30 q12 for DVT ppx    Endo:  - Pt is currently on insulin sliding scale    Dispo: PT/OT recommend acute rehab HPI:  69 year old right handed morbidly obese female , PMH of extrinsic asthma, glaucoma, HTN & OA,  reports having recent light headedness, dizziness & some balance disturbance for about three months,  PMD ordered a brain MRI which showed a 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    Please Check When Present   []  GCS  E   V  M     Heart Failure: []Acute, [] acute on chronic , []chronic  Heart Failure:  [] Diastolic (HFpEF), [] Systolic (HFrEF), []Combined (HFpEF and HFrEF), [] RHF, [] Pulm HTN, [] Other    [] BRENDA, [] ATN, [] AIN, [] other  [] CKD1, [] CKD2, [] CKD 3, [] CKD 4, [] CKD 5, []ESRD    Encephalopathy: [] Metabolic, [] Hepatic, [] toxic, [] Neurological, [] Other    Abnormal Nurtitional Status: [] malnurtition (see nutrition note), [ ]underweight: BMI < 19, [] morbid obesity: BMI >40, [] Cachexia    [] Sepsis  [] hypovolemic shock,[] cardiogenic shock, [] hemorrhagic shock, [] neuogenic shock  [] Acute Respiratory Failure  []Cerebral edema, [] Brain compression/ herniation,   [] Functional quadriplegia  [] Acute blood loss anemia      PLAN:     Neuro:  - Neuro/vitals q4h  - on slow decadron taper: will taper decadron 1 q12h tomorrow (7/9/19)  - Tylenol prn for pain  - Due to Rt facial droop and inability to close right eye, pt is on artifical tears solution 1 drop right eye q6h and petrolatum ophthalmic ointment 1 application right eye at bedtime  - sutures will be removed tomorrow (7/9/19)    Optho:  - Due to history of glaucoma, pt is currently on prednisolone acetate 1% suspension 1 drop both eyes 4x a day, latanoprost 0.005% ophthalmic solution 1 drop both eyes at bedtime    ENT:  - ENT is following  - ENT scoped patient today and found L base of tongue mass with sig secretions still noted. They recommend a CT neck soft tissue w/wo IV contrast to better evaluate tongue mass  - previous MR neck soft tissue only w/wo IV contrast done but limited ability to evaluate because of artifact (pt was moving)    CV:  - Amlodipine 10 mg PO QD and losartan 25 mg for HTN  - stable    Pulm:  - due to history of asthma, pt continue with albuterol 2.5 mg nebulizer every 6h    GI:  - senna and colace for bowel regimen  - as per GI, monitor for bowel movements  - Pt is post op day 3 from PEG tube placement  - Gastro following    Renal:  - IVL    ID:  - afebrile    Heme/onc:  - lovenox 30 q12 for DVT ppx    Endo:  - Pt is currently on insulin sliding scale    Dispo: PT/OT recommend acute rehab

## 2019-07-08 NOTE — CHART NOTE - NSCHARTNOTEFT_GEN_A_CORE
At around 5:20 pm on 7/8/19, I was notified by the nurse that there is bleeding around the PEG site. This is the first incident of bleeding around the PEG site for the now post op day 3 procedure. I ordered silver nitrate stick and informed the nurse to apply to the area around the PEG site in addition to another gauze. When I saw the pt, two gauzes were soaked with blood and it was currently still bleeding. I informed [facility] about an evaluation of the PEG site.

## 2019-07-08 NOTE — PROGRESS NOTE ADULT - SUBJECTIVE AND OBJECTIVE BOX
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 C/o feeling tired. Having back pain.  and daughter by bedside.     Vital Signs Last 24 Hrs  T(C): 37.3 (08 Jul 2019 12:18), Max: 37.4 (08 Jul 2019 09:01)  T(F): 99.1 (08 Jul 2019 12:18), Max: 99.4 (08 Jul 2019 09:01)  HR: 90 (08 Jul 2019 12:20) (90 - 99)  BP: 102/67 (08 Jul 2019 12:20) (102/67 - 129/71)  BP(mean): --  RR: 18 (08 Jul 2019 12:18) (18 - 18)  SpO2: 97% (08 Jul 2019 12:20) (96% - 100%)                          11.6   15.7  )-----------( 366      ( 07 Jul 2019 12:56 )             36.3       MEDICATIONS  (STANDING):  ALBUTerol    0.083% 2.5 milliGRAM(s) Nebulizer every 6 hours  amLODIPine   Tablet 10 milliGRAM(s) Oral daily  artificial  tears Solution 1 Drop(s) Right EYE every 6 hours  dexamethasone     Tablet 2 milliGRAM(s) Oral every 12 hours  docusate sodium Liquid 100 milliGRAM(s) Oral two times a day  enoxaparin Injectable 30 milliGRAM(s) SubCutaneous every 12 hours  insulin lispro (HumaLOG) corrective regimen sliding scale   SubCutaneous every 6 hours  latanoprost 0.005% Ophthalmic Solution 1 Drop(s) Both EYES at bedtime  losartan 25 milliGRAM(s) Oral daily  petrolatum Ophthalmic Ointment 1 Application(s) Right EYE at bedtime  prednisoLONE acetate 1% Suspension 1 Drop(s) Both EYES four times a day  senna Syrup 5 milliLiter(s) Oral at bedtime    MEDICATIONS  (PRN):  acetaminophen    Suspension .. 650 milliGRAM(s) Oral every 6 hours PRN Temp greater or equal to 38.5C (101.3F), Mild Pain (1 - 3)

## 2019-07-08 NOTE — CHART NOTE - NSCHARTNOTEFT_GEN_A_CORE
Source: Patient [x ]    Family [ ]     other [ x] RN  Patient seen for routine length of stay follow up and consult.  As per RN, no specific concern noted for nutrition consult.  Patient did receive a PEG placement on July 5th after being on NGT feeds.  PEG working well. Patient moving bowels. Patient denies any complaints.  Reports to be tolerating Glucerna 1.2 @ 65cc/hr x 24 hours via PEG.  Patient also supplemented with Danactive 80 calories, 3 Gm protein per serving for benefit of bacteria for gut health.  Skin intact except for a healed stage 1 to her chin.      Diet : Glucerna 1.2 @ 65cc/hr x 24 hours.       Patient reports no complaints         Enteral /Parenteral Nutrition: Glucerna 1.2 60cc/hr x 24 hrs provides 1728 kcals, 86 gm protein, 1159cc free water  meets 31 Kcal/Kg IBW, 1.5 Gm/kg IBW      Current Weight: No new weight since admission weight.  Please obtain so Tube feeds may be adjusted accordingly.  Weight gain is not desired.   % Weight Change    Pertinent Medications: MEDICATIONS  (STANDING):  ALBUTerol    0.083% 2.5 milliGRAM(s) Nebulizer every 6 hours  amLODIPine   Tablet 10 milliGRAM(s) Oral daily  artificial  tears Solution 1 Drop(s) Right EYE every 6 hours  dexamethasone     Tablet 2 milliGRAM(s) Oral every 12 hours  docusate sodium Liquid 100 milliGRAM(s) Oral two times a day  enoxaparin Injectable 30 milliGRAM(s) SubCutaneous every 12 hours  insulin lispro (HumaLOG) corrective regimen sliding scale   SubCutaneous every 6 hours  latanoprost 0.005% Ophthalmic Solution 1 Drop(s) Both EYES at bedtime  losartan 25 milliGRAM(s) Oral daily  petrolatum Ophthalmic Ointment 1 Application(s) Right EYE at bedtime  prednisoLONE acetate 1% Suspension 1 Drop(s) Both EYES four times a day  senna Syrup 5 milliLiter(s) Oral at bedtime    MEDICATIONS  (PRN):  acetaminophen    Suspension .. 650 milliGRAM(s) Oral every 6 hours PRN Temp greater or equal to 38.5C (101.3F), Mild Pain (1 - 3)    Pertinent Labs:   06-26 [id number] 5.1 %      Skin: Circular blist to surgical incision- discussed with RN to confirm.  Healed chin stage 1.    Estimated Needs:   [ x] no change since previous assessment  [ ] recalculated:       Previous Nutrition Diagnosis:        [ x] Increased Nutrient Needs     Nutrition Diagnosis is  [ x] resolved and patient has been stable on feeds.  Need weight to establish if feeds need to be adjusted.         New Nutrition Diagnosis: [ x] not applicable        Recommend    X Nutrition Support- Continue Glucerna 1.2 @ 65cc/hr x 24 hours.  If patient gaining weight, may want to decrease feeds as patient does not have the same energy demands post op.     [x ] Other: Continue DanActiv supplement x2 daily       Monitoring and Evaluation:     Monitor PEG feed tolerance, GI tolerance, weight trends, labs, and skin integrity  RDN remains available for follow up Source: Patient [x ]    Family [ ]     other [ x] RN  Patient seen for routine length of stay follow up and consult.  As per RN, no specific concern noted for nutrition consult.  Patient did receive a PEG placement on July 5th after being on NGT feeds.  PEG working well. Patient moving bowels. Patient denies any complaints.  Reports to be tolerating Glucerna 1.2 @ 65cc/hr x 24 hours via PEG.  Patient also supplemented with Danactive 80 calories, 3 Gm protein per serving for benefit of bacteria for gut health.  Skin intact except for a healed stage 1 to her chin.      Diet : Glucerna 1.2 @ 65cc/hr x 24 hours.       Patient reports no complaints         Enteral /Parenteral Nutrition: Glucerna 1.2 goal 65 cc/hr x 24 hrs provides 1872 kcals, 93gm protein, 1256cc free water  meets 34 Kcal/Kg, 1.6 Gm/kg   Current Weight: No new weight since admission weight.  Please obtain so Tube feeds may be adjusted accordingly.  Weight gain is not desired.   % Weight Change    Pertinent Medications: MEDICATIONS  (STANDING):  ALBUTerol    0.083% 2.5 milliGRAM(s) Nebulizer every 6 hours  amLODIPine   Tablet 10 milliGRAM(s) Oral daily  artificial  tears Solution 1 Drop(s) Right EYE every 6 hours  dexamethasone     Tablet 2 milliGRAM(s) Oral every 12 hours  docusate sodium Liquid 100 milliGRAM(s) Oral two times a day  enoxaparin Injectable 30 milliGRAM(s) SubCutaneous every 12 hours  insulin lispro (HumaLOG) corrective regimen sliding scale   SubCutaneous every 6 hours  latanoprost 0.005% Ophthalmic Solution 1 Drop(s) Both EYES at bedtime  losartan 25 milliGRAM(s) Oral daily  petrolatum Ophthalmic Ointment 1 Application(s) Right EYE at bedtime  prednisoLONE acetate 1% Suspension 1 Drop(s) Both EYES four times a day  senna Syrup 5 milliLiter(s) Oral at bedtime    MEDICATIONS  (PRN):  acetaminophen    Suspension .. 650 milliGRAM(s) Oral every 6 hours PRN Temp greater or equal to 38.5C (101.3F), Mild Pain (1 - 3)    Pertinent Labs:   06-26 [id number] 5.1 %      Skin: Circular blist to surgical incision- discussed with RN to confirm.  Healed chin stage 1.    Estimated Needs:   [ x] no change since previous assessment  [ ] recalculated:       Previous Nutrition Diagnosis:        [ x] Increased Nutrient Needs     Nutrition Diagnosis is  [ x] resolved and patient has been stable on feeds.  Need weight to establish if feeds need to be adjusted.         New Nutrition Diagnosis: [ x] not applicable        Recommend    X Nutrition Support- Continue Glucerna 1.2 @ 65cc/hr x 24 hours.  If patient gaining weight, may want to decrease feeds as patient does not have the same energy demands post op.   Consider decreasing feeds to 55cc/hr x 24 hours to avoid weight gain.   [x ] Other: Continue DanActiv supplement x2 daily       Monitoring and Evaluation:     Monitor PEG feed tolerance, GI tolerance, weight trends, labs, and skin integrity  RDN remains available for follow up

## 2019-07-08 NOTE — PROGRESS NOTE ADULT - ASSESSMENT
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 Monroy, course complicated with stage 1 pressure injury. Now c/o L ear pain and odynophagia.  Left vocal cord paralysis and possible ?swelling vs mass at left base of tongue (ENT following)    Has failed multiple SLP evaluations, recommend NPO with non oral means for meds and feeds; difficulty managing oral secretions  Dysphagia s/p PEG 7/5/19    RECS  -PEG feeds and local care  -Monitor BMs    Discussed with pt and Neurosurgery team      Andi Amaya PA-C    [facility]  [phone]  After hours and weekend coverage [phone]

## 2019-07-08 NOTE — PROGRESS NOTE ADULT - ASSESSMENT
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 PT on steroids and has been treated with nystatin swish and spit for ? thrush without relief x5days. On indirect laryngoscopy 7/2, L base of tongue mass vs. swelling  noted and edematous ae fold and arytenoid possibly due to traumatic intubation, left Vocal cord paralysis was also noted. Dr. Thakur requested Neck CT with contrast  (pt. was sent to MRI for scan so we added a neck MRI with contrast instead of CT).  Ear exam was normal so the left ear pain is referred pain.     ***MRI was inconclusive as pt was moving around too much.    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 PT on steroids and has been treated with nystatin swish and spit for ? thrush without relief x5days. On indirect laryngoscopy 7/2, L base of tongue mass vs. swelling  noted and edematous ae fold and arytenoid possibly due to traumatic intubation, left Vocal cord paralysis was also noted. Dr. Thakur requested Neck CT with contrast  (pt. was sent to MRI for scan so we added a neck MRI with contrast instead of CT).  Ear exam was normal so the left ear pain is referred pain. Indirect laryngoscopy reveals L Base of tongue mass still noted along with sig secretions.      ***MRI was inconclusive as pt was moving around too much.  Dave CT of soft tissue neck with contrast for further evaluation.     {[id number]}

## 2019-07-09 LAB
ANION GAP SERPL CALC-SCNC: 11 MMOL/L — SIGNIFICANT CHANGE UP (ref 5–17)
ANION GAP SERPL CALC-SCNC: 9 MMOL/L — SIGNIFICANT CHANGE UP (ref 5–17)
BUN SERPL-MCNC: 30 MG/DL — HIGH (ref 7–23)
BUN SERPL-MCNC: 31 MG/DL — HIGH (ref 7–23)
CALCIUM SERPL-MCNC: 9.2 MG/DL — SIGNIFICANT CHANGE UP (ref 8.4–10.5)
CALCIUM SERPL-MCNC: 9.6 MG/DL — SIGNIFICANT CHANGE UP (ref 8.4–10.5)
CHLORIDE SERPL-SCNC: 93 MMOL/L — LOW (ref 96–108)
CHLORIDE SERPL-SCNC: 96 MMOL/L — SIGNIFICANT CHANGE UP (ref 96–108)
CO2 SERPL-SCNC: 31 MMOL/L — SIGNIFICANT CHANGE UP (ref 22–31)
CO2 SERPL-SCNC: 31 MMOL/L — SIGNIFICANT CHANGE UP (ref 22–31)
CREAT SERPL-MCNC: 0.68 MG/DL — SIGNIFICANT CHANGE UP (ref 0.5–1.3)
CREAT SERPL-MCNC: 0.69 MG/DL — SIGNIFICANT CHANGE UP (ref 0.5–1.3)
GLUCOSE BLDC GLUCOMTR-MCNC: 130 MG/DL — HIGH (ref 70–99)
GLUCOSE BLDC GLUCOMTR-MCNC: 130 MG/DL — HIGH (ref 70–99)
GLUCOSE BLDC GLUCOMTR-MCNC: 146 MG/DL — HIGH (ref 70–99)
GLUCOSE SERPL-MCNC: 137 MG/DL — HIGH (ref 70–99)
GLUCOSE SERPL-MCNC: 142 MG/DL — HIGH (ref 70–99)
HCT VFR BLD CALC: 34.6 % — SIGNIFICANT CHANGE UP (ref 34.5–45)
HGB BLD-MCNC: 10.7 G/DL — LOW (ref 11.5–15.5)
MCHC RBC-ENTMCNC: 27.2 PG — SIGNIFICANT CHANGE UP (ref 27–34)
MCHC RBC-ENTMCNC: 31 GM/DL — LOW (ref 32–36)
MCV RBC AUTO: 87.6 FL — SIGNIFICANT CHANGE UP (ref 80–100)
PLATELET # BLD AUTO: 393 K/UL — SIGNIFICANT CHANGE UP (ref 150–400)
POTASSIUM SERPL-MCNC: 5 MMOL/L — SIGNIFICANT CHANGE UP (ref 3.5–5.3)
POTASSIUM SERPL-MCNC: 5.4 MMOL/L — HIGH (ref 3.5–5.3)
POTASSIUM SERPL-SCNC: 5 MMOL/L — SIGNIFICANT CHANGE UP (ref 3.5–5.3)
POTASSIUM SERPL-SCNC: 5.4 MMOL/L — HIGH (ref 3.5–5.3)
RBC # BLD: 3.95 M/UL — SIGNIFICANT CHANGE UP (ref 3.8–5.2)
RBC # FLD: 13.7 % — SIGNIFICANT CHANGE UP (ref 10.3–14.5)
SODIUM SERPL-SCNC: 135 MMOL/L — SIGNIFICANT CHANGE UP (ref 135–145)
SODIUM SERPL-SCNC: 136 MMOL/L — SIGNIFICANT CHANGE UP (ref 135–145)
T3 SERPL-MCNC: 58 NG/DL — LOW (ref 80–200)
T4 AB SER-ACNC: 5.4 UG/DL — SIGNIFICANT CHANGE UP (ref 4.6–12)
TSH SERPL-MCNC: 0.57 UIU/ML — SIGNIFICANT CHANGE UP (ref 0.27–4.2)
WBC # BLD: 16.4 K/UL — HIGH (ref 3.8–10.5)
WBC # FLD AUTO: 16.4 K/UL — HIGH (ref 3.8–10.5)

## 2019-07-09 PROCEDURE — 70491 CT SOFT TISSUE NECK W/DYE: CPT | Mod: 26

## 2019-07-09 PROCEDURE — 99233 SBSQ HOSP IP/OBS HIGH 50: CPT

## 2019-07-09 PROCEDURE — 99232 SBSQ HOSP IP/OBS MODERATE 35: CPT

## 2019-07-09 RX ADMIN — Medication 650 MILLIGRAM(S): at 05:55

## 2019-07-09 RX ADMIN — Medication 1 APPLICATION(S): at 21:25

## 2019-07-09 RX ADMIN — ALBUTEROL 2.5 MILLIGRAM(S): 90 AEROSOL, METERED ORAL at 11:09

## 2019-07-09 RX ADMIN — ENOXAPARIN SODIUM 30 MILLIGRAM(S): 100 INJECTION SUBCUTANEOUS at 17:04

## 2019-07-09 RX ADMIN — ENOXAPARIN SODIUM 30 MILLIGRAM(S): 100 INJECTION SUBCUTANEOUS at 05:42

## 2019-07-09 RX ADMIN — Medication 2 MILLIGRAM(S): at 05:41

## 2019-07-09 RX ADMIN — Medication 1 APPLICATION(S): at 05:42

## 2019-07-09 RX ADMIN — LOSARTAN POTASSIUM 25 MILLIGRAM(S): 100 TABLET, FILM COATED ORAL at 05:41

## 2019-07-09 RX ADMIN — Medication 2 MILLIGRAM(S): at 17:04

## 2019-07-09 RX ADMIN — ALBUTEROL 2.5 MILLIGRAM(S): 90 AEROSOL, METERED ORAL at 17:05

## 2019-07-09 RX ADMIN — AMLODIPINE BESYLATE 5 MILLIGRAM(S): 2.5 TABLET ORAL at 05:41

## 2019-07-09 RX ADMIN — LATANOPROST 1 DROP(S): 0.05 SOLUTION/ DROPS OPHTHALMIC; TOPICAL at 21:27

## 2019-07-09 RX ADMIN — Medication 1 DROP(S): at 17:04

## 2019-07-09 RX ADMIN — Medication 1 DROP(S): at 05:42

## 2019-07-09 RX ADMIN — ALBUTEROL 2.5 MILLIGRAM(S): 90 AEROSOL, METERED ORAL at 05:42

## 2019-07-09 RX ADMIN — Medication 1 DROP(S): at 11:09

## 2019-07-09 RX ADMIN — Medication 1 DROP(S): at 17:05

## 2019-07-09 NOTE — PROGRESS NOTE ADULT - SUBJECTIVE AND OBJECTIVE BOX
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 Denies new complaints.     Vital Signs Last 24 Hrs  T(C): 37 (09 Jul 2019 08:23), Max: 37.4 (09 Jul 2019 04:58)  T(F): 98.6 (09 Jul 2019 08:23), Max: 99.3 (09 Jul 2019 04:58)  HR: 95 (09 Jul 2019 08:23) (93 - 103)  BP: 113/66 (09 Jul 2019 08:23) (98/53 - 125/74)  BP(mean): --  RR: 18 (09 Jul 2019 11:07) (18 - 19)  SpO2: 93% (09 Jul 2019 11:07) (93% - 99%)                          10.7   16.4  )-----------( 393      ( 09 Jul 2019 06:40 )             34.6     07-09    135  |  93<L>  |  30<H>  ----------------------------<  137<H>  5.0   |  31  |  0.68    Ca    9.6      09 Jul 2019 10:13      MEDICATIONS  (STANDING):  ALBUTerol    0.083% 2.5 milliGRAM(s) Nebulizer every 6 hours  amLODIPine   Tablet 5 milliGRAM(s) Oral daily  artificial  tears Solution 1 Drop(s) Right EYE every 6 hours  dexamethasone     Tablet 2 milliGRAM(s) Oral every 12 hours  docusate sodium Liquid 100 milliGRAM(s) Oral two times a day  enoxaparin Injectable 30 milliGRAM(s) SubCutaneous every 12 hours  insulin lispro (HumaLOG) corrective regimen sliding scale   SubCutaneous every 6 hours  latanoprost 0.005% Ophthalmic Solution 1 Drop(s) Both EYES at bedtime  losartan 25 milliGRAM(s) Oral daily  petrolatum Ophthalmic Ointment 1 Application(s) Right EYE at bedtime  prednisoLONE acetate 1% Suspension 1 Drop(s) Both EYES four times a day  senna Syrup 5 milliLiter(s) Oral at bedtime

## 2019-07-09 NOTE — PROGRESS NOTE ADULT - NEUROLOGICAL COMMENTS
Able to raise arms. B/l LE power 2/5.
Moving all extremities
Power 4/5 b/l
Power 4/5 b/l extremities

## 2019-07-09 NOTE — PROGRESS NOTE ADULT - PROBLEM SELECTOR PLAN 1
f/u CT soft tissue neck with contrast   - will continue to follow f/u CT soft tissue neck with contrast   discontinue steroids  Keflex x 5 days  F/U in Dr. Thakur's office in 2 weeks

## 2019-07-09 NOTE — PROGRESS NOTE ADULT - ASSESSMENT
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 PT on steroids and has been treated with nystatin swish and spit for ? thrush without relief x5days. On indirect laryngoscopy 7/2, L base of tongue mass vs. swelling  noted and edematous ae fold and arytenoid possibly due to traumatic intubation, left Vocal cord paralysis was also noted. Dr. Thakur requested Neck CT with contrast  (pt. was sent to MRI for scan so we added a neck MRI with contrast instead of CT).  Ear exam was normal so the left ear pain is referred pain. Indirect laryngoscopy reveals L Base of tongue mass still noted along with sig secretions.      ***MRI was inconclusive as pt was moving around too much.  Dave CT of soft tissue neck with contrast for further evaluation.  CT was done  - awaiting results. 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 PT on steroids and has been treated with nystatin swish and spit for ? thrush without relief x5days. On indirect laryngoscopy 7/2, L base of tongue mass vs. swelling  noted and edematous ae fold and arytenoid possibly due to traumatic intubation, left Vocal cord paralysis was also noted. Dr. Thakur requested Neck CT with contrast  (pt. was sent to MRI for scan so we added a neck MRI with contrast instead of CT).  Ear exam was normal so the left ear pain is referred pain. Indirect laryngoscopy reveals L Base of tongue mass still noted along with sig secretions.   Pt. underwent repeat laryngoscope with decreased left arytenoid edema. Dr. Thakur is recommending discontinuing steroids, keflex x 5 days and to have pt. follow up in office in 2 weeks.

## 2019-07-09 NOTE — PROGRESS NOTE ADULT - SUBJECTIVE AND OBJECTIVE BOX
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 Monroy, course complicated by stage 1 pressure injury. ENT consulted on pt after c/o L ear pain and odynophagia thought to be 2/2 thrush with no improvement w nystatin. Laryngoscopy done yesterday 7/2 suspicious for base of tongue fullness and asymmetry [mass vs. swelling] and L AE fold edema and erythema. Denies external tenderness, n/v, tinnitus, dizziness, congestion, recent URI, otorrhea, hearing loss, hx of sx or trauma or recent travel. Pt presently NPO w tube feed. 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  Light-headedness  Glaucoma  Asthma: controlled with meds  HTN (hypertension)  History of ankle fracture: h/o repair  Fractured elbow: left elbow fracture repair,2007  H/O appendicitis: h/o appendicectomy    Allergies    No Known Drug Allergies  shellfish (Angioedema; Rash)    Intolerances      MEDICATIONS  (STANDING):  ALBUTerol    0.083% 2.5 milliGRAM(s) Nebulizer every 6 hours  amLODIPine   Tablet 5 milliGRAM(s) Oral daily  artificial  tears Solution 1 Drop(s) Right EYE every 6 hours  dexamethasone     Tablet 2 milliGRAM(s) Oral every 12 hours  docusate sodium Liquid 100 milliGRAM(s) Oral two times a day  enoxaparin Injectable 30 milliGRAM(s) SubCutaneous every 12 hours  insulin lispro (HumaLOG) corrective regimen sliding scale   SubCutaneous every 6 hours  latanoprost 0.005% Ophthalmic Solution 1 Drop(s) Both EYES at bedtime  losartan 25 milliGRAM(s) Oral daily  petrolatum Ophthalmic Ointment 1 Application(s) Right EYE at bedtime  prednisoLONE acetate 1% Suspension 1 Drop(s) Both EYES four times a day  senna Syrup 5 milliLiter(s) Oral at bedtime    MEDICATIONS  (PRN):  acetaminophen    Suspension .. 650 milliGRAM(s) Oral every 6 hours PRN Temp greater or equal to 38.5C (101.3F), Mild Pain (1 - 3)      Social History: see consult note    Family history: see consult note    ROS:   ENT: all negative except as noted in HPI   Pulm: denies SOB, cough, hemoptysis  Neuro: denies numbness/tingling, loss of sensation  Endo: denies heat/cold intolerance, excessive sweating      Vital Signs Last 24 Hrs  T(C): 37 (09 Jul 2019 08:23), Max: 37.4 (08 Jul 2019 09:01)  T(F): 98.6 (09 Jul 2019 08:23), Max: 99.4 (08 Jul 2019 09:01)  HR: 95 (09 Jul 2019 08:23) (90 - 103)  BP: 113/66 (09 Jul 2019 08:23) (98/53 - 125/74)  BP(mean): --  RR: 19 (09 Jul 2019 08:23) (18 - 19)  SpO2: 98% (09 Jul 2019 08:23) (94% - 99%)                          10.7   16.4  )-----------( 393      ( 09 Jul 2019 06:40 )             34.6    07-09    136  |  96  |  31<H>  ----------------------------<  142<H>  5.4<H>   |  31  |  0.69    Ca    9.2      09 Jul 2019 06:39         PHYSICAL EXAM:  Gen: NAD, difficult to understand when she speaks  Head: Normocephalic  Face: ecchymosis noted on chin s/p traumatic intubation, no edema, erythema, or fluctuance. Parotid glands soft without mass  Eyes: no scleral injection  Nose: Nares bilaterally patent , no discharge, keofeed tube in place  Mouth: Ulceration on buccal side of mandibular & tongue, adherent yellow exudate across anterior tongue, no Stridor / Drooling / Trismus. Mucosa moist, uvula midline.  Neck: Flat, supple, no lymphadenopathy, trachea midline, no masses  Lymphatic: No lymphadenopathy  Resp: breathing easily, no stridor  Neuro:  b/l 6th nerve palsy, R 7th nerve palsy

## 2019-07-09 NOTE — PROGRESS NOTE ADULT - ENMT
No oral lesions; no gross abnormalities
No oral lesions; no gross abnormalities
detailed exam
No oral lesions; no gross abnormalities

## 2019-07-09 NOTE — PROGRESS NOTE ADULT - ASSESSMENT
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 resection   POD# 15    PROCEDURE: PEG placement  POD # 4    Assessment:  Please Check When Present   []  GCS  E   V  M     Heart Failure: []Acute, [] acute on chronic , []chronic  Heart Failure:  [] Diastolic (HFpEF), [] Systolic (HFrEF), []Combined (HFpEF and HFrEF), [] RHF, [] Pulm HTN, [] Other    [] BRENDA, [] ATN, [] AIN, [] other  [] CKD1, [] CKD2, [] CKD 3, [] CKD 4, [] CKD 5, []ESRD    Encephalopathy: [] Metabolic, [] Hepatic, [] toxic, [] Neurological, [] Other    Abnormal Nurtitional Status: [] malnurtition (see nutrition note), [ ]underweight: BMI < 19, [X] morbid obesity: BMI >40, [] Cachexia    [] Sepsis  [] hypovolemic shock,[] cardiogenic shock, [] hemorrhagic shock, [] neuogenic shock  [] Acute Respiratory Failure  []Cerebral edema, [] Brain compression/ herniation,   [] Functional quadriplegia  [] Acute blood loss anemia      PLAN:    Neuro:  - Neuro/vitals q4h  - on slow decadron taper: will taper decadron 1 q12h tomorrow (7/10/19)  - Tylenol prn for pain  - Due to Rt facial droop and inability to close right eye, pt is on artifical tears solution 1 drop right eye q6h and petrolatum ophthalmic ointment 1 application right eye at bedtime  - sutures will be removed on 7/16/19  - 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 No drainable collections or abscess are identified. Mild ptosis of the left true vocal cord. There appears to be a soft tissue mass in the left thyroid gland. This could be further evaluated with ultrasound.   - ENT did not clear for discharge. Awaiting Dr. Thakur to read CT neck soft tissue with IV contrast for next steps in pt's care.  - previous MR neck soft tissue only w/wo IV contrast done but limited ability to evaluate because of artifact (pt was moving)    CV:  - Amlodipine 10 mg PO QD and losartan 25 mg for HTN  - stable    Pulm:  - due to history of asthma, pt continue with albuterol 2.5 mg nebulizer every 6h  - on CT Chest/Abdomen/Pelvis done on 6/21, showed two pulmonary nodules measuring up to 4 mm; will be followed outpatient    GI:  - Gastroenterologist evaluated PEG site due to bleeding yesterday and reports it is stable and continue with surgicell. Silver nitrate sticks were applied to bleeding site yesterday afternoon.  - senna and colace for bowel regimen  - as per GI, monitor for bowel movements and continue with PEG feeds ( pt is post op day 4 from peg placement)      :  - on 6/21 the CT Abdomen/pelvis/chest revealed an ovarian cystic mass which may represent a low grade neoplasm in postmenopausal patient    Renal:  - IVL    ID:  - afebrile    Heme/onc:  - lovenox 30 q12 for DVT ppx    Endo:  - Pt is currently on insulin sliding scale  - As per hospitalist, f/u on thyroid panel    Dispo: PT/OT recommend acute rehab

## 2019-07-09 NOTE — PROGRESS NOTE ADULT - ASSESSMENT
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        1. 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     Steroid taper per primary team.     2. Dysphagia- S/p PEG on 7/5, tolerating feeds.     3. HTN- Has been on Amlodipine and Losartan. BP borderline low since yesterday. Amlodipine dose decreased yesterday, w holding parameters.     4. Hyperkalemia- 5.4 yesterday, 5.0 today. Has been borderline hyperkalemic for the last few days. Cozaar d/lalo. Monitor.     4. Throat and L ear pain- Soft tissue neck CT done- Slight soft tissue swelling at the base of the tongue on the left extending into the left hypopharynx and aryepiglottic fold may be due to trauma or infection, cannot exclude underlying mass. No drainable collections or abscess are identified. Mild ptosis of the left true vocal cord. There appears to be a soft tissue mass in the left thyroid gland. This could be further evaluated with ultrasound.     Await ENT input. 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        1. 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     Steroid taper per primary team.     2. Dysphagia- S/p PEG on 7/5, tolerating feeds.     3. HTN- Has been on Amlodipine and Losartan. BP borderline low since yesterday. Amlodipine dose decreased yesterday, w holding parameters.     4. Hyperkalemia- 5.4 yesterday, 5.0 today. Has been borderline hyperkalemic for the last few days. Cozaar d/lalo. Monitor.     4. Throat and L ear pain- Soft tissue neck CT done- Slight soft tissue swelling at the base of the tongue on the left extending into the left hypopharynx and aryepiglottic fold may be due to trauma or infection, cannot exclude underlying mass. No drainable collections or abscess are identified. Mild ptosis of the left true vocal cord. There appears to be a soft tissue mass in the left thyroid gland. This could be further evaluated with ultrasound.     Await ENT input. Check thyroid panel.

## 2019-07-09 NOTE — PROGRESS NOTE ADULT - ASSESSMENT
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 Monroy, course complicated with stage 1 pressure injury. Now c/o L ear pain and odynophagia.  Left vocal cord paralysis and possible ?swelling vs mass at left base of tongue (ENT following)    Has failed multiple SLP evaluations, recommend NPO with non oral means for meds and feeds; difficulty managing oral secretions  Dysphagia s/p PEG 7/5/19 - some localized bleeding at PEG site    RECS  -Continue PEG feeds   -Leave Sugicell in place for now  -Monitor BMs

## 2019-07-09 NOTE — PROGRESS NOTE ADULT - RESPIRATORY COMMENTS
Good air entry anteriorly

## 2019-07-09 NOTE — PROGRESS NOTE ADULT - SUBJECTIVE AND OBJECTIVE BOX
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 (07 Jul 2019 13:34)    INTERVAL HPI/OVERNIGHT EVENTS:  tolerating PEG feeds  no diarrhea    MEDICATIONS  (STANDING):  ALBUTerol    0.083% 2.5 milliGRAM(s) Nebulizer every 6 hours  amLODIPine   Tablet 10 milliGRAM(s) Oral daily  artificial  tears Solution 1 Drop(s) Right EYE every 6 hours  dexamethasone     Tablet 2 milliGRAM(s) Oral every 12 hours  docusate sodium Liquid 100 milliGRAM(s) Oral two times a day  enoxaparin Injectable 30 milliGRAM(s) SubCutaneous every 12 hours  insulin lispro (HumaLOG) corrective regimen sliding scale   SubCutaneous every 6 hours  latanoprost 0.005% Ophthalmic Solution 1 Drop(s) Both EYES at bedtime  losartan 25 milliGRAM(s) Oral daily  petrolatum Ophthalmic Ointment 1 Application(s) Right EYE at bedtime  prednisoLONE acetate 1% Suspension 1 Drop(s) Both EYES four times a day  senna Syrup 5 milliLiter(s) Oral at bedtime    MEDICATIONS  (PRN):  acetaminophen    Suspension .. 650 milliGRAM(s) Oral every 6 hours PRN Temp greater or equal to 38.5C (101.3F), Mild Pain (1 - 3)    Allergies  No Known Drug Allergies  shellfish (Angioedema; Rash)    Review of Systems:  unable to obtain    T(F): 98.6 (07-09-19 @ 08:23), Max: 99.3 (07-09-19 @ 04:58)  HR: 95 (07-09-19 @ 08:23) (90 - 103)  BP: 113/66 (07-09-19 @ 08:23) (98/53 - 125/74)  RR: 18 (07-09-19 @ 11:07) (18 - 19)  SpO2: 93% (07-09-19 @ 11:07) (93% - 99%)  Wt(kg): --  ,   I&O's Summary    08 Jul 2019 07:01  -  09 Jul 2019 07:00  --------------------------------------------------------  IN: 771 mL / OUT: 0 mL / NET: 771 mL    09 Jul 2019 07:01  -  09 Jul 2019 12:05  --------------------------------------------------------  IN: 353 mL / OUT: 0 mL / NET: 353 mL    PHYSICAL EXAM:  Constitutional: non toxic , NAD, obese, female OOB to chair. responsive but drowsy  Neck:  supple  Respiratory:  anterior chest wall clear to auscultation   Cardiovascular: S1 and S2, RRR, no murmur  Gastrointestinal: soft, obese, non-tender; +BS , PEG external bumper at 5cm, gauze with moderate blood, site cleansed and Surgicell placed beneath bumper  Extremities: No  cyanosis  Skin: no rash    LABS:                      10.7   16.4  )-----------( 393      ( 09 Jul 2019 06:40 )             34.6               07-09    135  |  93<L>  |  30<H>  ----------------------------<  137<H>  5.0   |  31  |  0.68    Ca    9.6      09 Jul 2019 10:13    RADIOLOGY & ADDITIONAL TESTS:

## 2019-07-09 NOTE — PROGRESS NOTE ADULT - SUBJECTIVE AND OBJECTIVE BOX
SUBJECTIVE: Pt was seen and examined sitting in chair. She had no complaints. Pt denied chest pain, shortness of breath, and headache.    Vital Signs Last 24 Hrs  T(C): 37 (07-09-19 @ 08:23), Max: 37.4 (07-09-19 @ 04:58)  T(F): 98.6 (07-09-19 @ 08:23), Max: 99.3 (07-09-19 @ 04:58)  HR: 95 (07-09-19 @ 08:23) (93 - 103)  BP: 113/66 (07-09-19 @ 08:23) (98/53 - 125/74)  BP(mean): --  RR: 18 (07-09-19 @ 11:07) (18 - 19)  SpO2: 93% (07-09-19 @ 11:07) (93% - 99%)    PHYSICAL EXAM:    Constitutional: No Acute Distress     Neurological: AOx3, b/l 6th nerve palsy, Rt 7th nerve palsy, can not close right eyelid, follows commands CONTRERAS 5/5    Incision: sutures CDI    Pulmonary: Clear to Auscultation, No rales, No rhonchi, No wheezes     Cardiovascular: S1, S2, Regular rate and rhythm     Gastrointestinal: Soft, Non-tender, Non-distended, +bowel sounds x 4    Extremities: No calf tenderness bilaterally, no cyanosis, clubbing or edema          LABS:                          10.7   16.4  )-----------( 393      ( 09 Jul 2019 06:40 )             34.6    07-09    135  |  93<L>  |  30<H>  ----------------------------<  137<H>  5.0   |  31  |  0.68    Ca    9.6      09 Jul 2019 10:13        07-08 @ 07:01  -  07-09 @ 07:00  --------------------------------------------------------  IN: 771 mL / OUT: 0 mL / NET: 771 mL    07-09 @ 07:01  -  07-09 @ 13:04  --------------------------------------------------------  IN: 353 mL / OUT: 0 mL / NET: 353 mL        IMAGING: < from: CT Neck Soft Tissue w/ IV Cont (07.09.19 @ 09:07) >  EXAM:  CT NECK SOFT TISSUE IC                            PROCEDURE DATE:  07/09/2019            INTERPRETATION:  Clinical indication: Base of tongue swelling on visual   exam, recent intubation for suboccipital craniectomy and removal of   fourth ventricular subependymoma    Serial thin sections to the soft tissues of the neck were obtained from   the orbits to the thoracic inlet after the intravenous administration of   70 cc of Omnipaque-300, 30 cc were discarded. Images were reconstructed   at 1.0 and 3.0 mm sections with sagittal and coronal computer generator   reconstructed views.    Comparison is made with the suboptimal MRI of 7/2/2019.    As on the prior MRI there is mild swelling and nonspecific density at the   base of the tongueon the left extending into the left aryepiglottic fold   and hypopharynx. This may represent an area of trauma or infection and   could be related to intubation.    There is mild ptosis of the left true cord with displacement of the left   arytenoid cartilage. The laryngeal ventricle is enlarged suggesting this   is chronic. This could also be traumatic or may be due to a mass. There   is enlargement of the left lobe of the thyroid gland with an apparent   mass by streak artifact. This can be further evaluated with ultrasound.    The sellar salivary glands are normal. There is normal vascular   enhancement. Mild degenerative changes of the cervical spine are noted   with large anterior osteophytes which cause mass effect on the   hypopharynx.    There has been a suboccipital craniectomy and postoperative changes are   identified related to the prior posterior fossa surgery. There are 2   surgical clips identified postoperative bed. Normal intracranial vascular   enhancement is identified.    There is left sphenoid sinus opacification with wall thickening   suggesting chronic sphenoid sinus inflammatory changes.    IMPRESSION: Slight soft tissue swelling at the base of the tongue on the   left extending into the left hypopharynx and aryepiglottic fold may be   due to trauma or infection, cannot exclude underlying mass. Recommend   follow-up. No drainable collections or abscess are identified.    Mild ptosis of the left true vocal cord. There appears to be a soft   tissue mass in the left thyroid gland. This could be further evaluated   with ultrasound.                     HALINA FITZPATRICK M.D., ATTENDING RADIOLOGIST  This document has been electronically signed. Jul 9 2019 11:28AM        < end of copied text >      MEDICATIONS:  Antibiotics:    Neuro:  acetaminophen    Suspension .. 650 milliGRAM(s) Oral every 6 hours PRN Temp greater or equal to 38.5C (101.3F), Mild Pain (1 - 3)    Cardiac:  amLODIPine   Tablet 5 milliGRAM(s) Oral daily    Pulm:  ALBUTerol    0.083% 2.5 milliGRAM(s) Nebulizer every 6 hours    GI/:  docusate sodium Liquid 100 milliGRAM(s) Oral two times a day  senna Syrup 5 milliLiter(s) Oral at bedtime    Other:   artificial  tears Solution 1 Drop(s) Right EYE every 6 hours  dexamethasone     Tablet 2 milliGRAM(s) Oral every 12 hours  enoxaparin Injectable 30 milliGRAM(s) SubCutaneous every 12 hours  insulin lispro (HumaLOG) corrective regimen sliding scale   SubCutaneous every 6 hours  latanoprost 0.005% Ophthalmic Solution 1 Drop(s) Both EYES at bedtime  petrolatum Ophthalmic Ointment 1 Application(s) Right EYE at bedtime  prednisoLONE acetate 1% Suspension 1 Drop(s) Both EYES four times a day        DIET: PEG feeds (Glucerna 1.2)

## 2019-07-10 ENCOUNTER — TRANSCRIPTION ENCOUNTER (OUTPATIENT)
Age: 69
End: 2019-07-10

## 2019-07-10 ENCOUNTER — INPATIENT (INPATIENT)
Facility: HOSPITAL | Age: 69
LOS: 12 days | Discharge: NURSING, REHAB OR LONG-TERM CARE | DRG: 949 | End: 2019-07-23
Attending: STUDENT IN AN ORGANIZED HEALTH CARE EDUCATION/TRAINING PROGRAM | Admitting: STUDENT IN AN ORGANIZED HEALTH CARE EDUCATION/TRAINING PROGRAM
Payer: MEDICARE

## 2019-07-10 VITALS
DIASTOLIC BLOOD PRESSURE: 71 MMHG | HEART RATE: 92 BPM | TEMPERATURE: 99 F | HEIGHT: 62 IN | OXYGEN SATURATION: 97 % | WEIGHT: 252.65 LBS | RESPIRATION RATE: 15 BRPM | SYSTOLIC BLOOD PRESSURE: 126 MMHG

## 2019-07-10 VITALS
TEMPERATURE: 98 F | SYSTOLIC BLOOD PRESSURE: 111 MMHG | HEART RATE: 75 BPM | DIASTOLIC BLOOD PRESSURE: 75 MMHG | RESPIRATION RATE: 18 BRPM | OXYGEN SATURATION: 99 %

## 2019-07-10 DIAGNOSIS — Z87.19 PERSONAL HISTORY OF OTHER DISEASES OF THE DIGESTIVE SYSTEM: Chronic | ICD-10-CM

## 2019-07-10 DIAGNOSIS — S42.409A UNSPECIFIED FRACTURE OF LOWER END OF UNSPECIFIED HUMERUS, INITIAL ENCOUNTER FOR CLOSED FRACTURE: Chronic | ICD-10-CM

## 2019-07-10 DIAGNOSIS — Z87.81 PERSONAL HISTORY OF (HEALED) TRAUMATIC FRACTURE: Chronic | ICD-10-CM

## 2019-07-10 DIAGNOSIS — D43.2 NEOPLASM OF UNCERTAIN BEHAVIOR OF BRAIN, UNSPECIFIED: ICD-10-CM

## 2019-07-10 LAB — GLUCOSE BLDC GLUCOMTR-MCNC: 159 MG/DL — HIGH (ref 70–99)

## 2019-07-10 PROCEDURE — 70553 MRI BRAIN STEM W/O & W/DYE: CPT

## 2019-07-10 PROCEDURE — 70491 CT SOFT TISSUE NECK W/DYE: CPT

## 2019-07-10 PROCEDURE — C1713: CPT

## 2019-07-10 PROCEDURE — C8929: CPT

## 2019-07-10 PROCEDURE — 93970 EXTREMITY STUDY: CPT

## 2019-07-10 PROCEDURE — 99261: CPT

## 2019-07-10 PROCEDURE — 99223 1ST HOSP IP/OBS HIGH 75: CPT

## 2019-07-10 PROCEDURE — 71045 X-RAY EXAM CHEST 1 VIEW: CPT

## 2019-07-10 PROCEDURE — 92610 EVALUATE SWALLOWING FUNCTION: CPT

## 2019-07-10 PROCEDURE — 83605 ASSAY OF LACTIC ACID: CPT

## 2019-07-10 PROCEDURE — 82803 BLOOD GASES ANY COMBINATION: CPT

## 2019-07-10 PROCEDURE — 70551 MRI BRAIN STEM W/O DYE: CPT

## 2019-07-10 PROCEDURE — 70450 CT HEAD/BRAIN W/O DYE: CPT

## 2019-07-10 PROCEDURE — 88307 TISSUE EXAM BY PATHOLOGIST: CPT

## 2019-07-10 PROCEDURE — 82962 GLUCOSE BLOOD TEST: CPT

## 2019-07-10 PROCEDURE — 97167 OT EVAL HIGH COMPLEX 60 MIN: CPT

## 2019-07-10 PROCEDURE — 94770: CPT

## 2019-07-10 PROCEDURE — 83036 HEMOGLOBIN GLYCOSYLATED A1C: CPT

## 2019-07-10 PROCEDURE — 85027 COMPLETE CBC AUTOMATED: CPT

## 2019-07-10 PROCEDURE — 88341 IMHCHEM/IMCYTCHM EA ADD ANTB: CPT

## 2019-07-10 PROCEDURE — 80048 BASIC METABOLIC PNL TOTAL CA: CPT

## 2019-07-10 PROCEDURE — 97163 PT EVAL HIGH COMPLEX 45 MIN: CPT

## 2019-07-10 PROCEDURE — 73080 X-RAY EXAM OF ELBOW: CPT

## 2019-07-10 PROCEDURE — 94003 VENT MGMT INPAT SUBQ DAY: CPT

## 2019-07-10 PROCEDURE — 88333 PATH CONSLTJ SURG CYTO XM 1: CPT

## 2019-07-10 PROCEDURE — 86901 BLOOD TYPING SEROLOGIC RH(D): CPT

## 2019-07-10 PROCEDURE — 99232 SBSQ HOSP IP/OBS MODERATE 35: CPT

## 2019-07-10 PROCEDURE — C1769: CPT

## 2019-07-10 PROCEDURE — 88360 TUMOR IMMUNOHISTOCHEM/MANUAL: CPT

## 2019-07-10 PROCEDURE — 99233 SBSQ HOSP IP/OBS HIGH 50: CPT | Mod: GC

## 2019-07-10 PROCEDURE — 84443 ASSAY THYROID STIM HORMONE: CPT

## 2019-07-10 PROCEDURE — 85610 PROTHROMBIN TIME: CPT

## 2019-07-10 PROCEDURE — 84436 ASSAY OF TOTAL THYROXINE: CPT

## 2019-07-10 PROCEDURE — C1889: CPT

## 2019-07-10 PROCEDURE — 70543 MRI ORBT/FAC/NCK W/O &W/DYE: CPT

## 2019-07-10 PROCEDURE — 82947 ASSAY GLUCOSE BLOOD QUANT: CPT

## 2019-07-10 PROCEDURE — 93005 ELECTROCARDIOGRAM TRACING: CPT

## 2019-07-10 PROCEDURE — 84295 ASSAY OF SERUM SODIUM: CPT

## 2019-07-10 PROCEDURE — 84100 ASSAY OF PHOSPHORUS: CPT

## 2019-07-10 PROCEDURE — 82565 ASSAY OF CREATININE: CPT

## 2019-07-10 PROCEDURE — 84132 ASSAY OF SERUM POTASSIUM: CPT

## 2019-07-10 PROCEDURE — 70460 CT HEAD/BRAIN W/DYE: CPT

## 2019-07-10 PROCEDURE — 86850 RBC ANTIBODY SCREEN: CPT

## 2019-07-10 PROCEDURE — 97535 SELF CARE MNGMENT TRAINING: CPT

## 2019-07-10 PROCEDURE — 85730 THROMBOPLASTIN TIME PARTIAL: CPT

## 2019-07-10 PROCEDURE — 97530 THERAPEUTIC ACTIVITIES: CPT

## 2019-07-10 PROCEDURE — 94640 AIRWAY INHALATION TREATMENT: CPT

## 2019-07-10 PROCEDURE — 82330 ASSAY OF CALCIUM: CPT

## 2019-07-10 PROCEDURE — 83735 ASSAY OF MAGNESIUM: CPT

## 2019-07-10 PROCEDURE — L8699: CPT

## 2019-07-10 PROCEDURE — 84480 ASSAY TRIIODOTHYRONINE (T3): CPT

## 2019-07-10 PROCEDURE — 82435 ASSAY OF BLOOD CHLORIDE: CPT

## 2019-07-10 PROCEDURE — 86900 BLOOD TYPING SEROLOGIC ABO: CPT

## 2019-07-10 PROCEDURE — 73610 X-RAY EXAM OF ANKLE: CPT

## 2019-07-10 PROCEDURE — 88331 PATH CONSLTJ SURG 1 BLK 1SPC: CPT

## 2019-07-10 PROCEDURE — 97110 THERAPEUTIC EXERCISES: CPT

## 2019-07-10 PROCEDURE — 85014 HEMATOCRIT: CPT

## 2019-07-10 PROCEDURE — 97116 GAIT TRAINING THERAPY: CPT

## 2019-07-10 PROCEDURE — A9585: CPT

## 2019-07-10 RX ORDER — DEXAMETHASONE 0.5 MG/5ML
1 ELIXIR ORAL EVERY 12 HOURS
Refills: 0 | Status: DISCONTINUED | OUTPATIENT
Start: 2019-07-10 | End: 2019-07-10

## 2019-07-10 RX ORDER — DOCUSATE SODIUM 100 MG
100 CAPSULE ORAL
Refills: 0 | Status: DISCONTINUED | OUTPATIENT
Start: 2019-07-10 | End: 2019-07-16

## 2019-07-10 RX ORDER — ACETAMINOPHEN 500 MG
650 TABLET ORAL EVERY 6 HOURS
Refills: 0 | Status: DISCONTINUED | OUTPATIENT
Start: 2019-07-10 | End: 2019-07-23

## 2019-07-10 RX ORDER — ALBUTEROL 90 UG/1
2 AEROSOL, METERED ORAL
Qty: 0 | Refills: 0 | DISCHARGE

## 2019-07-10 RX ORDER — LATANOPROST 0.05 MG/ML
1 SOLUTION/ DROPS OPHTHALMIC; TOPICAL
Qty: 0 | Refills: 0 | DISCHARGE
Start: 2019-07-10

## 2019-07-10 RX ORDER — AMLODIPINE BESYLATE 2.5 MG/1
5 TABLET ORAL DAILY
Refills: 0 | Status: DISCONTINUED | OUTPATIENT
Start: 2019-07-10 | End: 2019-07-23

## 2019-07-10 RX ORDER — PREDNISOLONE SODIUM PHOSPHATE 1 %
1 DROPS OPHTHALMIC (EYE)
Refills: 0 | Status: DISCONTINUED | OUTPATIENT
Start: 2019-07-10 | End: 2019-07-23

## 2019-07-10 RX ORDER — PSYLLIUM SEED (WITH DEXTROSE)
1 POWDER (GRAM) ORAL
Refills: 0 | Status: DISCONTINUED | OUTPATIENT
Start: 2019-07-10 | End: 2019-07-10

## 2019-07-10 RX ORDER — AMLODIPINE BESYLATE 2.5 MG/1
1 TABLET ORAL
Qty: 0 | Refills: 0 | DISCHARGE
Start: 2019-07-10

## 2019-07-10 RX ORDER — POLYETHYLENE GLYCOL 3350 17 G/17G
17 POWDER, FOR SOLUTION ORAL DAILY
Refills: 0 | Status: DISCONTINUED | OUTPATIENT
Start: 2019-07-10 | End: 2019-07-16

## 2019-07-10 RX ORDER — ENOXAPARIN SODIUM 100 MG/ML
30 INJECTION SUBCUTANEOUS EVERY 12 HOURS
Refills: 0 | Status: DISCONTINUED | OUTPATIENT
Start: 2019-07-10 | End: 2019-07-18

## 2019-07-10 RX ORDER — BRIMONIDINE TARTRATE 2 MG/MG
1 SOLUTION/ DROPS OPHTHALMIC
Qty: 0 | Refills: 0 | DISCHARGE

## 2019-07-10 RX ORDER — PSYLLIUM SEED (WITH DEXTROSE)
1 POWDER (GRAM) ORAL
Qty: 0 | Refills: 0 | DISCHARGE
Start: 2019-07-10

## 2019-07-10 RX ORDER — LATANOPROST 0.05 MG/ML
1 SOLUTION/ DROPS OPHTHALMIC; TOPICAL
Qty: 0 | Refills: 0 | DISCHARGE

## 2019-07-10 RX ORDER — ENOXAPARIN SODIUM 100 MG/ML
40 INJECTION SUBCUTANEOUS
Qty: 0 | Refills: 0 | DISCHARGE
Start: 2019-07-10

## 2019-07-10 RX ORDER — ASPIRIN/CALCIUM CARB/MAGNESIUM 324 MG
1 TABLET ORAL
Qty: 0 | Refills: 0 | DISCHARGE

## 2019-07-10 RX ORDER — IPRATROPIUM/ALBUTEROL SULFATE 18-103MCG
3 AEROSOL WITH ADAPTER (GRAM) INHALATION EVERY 6 HOURS
Refills: 0 | Status: DISCONTINUED | OUTPATIENT
Start: 2019-07-10 | End: 2019-07-19

## 2019-07-10 RX ORDER — SENNA PLUS 8.6 MG/1
2 TABLET ORAL AT BEDTIME
Refills: 0 | Status: DISCONTINUED | OUTPATIENT
Start: 2019-07-10 | End: 2019-07-16

## 2019-07-10 RX ORDER — SACCHAROMYCES BOULARDII 250 MG
250 POWDER IN PACKET (EA) ORAL
Refills: 0 | Status: COMPLETED | OUTPATIENT
Start: 2019-07-10 | End: 2019-07-15

## 2019-07-10 RX ORDER — CEPHALEXIN 500 MG
1 CAPSULE ORAL
Qty: 0 | Refills: 0 | DISCHARGE
Start: 2019-07-10

## 2019-07-10 RX ORDER — DEXAMETHASONE 0.5 MG/5ML
1 ELIXIR ORAL EVERY 12 HOURS
Refills: 0 | Status: COMPLETED | OUTPATIENT
Start: 2019-07-10 | End: 2019-07-11

## 2019-07-10 RX ORDER — CEPHALEXIN 500 MG
500 CAPSULE ORAL EVERY 12 HOURS
Refills: 0 | Status: COMPLETED | OUTPATIENT
Start: 2019-07-10 | End: 2019-07-15

## 2019-07-10 RX ORDER — ACETAMINOPHEN 500 MG
20.31 TABLET ORAL
Qty: 0 | Refills: 0 | DISCHARGE
Start: 2019-07-10

## 2019-07-10 RX ORDER — ALBUTEROL 90 UG/1
1 AEROSOL, METERED ORAL EVERY 4 HOURS
Refills: 0 | Status: DISCONTINUED | OUTPATIENT
Start: 2019-07-10 | End: 2019-07-14

## 2019-07-10 RX ORDER — ENOXAPARIN SODIUM 100 MG/ML
30 INJECTION SUBCUTANEOUS
Qty: 0 | Refills: 0 | DISCHARGE
Start: 2019-07-10

## 2019-07-10 RX ORDER — AMLODIPINE BESYLATE 2.5 MG/1
1 TABLET ORAL
Qty: 0 | Refills: 0 | DISCHARGE

## 2019-07-10 RX ORDER — LATANOPROST 0.05 MG/ML
1 SOLUTION/ DROPS OPHTHALMIC; TOPICAL AT BEDTIME
Refills: 0 | Status: DISCONTINUED | OUTPATIENT
Start: 2019-07-10 | End: 2019-07-23

## 2019-07-10 RX ORDER — CEPHALEXIN 500 MG
500 CAPSULE ORAL EVERY 12 HOURS
Refills: 0 | Status: DISCONTINUED | OUTPATIENT
Start: 2019-07-10 | End: 2019-07-10

## 2019-07-10 RX ORDER — TIOTROPIUM BROMIDE 18 UG/1
1 CAPSULE ORAL; RESPIRATORY (INHALATION) DAILY
Refills: 0 | Status: DISCONTINUED | OUTPATIENT
Start: 2019-07-10 | End: 2019-07-14

## 2019-07-10 RX ORDER — DEXAMETHASONE 0.5 MG/5ML
1 ELIXIR ORAL
Qty: 0 | Refills: 0 | DISCHARGE
Start: 2019-07-10

## 2019-07-10 RX ORDER — ALBUTEROL 90 UG/1
2.5 AEROSOL, METERED ORAL
Qty: 0 | Refills: 0 | DISCHARGE
Start: 2019-07-10

## 2019-07-10 RX ADMIN — Medication 1 APPLICATION(S): at 23:30

## 2019-07-10 RX ADMIN — ALBUTEROL 2.5 MILLIGRAM(S): 90 AEROSOL, METERED ORAL at 00:45

## 2019-07-10 RX ADMIN — Medication 1 DROP(S): at 05:47

## 2019-07-10 RX ADMIN — LATANOPROST 1 DROP(S): 0.05 SOLUTION/ DROPS OPHTHALMIC; TOPICAL at 22:55

## 2019-07-10 RX ADMIN — Medication 250 MILLIGRAM(S): at 17:27

## 2019-07-10 RX ADMIN — Medication 1 DROP(S): at 23:03

## 2019-07-10 RX ADMIN — Medication 1 DROP(S): at 05:48

## 2019-07-10 RX ADMIN — Medication 1 DROP(S): at 17:26

## 2019-07-10 RX ADMIN — ALBUTEROL 2.5 MILLIGRAM(S): 90 AEROSOL, METERED ORAL at 05:46

## 2019-07-10 RX ADMIN — Medication 2 MILLIGRAM(S): at 05:47

## 2019-07-10 RX ADMIN — Medication 1 DROP(S): at 13:39

## 2019-07-10 RX ADMIN — Medication 1 DROP(S): at 00:10

## 2019-07-10 RX ADMIN — ENOXAPARIN SODIUM 30 MILLIGRAM(S): 100 INJECTION SUBCUTANEOUS at 05:47

## 2019-07-10 RX ADMIN — Medication 500 MILLIGRAM(S): at 17:28

## 2019-07-10 RX ADMIN — Medication 1 MILLIGRAM(S): at 17:27

## 2019-07-10 RX ADMIN — Medication 1 DROP(S): at 00:46

## 2019-07-10 NOTE — DISCHARGE NOTE PROVIDER - CARE PROVIDERS DIRECT ADDRESSES
[email or web address]

## 2019-07-10 NOTE — PROGRESS NOTE ADULT - ASSESSMENT
68yo female with BOT fullness and L AE fold edema. Pt showing improvement on exam. Neck CT yesterday showed soft tissue swelling at left BOT extending into hypopharynx and left AE fold, along with a soft tissue mass in left thyroid gland. Laryngoscopy on 7/8 showing improvement from previous exam. Discontinue steroids. Pt should be discharged on keflex x 5 days and follow up in office in 2 weeks.

## 2019-07-10 NOTE — H&P ADULT - NSHPLABSRESULTS_GEN_ALL_CORE
10.7   16.4  )-----------( 393      ( 09 Jul 2019 06:40 )             34.6     07-09    135  |  93<L>  |  30<H>  ----------------------------<  137<H>  5.0   |  31  |  0.68    Ca    9.6      09 Jul 2019 10:13

## 2019-07-10 NOTE — PROGRESS NOTE ADULT - REASON FOR ADMISSION
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
s/p sub-occipital crani for 4th ventricular tumor resection
s/p suboccipital crani for 4th ventricular tumor
dizziness
s/p sub-occipital crani for 4th ventricular tumor
s/p sub-occipital crani for 4th ventricular tumor
s/p suboccipital crani for 4th ventricular tumor
sub-occipital crani for 4th ventricular tumor
4th Ventricular tumor

## 2019-07-10 NOTE — H&P ADULT - ASSESSMENT
ASSESSMENT/PLAN  ___ year-old ___ with a PMH of _  with Gait Instability, ADL impairments and Functional impairments.    COMORBIDITES/ACTIVE MEDICAL ISSUES     Gait Instability, ADL impairments and Functional impairments: start Comprehensive Rehab Program of PT/OT       Pain Mgmt - Tylenol PRN, Oxycodone PRN  GI/Bowel Mgmt -  Continent c/w Colace, Senna,  Dulcolax PRN, Miralax PRN,  /Bladder Mgmt - Continent, PVR    FEN   - Diet - Regular + Thins  [CCHO, DASH/TLC]    - Dysphagia  SLP - evaluation and treatment    Precautions / PROPHYLAXIS:   - Falls, Cardiac, Sternal, Spinal, Seizure   - ortho: Weight bearing status: WBAT   - Lungs: Aspiration, Incentive Spirometer   - Pressure injury/Skin: Turn Q2hrs while in bed, OOB to Chair, PT/OT    - DVT: Lovenox, SCDs, TEDs     MEDICAL PROGNOSIS: GOOD            REHAB POTENTIAL: GOOD             ESTIMATED DISPOSITION: HOME WITH HOME CARE            ELOS: 10-14 Days   EXPECTED THERAPY:     P.T. 1hr/day       O.T. 1hr/day      S.L.P. 1hr/day     P&O Unnecessary     EXP FREQUENCY: 5 days per 7 day period     PRESCREEN COMPARISION:   I have reviewed the prescreen information and I have found no relevant changes between the preadmission screening and my post admission evaluation     RATIONALE FOR INPATIENT ADMISSION - Patient demonstrates the following: (check all that apply)  [X] Medically appropriate for rehabilitation admission  [X] Has attainable rehab goals with an appropriate initial discharge plan  [X] Has rehabilitation potential (expected to make a significant improvement within a reasonable period of time)   [X] Requires close medical management by a rehab physician, rehab nursing care, Hospitalist and comprehensive interdisciplinary team (including PT, OT, & or SLP, Prosthetics and Orthotics) ASSESSMENT/PLAN  The patient is a 69 year old right handed, morbidly obese (BMI 49) woman with a past medical history of asthma, glaucoma, hypertension and osteoarthritis who presented for dizziness, balance issues found to have a posterior fossa-4th ventricle mass who was admitted to [facility] on 6/24/19 s/p planned craniotomy and resection on 6/24/19 being admitted here for rehab with Gait Instability, ADL impairments and Functional impairments.    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: s/p resection on 6/24/19  -Continue with comprehensive rehab program  -Follow up with NSGY upon discharge    HTN:  -Continue with amlodipine  -Monitor vitals    Asthma:  -Continue with duoneb  -Monitor vitals    Right facial nerve palsy/Glaucoma:  -Continue with ocular drops: Latanoprost, Lacri-Lube, Prednisolone, Artificial tears    Tongue Mass:  -Continue with keflex or 5 day course  -Follow up with outpatient for further workup    Dysphagia: s/p PEG  -NPO with tube feeds  -Start bolus feeds and free water flush    Ovarian cystic mass:  -Follow up as an outpatient for further workup    Pain Mgmt - Tylenol PRN  GI/Bowel Mgmt -  Continent c/w Colace, Lizzie  /Bladder Mgmt - Check bladder scans and straight cath for volumes >400cc    FEN   - Diet - NPO with bolus tube feeds  - Dysphagia  SLP - evaluation and treatment    Precautions / PROPHYLAXIS:   - Falls, Seizure   - ortho: Weight bearing status: WBAT   - Lungs: Aspiration, Incentive Spirometer   - Pressure injury/Skin: Turn Q2hrs while in bed, OOB to Chair, PT/OT    - DVT: Lovenox, SCDs, TEDs     MEDICAL PROGNOSIS: GOOD            REHAB POTENTIAL: GOOD             ESTIMATED DISPOSITION: HOME WITH HOME CARE            ELOS: 10-14 Days   EXPECTED THERAPY:     P.T. 1hr/day       O.T. 1hr/day      S.L.P. 1hr/day     P&O Unnecessary     EXP FREQUENCY: 5 days per 7 day period     PRESCREEN COMPARISION:   I have reviewed the prescreen information and I have found no relevant changes between the preadmission screening and my post admission evaluation     RATIONALE FOR INPATIENT ADMISSION - Patient demonstrates the following: (check all that apply)  [X] Medically appropriate for rehabilitation admission  [X] Has attainable rehab goals with an appropriate initial discharge plan  [X] Has rehabilitation potential (expected to make a significant improvement within a reasonable period of time)   [X] Requires close medical management by a rehab physician, rehab nursing care, Hospitalist and comprehensive interdisciplinary team (including PT, OT, & or SLP, Prosthetics and Orthotics) ASSESSMENT/PLAN  The patient is a 69 year old right handed, morbidly obese (BMI 49) woman with a past medical history of asthma, glaucoma, hypertension and osteoarthritis who presented for dizziness, balance issues found to have a posterior fossa-4th ventricle mass who was admitted to [facility] on 6/24/19 s/p planned craniotomy and resection on 6/24/19 being admitted here for rehab with Gait Instability, ADL impairments and Functional impairments.    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: s/p resection on 6/24/19  -Continue with comprehensive rehab program  -Continue with dexamethasone taper as per OSH  -Pain control: Tylenol  -Follow up with NSGY upon discharge    HTN:  -Continue with amlodipine  -Monitor vitals    Asthma:  -Continue with duoneb  -Monitor vitals    Right facial nerve palsy/Glaucoma:  -Continue with ocular drops: Latanoprost, Lacri-Lube, Prednisolone, Artificial tears    Tongue Mass:  -Continue with keflex or 5 day course  -Follow up with outpatient for further workup    Dysphagia: s/p PEG  -NPO with tube feeds  -Start bolus feeds and free water flush    Ovarian cystic mass:  -Follow up as an outpatient for further workup    Pain Mgmt - Tylenol PRN  GI/Bowel Mgmt -  Continent c/w Colace, Lizzie  /Bladder Mgmt - Check bladder scans and straight cath for volumes >400cc    FEN   - Diet - NPO with bolus tube feeds  - Dysphagia  SLP - evaluation and treatment    Precautions / PROPHYLAXIS:   - Falls, Seizure   - ortho: Weight bearing status: WBAT   - Lungs: Aspiration, Incentive Spirometer   - Pressure injury/Skin: Turn Q2hrs while in bed, OOB to Chair, PT/OT    - DVT: Lovenox, SCDs, TEDs     MEDICAL PROGNOSIS: GOOD            REHAB POTENTIAL: GOOD             ESTIMATED DISPOSITION: HOME WITH HOME CARE            ELOS: 10-14 Days   EXPECTED THERAPY:     P.T. 1hr/day       O.T. 1hr/day      S.L.P. 1hr/day     P&O Unnecessary     EXP FREQUENCY: 5 days per 7 day period     PRESCREEN COMPARISION:   I have reviewed the prescreen information and I have found no relevant changes between the preadmission screening and my post admission evaluation     RATIONALE FOR INPATIENT ADMISSION - Patient demonstrates the following: (check all that apply)  [X] Medically appropriate for rehabilitation admission  [X] Has attainable rehab goals with an appropriate initial discharge plan  [X] Has rehabilitation potential (expected to make a significant improvement within a reasonable period of time)   [X] Requires close medical management by a rehab physician, rehab nursing care, Hospitalist and comprehensive interdisciplinary team (including PT, OT, & or SLP, Prosthetics and Orthotics) ASSESSMENT/PLAN  The patient is a 69 year old right handed, morbidly obese (BMI 49) woman with a past medical history of asthma, glaucoma, hypertension and osteoarthritis who presented for dizziness, balance issues found to have a posterior fossa-4th ventricle mass who was admitted to [facility] on 6/24/19 s/p planned craniotomy and resection on 6/24/19 being admitted here for rehab with Gait Instability, ADL impairments and Functional impairments.    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: s/p resection on 6/24/19  -Continue with comprehensive rehab program  -Continue with dexamethasone taper as per OSH  -Pain control: Tylenol  -Follow up with NSGY upon discharge    HTN:  -Continue with amlodipine  -Monitor vitals    Asthma:  -Continue with duoneb  -Monitor vitals  -Supplemental oxygen prn     Right facial nerve palsy/Glaucoma:  -Continue with ocular drops: Latanoprost, Lacri-Lube, Prednisolone, Artificial tears  -Will need outpatient followup    Tongue Mass:  -Continue with keflex for 5 day course  -Follow up with outpatient for further workup    Dysphagia: s/p PEG  -NPO with tube feeds  -Start bolus feeds and free water flush    Ovarian cystic mass:  -Follow up as an outpatient for further workup    Leukocytosis: Patient afebrile and no localizing sx. May be due to steroids  -Check CBC in am      Pain Mgmt - Tylenol PRN  GI/Bowel Mgmt -  Continent c/w Colace, Lizzie  /Bladder Mgmt - Check bladder scans and straight cath for volumes >400cc    FEN   - Diet - NPO with bolus tube feeds  - Dysphagia  SLP - evaluation and treatment    Precautions / PROPHYLAXIS:   - Falls, Seizure   - ortho: Weight bearing status: WBAT   - Lungs: Aspiration, Incentive Spirometer   - Pressure injury/Skin: Turn Q2hrs while in bed, OOB to Chair, PT/OT    - DVT: Lovenox, SCDs, TEDs     MEDICAL PROGNOSIS: GOOD            REHAB POTENTIAL: GOOD             ESTIMATED DISPOSITION: HOME WITH HOME CARE            ELOS: 10-14 Days   EXPECTED THERAPY:     P.T. 1hr/day       O.T. 1hr/day      S.L.P. 1hr/day     P&O Unnecessary     EXP FREQUENCY: 5 days per 7 day period     PRESCREEN COMPARISION:   I have reviewed the prescreen information and I have found no relevant changes between the preadmission screening and my post admission evaluation     RATIONALE FOR INPATIENT ADMISSION - Patient demonstrates the following: (check all that apply)  [X] Medically appropriate for rehabilitation admission  [X] Has attainable rehab goals with an appropriate initial discharge plan  [X] Has rehabilitation potential (expected to make a significant improvement within a reasonable period of time)   [X] Requires close medical management by a rehab physician, rehab nursing care, Hospitalist and comprehensive interdisciplinary team (including PT, OT, & or SLP, Prosthetics and Orthotics)      Dr. Zimmer made aware.

## 2019-07-10 NOTE — DISCHARGE NOTE PROVIDER - REASON FOR ADMISSION
sub-occipital craniectomy for 4th ventricular tumor resection 6/24 sub-occipital craniotomy for 4th ventricular tumor resection  7/5 PEG placement

## 2019-07-10 NOTE — DISCHARGE NOTE PROVIDER - NSDCACTIVITY_GEN_ALL_CORE
No heavy lifting/straining/Do not drive or operate machinery/Do not make important decisions No heavy lifting/straining/Walking - Outdoors allowed/Stairs allowed/Bathing allowed/Do not drive or operate machinery/Showering allowed/Do not make important decisions/Walking - Indoors allowed

## 2019-07-10 NOTE — DISCHARGE NOTE PROVIDER - INSTRUCTIONS
Pt is on Glucerna 1.2 at 65 cc/hr via PEG tube Pt is on Glucerna 1.2 at 65 cc/hr via PEG tube. Please upgrade diet as tolerated.

## 2019-07-10 NOTE — DISCHARGE NOTE PROVIDER - NSDCCPCAREPLAN_GEN_ALL_CORE_FT
PRINCIPAL DISCHARGE DIAGNOSIS  Diagnosis: Subependymoma  Assessment and Plan of Treatment: 4th ventricular tumor PRINCIPAL DISCHARGE DIAGNOSIS  Diagnosis: Subependymoma  Assessment and Plan of Treatment: s/p sub-occipital craniectomy for 4th ventricular resection. Upon discharge from rehab, follow up with Dr. Monroy.      SECONDARY DISCHARGE DIAGNOSES  Diagnosis: Glaucoma  Assessment and Plan of Treatment: Continue present medications. Follow up with PMD for further management upon discharge from rehab    Diagnosis: Hypertension  Assessment and Plan of Treatment: Continue present medications. Follow up with PMD for further managment upon discharge from rehab    Diagnosis: Dysphagia  Assessment and Plan of Treatment: insertion of PEG tube on 7/5 secondary to dysphagia    Diagnosis: Ovarian cystic mass  Assessment and Plan of Treatment: Noted on CT Chest/Abdomen/Pelvis on 6/21/19. Follow up with PMD/gyn upon discharge from rehab.    Diagnosis: Pulmonary nodule  Assessment and Plan of Treatment: two pulmonary nodules 4 mm. Noted on CT Chest/Abdomen/Pelvis on 6/21/19. Upon discharge from rehab, follow up with PMD    Diagnosis: Tongue mass  Assessment and Plan of Treatment: Tongue mass. Upon discharge from rehab, follow up with Dr. Thakur PRINCIPAL DISCHARGE DIAGNOSIS  Diagnosis: Subependymoma  Assessment and Plan of Treatment: s/p sub-occipital craniotomy for 4th ventricular resection. Upon discharge from rehab, follow up with Dr. Monroy. Please call office for appointment.      SECONDARY DISCHARGE DIAGNOSES  Diagnosis: Thyroid mass  Assessment and Plan of Treatment: soft tissue mass on left thyroid gland noted on CT soft tissue neck. Please follow up with Dr. Thakur for outpatient thyroid ultrasound upon discharge from rehab.    Diagnosis: Glaucoma  Assessment and Plan of Treatment: Continue present medications. Follow up with PMD for further management upon discharge from rehab    Diagnosis: Hypertension  Assessment and Plan of Treatment: Continue present medications. Follow up with PMD for further managment upon discharge from rehab. Please note home medications regimen changed.    Diagnosis: Dysphagia  Assessment and Plan of Treatment: insertion of PEG on 7/5 secondary to dysphagia    Diagnosis: Ovarian cystic mass  Assessment and Plan of Treatment: Noted on CT Chest/Abdomen/Pelvis on 6/21/19. Follow up with PMD/gyn upon discharge from rehab.    Diagnosis: Pulmonary nodule  Assessment and Plan of Treatment: two pulmonary nodules 4 mm. Noted on CT Chest/Abdomen/Pelvis on 6/21/19. Upon discharge from rehab, follow up with PMD

## 2019-07-10 NOTE — DISCHARGE NOTE NURSING/CASE MANAGEMENT/SOCIAL WORK - NSDCPEPTSTRK_GEN_ALL_CORE
Call 911 for stroke/Prescribed medications/Risk factors for stroke/Stroke support groups for patients, families, and friends/Stroke warning signs and symptoms/Signs and symptoms of stroke/Need for follow up after discharge/Stroke education booklet

## 2019-07-10 NOTE — PROGRESS NOTE ADULT - ASSESSMENT
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 Monroy, course complicated with stage 1 pressure injury. Now c/o L ear pain and odynophagia.  Left vocal cord paralysis and possible ?swelling vs mass at left base of tongue (ENT following)    Has failed multiple SLP evaluations, recommend NPO with non oral means for meds and feeds; difficulty managing oral secretions  Dysphagia s/p PEG 7/5/19 - some localized bleeding at PEG site; resolved after topical silver nitrate and surgicell placement  loose stool ?Abx associated    RECS  -Continue PEG feeds   -agree with trial of Metamucil, monitor stools  -Leave Sugicell in place for now  -Abx as per ENT recs    Discussed with Neurosurgery team    Andi Amaya PA-C    [facility]  [phone]  After hours and weekend coverage [phone]

## 2019-07-10 NOTE — H&P ADULT - NSHPREVIEWOFSYSTEMS_GEN_ALL_CORE
REVIEW OF SYSTEMS  Constitutional: No fever, No Chills, No fatigue  HEENT: No eye pain, No visual disturbances, No difficulty hearing  Pulm: No cough,  No shortness of breath  Cardio: No chest pain, No palpitations  GI:  No abdominal pain, No nausea, No vomiting, No diarrhea, No constipation  : No dysuria, No frequency, No hematuria  Neuro: No headaches, No memory loss, No loss of strength, No numbness, No tremors  Skin: No itching, No rashes, No lesions   Endo: No temperature intolerance  MSK: No joint pain, No joint swelling, No muscle pain, No Neck or back pain  Psych:  No depression, No anxiety REVIEW OF SYSTEMS  Constitutional: No fever, No Chills, No fatigue  HEENT: right eye pain, No difficulty hearing  Pulm: occasional cough,  No shortness of breath  Cardio: No chest pain, No palpitations  GI:  abdominal pain at PEG site, No nausea, No vomiting, No constipation  : No dysuria, No frequency, No hematuria  Neuro: No headaches, No loss of strength, No numbness, No tremors  Skin: No itching, No rashes, No lesions   Endo: No temperature intolerance  MSK: No muscle pain, No Neck or back pain REVIEW OF SYSTEMS  Constitutional: No fever, No fatigue  HEENT: right eye pain, difficulty with eye movements laterally and medially, No difficulty hearing, +dysarthria, No throat pain  Pulm: occasional cough,  No shortness of breath  Cardio: No chest pain, No palpitations  GI:  abdominal pain at PEG site, stable from OSH, No nausea, No vomiting, Last BM today, episodes of incontinence   : No dysuria, No frequency, No hematuria  Neuro: No headaches, No loss of strength, No numbness, +tremors, dizziness with changing positions, none currently   Skin: +cranial incision  Endo: No temperature intolerance  MSK: No muscle pain, No Neck or back pain

## 2019-07-10 NOTE — H&P ADULT - NSHPOUTPATIENTPROVIDERS_GEN_ALL_CORE
Joelle Monroy)  Neurosurgery  [facility]  [address], [address] [place] [address]  Phone: [phone]    Miley Thakur)  Otolaryngology  [address] [place] [address], [facility] [address]  [place] [address]  Phone: [phone]

## 2019-07-10 NOTE — PROGRESS NOTE ADULT - SUBJECTIVE AND OBJECTIVE BOX
SUBJECTIVE: Pt seen and examined in bed. Pt had no complaints. Pt had episodes of two loose stools from the night prior.     Vital Signs Last 24 Hrs  T(C): 36.8 (07-10-19 @ 08:35), Max: 37.1 (07-09-19 @ 15:54)  T(F): 98.3 (07-10-19 @ 08:35), Max: 98.8 (07-09-19 @ 15:54)  HR: 101 (07-10-19 @ 08:35) (88 - 101)  BP: 129/67 (07-10-19 @ 08:35) (108/66 - 134/81)  BP(mean): --  RR: 18 (07-10-19 @ 08:35) (18 - 18)  SpO2: 97% (07-10-19 @ 08:35) (95% - 99%)    PHYSICAL EXAM:    Constitutional: No Acute Distress     Neurological: AOx3, b/l 6th nerve palsy, Rt 7th nerve palsy, can not close right eyelid, follows commands CONTRERAS 5/5    Incision: sutures CDI. sutures left for a few more days due to high risk of wound dehiscence     Pulmonary: Clear to Auscultation, No rales, No rhonchi, No wheezes     Cardiovascular: S1, S2, Regular rate and rhythm     Gastrointestinal: Soft, Non-tender, Non-distended, +bowel sounds x 4    Extremities: No calf tenderness bilaterally, no cyanosis, clubbing or edema          LABS:                          10.7   16.4  )-----------( 393      ( 09 Jul 2019 06:40 )             34.6    07-09    135  |  93<L>  |  30<H>  ----------------------------<  137<H>  5.0   |  31  |  0.68    Ca    9.6      09 Jul 2019 10:13        07-09 @ 07:01  -  07-10 @ 07:00  --------------------------------------------------------  IN: 1673 mL / OUT: 0 mL / NET: 1673 mL        IMAGING: < from: CT Neck Soft Tissue w/ IV Cont (07.09.19 @ 09:07) >  EXAM:  CT NECK SOFT TISSUE IC                            PROCEDURE DATE:  07/09/2019            INTERPRETATION:  Clinical indication: Base of tongue swelling on visual   exam, recent intubation for suboccipital craniectomy and removal of   fourth ventricular subependymoma    Serial thin sections to the soft tissues of the neck were obtained from   the orbits to the thoracic inlet after the intravenous administration of   70 cc of Omnipaque-300, 30 cc were discarded. Images were reconstructed   at 1.0 and 3.0 mm sections with sagittal and coronal computer generator   reconstructed views.    Comparison is made with the suboptimal MRI of 7/2/2019.    As on the prior MRI there is mild swelling and nonspecific density at the   base of the tongueon the left extending into the left aryepiglottic fold   and hypopharynx. This may represent an area of trauma or infection and   could be related to intubation.    There is mild ptosis of the left true cord with displacement of the left   arytenoid cartilage. The laryngeal ventricle is enlarged suggesting this   is chronic. This could also be traumatic or may be due to a mass. There   is enlargement of the left lobe of the thyroid gland with an apparent   mass by streak artifact. This can be further evaluated with ultrasound.    The sellar salivary glands are normal. There is normal vascular   enhancement. Mild degenerative changes of the cervical spine are noted   with large anterior osteophytes which cause mass effect on the   hypopharynx.    There has been a suboccipital craniectomy and postoperative changes are   identified related to the prior posterior fossa surgery. There are 2   surgical clips identified postoperative bed. Normal intracranial vascular   enhancement is identified.    There is left sphenoid sinus opacification with wall thickening   suggesting chronic sphenoid sinus inflammatory changes.    IMPRESSION: Slight soft tissue swelling at the base of the tongue on the   left extending into the left hypopharynx and aryepiglottic fold may be   due to trauma or infection, cannot exclude underlying mass. Recommend   follow-up. No drainable collections or abscess are identified.    Mild ptosis of the left true vocal cord. There appears to be a soft   tissue mass in the left thyroid gland. This could be further evaluated   with ultrasound.                     HALINA FITZPATRICK M.D., ATTENDING RADIOLOGIST  This document has been electronically signed. Jul 9 2019 11:28AM                < end of copied text >      MEDICATIONS:  Antibiotics:  cephalexin 500 milliGRAM(s) Oral every 12 hours    Neuro:  acetaminophen    Suspension .. 650 milliGRAM(s) Oral every 6 hours PRN Temp greater or equal to 38.5C (101.3F), Mild Pain (1 - 3)    Cardiac:  amLODIPine   Tablet 5 milliGRAM(s) Oral daily    Pulm:  ALBUTerol    0.083% 2.5 milliGRAM(s) Nebulizer every 6 hours    GI/:  psyllium Powder 1 Packet(s) Enteral Tube two times a day    Other:   artificial  tears Solution 1 Drop(s) Right EYE every 6 hours  dexamethasone     Tablet 1 milliGRAM(s) Oral every 12 hours  enoxaparin Injectable 30 milliGRAM(s) SubCutaneous every 12 hours  latanoprost 0.005% Ophthalmic Solution 1 Drop(s) Both EYES at bedtime  petrolatum Ophthalmic Ointment 1 Application(s) Right EYE at bedtime  prednisoLONE acetate 1% Suspension 1 Drop(s) Both EYES four times a day        DIET: Tube Feeds with Glucerna 1.2 at 60 cc/hr

## 2019-07-10 NOTE — H&P ADULT - HISTORY OF PRESENT ILLNESS
The patient is a 69 year old right handed, morbidly obese (BMI 49) woman with a past medical history of asthma, glaucoma, hypertension and osteoarthritis. She presented to her PMD with light headedness, dizziness & balance disturbance of three months duration.   Outpatient MRI revealed a heterogeneously enhancing lesion completely filling the fourth ventricle, obstructing the circulation of cerebrospinal fluid and early hydrocephalus with transependymal resorption.  She was admitted on 6/24/19 to [facility] for stereotactic suboccipital craniotomy for 4th ventricular tumor resection. Gross total resection was achieved and the patient was transferred to the neurosurgical ICU post op. Pathology revealed sub ependymoma    Post operatively her course was complicated by dysphagia now s/p peg on 7/5 with post procedural bleeding around the PEG site controlled with silver nitrate. Laryngoscopy revealed a tongue mass and CT imaging showed soft tissue swelling extending into hypopharynx and left AE fold, along with a soft tissue mass in left thyroid gland. At hospital discharge ENT recommended keflex x 5 days and follow up outpatient in 2 weeks for thyroid ultrasound. The patient is a 69 year old right handed, morbidly obese (BMI 49) woman with a past medical history of asthma, glaucoma, hypertension and osteoarthritis. She presented to her PMD with light headedness, dizziness & balance disturbance of three months duration.   Outpatient MRI revealed a heterogeneously enhancing lesion completely filling the fourth ventricle, obstructing the circulation of cerebrospinal fluid and early hydrocephalus with transependymal resorption.  She was admitted on 6/24/19 to [facility] for stereotactic suboccipital craniotomy for 4th ventricular tumor resection. Gross total resection was achieved and the patient was transferred to the neurosurgical ICU post op. Pathology revealed sub ependymoma    Post operatively her course was complicated by dysphagia now s/p peg on 7/5 with post procedural bleeding around the PEG site controlled with silver nitrate. Laryngoscopy revealed a tongue mass and CT imaging showed soft tissue swelling extending into hypopharynx and left AE fold, along with a soft tissue mass in left thyroid gland. At hospital discharge ENT recommended keflex x 5 days and follow up outpatient in 2 weeks for thyroid ultrasound. She  was also evaluated post operatively for right facial nerve weakness and incomplete eye closure and treated with topical eye lubrication. The patient is a 69 year old right handed, morbidly obese (BMI 49) woman with a past medical history of asthma, glaucoma, hypertension and osteoarthritis who presented for dizziness, balance issues found to have a posterior fossa-4th ventricle mass who was admitted to [facility] on 6/24/19 s/p planned craniotomy and resection on 6/24/19 being admitted here for rehab. She presented to her PMD with light headedness, dizziness & balance disturbance of three months duration. Outpatient MRI revealed a heterogeneously enhancing lesion completely filling the fourth ventricle, obstructing the circulation of cerebrospinal fluid and early hydrocephalus with transependymal resorption.  She was admitted on 6/24/19 to [facility] for planned stereotactic suboccipital craniotomy for 4th ventricular tumor resection. Gross total resection was achieved and the patient was transferred to the neurosurgical ICU post op. Pathology revealed sub ependymoma. Her course was complicated by post op imaging bilateral intraventricular hemorrhage and was monitored closely. Patient did not require further surgical intervention.     Post operatively her course was complicated by dysphagia now s/p peg on 7/5 with post procedural bleeding around the PEG site controlled with silver nitrate. Her course was complicated by throat and left ear pain. Laryngoscopy revealed a tongue mass and CT imaging showed soft tissue swelling extending into hypopharynx and left AE fold, along with a soft tissue mass in left thyroid gland. ENT consulted and recommended keflex x 5 days and follow up outpatient in 2 weeks for thyroid ultrasound. She was also evaluated post operatively for right facial nerve weakness and incomplete eye closure and treated with topical eye lubrication. Patient medically stabilized and admitted here for rehab.

## 2019-07-10 NOTE — DISCHARGE NOTE PROVIDER - CARE PROVIDER_API CALL
Joelle Monroy)  Neurosurgery  [address], [address] [place] [address]  Phone: [phone]  Fax: [phone]  Follow Up Time: 1 week Joelle Monroy)  Neurosurgery  [address], [address] [place] [address]  Phone: [phone]  Fax: [phone]  Follow Up Time: Joelle Monroy)  Neurosurgery  [facility]  [address] [facility], [address] [place] [address]  Phone: [phone]  Fax: [phone]  Follow Up Time:     Miley Thakur)  Otolaryngology  [address] [place] [address] [address]  [place] [address]  Phone: [phone]  Fax: [phone]  Follow Up Time:

## 2019-07-10 NOTE — DISCHARGE NOTE NURSING/CASE MANAGEMENT/SOCIAL WORK - NSDCDPATPORTLINK_GEN_ALL_CORE
You can access the [facility] Patient Portal, offered by [facility], by registering with the following website: [email or web address]

## 2019-07-10 NOTE — DISCHARGE NOTE PROVIDER - PROVIDER TOKENS
PROVIDER:[TOKEN:[[id number]],FOLLOWUP:[1 week]] PROVIDER:[TOKEN:[[id number]]] PROVIDER:[TOKEN:[[id number]]],PROVIDER:[TOKEN:[[id number]]]

## 2019-07-10 NOTE — PROGRESS NOTE ADULT - SUBJECTIVE AND OBJECTIVE BOX
Patient is a 69y old  Female who presented with a chief complaint of sub-occipital craniectomy for 4th ventricular tumor resection (10 Jul 2019 09:30)      INTERVAL HPI/OVERNIGHT EVENTS:  no further bleeding at PEG site  2 loose stools reported yesterday  started on Metamucil via PEG  no fever or chills    MEDICATIONS  (STANDING):  ALBUTerol    0.083% 2.5 milliGRAM(s) Nebulizer every 6 hours  amLODIPine   Tablet 5 milliGRAM(s) Oral daily  artificial  tears Solution 1 Drop(s) Right EYE every 6 hours  cephalexin 500 milliGRAM(s) Oral every 12 hours  dexamethasone     Tablet 1 milliGRAM(s) Oral every 12 hours  enoxaparin Injectable 30 milliGRAM(s) SubCutaneous every 12 hours  latanoprost 0.005% Ophthalmic Solution 1 Drop(s) Both EYES at bedtime  petrolatum Ophthalmic Ointment 1 Application(s) Right EYE at bedtime  prednisoLONE acetate 1% Suspension 1 Drop(s) Both EYES four times a day  psyllium Powder 1 Packet(s) Enteral Tube two times a day    MEDICATIONS  (PRN):  acetaminophen    Suspension .. 650 milliGRAM(s) Oral every 6 hours PRN Temp greater or equal to 38.5C (101.3F), Mild Pain (1 - 3)      Allergies  No Known Drug Allergies  shellfish (Angioedema; Rash)      Review of Systems:  unable to obtain    Vital Signs Last 24 Hrs  T(C): 36.8 (10 Jul 2019 08:35), Max: 37.1 (09 Jul 2019 13:29)  T(F): 98.3 (10 Jul 2019 08:35), Max: 98.8 (09 Jul 2019 15:54)  HR: 101 (10 Jul 2019 08:35) (88 - 101)  BP: 129/67 (10 Jul 2019 08:35) (108/66 - 134/81)  BP(mean): --  RR: 18 (10 Jul 2019 08:35) (18 - 19)  SpO2: 97% (10 Jul 2019 08:35) (93% - 99%)    PHYSICAL EXAM:  Constitutional: non toxic , NAD, obese, female lying comfortably in bed. responsive but drowsy  Neck:  supple  Respiratory:  anterior chest wall clear to auscultation   Cardiovascular: S1 and S2, RRR, no murmur  Gastrointestinal: soft, obese, non-tender; +BS , PEG external bumper at 5cm,surgicell in place beneath external bumper, clean and dry  Extremities: No  cyanosis  Skin: no rash    LABS:                        10.7   16.4  )-----------( 393      ( 09 Jul 2019 06:40 )             34.6     07-09    135  |  93<L>  |  30<H>  ----------------------------<  137<H>  5.0   |  31  |  0.68    Ca    9.6      09 Jul 2019 10:13      RADIOLOGY & ADDITIONAL TESTS:

## 2019-07-10 NOTE — PROGRESS NOTE ADULT - ATTENDING COMMENTS
[facility]. [facility]  [phone]
Keny Saenz MD, FACP, FACG, AGAF  [facility]  [phone]     After hours and weekend coverage GI service : [phone]
discussed with neurosurgery PA

## 2019-07-10 NOTE — DISCHARGE NOTE PROVIDER - HOSPITAL COURSE
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 Pt then was transferred post-operatively to the neurosurgical ICU and showed on 6/25/19 that the patient had bilateral intraventricular hemorrhage that is new since the surgery. In addition, it was also noted that the patient had blistering on the chin and soft tissue swelling on the lower gumline. The ICU team withheld extubating here that day due to swelling noted on her tongue. 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 On 6/26/19, an MRI of the brain w/wo contrast was pending to see if her screws in the left elbow and forearm and right ankle from previous surgeries. It was still noted the pt had swelling on the tongue and extubation was withheld due to no cuff leak. Pt had a CXR which showed mild congestion and was given duonebs and lasix 10 mg once. Pt was started on lovenox 40.        Pt was extubated on 6/27/19 and tolerated it well. On 6/28/19, it was noted the patient was agitated and was given seroquel 12.5 prn at bedtime as well as started on precedex. On 6/30/19, pt was started on nystatin due to oral thrush. It was decided by the ICU team for the pt to be transferred out of the unit to the floor.        On 7/1, pt was seen on the floor and had a bedside swallow evaluation from the speech pathologist and failed. Pt was on a slow taper of decadron and was still on nystatin for the oral thrush with a stop date of 7/2. 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 Pt was started on lacrilube for the Rt eye and it was notified by oculoplastics to see if a gold weight is needed for the Rt eye. On 7/2, the pt continued with the decadron taper and oculoplastics decided that the pt did not need gold weight for her Rt eye and to use lacrilube. Pt was scheduled for a PEG to be done on 7/5 due to failure of speech and swallow evaluation. The MRI of the brain with contrast was completed and showed that there is a small amount of hemorrhage and edema in the region of the surgical bed.     however, there is no hydrocephalus or midline shift. On 7/3, ENT was consulted for the pt due to complaint of ear pain and pain with swallowing. ENT scoped the pt and it was noted there was a tongue mass. On 7/3, it was noted there was some back of the tongue swelling but it was of poor quality due to patient motion.  On 7/5, pt had PEG procedure. On 7/8, it was noted there was bleeding around the PEG site and pt had silver nitrate sticks placed and gauze. The bleeding was stabilized and stopped. On 7/9, the CT neck soft tissue was officially read and mentioned that there is swelling on the back of the tongue but can not exclude a mass. On 7/10, pt's CT soft tissue neck was read by ENT doctor, Dr. Thakur, which cleared her for discharge and to start keflex for 5 days and discontinue decadron. It was noted during the night the pt had two episodes of loose stool and senna and colace as discontinued and trent active was discontinued the day before.  Pt was started on a metamucil packet.         Pt was on Glucerna 1.2 for PEG feeds.         Pt is medically cleared for discharge. 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 Pt then was transferred post-operatively to the neurosurgical ICU and showed on 6/25/19 that the patient had bilateral intraventricular hemorrhage that is new since the surgery. In addition, it was also noted that the patient had blistering on the chin and soft tissue swelling on the lower gumline. The ICU team withheld extubating here that day due to swelling noted on her tongue. 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 On 6/26/19, an MRI of the brain w/wo contrast was pending to see if her screws in the left elbow and forearm and right ankle from previous surgeries. It was still noted the pt had swelling on the tongue and extubation was withheld due to no cuff leak. Pt had a CXR which showed mild congestion and was given duonebs and lasix 10 mg once. Pt was started on lovenox 40.        Pt was extubated on 6/27/19 and tolerated it well. On 6/28/19, it was noted the patient was agitated and was given seroquel 12.5 prn at bedtime as well as started on precedex. On 6/30/19, pt was started on nystatin due to oral thrush. It was decided by the ICU team for the pt to be transferred out of the unit to the floor.        On 7/1, pt was seen on the floor and had a bedside swallow evaluation from the speech pathologist and failed. Pt was on a slow taper of decadron and was still on nystatin for the oral thrush with a stop date of 7/2. 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 Pt was started on lacrilube for the Rt eye and it was notified by oculoplastics to see if a gold weight is needed for the Rt eye. On 7/2, the pt continued with the decadron taper and oculoplastics decided that the pt did not need gold weight for her Rt eye and to use lacrilube. Pt was scheduled for a PEG to be done on 7/5 due to failure of speech and swallow evaluation. The MRI of the brain with contrast was completed and showed that there is a small amount of hemorrhage and edema in the region of the surgical bed.     however, there is no hydrocephalus or midline shift. On 7/3, ENT was consulted for the pt due to complaint of ear pain and pain with swallowing. ENT scoped the pt and it was noted there was a tongue mass. On 7/3, it was noted there was some back of the tongue swelling but it was of poor quality due to patient motion.  On 7/5, pt had PEG procedure. On 7/8, it was noted there was bleeding around the PEG site and pt had silver nitrate sticks placed and gauze. The bleeding was stabilized and stopped. On 7/9, the CT neck soft tissue was officially read and mentioned that there is swelling on the back of the tongue but can not exclude a mass. On 7/10, pt's CT soft tissue neck was read by ENT doctor, Dr. Thakur, which cleared her for discharge and to start keflex for 5 days and discontinue decadron. It was noted during the night the pt had two episodes of loose stool and senna and colace as discontinued and trent active was discontinued the day before.  Pt was started on a metamucil packet.         Pt was on Glucerna 1.2 for PEG feeds.         Pt is medically cleared for discharge. 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 On 6/25/19, Pt transferred to neurosurgical ICU post-op and CT scan showed new bilateral intraventricular hemorrhage. ICU team delayed extubated due to swelling on tongue. Pathology results showed subependymoma. On 6/26/19, pt had a CXR which showed mild congestion and was given duonebs and lasix 10 mg once. Pt was started on lovenox 40 for DVT ppx.        On 6/27/19, pt was extubated and tolerated it well. On 6/28/19, patient was agitated and was given seroquel 12.5 prn at bedtime as well as started on precedex. On 6/30/19, pt was started on nystatin due to oral thrush. Pt transferred out of the unit to the floor on 6/30/19.        On 7/1, pt failed bedside swallow evaluation. Pt was on a slow taper of decadron. On 7/2 oculoplastics determined pt did not need gold weight on Rt eye and recommended lacrilube. Pt scheduled for PEG placement on 7/5 due to dysphagia. On 7/3, ENT was consulted for complaint of ear pain and pain with swallowing. ENT scoped and noted tongue mass. On 7/3, MR soft tissue of the neck revealed back of the tongue swelling but it was of poor quality due to patient motion.  On 7/8, bleeding around the PEG site was noted and controlled with silver nitrate sticks. There were no incidences of bleeding the day after. On 7/9, the CT neck soft tissue noted swelling on the back of the tongue but can not exclude a mass as well as soft tissue mass in the left thyroid gland. On 7/10, pt's CT soft tissue neck was read by ENT doctor, Dr. Thakur, which cleared her for discharge and to start keflex for 5 days, discontinue decadron, and follow up thyroid ultrasound outpatient. At the end of hospital course, pt has completed decadron taper.        Pt was seen by Rehab Medicine, Occupational Therapist, and Physical Therapist, that recommend Acute Rehab upon discharge.        Pt was on Glucerna 1.2 for PEG feeds and tolerated it well.        On day of discharge, pt is medically and neurologically cleared for discharge. 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 On 6/25/19, Pt transferred to neurosurgical ICU post-op and CT scan showed new bilateral intraventricular hemorrhage. ICU team delayed extubated due to swelling on tongue. Pathology results showed subependymoma. On 6/26/19, pt had a CXR which showed mild congestion and was given duonebs and lasix 10 mg once. Pt was started on lovenox 40 for DVT ppx.        On 6/27/19, pt was extubated and tolerated it well. On 6/28/19, patient was agitated and was given seroquel 12.5 prn at bedtime as well as started on precedex. On 6/30/19, pt was started on nystatin due to oral thrush. Pt transferred out of the unit to the floor on 6/30/19.        On 7/1, pt failed bedside swallow evaluation. Pt was on a slow taper of decadron. On 7/2 oculoplastics determined pt did not need gold weight on Rt eye and recommended lacrilube. Pt scheduled for PEG placement on 7/5 due to dysphagia. On 7/3, ENT was consulted for complaint of ear pain and pain with swallowing. ENT scoped and noted tongue mass. On 7/3, MR soft tissue of the neck revealed back of the tongue swelling but it was of poor quality due to patient motion.  On 7/8, bleeding around the PEG site was noted and controlled with silver nitrate sticks. There were no incidences of bleeding the day after. On 7/9, the CT neck soft tissue noted swelling on the back of the tongue but can not exclude a mass as well as soft tissue mass in the left thyroid gland. On 7/10, pt's CT soft tissue neck was read by ENT doctor, Dr. Thakur, which cleared her for discharge and to start keflex for 5 days, discontinue decadron, and follow up thyroid ultrasound outpatient. At the end of hospital course, pt has completed decadron taper. Additionally, pt was seen and followed with Hospitalist medicine.        Pt was seen by Rehab Medicine, Occupational Therapist, and Physical Therapist, that recommend Acute Rehab upon discharge.        Pt was on Glucerna 1.2 for PEG feeds and tolerated it well.        On day of discharge, pt is medically and neurologically cleared for discharge.

## 2019-07-10 NOTE — DISCHARGE NOTE PROVIDER - NSDCFUADDINST_GEN_ALL_CORE_FT
When discharged from rehab, please make a follow up appointment with the surgeon.    In addition, please make follow up appointments with your primary care doctor in regards to your ovarian cystic mass, and pulmonary nodules noted on the CT of the Chest/Abdomen/Pelvis.     Please follow up with ENT in regards to the swelling noted on the back of the tongue.     Please follow up with Gastroenterologist about PEG tube.    If you experience any of the following:  severe headache, changes in vision, changes in level of consciousness,  seizures, increase in nausea/vomiting, chest pain, fainting, please go to the ER immediately. When discharged from rehab, please make a follow up appointment with the surgeon.    In addition, please make follow up appointments with your primary care doctor in regards to your ovarian cystic mass, and pulmonary nodules noted on the CT of the Chest/Abdomen/Pelvis.     Please follow up with ENT in regards to the swelling noted on the back of the tongue.     If you experience any of the following:  severe headache, changes in vision, changes in level of consciousness,  seizures, increase in nausea/vomiting, chest pain, fainting, please go to the ER immediately. When discharged from rehab, please make a follow up appointment with the surgeon.    In addition, please make follow up appointments with your primary care doctor in regards to your antihypertensive medications due to changes, ovarian cystic mass, and pulmonary nodules noted on the CT of the Chest/Abdomen/Pelvis.     Please follow up with ENT in regards to the swelling noted on the back of the tongue and outpatient thyroid ultrasound due to mass on thyroid gland noted on CT soft tissue neck.    If you experience any of the following:  severe headache, changes in vision, changes in level of consciousness,  seizures, increase in nausea/vomiting, chest pain, fainting, please go to the ER immediately. When discharged from rehab, please make a follow up appointment with the surgeon.    In addition, please make follow up appointments with your primary care doctor in regards to your antihypertensive medications due to changes, ovarian cystic mass, and pulmonary nodules noted on the CT of the Chest/Abdomen/Pelvis.     Please follow up with ENT in regards to the swelling noted on the back of the tongue and outpatient thyroid ultrasound due to mass on thyroid gland noted on CT soft tissue neck.    If you experience any of the following:  severe headache, changes in vision, changes in level of consciousness,  seizures, increase in nausea/vomiting, chest pain, fainting, please go to the ER immediately. Do not resume aspirin until cleared with surgeon.

## 2019-07-10 NOTE — PROGRESS NOTE ADULT - ASSESSMENT
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 (18 Jun 2019 18:39)    PROCEDURE: Craniotomy for brain tumor using navigation system     POD# 16    PROCEDURE: PEG placement    POD# 5        Assessment:  Please Check When Present   []  GCS  E   V  M     Heart Failure: []Acute, [] acute on chronic , []chronic  Heart Failure:  [] Diastolic (HFpEF), [] Systolic (HFrEF), []Combined (HFpEF and HFrEF), [] RHF, [] Pulm HTN, [] Other    [] BRENDA, [] ATN, [] AIN, [] other  [] CKD1, [] CKD2, [] CKD 3, [] CKD 4, [] CKD 5, []ESRD    Encephalopathy: [] Metabolic, [] Hepatic, [] toxic, [] Neurological, [] Other    Abnormal Nurtitional Status: [] malnurtition (see nutrition note), [ ]underweight: BMI < 19, [] morbid obesity: BMI >40, [] Cachexia    [] Sepsis  [] hypovolemic shock,[] cardiogenic shock, [] hemorrhagic shock, [] neuogenic shock  [] Acute Respiratory Failure  []Cerebral edema, [] Brain compression/ herniation,   [] Functional quadriplegia  [] Acute blood loss anemia        PLAN:     Neuro:  - Neuro/vitals q4h  - on decadron 1 mg q12h stop after 2 doses  - Tylenol prn for pain  - Due to Rt facial droop and inability to close right eye, pt is on artifical tears solution 1 drop right eye q6h and petrolatum ophthalmic ointment 1 application right eye at bedtime  -  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 Monroy about restarting aspirin    Optho:  - Due to history of glaucoma, pt is currently on prednisolone acetate 1% suspension 1 drop both eyes 4x a day, latanoprost 0.005% ophthalmic solution 1 drop both eyes at bedtime    ENT:  - ENT is following  - CT neck soft tissue with IV contrast was done and reports: Slight soft tissue swelling at the base of the tongue on the left extending into the left hypopharynx and aryepiglottic fold may be due to trauma or infection, cannot exclude underlying mass. No drainable collections or abscess are identified. Mild ptosis of the left true vocal cord. There appears to be a soft tissue mass in the left thyroid gland. This could be further evaluated with ultrasound.   - previous MR neck soft tissue only w/wo IV contrast done but limited ability to evaluate because of artifact (pt was moving)  - Pt cleared for discharge as per ENT.   - Recommend follow up as outpatient as well as thyroid ultrasound    CV:  - Amlodipine 10 mg PO QD and losartan 25 mg for HTN  - stable    Pulm:  - due to history of asthma, pt continue with albuterol 2.5 mg nebulizer every 6h  - on CT Chest/Abdomen/Pelvis done on 6/21, showed two pulmonary nodules measuring up to 4 mm; will be followed outpatient    GI:  - due to two episodes of loose stool, d/c senna and colace  - pt started on metamucil packets twice a day    :  - on 6/21 the CT Abdomen/pelvis/chest revealed an ovarian cystic mass which may represent a low grade neoplasm in postmenopausal patient  - will be followed up with outpatient with PMD/gyn    Renal:  - IVL    ID:  - afebrile    Heme/onc:  - lovenox 30 q12 for DVT ppx    Endo:  - Pt is currently on insulin sliding scale  - thyroid panel normal  - follow up thyroid ultrasound with ENT outpatient    Dispo: PT/OT recommend acute rehab HPI:  69 year old right handed morbidly obese female , PMH of extrinsic asthma, glaucoma, HTN & OA,  reports having recent light headedness, dizziness & some balance disturbance for about three months,  PMD ordered a brain MRI which showed a 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 (18 Jun 2019 18:39)    PROCEDURE: Craniotomy for brain tumor using navigation system     POD# 16    PROCEDURE: PEG placement    POD# 5        Assessment:  Please Check When Present   []  GCS  E   V  M     Heart Failure: []Acute, [] acute on chronic , []chronic  Heart Failure:  [] Diastolic (HFpEF), [] Systolic (HFrEF), []Combined (HFpEF and HFrEF), [] RHF, [] Pulm HTN, [] Other    [] BRENDA, [] ATN, [] AIN, [] other  [] CKD1, [] CKD2, [] CKD 3, [] CKD 4, [] CKD 5, []ESRD    Encephalopathy: [] Metabolic, [] Hepatic, [] toxic, [] Neurological, [] Other    Abnormal Nurtitional Status: [] malnurtition (see nutrition note), [ ]underweight: BMI < 19, [] morbid obesity: BMI >40, [] Cachexia    [] Sepsis  [] hypovolemic shock,[] cardiogenic shock, [] hemorrhagic shock, [] neuogenic shock  [] Acute Respiratory Failure  []Cerebral edema, [] Brain compression/ herniation,   [] Functional quadriplegia  [] Acute blood loss anemia        PLAN:     Neuro:  - Neuro/vitals q4h  - on decadron 1 mg q12h stop after 2 doses  - Tylenol prn for pain  - Due to Rt facial droop and inability to close right eye, pt is on artifical tears solution 1 drop right eye q6h and petrolatum ophthalmic ointment 1 application right eye at bedtime  -  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 Monroy about restarting aspirin    Optho:  - Due to history of glaucoma, pt is currently on prednisolone acetate 1% suspension 1 drop both eyes 4x a day, latanoprost 0.005% ophthalmic solution 1 drop both eyes at bedtime    ENT:  - ENT is following  - CT neck soft tissue with IV contrast was done and reports: Slight soft tissue swelling at the base of the tongue on the left extending into the left hypopharynx and aryepiglottic fold may be due to trauma or infection, cannot exclude underlying mass. No drainable collections or abscess are identified. Mild ptosis of the left true vocal cord. There appears to be a soft tissue mass in the left thyroid gland. This could be further evaluated with ultrasound.   - previous MR neck soft tissue only w/wo IV contrast done but limited ability to evaluate because of artifact (pt was moving)  - Pt cleared for discharge as per ENT.   - Recommend follow up as outpatient as well as thyroid ultrasound    CV:  - Amlodipine 10 mg PO QD and losartan 25 mg for HTN  - stable    Pulm:  - due to history of asthma, pt continue with albuterol 2.5 mg nebulizer every 6h  - on CT Chest/Abdomen/Pelvis done on 6/21, showed two pulmonary nodules measuring up to 4 mm; will be followed outpatient    GI:  - due to two episodes of loose stool, d/c senna and colace  - pt started on metamucil packets twice a day    :  - on 6/21 the CT Abdomen/pelvis/chest revealed an ovarian cystic mass which may represent a low grade neoplasm in postmenopausal patient  - will be followed up with outpatient with PMD/gyn    Renal:  - IVL    ID:  - afebrile    Heme/onc:  - lovenox 30 q12 for DVT ppx    Endo:  - Pt is currently on insulin sliding scale  - thyroid panel normal  - follow up thyroid ultrasound with ENT outpatient    Dispo: PT/OT recommend acute rehab    Daughter was informed bedside about thyroid mass, ovarian cystic mass, and need for follow up. ENT spoke with daughter as well.

## 2019-07-10 NOTE — H&P ADULT - NSHPSOCIALHISTORY_GEN_ALL_CORE
FUNCTIONAL HISTORY  As per chart patient resides with spouse, daughter, and grandchildren in a private home with 6 steps to enter, no stairs inside. At time of evaluation pt reports 3-4 steps to enter with bilateral handrails and cane for ambulation.    Independent prior with Ambulation and ADLs.     Current Functional History: 7/10/19    Bed Mobility  Bed Mobility Training Rolling/Turning: minimum assist (75% patient effort);  verbal cues;  supervision;  1 person assist  Bed Mobility Training Scooting: minimum assist (75% patient effort);  verbal cues;  supervision;  1 person assist  Bed Mobility Training Supine-to-Sit: minimum assist (75% patient effort);  verbal cues;  supervision;  1 person assist  Bed Mobility Training Limitations: decreased flexibility;  decreased strength;  impaired balance    Sit-Stand Transfer Training  Transfer Training Sit-to-Stand Transfer: minimum assist (75% patient effort);  supervision;  verbal cues;  1 person assist;  weight-bearing as tolerated  Transfer Training Stand-to-Sit Transfer: minimum assist (75% patient effort);  verbal cues;  supervision;  1 person assist;  weight-bearing as tolerated  Sit-to-Stand Transfer Training Transfer Safety Analysis: decreased balance;  decreased flexibility;  impaired balance;  decreased strength    Gait Training  Gait Training: minimum assist (75% patient effort);  supervision;  verbal cues;  2 person assist;  weight-bearing as tolerated   bed to chair;  4 ft  Gait Analysis: swing-through gait   decreased duane;  decreased stride length;  decreased step length;  decreased flexibility;  decreased strength;  impaired balance;  Bed to Chair;  4 ft

## 2019-07-10 NOTE — DISCHARGE NOTE PROVIDER - NSDCCPTREATMENT_GEN_ALL_CORE_FT
PRINCIPAL PROCEDURE  Procedure: Occipital craniectomy  Findings and Treatment: sub-occipital craniectomy for 4th ventricular tumor resection PRINCIPAL PROCEDURE  Procedure: Occipital craniectomy  Findings and Treatment: sub-occipital craniectomy for 4th ventricular tumor resection      SECONDARY PROCEDURE  Procedure: Insertion, PEG tube  Findings and Treatment: Procedure done on 7/5/19 by [facility]

## 2019-07-10 NOTE — PROGRESS NOTE ADULT - ASSESSMENT
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        1. 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     Steroid taper per primary team.     2. Dysphagia- S/p PEG on 7/5, tolerating feeds.     3. HTN- bp controlled on current regimen. c/w amlodipine 5mg qd. can increase to 10mg qd if bp elevated. ?had been on losartan - would clarify.      4. Hyperkalemia- resolved though normal high -off arb. would continue to monitor K    4. Throat and L ear pain- Soft tissue neck CT done- Slight soft tissue swelling at the base of the tongue on the left extending into the left hypopharynx and aryepiglottic fold may be due to trauma or infection, cannot exclude underlying mass. No drainable collections or abscess are identified. Mild ptosis of the left true vocal cord. There appears to be a soft tissue mass in the left thyroid gland. This could be further evaluated with ultrasound.     ENT consult appreciated - ulceration seen recommended - keflex x 5 days.   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        1. 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   Steroid taper per primary team.     2. Dysphagia- S/p PEG on 7/5, tolerating feeds.     3. HTN- bp controlled on current regimen. c/w amlodipine 5mg qd. can increase to 10mg qd if bp elevated. ?had been on losartan - would clarify.      4. Hyperkalemia- resolved though normal high -off arb. would continue to monitor K    4. Throat and L ear pain- Soft tissue neck CT done- Slight soft tissue swelling at the base of the tongue on the left extending into the left hypopharynx and aryepiglottic fold may be due to trauma or infection, cannot exclude underlying mass. No drainable collections or abscess are identified. Mild ptosis of the left true vocal cord. There appears to be a soft tissue mass in the left thyroid gland. This could be further evaluated with ultrasound.     ENT consult appreciated - ?ulceration seen recommended - keflex x 5 days.   TSH wnl

## 2019-07-10 NOTE — PROGRESS NOTE ADULT - SUBJECTIVE AND OBJECTIVE BOX
Patient is a 69y old  Female who presents with a chief complaint of sub-occipital craniectomy for 4th ventricular tumor resection (10 Jul 2019 09:30)        SUBJECTIVE / OVERNIGHT EVENTS:      MEDICATIONS  (STANDING):  ALBUTerol    0.083% 2.5 milliGRAM(s) Nebulizer every 6 hours  amLODIPine   Tablet 5 milliGRAM(s) Oral daily  artificial  tears Solution 1 Drop(s) Right EYE every 6 hours  cephalexin 500 milliGRAM(s) Oral every 12 hours  dexamethasone     Tablet 1 milliGRAM(s) Oral every 12 hours  enoxaparin Injectable 30 milliGRAM(s) SubCutaneous every 12 hours  latanoprost 0.005% Ophthalmic Solution 1 Drop(s) Both EYES at bedtime  petrolatum Ophthalmic Ointment 1 Application(s) Right EYE at bedtime  prednisoLONE acetate 1% Suspension 1 Drop(s) Both EYES four times a day  psyllium Powder 1 Packet(s) Enteral Tube two times a day    MEDICATIONS  (PRN):  acetaminophen    Suspension .. 650 milliGRAM(s) Oral every 6 hours PRN Temp greater or equal to 38.5C (101.3F), Mild Pain (1 - 3)      Vital Signs Last 24 Hrs  T(C): 36.8 (10 Jul 2019 08:35), Max: 37.1 (09 Jul 2019 13:29)  T(F): 98.3 (10 Jul 2019 08:35), Max: 98.8 (09 Jul 2019 15:54)  HR: 101 (10 Jul 2019 08:35) (88 - 101)  BP: 129/67 (10 Jul 2019 08:35) (108/66 - 134/81)  BP(mean): --  RR: 18 (10 Jul 2019 08:35) (18 - 19)  SpO2: 97% (10 Jul 2019 08:35) (95% - 99%)  CAPILLARY BLOOD GLUCOSE      POCT Blood Glucose.: 130 mg/dL (09 Jul 2019 12:49)    I&O's Summary    09 Jul 2019 07:01  -  10 Jul 2019 07:00  --------------------------------------------------------  IN: 1673 mL / OUT: 0 mL / NET: 1673 mL        PHYSICAL EXAM:  GENERAL: NAD  HEAD:  Atraumatic, Normocephalic  EYES: conjunctiva and sclera clear  NECK: No JVD  CHEST/LUNG: CTA b/l  HEART: S1 S2 RRR  ABDOMEN: +BS Soft, NT/ND +PEG  EXTREMITIES:  2+ DP Pulses, No c/c/e  NEUROLOGY: AAOx3, no focal deficits   SKIN: No rashes or lesions    LABS:                        10.7   16.4  )-----------( 393      ( 09 Jul 2019 06:40 )             34.6     07-09    135  |  93<L>  |  30<H>  ----------------------------<  137<H>  5.0   |  31  |  0.68    Ca    9.6      09 Jul 2019 10:13                RADIOLOGY & ADDITIONAL TESTS:    Imaging Personally Reviewed:  Consultant(s) Notes Reviewed:    Care Discussed with Consultants/Other Providers: Patient is a 69y old  Female who presents with a chief complaint of sub-occipital craniectomy for 4th ventricular tumor resection (10 Jul 2019 09:30)        SUBJECTIVE / OVERNIGHT EVENTS: no acute complaints      MEDICATIONS  (STANDING):  ALBUTerol    0.083% 2.5 milliGRAM(s) Nebulizer every 6 hours  amLODIPine   Tablet 5 milliGRAM(s) Oral daily  artificial  tears Solution 1 Drop(s) Right EYE every 6 hours  cephalexin 500 milliGRAM(s) Oral every 12 hours  dexamethasone     Tablet 1 milliGRAM(s) Oral every 12 hours  enoxaparin Injectable 30 milliGRAM(s) SubCutaneous every 12 hours  latanoprost 0.005% Ophthalmic Solution 1 Drop(s) Both EYES at bedtime  petrolatum Ophthalmic Ointment 1 Application(s) Right EYE at bedtime  prednisoLONE acetate 1% Suspension 1 Drop(s) Both EYES four times a day  psyllium Powder 1 Packet(s) Enteral Tube two times a day    MEDICATIONS  (PRN):  acetaminophen    Suspension .. 650 milliGRAM(s) Oral every 6 hours PRN Temp greater or equal to 38.5C (101.3F), Mild Pain (1 - 3)      Vital Signs Last 24 Hrs  T(C): 36.8 (10 Jul 2019 08:35), Max: 37.1 (09 Jul 2019 13:29)  T(F): 98.3 (10 Jul 2019 08:35), Max: 98.8 (09 Jul 2019 15:54)  HR: 101 (10 Jul 2019 08:35) (88 - 101)  BP: 129/67 (10 Jul 2019 08:35) (108/66 - 134/81)  BP(mean): --  RR: 18 (10 Jul 2019 08:35) (18 - 19)  SpO2: 97% (10 Jul 2019 08:35) (95% - 99%)  CAPILLARY BLOOD GLUCOSE      POCT Blood Glucose.: 130 mg/dL (09 Jul 2019 12:49)    I&O's Summary    09 Jul 2019 07:01  -  10 Jul 2019 07:00  --------------------------------------------------------  IN: 1673 mL / OUT: 0 mL / NET: 1673 mL        PHYSICAL EXAM:  GENERAL: NAD  EYES: conjunctiva and sclera clear  NECK: No JVD  CHEST/LUNG: CTA b/l  HEART: S1 S2 RRR  ABDOMEN: +BS Soft, NT/ND +PEG  EXTREMITIES:  2+ DP Pulses, No c/c. No LE edema  NEUROLOGY: AAOx3, no focal deficits   SKIN: No rashes or lesions    LABS:                        10.7   16.4  )-----------( 393      ( 09 Jul 2019 06:40 )             34.6     07-09    135  |  93<L>  |  30<H>  ----------------------------<  137<H>  5.0   |  31  |  0.68    Ca    9.6      09 Jul 2019 10:13                RADIOLOGY & ADDITIONAL TESTS:    Imaging Personally Reviewed: CT soft tissue neck reviewed - Slight soft tissue swelling at the base of the tongue on the   left extending into the left hypopharynx and aryepiglottic fold may be   due to trauma or infection, cannot exclude underlying mass. Recommend   follow-up. No drainable collections or abscess are identified.    Mild ptosis of the left true vocal cord. There appears to be a soft   tissue mass in the left thyroid gland. This could be further evaluated   with ultrasound.     Consultant(s) Notes Reviewed:    Care Discussed with Consultants/Other Providers: d/w Neurosurgery PA - Zelda regarding ENT evaluation

## 2019-07-10 NOTE — H&P ADULT - NSHPPHYSICALEXAM_GEN_ALL_CORE
Physical Exam  T(C): 36.8 (07-10-19 @ 08:35), Max: 37.1 (07-09-19 @ 13:29)  HR: 101 (07-10-19 @ 08:35) (88 - 101)  BP: 129/67 (07-10-19 @ 08:35) (108/66 - 134/81)  RR: 18 (07-10-19 @ 08:35) (18 - 19)  SpO2: 97% (07-10-19 @ 08:35) (95% - 99%)    Constitutional - NAD, Comfortable  HEENT - NCAT, EOMI  Neck - Supple  Chest - CTA bilaterally  Cardiovascular - RRR, S1S2  Abdomen - BS+, Soft, NTND  Extremities - No C/C/E, No calf tenderness   Neurologic Exam -                    Cognitive - Awake, Alert, Oriented to self, place, date, year, situation     Communication - Fluent, No dysarthria     Attention - able to recite months of the year backward     Naming/Abstract -      Memory - able to recall 3/3 items immediate and -/3 after 3 minutes     Cranial Nerves - CN 2-12 grossly intact     Motor -                     LEFT    UE - ShAB 5/5, EF 5/5, EE 5/5, WE 5/5,  5/5                    RIGHT UE - ShAB 5/5, EF 5/5, EE 5/5, WE 5/5,  5/5                    LEFT    LE - HF 5/5, KE 5/5, DF 5/5, PF 5/5                    RIGHT LE - HF 5/5, KE 5/5, DF 5/5, PF 5/5        Sensory - Intact to light touch diffusely     Vestibulo-ocular - No nystagmus, no saccades     Reflexes - DTR intact and symmetrical, Negative Panchal's bilaterally, Negative Babinski's bilaterally      Coordination - Finger-to-nose intact bilaterally   Psychiatric - Affect WNL  Skin - Physical Exam  T(C): 36.8 (07-10-19 @ 08:35), Max: 37.1 (07-09-19 @ 13:29)  HR: 101 (07-10-19 @ 08:35) (88 - 101)  BP: 129/67 (07-10-19 @ 08:35) (108/66 - 134/81)  RR: 18 (07-10-19 @ 08:35) (18 - 19)  SpO2: 97% (07-10-19 @ 08:35) (95% - 99%)    Constitutional - NAD, somnolent  HEENT - NCAT  Neck - Supple  Chest - CTA bilaterally  Cardiovascular - RRR, S1S2  Abdomen - BS+, Soft, NTND, mild tenderness around PEG, no redness, no warmth, no discharge  Extremities - No calf tenderness   Neurologic Exam -                    Cognitive - Awake, Alert, Oriented to self, place, date, year, situation     Communication - Fluent, low volume, dysarthric     Attention - not able to recite months of the year backward or spell world backward     Naming/Abstract -      Memory - able to recall 3/3 items immediate and 3/3 after 3 minutes     Cranial Nerves - II- Pupils equal round and reactive to light, visual field exam limited by pain and patient effort, III, IV and VI- unable to adduct or abduct either eye, left eye pupil is depressed inferiorly relative to the right, left pupil does not react to accomodation, V- intact, VII- inability to close right eye, left eye ptosis and left facial droop, VIII hearing decreased on the left, IX and X- hoarse voice, equal palate raise, IX- equal shoulder shrug and head turn, XII-       CN 2-12 grossly intact     Motor -                     LEFT    UE - ShAB 5/5, EF 5/5, EE 5/5, WE 5/5,  5/5                    RIGHT UE - ShAB 5/5, EF 5/5, EE 5/5, WE 5/5,  5/5                    LEFT    LE - HF 5/5, KE 5/5, DF 5/5, PF 5/5                    RIGHT LE - HF 5/5, KE 5/5, DF 5/5, PF 5/5        Sensory - Intact to light touch diffusely     Vestibulo-ocular - No nystagmus, no saccades     Reflexes - DTR intact and symmetrical, Negative Panchal's bilaterally, Negative Babinski's bilaterally      Coordination - Finger-to-nose intact bilaterally   Psychiatric - Affect WNL  Skin - Physical Exam  T(C): 36.8 (07-10-19 @ 08:35), Max: 37.1 (07-09-19 @ 13:29)  HR: 101 (07-10-19 @ 08:35) (88 - 101)  BP: 129/67 (07-10-19 @ 08:35) (108/66 - 134/81)  RR: 18 (07-10-19 @ 08:35) (18 - 19)  SpO2: 97% (07-10-19 @ 08:35) (95% - 99%)    Constitutional - NAD, somnolent  HEENT - NCAT  Neck - Supple  Chest - CTA bilaterally  Cardiovascular - RRR, S1S2  Abdomen - BS+, Soft, NTND, mild tenderness around PEG, no redness, no warmth, no discharge  Extremities - No calf tenderness   Neurologic Exam -                    Cognitive - Awake, Alert, Oriented to self, place, date, year, situation     Communication - Fluent, low volume, dysarthric     Attention - not able to recite months of the year backward or spell world backward     Naming/Abstract -      Memory - able to recall 3/3 items immediate and 3/3 after 3 minutes     Cranial Nerves - II- Pupils equal round and reactive to light, visual field exam limited by pain and patient effort, III, IV and VI- unable to adduct or abduct either eye, left eye pupil is depressed inferiorly relative to the right, left pupil does not react to accomodation, V- intact, VII- inability to close right eye, left eye ptosis and left facial droop, VIII hearing decreased on the left, IX and X- hoarse voice, equal palate raise, IX- equal shoulder shrug and head turn, XII- tongue protrudes to left       Motor -                     LEFT    UE - ShAB 5/5, EF 5/5, EE 5/5, WE 5/5,  5/5                    RIGHT UE - ShAB 5/5, EF 5/5, EE 5/5, WE 5/5,  5/5                    LEFT    LE - HF 5/5, KE 5/5, DF 5/5, PF 5/5                    RIGHT LE - HF 5/5, KE 5/5, DF 5/5, PF 5/5        Sensory - Intact to light touch diffusely     Vestibulo-ocular - No nystagmus, no saccades     Reflexes - DTR intact and symmetrical, Negative Babinski's bilaterally      Coordination - Finger-to-nose bilaterally dysmetric  Psychiatric - Affect WNL Physical Exam  T(C): 36.8 (07-10-19 @ 08:35), Max: 37.1 (07-09-19 @ 13:29)  HR: 101 (07-10-19 @ 08:35) (88 - 101)  BP: 129/67 (07-10-19 @ 08:35) (108/66 - 134/81)  RR: 18 (07-10-19 @ 08:35) (18 - 19)  SpO2: 97% (07-10-19 @ 08:35) (95% - 99%)    Constitutional - NAD, falling asleep during exam, but arousable with verbal stimuli   HEENT - posterior cranial incision, c/d/i, mild erythema surrounding, non-TTP, right eye with conjunctival erythema  Neck - Supple  Chest - CTA bilaterally, no increased work of breathing   Cardiovascular - RRR, S1S2  Abdomen - BS+, Soft, NTND, mild tenderness around PEG, no redness, no warmth, no discharge  Extremities - No calf tenderness   Neurologic Exam -                    Cognitive - Awake, Alert, Oriented to self, place, date, year, situation. Able to follow multistep commands across midline.     Communication - Fluent, low volume, dysarthric     Attention - not able to recite months of the year backward or spell world backward     Naming/Abstract - intact      Memory - able to recall 3/3 items immediate and 3/3 after 3 minutes     Cranial Nerves - II- Pupils equal round and reactive to light, visual field exam limited by pain on the right and patient effort, III, IV and VI- unable to adduct or abduct either eye, left eye pupil is depressed inferiorly relative to the right, left pupil does not react to accomodation, V- intact, VII- inability to close right eye, left eye ptosis and left facial droop, VIII hearing decreased on the left, IX and X- hoarse voice, equal palate raise, IX- equal shoulder shrug and head turn, XII- tongue protrudes to left     Motor -                     LEFT    UE - ShAB 5/5, EF 5/5, EE 5/5, WE 5/5,  5/5                    RIGHT UE - ShAB 5/5, EF 5/5, EE 5/5, WE 5/5,  5/5                    LEFT    LE - HF 4/5, KE 5/5, DF 5/5, PF 5/5                    RIGHT LE - HF 4/5, KE 5/5, DF 5/5, PF 5/5        Sensory - Intact to light touch diffusely, intact to temperature, proprioception     Reflexes - DTR intact and symmetrical, Negative Babinski's bilaterally      Coordination - Finger-to-nose bilaterally dysmetric worse on the left  Psychiatric - Affect WNL Physical Exam  T(C): 36.8 (07-10-19 @ 08:35), Max: 37.1 (07-09-19 @ 13:29)  HR: 101 (07-10-19 @ 08:35) (88 - 101)  BP: 129/67 (07-10-19 @ 08:35) (108/66 - 134/81)  RR: 18 (07-10-19 @ 08:35) (18 - 19)  SpO2: 97% (07-10-19 @ 08:35) (95% - 99%)    Constitutional - NAD, falling asleep during exam, but arousable with verbal stimuli   HEENT - posterior cranial incision, c/d/i, mild erythema surrounding, non-TTP, right eye with conjunctival erythema  Neck - Supple  Chest - CTA bilaterally, no increased work of breathing   Cardiovascular - RRR, S1S2  Abdomen - BS+, Soft, NTND, mild tenderness around PEG, no redness, no warmth, no discharge  Extremities - No calf tenderness   Neurologic Exam -                    Cognitive - Awake, Alert, Oriented to self, place, date, year, situation. Able to follow multistep commands across midline.     Communication - Fluent, low volume, dysarthric     Attention - not able to recite months of the year backward or spell world backward     Naming/Abstract - intact      Memory - able to recall 3/3 items immediate and 3/3 after 3 minutes     Cranial Nerves - II- Pupils equal round and reactive to light, visual field exam limited by pain on the right and patient effort, III, IV and VI- unable to adduct or abduct either eye, left eye pupil is depressed inferiorly relative to the right, left pupil does not react to accomodation, V- intact, VII- inability to close right eye, left eye ptosis and left facial droop, VIII hearing decreased on the left, IX and X- hoarse voice, equal palate raise, XI- equal shoulder shrug and head turn, XII- tongue protrudes to left     Motor -                     LEFT    UE - ShAB 5/5, EF 5/5, EE 5/5, WE 5/5,  5/5                    RIGHT UE - ShAB 5/5, EF 5/5, EE 5/5, WE 5/5,  5/5                    LEFT    LE - HF 4/5, KE 5/5, DF 5/5, PF 5/5                    RIGHT LE - HF 4/5, KE 5/5, DF 5/5, PF 5/5        Sensory - Intact to light touch diffusely, intact to temperature, proprioception     Reflexes - DTR intact and symmetrical, Negative Babinski's bilaterally      Coordination - Finger-to-nose bilaterally dysmetric worse on the left  Psychiatric - Affect WNL

## 2019-07-10 NOTE — H&P ADULT - ATTENDING COMMENTS
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      Patient examined at  bedside with staff  Chart reviewed  Will admit to [facility]U  Resume all medications  Admit labs  Aspirations and fall precautions  Medicine evaluation  Family is at bedside, all questions answered

## 2019-07-10 NOTE — PROGRESS NOTE ADULT - PROVIDER SPECIALTY LIST ADULT
ENT
Gastroenterology
Hospitalist
Internal Medicine
NSICU
Neurosurgery
Internal Medicine
NSICU
Neurosurgery
Internal Medicine
Neurosurgery
Neurosurgery
Hospitalist
ENT

## 2019-07-10 NOTE — PROGRESS NOTE ADULT - SUBJECTIVE AND OBJECTIVE BOX
ENT ISSUE/POD: BOT fullness and odynophagia    HPI: 70yo female seen for BOT fullness and odynophagia. Pt seen and examined at bedside. No acute events overnight. Pt states improvement of odynophagia. Neck CT showing soft tissue swelling at left BOT extending into hypopharynx and left AE fold, along with a soft tissue mass in left thyroid gland (detailed report below). Laryngoscopy on 7/8 showed decreased Left AE fold edema from the previous scope. Pt denies fever, chills, n/v, HA, SOB, hemoptysis, unintentional weight loss.         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  Light-headedness  Glaucoma  Asthma: controlled with meds  HTN (hypertension)  History of ankle fracture: h/o repair  Fractured elbow: left elbow fracture repair,2007  H/O appendicitis: h/o appendicectomy    Allergies    No Known Drug Allergies  shellfish (Angioedema; Rash)    Intolerances      MEDICATIONS  (STANDING):  ALBUTerol    0.083% 2.5 milliGRAM(s) Nebulizer every 6 hours  amLODIPine   Tablet 5 milliGRAM(s) Oral daily  artificial  tears Solution 1 Drop(s) Right EYE every 6 hours  cephalexin 500 milliGRAM(s) Oral every 12 hours  dexamethasone     Tablet 1 milliGRAM(s) Oral every 12 hours  enoxaparin Injectable 30 milliGRAM(s) SubCutaneous every 12 hours  latanoprost 0.005% Ophthalmic Solution 1 Drop(s) Both EYES at bedtime  petrolatum Ophthalmic Ointment 1 Application(s) Right EYE at bedtime  prednisoLONE acetate 1% Suspension 1 Drop(s) Both EYES four times a day  psyllium Powder 1 Packet(s) Enteral Tube two times a day    MEDICATIONS  (PRN):  acetaminophen    Suspension .. 650 milliGRAM(s) Oral every 6 hours PRN Temp greater or equal to 38.5C (101.3F), Mild Pain (1 - 3)      Social History: see consult note    Family history: see consult note    ROS:   ENT: all negative except as noted in HPI   Pulm: denies SOB, cough, hemoptysis  Neuro: denies numbness/tingling, loss of sensation  Endo: denies heat/cold intolerance, excessive sweating      Vital Signs Last 24 Hrs  T(C): 36.6 (10 Jul 2019 04:50), Max: 37.1 (09 Jul 2019 13:29)  T(F): 97.9 (10 Jul 2019 04:50), Max: 98.8 (09 Jul 2019 15:54)  HR: 90 (10 Jul 2019 04:50) (88 - 96)  BP: 108/66 (10 Jul 2019 04:50) (108/66 - 134/81)  BP(mean): --  RR: 18 (10 Jul 2019 04:50) (18 - 19)  SpO2: 99% (10 Jul 2019 04:50) (93% - 99%)                          10.7   16.4  )-----------( 393      ( 09 Jul 2019 06:40 )             34.6    07-09    135  |  93<L>  |  30<H>  ----------------------------<  137<H>  5.0   |  31  |  0.68    Ca    9.6      09 Jul 2019 10:13        PHYSICAL EXAM:  Gen: NAD  Head: Normocephalic  Face: ecchymosis noted on chin s/p traumatic intubation, no edema, erythema, or fluctuance. Parotid glands soft without mass  Eyes: no scleral injection  Nose: Nares bilaterally patent , no discharge, keofeed tube in place  Mouth: Ulceration on buccal side of mandibular & tongue, no Stridor / Drooling / Trismus. Mucosa moist, uvula midline.  Neck: Flat, supple, no lymphadenopathy, trachea midline, no masses  Lymphatic: No lymphadenopathy  Resp: breathing easily, no stridor  Neuro:  b/l 6th nerve palsy, R 7th nerve palsy      Imaging:    < from: CT Neck Soft Tissue w/ IV Cont (07.09.19 @ 09:07) >    EXAM:  CT NECK SOFT TISSUE IC                            PROCEDURE DATE:  07/09/2019            INTERPRETATION:  Clinical indication: Base of tongue swelling on visual   exam, recent intubation for suboccipital craniectomy and removal of   fourth ventricular subependymoma    Serial thin sections to the soft tissues of the neck were obtained from   the orbits to the thoracic inlet after the intravenous administration of   70 cc of Omnipaque-300, 30 cc were discarded. Images were reconstructed   at 1.0 and 3.0 mm sections with sagittal and coronal computer generator   reconstructed views.    Comparison is made with the suboptimal MRI of 7/2/2019.    As on the prior MRI there is mild swelling and nonspecific density at the   base of the tongueon the left extending into the left aryepiglottic fold   and hypopharynx. This may represent an area of trauma or infection and   could be related to intubation.    There is mild ptosis of the left true cord with displacement of the left   arytenoid cartilage. The laryngeal ventricle is enlarged suggesting this   is chronic. This could also be traumatic or may be due to a mass. There   is enlargement of the left lobe of the thyroid gland with an apparent   mass by streak artifact. This can be further evaluated with ultrasound.    The sellar salivary glands are normal. There is normal vascular   enhancement. Mild degenerative changes of the cervical spine are noted   with large anterior osteophytes which cause mass effect on the   hypopharynx.    There has been a suboccipital craniectomy and postoperative changes are   identified related to the prior posterior fossa surgery. There are 2   surgical clips identified postoperative bed. Normal intracranial vascular   enhancement is identified.    There is left sphenoid sinus opacification with wall thickening   suggesting chronic sphenoid sinus inflammatory changes.    IMPRESSION: Slight soft tissue swelling at the base of the tongue on the   left extending into the left hypopharynx and aryepiglottic fold may be   due to trauma or infection, cannot exclude underlying mass. Recommend   follow-up. No drainable collections or abscess are identified.    Mild ptosis of the left true vocal cord. There appears to be a soft   tissue mass in the left thyroid gland. This could be further evaluated   with ultrasound.                     HALINA FITZPATRICK M.D., ATTENDING RADIOLOGIST  This document has been electronically signed. Jul 9 2019 11:28AM                < end of copied text >

## 2019-07-11 ENCOUNTER — RECORD ABSTRACTING (OUTPATIENT)
Age: 69
End: 2019-07-11

## 2019-07-11 DIAGNOSIS — D43.1 NEOPLASM OF UNCERTAIN BEHAVIOR OF BRAIN, INFRATENTORIAL: ICD-10-CM

## 2019-07-11 DIAGNOSIS — Z93.1 GASTROSTOMY STATUS: ICD-10-CM

## 2019-07-11 LAB
ALBUMIN SERPL ELPH-MCNC: 2.6 G/DL — LOW (ref 3.3–5)
ALP SERPL-CCNC: 96 U/L — SIGNIFICANT CHANGE UP (ref 40–120)
ALT FLD-CCNC: 48 U/L DA — HIGH (ref 10–45)
ANION GAP SERPL CALC-SCNC: 4 MMOL/L — LOW (ref 5–17)
AST SERPL-CCNC: 17 U/L — SIGNIFICANT CHANGE UP (ref 10–40)
BASOPHILS # BLD AUTO: 0.03 K/UL — SIGNIFICANT CHANGE UP (ref 0–0.2)
BASOPHILS NFR BLD AUTO: 0.2 % — SIGNIFICANT CHANGE UP (ref 0–2)
BILIRUB SERPL-MCNC: 0.7 MG/DL — SIGNIFICANT CHANGE UP (ref 0.2–1.2)
BUN SERPL-MCNC: 24 MG/DL — HIGH (ref 7–23)
CALCIUM SERPL-MCNC: 9.2 MG/DL — SIGNIFICANT CHANGE UP (ref 8.4–10.5)
CHLORIDE SERPL-SCNC: 101 MMOL/L — SIGNIFICANT CHANGE UP (ref 96–108)
CO2 SERPL-SCNC: 34 MMOL/L — HIGH (ref 22–31)
CREAT SERPL-MCNC: 0.61 MG/DL — SIGNIFICANT CHANGE UP (ref 0.5–1.3)
EOSINOPHIL # BLD AUTO: 0.15 K/UL — SIGNIFICANT CHANGE UP (ref 0–0.5)
EOSINOPHIL NFR BLD AUTO: 1.1 % — SIGNIFICANT CHANGE UP (ref 0–6)
GLUCOSE SERPL-MCNC: 101 MG/DL — HIGH (ref 70–99)
HCT VFR BLD CALC: 31.4 % — LOW (ref 34.5–45)
HCV AB S/CO SERPL IA: 0.05 S/CO — SIGNIFICANT CHANGE UP (ref 0–0.99)
HCV AB SERPL-IMP: SIGNIFICANT CHANGE UP
HGB BLD-MCNC: 9.8 G/DL — LOW (ref 11.5–15.5)
IMM GRANULOCYTES NFR BLD AUTO: 0.7 % — SIGNIFICANT CHANGE UP (ref 0–1.5)
LYMPHOCYTES # BLD AUTO: 1.37 K/UL — SIGNIFICANT CHANGE UP (ref 1–3.3)
LYMPHOCYTES # BLD AUTO: 9.8 % — LOW (ref 13–44)
MCHC RBC-ENTMCNC: 27.5 PG — SIGNIFICANT CHANGE UP (ref 27–34)
MCHC RBC-ENTMCNC: 31.2 GM/DL — LOW (ref 32–36)
MCV RBC AUTO: 88 FL — SIGNIFICANT CHANGE UP (ref 80–100)
MONOCYTES # BLD AUTO: 1.36 K/UL — HIGH (ref 0–0.9)
MONOCYTES NFR BLD AUTO: 9.8 % — SIGNIFICANT CHANGE UP (ref 2–14)
NEUTROPHILS # BLD AUTO: 10.92 K/UL — HIGH (ref 1.8–7.4)
NEUTROPHILS NFR BLD AUTO: 78.4 % — HIGH (ref 43–77)
NRBC # BLD: 0 /100 WBCS — SIGNIFICANT CHANGE UP (ref 0–0)
PLATELET # BLD AUTO: 323 K/UL — SIGNIFICANT CHANGE UP (ref 150–400)
POTASSIUM SERPL-MCNC: 4.1 MMOL/L — SIGNIFICANT CHANGE UP (ref 3.5–5.3)
POTASSIUM SERPL-SCNC: 4.1 MMOL/L — SIGNIFICANT CHANGE UP (ref 3.5–5.3)
PROT SERPL-MCNC: 6.3 G/DL — SIGNIFICANT CHANGE UP (ref 6–8.3)
RBC # BLD: 3.57 M/UL — LOW (ref 3.8–5.2)
RBC # FLD: 14.7 % — HIGH (ref 10.3–14.5)
SODIUM SERPL-SCNC: 139 MMOL/L — SIGNIFICANT CHANGE UP (ref 135–145)
WBC # BLD: 13.93 K/UL — HIGH (ref 3.8–10.5)
WBC # FLD AUTO: 13.93 K/UL — HIGH (ref 3.8–10.5)

## 2019-07-11 PROCEDURE — 96116 NUBHVL XM PHYS/QHP 1ST HR: CPT

## 2019-07-11 PROCEDURE — 99223 1ST HOSP IP/OBS HIGH 75: CPT

## 2019-07-11 PROCEDURE — 99232 SBSQ HOSP IP/OBS MODERATE 35: CPT

## 2019-07-11 RX ORDER — NYSTATIN 500MM UNIT
500000 POWDER (EA) MISCELLANEOUS THREE TIMES A DAY
Refills: 0 | Status: COMPLETED | OUTPATIENT
Start: 2019-07-11 | End: 2019-07-16

## 2019-07-11 RX ORDER — NYSTATIN CREAM 100000 [USP'U]/G
1 CREAM TOPICAL
Refills: 0 | Status: DISCONTINUED | OUTPATIENT
Start: 2019-07-11 | End: 2019-07-23

## 2019-07-11 RX ADMIN — LATANOPROST 1 DROP(S): 0.05 SOLUTION/ DROPS OPHTHALMIC; TOPICAL at 21:01

## 2019-07-11 RX ADMIN — Medication 500000 UNIT(S): at 18:11

## 2019-07-11 RX ADMIN — Medication 1 DROP(S): at 18:12

## 2019-07-11 RX ADMIN — Medication 250 MILLIGRAM(S): at 18:12

## 2019-07-11 RX ADMIN — Medication 1 APPLICATION(S): at 18:13

## 2019-07-11 RX ADMIN — Medication 500 MILLIGRAM(S): at 06:01

## 2019-07-11 RX ADMIN — ENOXAPARIN SODIUM 30 MILLIGRAM(S): 100 INJECTION SUBCUTANEOUS at 06:49

## 2019-07-11 RX ADMIN — Medication 1 DROP(S): at 18:11

## 2019-07-11 RX ADMIN — Medication 1 DROP(S): at 23:36

## 2019-07-11 RX ADMIN — Medication 1 DROP(S): at 12:07

## 2019-07-11 RX ADMIN — Medication 250 MILLIGRAM(S): at 06:02

## 2019-07-11 RX ADMIN — Medication 1 APPLICATION(S): at 12:29

## 2019-07-11 RX ADMIN — Medication 1 DROP(S): at 23:37

## 2019-07-11 RX ADMIN — Medication 1 APPLICATION(S): at 21:02

## 2019-07-11 RX ADMIN — ENOXAPARIN SODIUM 30 MILLIGRAM(S): 100 INJECTION SUBCUTANEOUS at 18:11

## 2019-07-11 RX ADMIN — Medication 500000 UNIT(S): at 21:02

## 2019-07-11 RX ADMIN — AMLODIPINE BESYLATE 5 MILLIGRAM(S): 2.5 TABLET ORAL at 06:01

## 2019-07-11 RX ADMIN — Medication 500 MILLIGRAM(S): at 18:10

## 2019-07-11 RX ADMIN — Medication 1 MILLIGRAM(S): at 06:03

## 2019-07-11 RX ADMIN — Medication 1 DROP(S): at 06:00

## 2019-07-11 NOTE — DIETITIAN INITIAL EVALUATION ADULT. - OTHER INFO
The patient is a 69 year old right handed, morbidly obese (BMI 49) woman with a past medical history of asthma, glaucoma, hypertension and osteoarthritis who presented for dizziness, balance issues found to have a posterior fossa-4th ventricle mass who was admitted to [facility] on 6/24/19 s/p planned craniotomy and resection on 6/24/19.   Pt noted with PEG placement on 7/5/19 at [facility]. Was previously receiving Glucerna 1.2 @65cc/hr x 24 hours, which was tolerated well per previous RD note. Now pt changed to bolus regimen to better accommodate rehab program, which has been tolerated well so far as per RN. Noted pt receiving 250cc free H20 QID via PEG 1 hr after each feed. No evidence of GI distress- last BM 7/11. Noted allergy to shellfish.

## 2019-07-11 NOTE — CONSULT NOTE ADULT - ASSESSMENT
70 y/o F with PMH asthma, glaucoma, HTN, morbid obesity with recent dx of posterior fossa-4th ventricle mas s/p stereotatic suboccipital craniotomy on 06/24/19 now with functional and ADLimpairment       #Posterior fossa tumor s/p/p craniotomy/resection as above  PT/OT/SLP as tolerated    #Steroid induced leukocytosis  recently was on steroids  afebrile, non-toxic appearing  monitor cbc for now     #Poor dentition/gingivitis   oral care discussed with nurse  tongue ulceration per ENT notes prior hospitalization currently on keflex recommended for 5 day total dose per ENT recs     #Dysphagia s/p PEG  continue with tube feeds    #HTN  bp controlled on Norvasc     #Asthma  duonebs     #morbid obesity  nutrition consult recommended for peg feeds/ counseling     DVT/GI ppx  lovenox  would add protinix 70 y/o F with PMH asthma, glaucoma, HTN, morbid obesity with recent dx of posterior fossa-4th ventricle mas s/p stereotatic suboccipital craniotomy on 06/24/19 now with functional and ADLimpairment       #Posterior fossa tumor s/p/p craniotomy/resection as above  PT/OT/SLP as tolerated    #Steroid induced leukocytosis  recently was on steroids  afebrile, non-toxic appearing  monitor cbc for now     #Poor dentition/gingivitis   oral care discussed with nurse  tongue ulceration per ENT notes prior hospitalization currently on keflex recommended for 5 day total dose per ENT recs     #Dysphagia s/p PEG  continue with tube feeds  Gi consulted to evaluate bleeding around PEG site    #HTN  bp controlled on Norvasc     #Asthma  duonebs     #morbid obesity  nutrition consult recommended for peg feeds/ counseling     DVT/GI ppx  lovenox  would add protinix

## 2019-07-11 NOTE — PROVIDER CONTACT NOTE (OTHER) - SITUATION
noted redness on right eye, with yellow discharge noted, denies any pain/discomfort. noted redness on right eye, with yellow discharge noted.

## 2019-07-11 NOTE — CONSULT NOTE ADULT - ATTENDING COMMENTS
Patient seen and examined with Isha Diaz NP.  Agree with assessment and plan as above.    Elderly female with recently diagnosed 4th ventricular mass, s/p craniotomy and resection, underwent PEG placement 7/5/2019 now in acute rehab.  GI requested due to bleeding noted around gastrostomy tube insertion site.  VSS.  Abdomen soft, nontender ++bleeding from PEG insertion site    Wound treated with silver nitrite applicators by Isha Diaz NP (I personally supervised).  Blood did stop with application.  Will monitor.  May continue to use gastrostomy tube for medications and feedings.

## 2019-07-11 NOTE — PROGRESS NOTE ADULT - ASSESSMENT
ASSESSMENT/PLAN  The patient is a 69 year old right handed, morbidly obese (BMI 49) woman with a past medical history of asthma, glaucoma, hypertension and osteoarthritis who presented for dizziness, balance issues found to have a posterior fossa-4th ventricle mass who was admitted to [facility] on 6/24/19 s/p planned craniotomy and resection on 6/24/19 being admitted here for rehab with Gait Instability, ADL impairments and Functional impairments.    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: s/p resection on 6/24/19  -Continue with comprehensive rehab program  -Completed dexamethasone taper as per OSH  -Pain control: Tylenol  -Follow up with NSGY upon discharge    HTN:  -Continue with amlodipine  -Monitor vitals    Asthma:  -Continue with duoneb  -Monitor vitals  -Supplemental oxygen prn     Right facial nerve palsy/Glaucoma:  -Continue with ocular drops: Latanoprost, Lacri-Lube, Prednisolone, Artificial tears  -Will need outpatient followup    Tongue Mass:  -Continue with keflex for 5 day course  -Follow up with outpatient for further workup    Thrush:  -Start nystatin  -Continue oral care    Dysphagia: s/p PEG  -NPO with tube feeds  -Continue bolus feeds and free water flush  -GI consult for bleeding around PEG, appreciate recs  -Plan for MBS next week    Ovarian cystic mass:  -Follow up as an outpatient for further workup    Leukocytosis: Improving. Patient afebrile and no localizing sx. May be due to steroids  -Monitor weekly labs    Pain Mgmt - Tylenol PRN  GI/Bowel Mgmt -  Continent c/w Colace, Lizzie  /Bladder Mgmt - Check bladder scans and straight cath for volumes >400cc    FEN   - Diet - NPO with bolus tube feeds  - Dysphagia  SLP - evaluation and treatment    Precautions / PROPHYLAXIS:   - Falls, Seizure   - ortho: Weight bearing status: WBAT   - Lungs: Aspiration, Incentive Spirometer   - Pressure injury/Skin: Turn Q2hrs while in bed, OOB to Chair, PT/OT    - DVT: Lovenox, SCDs, TEDs ASSESSMENT/PLAN  The patient is a 69 year old right handed, morbidly obese (BMI 49) woman with a past medical history of asthma, glaucoma, hypertension and osteoarthritis who presented for dizziness, balance issues found to have a posterior fossa-4th ventricle mass who was admitted to [facility] on 6/24/19 s/p planned craniotomy and resection on 6/24/19 being admitted here for rehab with Gait Instability, ADL impairments and Functional impairments.    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: s/p resection on 6/24/19  -Continue with comprehensive rehab program  -Completed dexamethasone taper as per OSH  -Pain control: Tylenol  -Follow up with NSGY upon discharge    HTN:  -Continue with amlodipine  -Monitor vitals    Asthma:  -Continue with duoneb  -Monitor vitals  -Supplemental oxygen prn     Right facial nerve palsy/Glaucoma:  -Continue with ocular drops: Latanoprost, Lacri-Lube, Prednisolone, Artificial tears  -Will need outpatient followup    Tongue Mass:  -Continue with keflex for 5 day course  -Follow up with outpatient for further workup    Thrush:  -Start nystatin  -Continue oral care    Dysphagia: s/p PEG  -NPO with tube feeds  -Continue bolus feeds and free water flush  -GI consult for bleeding around PEG, appreciate recs  -Plan for MBS next week    Ovarian cystic mass:  -Follow up as an outpatient for further workup    Leukocytosis: Improving. Patient afebrile and no localizing sx. May be due to steroids  -Monitor weekly labs    Pain Mgmt - Tylenol PRN  GI/Bowel Mgmt -  Continent c/w Colace, Lizzie  /Bladder Mgmt - Check bladder scans and straight cath for volumes >400cc    FEN   - Diet - NPO with bolus tube feeds  - Dysphagia  SLP - evaluation and treatment    Precautions / PROPHYLAXIS:   - Falls, Seizure   - Lungs: Aspiration, Incentive Spirometer   - Pressure injury/Skin: Turn Q2hrs while in bed, OOB to Chair, PT/OT    - DVT: Lovenox, SCDs, TEDs

## 2019-07-11 NOTE — CONSULT NOTE ADULT - SUBJECTIVE AND OBJECTIVE BOX
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 on 6/24/19 s/p planned craniotomy and resection on 6/24/19 being admitted here for rehab. She presented to her PMD with light headedness, dizziness & balance disturbance of three months duration. Outpatient MRI revealed a heterogeneously enhancing lesion completely filling the fourth ventricle, obstructing the circulation of cerebrospinal fluid and early hydrocephalus with transependymal resorption.  She was admitted on 6/24/19 to [facility] for planned stereotactic suboccipital craniotomy for 4th ventricular tumor resection. Gross total resection was achieved and the patient was transferred to the neurosurgical ICU post op. Pathology revealed sub ependymoma. Her course was complicated by post op imaging bilateral intraventricular hemorrhage and was monitored closely. Patient did not require further surgical intervention.     Post operatively her course was complicated by dysphagia now s/p peg on 7/5 with post procedural bleeding around the PEG site controlled with silver nitrate. Her course was complicated by throat and left ear pain. Laryngoscopy revealed a tongue mass and CT imaging showed soft tissue swelling extending into hypopharynx and left AE fold, along with a soft tissue mass in left thyroid gland. ENT consulted and recommended keflex x 5 days and follow up outpatient in 2 weeks for thyroid ultrasound. She was also evaluated post operatively for right facial nerve weakness and incomplete eye closure and treated with topical eye lubrication. Patient medically stabilized and admitted here for rehab. 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  Light-headedness  Glaucoma  Asthma: controlled with meds  HTN (hypertension)  History of ankle fracture: h/o repair  Fractured elbow: left elbow fracture repair,2007  H/O appendicitis: h/o appendicectomy      Father: - at age - with history of   Mother: - at age - with history of           MEDICATIONS  (STANDING):  ALBUTerol    90 MICROgram(s) HFA Inhaler 1 Puff(s) Inhalation every 4 hours  amLODIPine   Tablet 5 milliGRAM(s) Oral daily  artificial  tears Solution 1 Drop(s) Right EYE every 6 hours  cephalexin 500 milliGRAM(s) Oral every 12 hours  docusate sodium 100 milliGRAM(s) Oral two times a day  enoxaparin Injectable 30 milliGRAM(s) SubCutaneous every 12 hours  latanoprost 0.005% Ophthalmic Solution 1 Drop(s) Both EYES at bedtime  petrolatum Ophthalmic Ointment 1 Application(s) Right EYE at bedtime  polyethylene glycol 3350 17 Gram(s) Oral daily  prednisoLONE acetate 1% Suspension 1 Drop(s) Both EYES four times a day  saccharomyces boulardii 250 milliGRAM(s) Oral two times a day  tiotropium 18 MICROgram(s) Capsule 1 Capsule(s) Inhalation daily    MEDICATIONS  (PRN):  acetaminophen   Tablet .. 650 milliGRAM(s) Oral every 6 hours PRN Temp greater or equal to 38C (100.4F), Mild Pain (1 - 3)  ALBUTerol/ipratropium for Nebulization 3 milliLiter(s) Nebulizer every 6 hours PRN Shortness of Breath and/or Wheezing  senna 2 Tablet(s) Oral at bedtime PRN Constipation  Substance Use (street drugs): (  ) never used  (  ) other:  Tobacco Usage:  (   ) never smoked   (   ) former smoker   (   ) current smoker  (     ) pack year  Alcohol Usage:  Sexual History:   Recent Travel:      Allergies    No Known Drug Allergies  shellfish (Angioedema; Rash)    Intolerances            REVIEW OF SYSTEMS:  CONSTITUTIONAL: No fever, weight loss, or fatigue  RESPIRATORY: No cough, wheezing, chills or hemoptysis; No shortness of breath  CARDIOVASCULAR: No chest pain, palpitations, dizziness, or leg swelling  GASTROINTESTINAL: No abdominal or epigastric pain. No nausea, vomiting, or hematemesis; No diarrhea or constipation. No melena or hematochezia.  GENITOURINARY: No dysuria, frequency, hematuria, or incontinence  MUSCULOSKELETAL: No joint pain or swelling; No muscle, back, or extremity pain      ALL ROS REVIEWED AND NORMAL EXCEPT AS STATED ABOVE    Vital Signs Last 24 Hrs  T(C): 37.2 (11 Jul 2019 08:03), Max: 37.2 (11 Jul 2019 08:03)  T(F): 98.9 (11 Jul 2019 08:03), Max: 98.9 (11 Jul 2019 08:03)  HR: 98 (11 Jul 2019 08:03) (75 - 101)  BP: 105/66 (11 Jul 2019 08:03) (105/66 - 135/68)  BP(mean): --  RR: 14 (11 Jul 2019 08:03) (14 - 18)  SpO2: 96% (11 Jul 2019 08:03) (96% - 99%)    PHYSICAL EXAM:  GENERAL: lethargic  HEENT: NCAT, poor dentition, +gingivitis , crusting around b/l eyes   NERVOUS SYSTEM:  Alert & Oriented X2  CHEST/LUNG: decreased lung sounds b/l  HEART: S1S2, RRR   ABDOMEN: Soft, Nontender, Nondistended; + Bowel sounds, PEG cdi, morbidly obese  EXTREMITIES:  2+ Peripheral Pulses, No clubbing, cyanosis, +1 edema          LABS:                        9.8    13.93 )-----------( 323      ( 11 Jul 2019 05:00 )             31.4     07-11    139  |  101  |  24<H>  ----------------------------<  101<H>  4.1   |  34<H>  |  0.61    Ca    9.2      11 Jul 2019 05:00    TPro  6.3  /  Alb  2.6<L>  /  TBili  0.7  /  DBili  x   /  AST  17  /  ALT  48<H>  /  AlkPhos  96  07-11         CAPILLARY BLOOD GLUCOSE      POCT Blood Glucose.: 159 mg/dL (10 Jul 2019 23:21)            RADIOLOGY & ADDITIONAL TESTS:    Consultant(s) Notes Reviewed:  [x ] YES  [ ] NO  Care Discussed with Consultants/Other Providers [ x] YES  [ ] NO  Imaging Personally Reviewed:  [ ] YES  [ ] NO The patient is a 69 year old right handed, morbidly obese (BMI 49) woman with a past medical history of asthma, glaucoma, hypertension and osteoarthritis who presented for dizziness, balance issues found to have a 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 She presented to her PMD with light headedness, dizziness & balance disturbance of three months duration. Outpatient MRI revealed a heterogeneously enhancing lesion completely filling the fourth ventricle, obstructing the circulation of cerebrospinal fluid and early hydrocephalus with transependymal resorption.  She was admitted on 6/24/19 to [facility] for planned stereotactic suboccipital craniotomy for 4th ventricular tumor resection. Gross total resection was achieved and the patient was transferred to the neurosurgical ICU post op. Pathology revealed sub ependymoma. Her course was complicated by post op imaging bilateral intraventricular hemorrhage and was monitored closely. Patient did not require further surgical intervention.     Post operatively her course was complicated by dysphagia now s/p peg on 7/5 with post procedural bleeding around the PEG site controlled with silver nitrate. Her course was complicated by throat and left ear pain. Laryngoscopy revealed a tongue mass and CT imaging showed soft tissue swelling extending into hypopharynx and left AE fold, along with a soft tissue mass in left thyroid gland. ENT consulted and recommended keflex x 5 days and follow up outpatient in 2 weeks for thyroid ultrasound. She was also evaluated post operatively for right facial nerve weakness and incomplete eye closure and treated with topical eye lubrication. Patient medically stabilized and admitted here for rehab. 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  Light-headedness  Glaucoma  Asthma: controlled with meds  HTN (hypertension)  History of ankle fracture: h/o repair  Fractured elbow: left elbow fracture repair,2007  H/O appendicitis: h/o appendicectomy      Father: - at age - with history of   Mother: - at age - with history of           MEDICATIONS  (STANDING):  ALBUTerol    90 MICROgram(s) HFA Inhaler 1 Puff(s) Inhalation every 4 hours  amLODIPine   Tablet 5 milliGRAM(s) Oral daily  artificial  tears Solution 1 Drop(s) Right EYE every 6 hours  cephalexin 500 milliGRAM(s) Oral every 12 hours  docusate sodium 100 milliGRAM(s) Oral two times a day  enoxaparin Injectable 30 milliGRAM(s) SubCutaneous every 12 hours  latanoprost 0.005% Ophthalmic Solution 1 Drop(s) Both EYES at bedtime  petrolatum Ophthalmic Ointment 1 Application(s) Right EYE at bedtime  polyethylene glycol 3350 17 Gram(s) Oral daily  prednisoLONE acetate 1% Suspension 1 Drop(s) Both EYES four times a day  saccharomyces boulardii 250 milliGRAM(s) Oral two times a day  tiotropium 18 MICROgram(s) Capsule 1 Capsule(s) Inhalation daily    MEDICATIONS  (PRN):  acetaminophen   Tablet .. 650 milliGRAM(s) Oral every 6 hours PRN Temp greater or equal to 38C (100.4F), Mild Pain (1 - 3)  ALBUTerol/ipratropium for Nebulization 3 milliLiter(s) Nebulizer every 6 hours PRN Shortness of Breath and/or Wheezing  senna 2 Tablet(s) Oral at bedtime PRN Constipation  Substance Use (street drugs): (  ) never used  (  ) other:  Tobacco Usage:  (   ) never smoked   (   ) former smoker   (   ) current smoker  (     ) pack year  Alcohol Usage:  Sexual History:   Recent Travel:      Allergies    No Known Drug Allergies  shellfish (Angioedema; Rash)    Intolerances            REVIEW OF SYSTEMS:  CONSTITUTIONAL: No fever, weight loss, or fatigue  RESPIRATORY: No cough, wheezing, chills or hemoptysis; No shortness of breath  CARDIOVASCULAR: No chest pain, palpitations, dizziness, or leg swelling  GASTROINTESTINAL: No abdominal or epigastric pain. No nausea, vomiting, or hematemesis; No diarrhea or constipation. No melena or hematochezia.  GENITOURINARY: No dysuria, frequency, hematuria, or incontinence  MUSCULOSKELETAL: No joint pain or swelling; No muscle, back, or extremity pain      ALL ROS REVIEWED AND NORMAL EXCEPT AS STATED ABOVE    Vital Signs Last 24 Hrs  T(C): 37.2 (11 Jul 2019 08:03), Max: 37.2 (11 Jul 2019 08:03)  T(F): 98.9 (11 Jul 2019 08:03), Max: 98.9 (11 Jul 2019 08:03)  HR: 98 (11 Jul 2019 08:03) (75 - 101)  BP: 105/66 (11 Jul 2019 08:03) (105/66 - 135/68)  BP(mean): --  RR: 14 (11 Jul 2019 08:03) (14 - 18)  SpO2: 96% (11 Jul 2019 08:03) (96% - 99%)    PHYSICAL EXAM:  GENERAL: lethargic  HEENT: NCAT, poor dentition, +gingivitis , crusting around b/l eyes   NERVOUS SYSTEM:  Alert & Oriented X2  CHEST/LUNG: decreased lung sounds b/l  HEART: S1S2, RRR   ABDOMEN: Soft, Nontender, Nondistended; + Bowel sounds, PEG cdi with surrounding blood, morbidly obese  EXTREMITIES:  2+ Peripheral Pulses, No clubbing, cyanosis, +1 edema          LABS:                        9.8    13.93 )-----------( 323      ( 11 Jul 2019 05:00 )             31.4     07-11    139  |  101  |  24<H>  ----------------------------<  101<H>  4.1   |  34<H>  |  0.61    Ca    9.2      11 Jul 2019 05:00    TPro  6.3  /  Alb  2.6<L>  /  TBili  0.7  /  DBili  x   /  AST  17  /  ALT  48<H>  /  AlkPhos  96  07-11         CAPILLARY BLOOD GLUCOSE      POCT Blood Glucose.: 159 mg/dL (10 Jul 2019 23:21)            RADIOLOGY & ADDITIONAL TESTS:    Consultant(s) Notes Reviewed:  [x ] YES  [ ] NO  Care Discussed with Consultants/Other Providers [ x] YES  [ ] NO  Imaging Personally Reviewed:  [ ] YES  [ ] NO

## 2019-07-11 NOTE — PROGRESS NOTE ADULT - SUBJECTIVE AND OBJECTIVE BOX
Subjective: No acute events overnight. Patient reports that she is doing well. Reports that she is tired today, but slept well overnight. Reports some mild discomfort around the PEG. Denies chest pain, SOB, or urinary sx. Denies nausea or vomiting.     REVIEW OF SYMPTOMS  Pertinent in the last 24hrs: Neurological deficits, stable  Abdominal discomfort      VITALS  Vital Signs Last 24 Hrs  T(C): 37.2 (11 Jul 2019 08:03), Max: 37.2 (11 Jul 2019 08:03)  T(F): 98.9 (11 Jul 2019 08:03), Max: 98.9 (11 Jul 2019 08:03)  HR: 98 (11 Jul 2019 08:03) (75 - 98)  BP: 105/66 (11 Jul 2019 08:03) (105/66 - 135/68)  BP(mean): --  RR: 14 (11 Jul 2019 08:03) (14 - 18)  SpO2: 96% (11 Jul 2019 08:03) (96% - 99%)      PHYSICAL EXAM   Constitutional - NAD, falling asleep during exam, but arousable with verbal stimuli   HEENT - posterior cranial incision, c/d/i, mild erythema surrounding, non-TTP, right eye with conjunctival erythema, thrush  Chest - CTA bilaterally, no increased work of breathing   Cardiovascular - RRR, S1S2  Abdomen - BS+, Soft, NTND, mild tenderness around PEG, with mild sanguineous discharge  Extremities - No calf tenderness   Neurologic Exam -                    Cognitive - Awake, Alert, Oriented to self, place, date, year, situation. Able to follow multistep commands across midline.     Communication - Fluent, low volume, dysarthric     Cranial Nerves - II- Pupils equal round and reactive to light, visual field exam limited by pain on the right and patient effort, III, IV and VI- unable to adduct or abduct either eye, left eye pupil is depressed inferiorly relative to the right, left pupil does not react to accomodation, V- intact, VII- inability to close right eye, left eye ptosis and left facial droop, VIII hearing decreased on the left, IX and X- hoarse voice, equal palate raise, XI- equal shoulder shrug and head turn, XII- tongue protrudes to left     Motor - No focal deficits     Sensory - Intact to light touch diffusely, impaired proprioception      Coordination - HTS intact BL, FTN intact BL but slow movements Psychiatric - Affect WNL	      RECENT LABS                        9.8    13.93 )-----------( 323      ( 11 Jul 2019 05:00 )             31.4     07-11    139  |  101  |  24<H>  ----------------------------<  101<H>  4.1   |  34<H>  |  0.61    Ca    9.2      11 Jul 2019 05:00    TPro  6.3  /  Alb  2.6<L>  /  TBili  0.7  /  DBili  x   /  AST  17  /  ALT  48<H>  /  AlkPhos  96  07-11            RADIOLOGY/OTHER RESULTS      MEDICATIONS  (STANDING):  ALBUTerol    90 MICROgram(s) HFA Inhaler 1 Puff(s) Inhalation every 4 hours  amLODIPine   Tablet 5 milliGRAM(s) Oral daily  artificial  tears Solution 1 Drop(s) Right EYE every 6 hours  cephalexin 500 milliGRAM(s) Oral every 12 hours  docusate sodium 100 milliGRAM(s) Oral two times a day  enoxaparin Injectable 30 milliGRAM(s) SubCutaneous every 12 hours  latanoprost 0.005% Ophthalmic Solution 1 Drop(s) Both EYES at bedtime  nystatin    Suspension 583122 Unit(s) Oral three times a day  petrolatum Ophthalmic Ointment 1 Application(s) Right EYE at bedtime  polyethylene glycol 3350 17 Gram(s) Oral daily  prednisoLONE acetate 1% Suspension 1 Drop(s) Both EYES four times a day  saccharomyces boulardii 250 milliGRAM(s) Oral two times a day  tiotropium 18 MICROgram(s) Capsule 1 Capsule(s) Inhalation daily    MEDICATIONS  (PRN):  acetaminophen   Tablet .. 650 milliGRAM(s) Oral every 6 hours PRN Temp greater or equal to 38C (100.4F), Mild Pain (1 - 3)  ALBUTerol/ipratropium for Nebulization 3 milliLiter(s) Nebulizer every 6 hours PRN Shortness of Breath and/or Wheezing  nystatin Powder 1 Application(s) Topical two times a day PRN Rash  senna 2 Tablet(s) Oral at bedtime PRN Constipation

## 2019-07-11 NOTE — DIETITIAN INITIAL EVALUATION ADULT. - ADD RECOMMEND
May suggest increasing free H2O to 300cc 4x/day via PEG to meet hydration needs. Trend weights, labs and monitor tolerance to tube feeding regimen. Speech and swallow to evaluation and treat for PO feasibility.

## 2019-07-11 NOTE — PROVIDER CONTACT NOTE (OTHER) - BACKGROUND
, S/P Craniotomy and Resection, post operatively noted with Right Facial Nerve weakness and incomplete eye closure.

## 2019-07-11 NOTE — DIETITIAN INITIAL EVALUATION ADULT. - ENERGY NEEDS
Height: 5'2", Weight: (7/10) 252.6lbs  Skin: s/p craniotomy and resection, Edema: none  IBW range: 99-121lbs  LAst BM: 7/11

## 2019-07-11 NOTE — PROVIDER CONTACT NOTE (OTHER) - ASSESSMENT
alert, verbally responsive, VSS. alert, verbally responsive, VSS. Denies any pain / discomfort on affected eye.

## 2019-07-11 NOTE — CONSULT NOTE ADULT - SUBJECTIVE AND OBJECTIVE BOX
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 GI consulted to see patient due to bleeding around peg site. S/P endoscopic peg placement on 19 per records. Patient was noted to have bleeding around peg site on 19, silver nitrate was applied as well as surgicell.    MEDICATIONS  (STANDING):  ALBUTerol    90 MICROgram(s) HFA Inhaler 1 Puff(s) Inhalation every 4 hours  amLODIPine   Tablet 5 milliGRAM(s) Oral daily  artificial  tears Solution 1 Drop(s) Right EYE every 6 hours  cephalexin 500 milliGRAM(s) Oral every 12 hours  docusate sodium 100 milliGRAM(s) Oral two times a day  enoxaparin Injectable 30 milliGRAM(s) SubCutaneous every 12 hours  latanoprost 0.005% Ophthalmic Solution 1 Drop(s) Both EYES at bedtime  nystatin    Suspension 690138 Unit(s) Oral three times a day  petrolatum Ophthalmic Ointment 1 Application(s) Right EYE at bedtime  polyethylene glycol 3350 17 Gram(s) Oral daily  prednisoLONE acetate 1% Suspension 1 Drop(s) Both EYES four times a day  saccharomyces boulardii 250 milliGRAM(s) Oral two times a day  tiotropium 18 MICROgram(s) Capsule 1 Capsule(s) Inhalation daily    MEDICATIONS  (PRN):  acetaminophen   Tablet .. 650 milliGRAM(s) Oral every 6 hours PRN Temp greater or equal to 38C (100.4F), Mild Pain (1 - 3)  ALBUTerol/ipratropium for Nebulization 3 milliLiter(s) Nebulizer every 6 hours PRN Shortness of Breath and/or Wheezing  nystatin Powder 1 Application(s) Topical two times a day PRN Rash  senna 2 Tablet(s) Oral at bedtime PRN Constipation      Allergies    No Known Drug Allergies  shellfish (Angioedema; Rash)    Intolerances        PHYSICAL EXAM:   Vital Signs:  Vital Signs Last 24 Hrs  T(C): 37.2 (2019 08:03), Max: 37.2 (2019 08:03)  T(F): 98.9 (2019 08:03), Max: 98.9 (2019 08:03)  HR: 98 (2019 08:03) (88 - 98)  BP: 105/66 (2019 08:03) (105/66 - 135/68)  BP(mean): --  RR: 14 (2019 08:03) (14 - 16)  SpO2: 96% (2019 08:03) (96% - 98%)  Daily Height in cm: 157.48 (10 Jul 2019 16:31)    Daily Weight in k.8 (2019 11:22)I&O's Summary    10 Jul 2019 07:  -  2019 07:00  --------------------------------------------------------  IN: 490 mL / OUT: 0 mL / NET: 490 mL    2019 07:  -  2019 15:26  --------------------------------------------------------  IN: 730 mL / OUT: 1 mL / NET: 729 mL        GENERAL:  Appears stated age, sleepy during exam  CHEST:  Full & symmetric excursion, no increased effort, breath sounds clear  HEART:  Regular rhythm, S1, S2, no murmur  ABDOMEN:  Soft, non-tender, non-distended, normoactive bowel sounds, Peg site + oozing blood  NEURO:  Alert,       LABS:                        9.8    13.93 )-----------( 323      ( 2019 05:00 )             31.4     07-11    139  |  101  |  24<H>  ----------------------------<  101<H>  4.1   |  34<H>  |  0.61    Ca    9.2      2019 05:00    TPro  6.3  /  Alb  2.6<L>  /  TBili  0.7  /  DBili  x   /  AST  17  /  ALT  48<H>  /  AlkPhos  96  07-11

## 2019-07-12 LAB
ALBUMIN SERPL ELPH-MCNC: 2.5 G/DL — LOW (ref 3.3–5)
ALP SERPL-CCNC: 97 U/L — SIGNIFICANT CHANGE UP (ref 40–120)
ALT FLD-CCNC: 47 U/L DA — HIGH (ref 10–45)
ANION GAP SERPL CALC-SCNC: 4 MMOL/L — LOW (ref 5–17)
APTT BLD: 32.6 SEC — SIGNIFICANT CHANGE UP (ref 27.5–36.3)
AST SERPL-CCNC: 15 U/L — SIGNIFICANT CHANGE UP (ref 10–40)
BASOPHILS # BLD AUTO: 0.02 K/UL — SIGNIFICANT CHANGE UP (ref 0–0.2)
BASOPHILS NFR BLD AUTO: 0.2 % — SIGNIFICANT CHANGE UP (ref 0–2)
BILIRUB SERPL-MCNC: 0.6 MG/DL — SIGNIFICANT CHANGE UP (ref 0.2–1.2)
BUN SERPL-MCNC: 15 MG/DL — SIGNIFICANT CHANGE UP (ref 7–23)
CALCIUM SERPL-MCNC: 9 MG/DL — SIGNIFICANT CHANGE UP (ref 8.4–10.5)
CHLORIDE SERPL-SCNC: 99 MMOL/L — SIGNIFICANT CHANGE UP (ref 96–108)
CK SERPL-CCNC: 24 U/L — LOW (ref 25–170)
CO2 BLDA-SCNC: 35 MMOL/L — HIGH (ref 22–30)
CO2 SERPL-SCNC: 32 MMOL/L — HIGH (ref 22–31)
CREAT SERPL-MCNC: 0.64 MG/DL — SIGNIFICANT CHANGE UP (ref 0.5–1.3)
EOSINOPHIL # BLD AUTO: 0.18 K/UL — SIGNIFICANT CHANGE UP (ref 0–0.5)
EOSINOPHIL NFR BLD AUTO: 2.1 % — SIGNIFICANT CHANGE UP (ref 0–6)
GAS PNL BLDA: SIGNIFICANT CHANGE UP
GLUCOSE BLDC GLUCOMTR-MCNC: 116 MG/DL — HIGH (ref 70–99)
GLUCOSE SERPL-MCNC: 113 MG/DL — HIGH (ref 70–99)
HCT VFR BLD CALC: 30.2 % — LOW (ref 34.5–45)
HGB BLD-MCNC: 9.3 G/DL — LOW (ref 11.5–15.5)
HOROWITZ INDEX BLDA+IHG-RTO: SIGNIFICANT CHANGE UP
IMM GRANULOCYTES NFR BLD AUTO: 0.7 % — SIGNIFICANT CHANGE UP (ref 0–1.5)
INR BLD: 1.14 RATIO — SIGNIFICANT CHANGE UP (ref 0.88–1.16)
LACTATE SERPL-SCNC: 0.6 MMOL/L — LOW (ref 0.7–2)
LYMPHOCYTES # BLD AUTO: 1.39 K/UL — SIGNIFICANT CHANGE UP (ref 1–3.3)
LYMPHOCYTES # BLD AUTO: 15.8 % — SIGNIFICANT CHANGE UP (ref 13–44)
MAGNESIUM SERPL-MCNC: 2 MG/DL — SIGNIFICANT CHANGE UP (ref 1.6–2.6)
MCHC RBC-ENTMCNC: 26.6 PG — LOW (ref 27–34)
MCHC RBC-ENTMCNC: 30.8 GM/DL — LOW (ref 32–36)
MCV RBC AUTO: 86.3 FL — SIGNIFICANT CHANGE UP (ref 80–100)
MONOCYTES # BLD AUTO: 0.86 K/UL — SIGNIFICANT CHANGE UP (ref 0–0.9)
MONOCYTES NFR BLD AUTO: 9.8 % — SIGNIFICANT CHANGE UP (ref 2–14)
NEUTROPHILS # BLD AUTO: 6.27 K/UL — SIGNIFICANT CHANGE UP (ref 1.8–7.4)
NEUTROPHILS NFR BLD AUTO: 71.4 % — SIGNIFICANT CHANGE UP (ref 43–77)
NRBC # BLD: 0 /100 WBCS — SIGNIFICANT CHANGE UP (ref 0–0)
PCO2 BLDA: 54 MMHG — HIGH (ref 32–46)
PH BLDA: 7.41 — SIGNIFICANT CHANGE UP (ref 7.35–7.45)
PHOSPHATE SERPL-MCNC: 3.4 MG/DL — SIGNIFICANT CHANGE UP (ref 2.5–4.5)
PLATELET # BLD AUTO: 292 K/UL — SIGNIFICANT CHANGE UP (ref 150–400)
PO2 BLDA: 93 MMHG — SIGNIFICANT CHANGE UP (ref 74–108)
POTASSIUM SERPL-MCNC: 4 MMOL/L — SIGNIFICANT CHANGE UP (ref 3.5–5.3)
POTASSIUM SERPL-SCNC: 4 MMOL/L — SIGNIFICANT CHANGE UP (ref 3.5–5.3)
PROCALCITONIN SERPL-MCNC: 0.02 NG/ML — SIGNIFICANT CHANGE UP
PROLACTIN SERPL-MCNC: 23.4 NG/ML — SIGNIFICANT CHANGE UP (ref 3.4–24.1)
PROT SERPL-MCNC: 6.2 G/DL — SIGNIFICANT CHANGE UP (ref 6–8.3)
PROTHROM AB SERPL-ACNC: 12.8 SEC — SIGNIFICANT CHANGE UP (ref 10–12.9)
RBC # BLD: 3.5 M/UL — LOW (ref 3.8–5.2)
RBC # FLD: 14.6 % — HIGH (ref 10.3–14.5)
SAO2 % BLDA: 97 % — HIGH (ref 92–96)
SODIUM SERPL-SCNC: 135 MMOL/L — SIGNIFICANT CHANGE UP (ref 135–145)
TROPONIN I SERPL-MCNC: <.017 NG/ML — LOW (ref 0.02–0.06)
WBC # BLD: 8.78 K/UL — SIGNIFICANT CHANGE UP (ref 3.8–10.5)
WBC # FLD AUTO: 8.78 K/UL — SIGNIFICANT CHANGE UP (ref 3.8–10.5)

## 2019-07-12 PROCEDURE — 99232 SBSQ HOSP IP/OBS MODERATE 35: CPT

## 2019-07-12 PROCEDURE — 99233 SBSQ HOSP IP/OBS HIGH 50: CPT

## 2019-07-12 PROCEDURE — 71045 X-RAY EXAM CHEST 1 VIEW: CPT | Mod: 26

## 2019-07-12 PROCEDURE — 70450 CT HEAD/BRAIN W/O DYE: CPT | Mod: 26

## 2019-07-12 RX ADMIN — Medication 500 MILLIGRAM(S): at 17:24

## 2019-07-12 RX ADMIN — Medication 1 DROP(S): at 17:23

## 2019-07-12 RX ADMIN — Medication 1 DROP(S): at 11:52

## 2019-07-12 RX ADMIN — Medication 250 MILLIGRAM(S): at 06:46

## 2019-07-12 RX ADMIN — Medication 1 DROP(S): at 06:47

## 2019-07-12 RX ADMIN — Medication 1 DROP(S): at 11:53

## 2019-07-12 RX ADMIN — Medication 250 MILLIGRAM(S): at 17:22

## 2019-07-12 RX ADMIN — AMLODIPINE BESYLATE 5 MILLIGRAM(S): 2.5 TABLET ORAL at 06:46

## 2019-07-12 RX ADMIN — Medication 100 MILLIGRAM(S): at 17:24

## 2019-07-12 RX ADMIN — Medication 1 DROP(S): at 21:46

## 2019-07-12 RX ADMIN — ENOXAPARIN SODIUM 30 MILLIGRAM(S): 100 INJECTION SUBCUTANEOUS at 06:47

## 2019-07-12 RX ADMIN — Medication 500000 UNIT(S): at 13:29

## 2019-07-12 RX ADMIN — ENOXAPARIN SODIUM 30 MILLIGRAM(S): 100 INJECTION SUBCUTANEOUS at 17:22

## 2019-07-12 RX ADMIN — Medication 1 APPLICATION(S): at 21:47

## 2019-07-12 RX ADMIN — Medication 500000 UNIT(S): at 06:46

## 2019-07-12 RX ADMIN — POLYETHYLENE GLYCOL 3350 17 GRAM(S): 17 POWDER, FOR SOLUTION ORAL at 11:54

## 2019-07-12 RX ADMIN — LATANOPROST 1 DROP(S): 0.05 SOLUTION/ DROPS OPHTHALMIC; TOPICAL at 21:47

## 2019-07-12 RX ADMIN — Medication 500 MILLIGRAM(S): at 06:46

## 2019-07-12 RX ADMIN — Medication 500000 UNIT(S): at 21:47

## 2019-07-12 NOTE — PROGRESS NOTE ADULT - SUBJECTIVE AND OBJECTIVE BOX
Subjective: This morning, patient had an RRT this morning for AMS and worsening weakness. Per report, patient was oriented to person only and left sided weakness. Patient seen this morning. Reports that she does not remember exactly what happened this morning, but she feels that she is at baseline. Denies headache, chest pain, SOB, abdominal or urinary sx. Reports that she slept well. Functionally, patient's bed mobility with     REVIEW OF SYMPTOMS  Pertinent in the last 24hrs: Neurological deficits, stable  RRT this morning for AMS, worsening weakness -> resolved    VITALS  Vital Signs Last 24 Hrs  T(C): 37.1 (12 Jul 2019 07:32), Max: 37.6 (12 Jul 2019 06:09)  T(F): 98.8 (12 Jul 2019 07:32), Max: 99.7 (12 Jul 2019 06:09)  HR: 92 (12 Jul 2019 09:11) (84 - 98)  BP: 123/65 (12 Jul 2019 07:32) (122/74 - 137/80)  BP(mean): --  RR: 16 (12 Jul 2019 07:32) (14 - 16)  SpO2: 93% (12 Jul 2019 09:11) (93% - 100%)      PHYSICAL EXAM  Constitutional - NAD, awake  	HEENT - posterior cranial incision, c/d/i, mild erythema surrounding, non-TTP, right eye with conjunctival erythema  	Chest - CTA bilaterally, no increased work of breathing   	Cardiovascular - RRR, S1S2  	Abdomen - BS+, Soft, NTND, mild tenderness around PEG, with mild sanguineous discharge  	Extremities - No calf tenderness   	Neurologic Exam -                 	   Cognitive - Awake, Alert, Oriented to self, place, date, year, situation. Able to follow multistep commands across midline.  	   Communication - Fluent, low volume, dysarthric  	   Cranial Nerves - II- Pupils equal round and reactive to light, visual field exam limited by pain on the right and patient effort, III, IV and VI- unable to adduct or abduct either eye, left eye pupil is depressed inferiorly relative to the right, left pupil does not react to accomodation, V- intact, VII- inability to close right eye, left eye ptosis and left facial droop, VIII hearing decreased on the left, IX and X- hoarse voice, equal palate raise, XI- equal shoulder shrug and head turn, XII- tongue protrudes to left  	   Motor - No focal deficits 5/5 in BL UE and LE  	   Sensory - Intact to light touch diffusely, impaired proprioception   	   Coordination - HTS intact BL, FTN intact BL but slow movements  Psychiatric - Affect WNL      RECENT LABS                        9.3    8.78  )-----------( 292      ( 12 Jul 2019 07:40 )             30.2     07-12    135  |  99  |  15  ----------------------------<  113<H>  4.0   |  32<H>  |  0.64    Ca    9.0      12 Jul 2019 07:40  Phos  3.4     07-12  Mg     2.0     07-12    TPro  6.2  /  Alb  2.5<L>  /  TBili  0.6  /  DBili  x   /  AST  15  /  ALT  47<H>  /  AlkPhos  97  07-12    PT/INR - ( 12 Jul 2019 07:40 )   PT: 12.8 sec;   INR: 1.14 ratio         PTT - ( 12 Jul 2019 07:40 )  PTT:32.6 sec        RADIOLOGY/OTHER RESULTS      MEDICATIONS  (STANDING):  ALBUTerol    90 MICROgram(s) HFA Inhaler 1 Puff(s) Inhalation every 4 hours  amLODIPine   Tablet 5 milliGRAM(s) Oral daily  artificial  tears Solution 1 Drop(s) Right EYE every 6 hours  cephalexin 500 milliGRAM(s) Oral every 12 hours  docusate sodium 100 milliGRAM(s) Oral two times a day  enoxaparin Injectable 30 milliGRAM(s) SubCutaneous every 12 hours  latanoprost 0.005% Ophthalmic Solution 1 Drop(s) Both EYES at bedtime  nystatin    Suspension 602146 Unit(s) Oral three times a day  petrolatum Ophthalmic Ointment 1 Application(s) Right EYE at bedtime  polyethylene glycol 3350 17 Gram(s) Oral daily  prednisoLONE acetate 1% Suspension 1 Drop(s) Both EYES four times a day  saccharomyces boulardii 250 milliGRAM(s) Oral two times a day  tiotropium 18 MICROgram(s) Capsule 1 Capsule(s) Inhalation daily    MEDICATIONS  (PRN):  acetaminophen   Tablet .. 650 milliGRAM(s) Oral every 6 hours PRN Temp greater or equal to 38C (100.4F), Mild Pain (1 - 3)  ALBUTerol/ipratropium for Nebulization 3 milliLiter(s) Nebulizer every 6 hours PRN Shortness of Breath and/or Wheezing  nystatin Powder 1 Application(s) Topical two times a day PRN Rash  senna 2 Tablet(s) Oral at bedtime PRN Constipation Subjective: This morning, patient had an RRT this morning for AMS and worsening weakness. Per report, patient was oriented to person only and left sided weakness. Patient seen this morning. Reports that she does not remember exactly what happened this morning, but she feels that she is at baseline. Denies headache, chest pain, SOB, abdominal or urinary sx. No nausea, no vomiting.  Reports that she slept well.      REVIEW OF SYMPTOMS  Pertinent in the last 24hrs: Neurological deficits, stable  RRT this morning for AMS, worsening weakness -> resolved    VITALS  Vital Signs Last 24 Hrs  T(C): 37.1 (12 Jul 2019 07:32), Max: 37.6 (12 Jul 2019 06:09)  T(F): 98.8 (12 Jul 2019 07:32), Max: 99.7 (12 Jul 2019 06:09)  HR: 92 (12 Jul 2019 09:11) (84 - 98)  BP: 123/65 (12 Jul 2019 07:32) (122/74 - 137/80)  BP(mean): --  RR: 16 (12 Jul 2019 07:32) (14 - 16)  SpO2: 93% (12 Jul 2019 09:11) (93% - 100%)      PHYSICAL EXAM  Constitutional - NAD, awake  	HEENT - posterior cranial incision, c/d/i, mild erythema surrounding, non-TTP, right eye with conjunctival erythema  	Chest - CTA bilaterally, no increased work of breathing   	Cardiovascular - RRR, S1S2  	Abdomen - BS+, Soft, NTND, mild tenderness around PEG, with mild sanguineous discharge  	Extremities - No calf tenderness   	Neurologic Exam -                 	   Cognitive - Drowsy, Oriented to self, place, date, year, situation. Able to follow multistep commands across midline.  	   Communication - Fluent, low volume, dysarthric  	   Cranial Nerves - II- Pupils equal round and reactive to light, visual field exam limited by pain on the right and patient effort, III, IV and VI- unable to adduct or abduct either eye, left eye pupil is depressed inferiorly relative to the right, left pupil does not react to accomodation, V- intact, VII- inability to close right eye, left eye ptosis and left facial droop, VIII hearing decreased on the left, IX and X- hoarse voice, equal palate raise, XI- equal shoulder shrug and head turn, XII- tongue protrudes to left  	   Motor - No focal deficits 5/5 in BL UE and LE  	   Sensory - Intact to light touch diffusely, impaired proprioception   	   Coordination - HTS intact BL, FTN intact BL but slow movements  Psychiatric - Affect WNL      RECENT LABS                        9.3    8.78  )-----------( 292      ( 12 Jul 2019 07:40 )             30.2     07-12    135  |  99  |  15  ----------------------------<  113<H>  4.0   |  32<H>  |  0.64    Ca    9.0      12 Jul 2019 07:40  Phos  3.4     07-12  Mg     2.0     07-12    TPro  6.2  /  Alb  2.5<L>  /  TBili  0.6  /  DBili  x   /  AST  15  /  ALT  47<H>  /  AlkPhos  97  07-12    PT/INR - ( 12 Jul 2019 07:40 )   PT: 12.8 sec;   INR: 1.14 ratio         PTT - ( 12 Jul 2019 07:40 )  PTT:32.6 sec        RADIOLOGY/OTHER RESULTS  EXAM:  CT BRAIN STROKE PROTOCOL      PROCEDURE DATE:  07/12/2019        INTERPRETATION:  CLINICAL INFORMATION: Altered mental status with left   arm weakness. 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    TECHNIQUE: Contiguous noncontrast axial images of the head were obtained   from the skull base to the vertex with coronal and sagittal reformats.     COMPARISON: CT brain 6/28/2019. MRI brain 7/2/2019.    FINDINGS:  Suboccipital craniectomy with postsurgical changes extending to the   fourth ventricle, concordant with provided history. Motion degradation   and streak slightly limits evaluation of the skull base, brainstem and   posterior fossa. There is a cerebral spinal fluid collection in the   suboccipital soft tissues measures at least 4.8 x 4 x 2.6 cm, similar   when compared to neck CT 7/9/2019.     Faint punctate lucencies within the frontal horn of the left lateral   ventricle, suprasellar cistern, ambient cistern, left cerebellopontine   angle and fourth ventricle which likely represent droplets of fat or gas.    There is no acute intracranial hemorrhage, hydrocephalus, midline shift,   extra-axial fluid collection or mass effect.     There is diffuse cerebral volume loss with secondary prominence of sulci   and ventricles. There is moderate white matter lucency which is   nonspecific but likely the sequela of chronic microvascular ischemic   change given presence of intracranial atherosclerotic calcifications.    Opacified left sphenoid sinus and left maxillary sinus mucous retention   cyst versus polyp. The calvarium is intact.    IMPRESSION:  Postsurgical changes consistent with recent suboccipital craniectomy and   fourth ventriculostomy. No hemorrhage or mass effect.     Faint punctate lucencies within the frontal horn of the left lateral   ventricle, suprasellar cistern, ambient cistern, left cerebellopontine   angle and fourth ventricle which likely represent droplets of fat.      MEDICATIONS  (STANDING):  ALBUTerol    90 MICROgram(s) HFA Inhaler 1 Puff(s) Inhalation every 4 hours  amLODIPine   Tablet 5 milliGRAM(s) Oral daily  artificial  tears Solution 1 Drop(s) Right EYE every 6 hours  cephalexin 500 milliGRAM(s) Oral every 12 hours  docusate sodium 100 milliGRAM(s) Oral two times a day  enoxaparin Injectable 30 milliGRAM(s) SubCutaneous every 12 hours  latanoprost 0.005% Ophthalmic Solution 1 Drop(s) Both EYES at bedtime  nystatin    Suspension 820547 Unit(s) Oral three times a day  petrolatum Ophthalmic Ointment 1 Application(s) Right EYE at bedtime  polyethylene glycol 3350 17 Gram(s) Oral daily  prednisoLONE acetate 1% Suspension 1 Drop(s) Both EYES four times a day  saccharomyces boulardii 250 milliGRAM(s) Oral two times a day  tiotropium 18 MICROgram(s) Capsule 1 Capsule(s) Inhalation daily    MEDICATIONS  (PRN):  acetaminophen   Tablet .. 650 milliGRAM(s) Oral every 6 hours PRN Temp greater or equal to 38C (100.4F), Mild Pain (1 - 3)  ALBUTerol/ipratropium for Nebulization 3 milliLiter(s) Nebulizer every 6 hours PRN Shortness of Breath and/or Wheezing  nystatin Powder 1 Application(s) Topical two times a day PRN Rash  senna 2 Tablet(s) Oral at bedtime PRN Constipation

## 2019-07-12 NOTE — PROGRESS NOTE ADULT - SUBJECTIVE AND OBJECTIVE BOX
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  Light-headedness  Glaucoma  Asthma: controlled with meds  HTN (hypertension)  History of ankle fracture: h/o repair  Fractured elbow: left elbow fracture repair,2007  H/O appendicitis: h/o appendicectomy        MedsMEDICATIONS  (STANDING):  ALBUTerol    90 MICROgram(s) HFA Inhaler 1 Puff(s) Inhalation every 4 hours  amLODIPine   Tablet 5 milliGRAM(s) Oral daily  artificial  tears Solution 1 Drop(s) Right EYE every 6 hours  cephalexin 500 milliGRAM(s) Oral every 12 hours  docusate sodium 100 milliGRAM(s) Oral two times a day  enoxaparin Injectable 30 milliGRAM(s) SubCutaneous every 12 hours  latanoprost 0.005% Ophthalmic Solution 1 Drop(s) Both EYES at bedtime  nystatin    Suspension 375712 Unit(s) Oral three times a day  petrolatum Ophthalmic Ointment 1 Application(s) Right EYE at bedtime  polyethylene glycol 3350 17 Gram(s) Oral daily  prednisoLONE acetate 1% Suspension 1 Drop(s) Both EYES four times a day  saccharomyces boulardii 250 milliGRAM(s) Oral two times a day  tiotropium 18 MICROgram(s) Capsule 1 Capsule(s) Inhalation daily    MEDICATIONS  (PRN):  acetaminophen   Tablet .. 650 milliGRAM(s) Oral every 6 hours PRN Temp greater or equal to 38C (100.4F), Mild Pain (1 - 3)  ALBUTerol/ipratropium for Nebulization 3 milliLiter(s) Nebulizer every 6 hours PRN Shortness of Breath and/or Wheezing  nystatin Powder 1 Application(s) Topical two times a day PRN Rash  senna 2 Tablet(s) Oral at bedtime PRN Constipation      Vital Signs Last 24 Hrs  T(C): 37.1 (12 Jul 2019 07:32), Max: 37.6 (12 Jul 2019 06:09)  T(F): 98.8 (12 Jul 2019 07:32), Max: 99.7 (12 Jul 2019 06:09)  HR: 92 (12 Jul 2019 09:11) (84 - 98)  BP: 123/65 (12 Jul 2019 07:32) (122/74 - 137/80)  BP(mean): --  RR: 16 (12 Jul 2019 07:32) (14 - 16)  SpO2: 93% (12 Jul 2019 09:11) (93% - 100%)    PHYSICAL EXAM:  GENERAL: lethargic   HEENT: crusting around eyes, gingivitis , poor dentition   NERVOUS SYSTEM:  Alert & Oriented X3  CHEST/LUNG: decreased lung sounds b/l   HEART: S1S2, RRR   ABDOMEN: Soft, non-tender, non-distended, + bowel sounds, obese  EXTREMITIES:  2+ Peripheral Pulses, No clubbing, cyanosis, +1 edema      LABS:                          9.3    8.78  )-----------( 292      ( 12 Jul 2019 07:40 )             30.2       07-12    135  |  99  |  15  ----------------------------<  113<H>  4.0   |  32<H>  |  0.64    Ca    9.0      12 Jul 2019 07:40  Phos  3.4     07-12  Mg     2.0     07-12    TPro  6.2  /  Alb  2.5<L>  /  TBili  0.6  /  DBili  x   /  AST  15  /  ALT  47<H>  /  AlkPhos  97  07-12      PT/INR - ( 12 Jul 2019 07:40 )   PT: 12.8 sec;   INR: 1.14 ratio         PTT - ( 12 Jul 2019 07:40 )  PTT:32.6 sec    CARDIAC MARKERS ( 12 Jul 2019 07:40 )  <.017 ng/mL / x     / 24 U/L / x     / x                                RADIOLOGY & ADDITIONAL TESTS:    Imaging Personally Reviewed:  [x ] YES  [ ] NO  CTH- no change     HEALTH ISSUES - PROBLEM Dx:  Percutaneous endoscopic gastrostomy status: Percutaneous endoscopic gastrostomy status          Care Discussed with Consultants/Other Providers [ x] YES  [ ] NO

## 2019-07-12 NOTE — CHART NOTE - NSCHARTNOTEFT_GEN_A_CORE
Nurse called patient not herself, before bed more cooperative. Patient chart yesterday am, AAOx 3, NFD on motor. Now patient states name, but really needs help to answer, not able give the date and location. Left UE is weaker compared to the left. RRT called.     Vitals stable,     T - 99.3, rectal 99.6  BP -n 124/57  HR - 91  O2 - 100  RR - 16    Exam - AAOx1, left UE weakness vs right, not withdrawing to painful stimuli on Right LE, does respond alittle on left LE.     A/P - patient with lethargy, r/o acute intracranial process vs new seizure vs infectious   - Stat CT head, labs - CBC, CMP, mag, phos, lactate, procal, prolactin, abg, trop Nurse called patient not herself, before bed more cooperative. Patient chart yesterday am, AAOx 3, NFD on motor. Now patient states name, but really needs help to answer, not able give the date and location. Left UE is weaker compared to the left. RRT called.     Vitals stable,     T - 99.3, rectal 99.6  BP -n 124/57  HR - 91  O2 - 100  RR - 16    Exam - AAOx1, left UE weakness vs right, not withdrawing to painful stimuli on Right LE, does respond alittle on left LE.     A/P - patient with lethargy, r/o acute intracranial process vs new seizure vs infectious   - Stat CT head, labs - CBC, CMP, mag, phos, lactate, procal, prolactin, abg, trop, neuro consult, eeg    Addendum  - Rads call back - CT head read, post surgical changes, slightly increased CSF collection noted compared to before, no evidence of intracranial hemorrhage or mass effect.   - Abg shows pH 7.4, elevated CO2 54, FS shows 116  - On-call Attending notified of RRT and plan of care, asked to have primary attending called after CT head, Dr. East called and case reviewed  - Family called, spouse, no answer, left voicemail to call back unit when available.   - official read of CT head, primary team to f/u and neuro consult to call and discuss case to r/o seizure (consult placed in sunrise)  - f/u remaining labs

## 2019-07-12 NOTE — PROGRESS NOTE ADULT - ASSESSMENT
ASSESSMENT/PLAN  The patient is a 69 year old right handed, morbidly obese (BMI 49) woman with a past medical history of asthma, glaucoma, hypertension and osteoarthritis who presented for dizziness, balance issues found to have a posterior fossa-4th ventricle mass who was admitted to [facility] on 6/24/19 s/p planned craniotomy and resection on 6/24/19 being admitted here for rehab with Gait Instability, ADL impairments and Functional impairments.    COMORBIDITES/ACTIVE MEDICAL ISSUES     Gait Instability, ADL impairments and Functional impairments:   -Continue Comprehensive Rehab Program of PT/OT/SLP    AMS: Unclear etiology, but patient currently at baseline. CBC, BMP, Lactate and troponin unremarkable. CTH demonstrated postsurgical changes, no hemorrhage or mass affect.   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  -Continue with comprehensive rehab program  -Completed dexamethasone taper as per OSH  -Pain control: Tylenol  -Follow up with NSGY upon discharge    HTN:  -Continue with amlodipine  -Monitor vitals    Asthma:  -Continue with duoneb  -Monitor vitals  -Supplemental oxygen prn     Right facial nerve palsy/Glaucoma:  -Continue with ocular drops: Latanoprost, Lacri-Lube, Prednisolone, Artificial tears  -Will need outpatient followup    Tongue Mass:  -Continue with keflex for 5 day course  -Follow up with outpatient for further workup    Thrush:  -Continue nystatin  -Continue oral care    Dysphagia: s/p PEG  -NPO with tube feeds  -Continue bolus feeds and free water flush  -GI consult for bleeding around PEG, appreciate recs  -Plan for MBS next week    Ovarian cystic mass:  -Follow up as an outpatient for further workup    Leukocytosis: Resolved. Patient afebrile and no localizing sx  -Monitor weekly labs    Pain Mgmt - Tylenol PRN  GI/Bowel Mgmt -  Continent c/w Colace, Lizzie  /Bladder Mgmt - Monitor output    FEN   - Diet - NPO with bolus tube feeds  - Dysphagia  SLP - evaluation and treatment    Precautions / PROPHYLAXIS:   - Falls, Seizure   - Lungs: Aspiration, Incentive Spirometer   - Pressure injury/Skin: Turn Q2hrs while in bed, OOB to Chair, PT/OT    - DVT: Lovenox, SCDs, TEDs ASSESSMENT/PLAN  The patient is a 69 year old right handed, morbidly obese (BMI 49) woman with a past medical history of asthma, glaucoma, hypertension and osteoarthritis who presented for dizziness, balance issues found to have a posterior fossa-4th ventricle mass who was admitted to [facility] on 6/24/19 s/p planned craniotomy and resection on 6/24/19 being admitted here for rehab with Gait Instability, ADL impairments and Functional impairments.    COMORBIDITES/ACTIVE MEDICAL ISSUES     Gait Instability, ADL impairments and Functional impairments:   -Continue Comprehensive Rehab Program of PT/OT/SLP    AMS: Unclear etiology, but patient currently at baseline. CBC, BMP, Lactate and troponin unremarkable. CTH demonstrated postsurgical changes, no hemorrhage or mass affect.   -Continue with neuro checks  -Reached out to NSGY at [facility] to review CTH and reported that it is stable. 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/19  -Continue with comprehensive rehab program  -Completed dexamethasone taper as per OSH  -Pain control: Tylenol  -Follow up with NSGY upon discharge    HTN:  -Continue with amlodipine  -Monitor vitals    Asthma:  -Continue with duoneb  -Monitor vitals  -Supplemental oxygen prn     Right facial nerve palsy/Glaucoma:  -Continue with ocular drops: Latanoprost, Lacri-Lube, Prednisolone, Artificial tears  -Will need outpatient followup    Tongue Mass:  -Continue with keflex for 5 day course  -Follow up with outpatient for further workup    Thrush:  -Continue nystatin  -Continue oral care    Dysphagia: s/p PEG  -NPO with tube feeds  -Continue bolus feeds and free water flush  -GI consult for bleeding around PEG, appreciate recs  -Plan for MBS next week    Ovarian cystic mass:  -Follow up as an outpatient for further workup    Leukocytosis: Resolved. Patient afebrile and no localizing sx  -Monitor weekly labs    Pain Mgmt - Tylenol PRN  GI/Bowel Mgmt -  Continent c/w Colace, Lizzie  /Bladder Mgmt - Monitor output    FEN   - Diet - NPO with bolus tube feeds  - Dysphagia  SLP - evaluation and treatment    Precautions / PROPHYLAXIS:   - Falls, Seizure   - Lungs: Aspiration, Incentive Spirometer   - Pressure injury/Skin: Turn Q2hrs while in bed, OOB to Chair, PT/OT    - DVT: Lovenox, SCDs, TEDs

## 2019-07-13 PROCEDURE — 99232 SBSQ HOSP IP/OBS MODERATE 35: CPT

## 2019-07-13 RX ADMIN — ENOXAPARIN SODIUM 30 MILLIGRAM(S): 100 INJECTION SUBCUTANEOUS at 05:48

## 2019-07-13 RX ADMIN — Medication 1 DROP(S): at 05:48

## 2019-07-13 RX ADMIN — Medication 1 DROP(S): at 22:35

## 2019-07-13 RX ADMIN — Medication 1 DROP(S): at 12:21

## 2019-07-13 RX ADMIN — POLYETHYLENE GLYCOL 3350 17 GRAM(S): 17 POWDER, FOR SOLUTION ORAL at 12:20

## 2019-07-13 RX ADMIN — ENOXAPARIN SODIUM 30 MILLIGRAM(S): 100 INJECTION SUBCUTANEOUS at 18:31

## 2019-07-13 RX ADMIN — Medication 500000 UNIT(S): at 05:48

## 2019-07-13 RX ADMIN — Medication 100 MILLIGRAM(S): at 18:31

## 2019-07-13 RX ADMIN — Medication 1 DROP(S): at 18:30

## 2019-07-13 RX ADMIN — Medication 1 DROP(S): at 18:31

## 2019-07-13 RX ADMIN — Medication 500 MILLIGRAM(S): at 05:48

## 2019-07-13 RX ADMIN — Medication 500000 UNIT(S): at 22:35

## 2019-07-13 RX ADMIN — Medication 250 MILLIGRAM(S): at 18:31

## 2019-07-13 RX ADMIN — Medication 250 MILLIGRAM(S): at 05:47

## 2019-07-13 RX ADMIN — Medication 1 APPLICATION(S): at 22:35

## 2019-07-13 RX ADMIN — Medication 500 MILLIGRAM(S): at 18:31

## 2019-07-13 RX ADMIN — LATANOPROST 1 DROP(S): 0.05 SOLUTION/ DROPS OPHTHALMIC; TOPICAL at 22:35

## 2019-07-13 RX ADMIN — Medication 500000 UNIT(S): at 16:07

## 2019-07-13 NOTE — PROGRESS NOTE ADULT - SUBJECTIVE AND OBJECTIVE BOX
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  Light-headedness  Glaucoma  Asthma: controlled with meds  HTN (hypertension)  History of ankle fracture: h/o repair  Fractured elbow: left elbow fracture repair,2007  H/O appendicitis: h/o appendicectomy        VITALS  Vital Signs Last 24 Hrs  T(C): 36.8 (13 Jul 2019 20:26), Max: 37.2 (13 Jul 2019 08:30)  T(F): 98.3 (13 Jul 2019 20:26), Max: 99 (13 Jul 2019 08:30)  HR: 102 (13 Jul 2019 20:26) (90 - 102)  BP: 123/75 (13 Jul 2019 20:26) (108/68 - 123/75)  BP(mean): --  RR: 15 (13 Jul 2019 20:26) (15 - 15)  SpO2: 100% (13 Jul 2019 20:26) (99% - 100%)      PHYSICAL EXAM  Constitutional - NAD, Comfortable  HEENT - NCAT, EOMI  Neck - Supple, No limited ROM  Chest - CTA bilaterally  Cardiovascular - RRR, S1S2, No murmurs  Abdomen - BS+, Soft, NTND  Extremities - No C/C/E, No calf tenderness   Neurologic Exam -                       No new focal deficits                RECENT LABS                        9.3    8.78  )-----------( 292      ( 12 Jul 2019 07:40 )             30.2     07-12    135  |  99  |  15  ----------------------------<  113<H>  4.0   |  32<H>  |  0.64    Ca    9.0      12 Jul 2019 07:40  Phos  3.4     07-12  Mg     2.0     07-12    TPro  6.2  /  Alb  2.5<L>  /  TBili  0.6  /  DBili  x   /  AST  15  /  ALT  47<H>  /  AlkPhos  97  07-12    PT/INR - ( 12 Jul 2019 07:40 )   PT: 12.8 sec;   INR: 1.14 ratio         PTT - ( 12 Jul 2019 07:40 )  PTT:32.6 sec              CURRENT MEDICATIONS    MEDICATIONS  (STANDING):  ALBUTerol    90 MICROgram(s) HFA Inhaler 1 Puff(s) Inhalation every 4 hours  amLODIPine   Tablet 5 milliGRAM(s) Oral daily  artificial  tears Solution 1 Drop(s) Right EYE every 6 hours  cephalexin 500 milliGRAM(s) Oral every 12 hours  docusate sodium 100 milliGRAM(s) Oral two times a day  enoxaparin Injectable 30 milliGRAM(s) SubCutaneous every 12 hours  latanoprost 0.005% Ophthalmic Solution 1 Drop(s) Both EYES at bedtime  nystatin    Suspension 340062 Unit(s) Oral three times a day  petrolatum Ophthalmic Ointment 1 Application(s) Right EYE at bedtime  polyethylene glycol 3350 17 Gram(s) Oral daily  prednisoLONE acetate 1% Suspension 1 Drop(s) Both EYES four times a day  saccharomyces boulardii 250 milliGRAM(s) Oral two times a day  tiotropium 18 MICROgram(s) Capsule 1 Capsule(s) Inhalation daily    MEDICATIONS  (PRN):  acetaminophen   Tablet .. 650 milliGRAM(s) Oral every 6 hours PRN Temp greater or equal to 38C (100.4F), Mild Pain (1 - 3)  ALBUTerol/ipratropium for Nebulization 3 milliLiter(s) Nebulizer every 6 hours PRN Shortness of Breath and/or Wheezing  nystatin Powder 1 Application(s) Topical two times a day PRN Rash  senna 2 Tablet(s) Oral at bedtime PRN Constipation    ASSESSMENT & PLAN          GI/Bowel Management - Dulcolax PRN, Fleet PRN   Management - Toilet Q2  Skin - Turn Q2  Pain - Tylenol PRN  DVT PPX - Lovenox      Continue comprehensive acute rehab program consisting of 3hrs/day of OT/PT and SLP.

## 2019-07-14 PROCEDURE — 99232 SBSQ HOSP IP/OBS MODERATE 35: CPT

## 2019-07-14 RX ORDER — ALBUTEROL 90 UG/1
2 AEROSOL, METERED ORAL EVERY 6 HOURS
Refills: 0 | Status: DISCONTINUED | OUTPATIENT
Start: 2019-07-14 | End: 2019-07-16

## 2019-07-14 RX ORDER — BUDESONIDE AND FORMOTEROL FUMARATE DIHYDRATE 160; 4.5 UG/1; UG/1
2 AEROSOL RESPIRATORY (INHALATION)
Refills: 0 | Status: DISCONTINUED | OUTPATIENT
Start: 2019-07-14 | End: 2019-07-23

## 2019-07-14 RX ADMIN — Medication 1 APPLICATION(S): at 15:26

## 2019-07-14 RX ADMIN — Medication 250 MILLIGRAM(S): at 06:37

## 2019-07-14 RX ADMIN — Medication 500000 UNIT(S): at 06:37

## 2019-07-14 RX ADMIN — AMLODIPINE BESYLATE 5 MILLIGRAM(S): 2.5 TABLET ORAL at 06:37

## 2019-07-14 RX ADMIN — Medication 1 DROP(S): at 23:09

## 2019-07-14 RX ADMIN — Medication 1 DROP(S): at 06:37

## 2019-07-14 RX ADMIN — Medication 500000 UNIT(S): at 21:54

## 2019-07-14 RX ADMIN — Medication 1 DROP(S): at 11:23

## 2019-07-14 RX ADMIN — Medication 650 MILLIGRAM(S): at 23:01

## 2019-07-14 RX ADMIN — Medication 1 DROP(S): at 17:14

## 2019-07-14 RX ADMIN — Medication 1 DROP(S): at 17:13

## 2019-07-14 RX ADMIN — Medication 650 MILLIGRAM(S): at 22:00

## 2019-07-14 RX ADMIN — Medication 500 MILLIGRAM(S): at 17:14

## 2019-07-14 RX ADMIN — Medication 1 APPLICATION(S): at 21:55

## 2019-07-14 RX ADMIN — BUDESONIDE AND FORMOTEROL FUMARATE DIHYDRATE 2 PUFF(S): 160; 4.5 AEROSOL RESPIRATORY (INHALATION) at 20:57

## 2019-07-14 RX ADMIN — Medication 1 DROP(S): at 23:06

## 2019-07-14 RX ADMIN — Medication 250 MILLIGRAM(S): at 17:15

## 2019-07-14 RX ADMIN — ENOXAPARIN SODIUM 30 MILLIGRAM(S): 100 INJECTION SUBCUTANEOUS at 17:14

## 2019-07-14 RX ADMIN — ENOXAPARIN SODIUM 30 MILLIGRAM(S): 100 INJECTION SUBCUTANEOUS at 06:37

## 2019-07-14 RX ADMIN — Medication 500000 UNIT(S): at 14:01

## 2019-07-14 RX ADMIN — LATANOPROST 1 DROP(S): 0.05 SOLUTION/ DROPS OPHTHALMIC; TOPICAL at 21:54

## 2019-07-14 RX ADMIN — Medication 500 MILLIGRAM(S): at 06:37

## 2019-07-14 NOTE — PROGRESS NOTE ADULT - SUBJECTIVE AND OBJECTIVE BOX
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  Light-headedness  Glaucoma  Asthma: controlled with meds  HTN (hypertension)  History of ankle fracture: h/o repair  Fractured elbow: left elbow fracture repair,2007  H/O appendicitis: h/o appendicectomy      VITALS  Vital Signs Last 24 Hrs  T(C): 37.1 (14 Jul 2019 09:08), Max: 37.1 (14 Jul 2019 09:08)  T(F): 98.7 (14 Jul 2019 09:08), Max: 98.7 (14 Jul 2019 09:08)  HR: 92 (14 Jul 2019 09:08) (90 - 102)  BP: 131/78 (14 Jul 2019 09:08) (122/75 - 131/78)  BP(mean): --  RR: 15 (14 Jul 2019 09:08) (15 - 15)  SpO2: 100% (14 Jul 2019 09:08) (100% - 100%)      PHYSICAL EXAM  Constitutional - NAD, Comfortable  HEENT - NCAT, EOMI  Neck - Supple, No limited ROM  Chest - CTA bilaterally  Cardiovascular - RRR, S1S2, No murmurs  Abdomen - BS+, Soft, NTND  Extremities - No C/C/E, No calf tenderness   Neurologic Exam -                    Cognitive - Awake, Alert     No new focal deficits        CURRENT MEDICATIONS    MEDICATIONS  (STANDING):  amLODIPine   Tablet 5 milliGRAM(s) Oral daily  artificial  tears Solution 1 Drop(s) Right EYE every 6 hours  buDESOnide 160 MICROgram(s)/formoterol 4.5 MICROgram(s) Inhaler 2 Puff(s) Inhalation two times a day  cephalexin 500 milliGRAM(s) Oral every 12 hours  docusate sodium 100 milliGRAM(s) Oral two times a day  enoxaparin Injectable 30 milliGRAM(s) SubCutaneous every 12 hours  latanoprost 0.005% Ophthalmic Solution 1 Drop(s) Both EYES at bedtime  nystatin    Suspension 708693 Unit(s) Oral three times a day  petrolatum Ophthalmic Ointment 1 Application(s) Right EYE at bedtime  polyethylene glycol 3350 17 Gram(s) Oral daily  prednisoLONE acetate 1% Suspension 1 Drop(s) Both EYES four times a day  saccharomyces boulardii 250 milliGRAM(s) Oral two times a day  silver nitrate Applicator 1 Application(s) Topical once    MEDICATIONS  (PRN):  acetaminophen   Tablet .. 650 milliGRAM(s) Oral every 6 hours PRN Temp greater or equal to 38C (100.4F), Mild Pain (1 - 3)  ALBUTerol    90 MICROgram(s) HFA Inhaler 2 Puff(s) Inhalation every 6 hours PRN Shortness of Breath and/or Wheezing  ALBUTerol/ipratropium for Nebulization 3 milliLiter(s) Nebulizer every 6 hours PRN Shortness of Breath and/or Wheezing  nystatin Powder 1 Application(s) Topical two times a day PRN Rash  senna 2 Tablet(s) Oral at bedtime PRN Constipation    ASSESSMENT & PLAN          GI/Bowel Management - Dulcolax PRN, Fleet PRN   Management - Toilet Q2  Skin - Turn Q2  Pain - Tylenol PRN  DVT PPX - Lovenox      Continue comprehensive acute rehab program consisting of 3hrs/day of OT/PT and SLP.

## 2019-07-14 NOTE — PROGRESS NOTE ADULT - SUBJECTIVE AND OBJECTIVE BOX
HPI:  The patient is a 69 year old right handed, morbidly obese (BMI 49) woman with a past medical history of asthma, glaucoma, hypertension and osteoarthritis who presented for dizziness, balance issues found to have a posterior fossa-4th ventricle mass who was admitted to [facility] on 6/24/19 s/p planned craniotomy and resection on 6/24/19 being admitted here for rehab. She presented to her PMD with light headedness, dizziness & balance disturbance of three months duration. Outpatient MRI revealed a heterogeneously enhancing lesion completely filling the fourth ventricle, obstructing the circulation of cerebrospinal fluid and early hydrocephalus with transependymal resorption.  She was admitted on 6/24/19 to [facility] for planned stereotactic suboccipital craniotomy for 4th ventricular tumor resection. Gross total resection was achieved and the patient was transferred to the neurosurgical ICU post op. Pathology revealed sub ependymoma. Her course was complicated by post op imaging bilateral intraventricular hemorrhage and was monitored closely. Patient did not require further surgical intervention.     Post operatively her course was complicated by dysphagia now s/p peg on 7/5 with post procedural bleeding around the PEG site controlled with silver nitrate. Her course was complicated by throat and left ear pain. Laryngoscopy revealed a tongue mass and CT imaging showed soft tissue swelling extending into hypopharynx and left AE fold, along with a soft tissue mass in left thyroid gland. ENT consulted and recommended keflex x 5 days and follow up outpatient in 2 weeks for thyroid ultrasound. She was also evaluated post operatively for right facial nerve weakness and incomplete eye closure and treated with topical eye lubrication. Patient medically stabilized and admitted here for rehab. (10 Jul 2019 13:19)      Subjective  oozing blood noted by nursing staff around PEG site.        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  Light-headedness  Glaucoma  Asthma: controlled with meds  HTN (hypertension)  History of ankle fracture: h/o repair  Fractured elbow: left elbow fracture repair,2007  H/O appendicitis: h/o appendicectomy      MedsMEDICATIONS  (STANDING):  ALBUTerol    90 MICROgram(s) HFA Inhaler 1 Puff(s) Inhalation every 4 hours  amLODIPine   Tablet 5 milliGRAM(s) Oral daily  artificial  tears Solution 1 Drop(s) Right EYE every 6 hours  cephalexin 500 milliGRAM(s) Oral every 12 hours  docusate sodium 100 milliGRAM(s) Oral two times a day  enoxaparin Injectable 30 milliGRAM(s) SubCutaneous every 12 hours  latanoprost 0.005% Ophthalmic Solution 1 Drop(s) Both EYES at bedtime  nystatin    Suspension 817766 Unit(s) Oral three times a day  petrolatum Ophthalmic Ointment 1 Application(s) Right EYE at bedtime  polyethylene glycol 3350 17 Gram(s) Oral daily  prednisoLONE acetate 1% Suspension 1 Drop(s) Both EYES four times a day  saccharomyces boulardii 250 milliGRAM(s) Oral two times a day  tiotropium 18 MICROgram(s) Capsule 1 Capsule(s) Inhalation daily    MEDICATIONS  (PRN):  acetaminophen   Tablet .. 650 milliGRAM(s) Oral every 6 hours PRN Temp greater or equal to 38C (100.4F), Mild Pain (1 - 3)  ALBUTerol/ipratropium for Nebulization 3 milliLiter(s) Nebulizer every 6 hours PRN Shortness of Breath and/or Wheezing  nystatin Powder 1 Application(s) Topical two times a day PRN Rash  senna 2 Tablet(s) Oral at bedtime PRN Constipation      Vital Signs Last 24 Hrs  T(C): 37.1 (14 Jul 2019 09:08), Max: 37.1 (14 Jul 2019 09:08)  T(F): 98.7 (14 Jul 2019 09:08), Max: 98.7 (14 Jul 2019 09:08)  HR: 92 (14 Jul 2019 09:08) (90 - 102)  BP: 131/78 (14 Jul 2019 09:08) (122/75 - 131/78)  BP(mean): --  RR: 15 (14 Jul 2019 09:08) (15 - 15)  SpO2: 100% (14 Jul 2019 09:08) (100% - 100%)  I&O's Summary    13 Jul 2019 07:01  -  14 Jul 2019 07:00  --------------------------------------------------------  IN: 1960 mL / OUT: 0 mL / NET: 1960 mL        PHYSICAL EXAM:  GENERAL: NAD  NECK: Supple  NERVOUS SYSTEM:  awake  HEART: S1s2 NL , RRR  CHEST/LUNG: Clear to percussion bilaterally  ABDOMEN: Soft, Nontender, Nondistended; Bowel sounds present. PEG site- previous silver nitrate all soaked and now brown. Mild oozing noted.   EXTREMITIES:  No edema      LABS:  |07-12-19 @ 07:40            9.3<L>  8.78>--------------<292            30.2<L>      135  |  99  |  15  --------------------------<113<H>  4.0  |  9.0  |  0.64    Mg 2.0 / Phos3.4    PT 12.8 / INR 1.14    PTT 32.6    Lipase --  07-12-19 @ 07:40    Imaging Personally Reviewed:  [ ] YES  [ ] NO      HEALTH ISSUES - PROBLEM Dx:  Brain mass(subependymoma) s/p resection  PT/OT/SLP per rehab    HTN  Norvasc    Asthma  Pt was on alb prn and flovent prior to hospitalization  Start Symbicort DC spiriva. 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 Will benefit from sleep study as outpt    s/p PEG with some oozing.   Old silver nitrate removed. New one ordered. Bacitracin applied with gauze.     Tongue ulceration  To finish Keflex velia    DVT ppx  Loevnox      Care Discussed with Consultants/Other Providers [ x] YES  [ ] NO

## 2019-07-15 LAB
ANION GAP SERPL CALC-SCNC: 3 MMOL/L — LOW (ref 5–17)
BASOPHILS # BLD AUTO: 0.02 K/UL — SIGNIFICANT CHANGE UP (ref 0–0.2)
BASOPHILS NFR BLD AUTO: 0.4 % — SIGNIFICANT CHANGE UP (ref 0–2)
BUN SERPL-MCNC: 11 MG/DL — SIGNIFICANT CHANGE UP (ref 7–23)
CALCIUM SERPL-MCNC: 9.1 MG/DL — SIGNIFICANT CHANGE UP (ref 8.4–10.5)
CHLORIDE SERPL-SCNC: 100 MMOL/L — SIGNIFICANT CHANGE UP (ref 96–108)
CO2 SERPL-SCNC: 35 MMOL/L — HIGH (ref 22–31)
CREAT SERPL-MCNC: 0.68 MG/DL — SIGNIFICANT CHANGE UP (ref 0.5–1.3)
EOSINOPHIL # BLD AUTO: 0.13 K/UL — SIGNIFICANT CHANGE UP (ref 0–0.5)
EOSINOPHIL NFR BLD AUTO: 2.4 % — SIGNIFICANT CHANGE UP (ref 0–6)
GLUCOSE SERPL-MCNC: 110 MG/DL — HIGH (ref 70–99)
HCT VFR BLD CALC: 31.5 % — LOW (ref 34.5–45)
HGB BLD-MCNC: 9.5 G/DL — LOW (ref 11.5–15.5)
IMM GRANULOCYTES NFR BLD AUTO: 0.6 % — SIGNIFICANT CHANGE UP (ref 0–1.5)
LYMPHOCYTES # BLD AUTO: 1.06 K/UL — SIGNIFICANT CHANGE UP (ref 1–3.3)
LYMPHOCYTES # BLD AUTO: 19.4 % — SIGNIFICANT CHANGE UP (ref 13–44)
MCHC RBC-ENTMCNC: 26.6 PG — LOW (ref 27–34)
MCHC RBC-ENTMCNC: 30.2 GM/DL — LOW (ref 32–36)
MCV RBC AUTO: 88.2 FL — SIGNIFICANT CHANGE UP (ref 80–100)
MONOCYTES # BLD AUTO: 0.64 K/UL — SIGNIFICANT CHANGE UP (ref 0–0.9)
MONOCYTES NFR BLD AUTO: 11.7 % — SIGNIFICANT CHANGE UP (ref 2–14)
NEUTROPHILS # BLD AUTO: 3.57 K/UL — SIGNIFICANT CHANGE UP (ref 1.8–7.4)
NEUTROPHILS NFR BLD AUTO: 65.5 % — SIGNIFICANT CHANGE UP (ref 43–77)
NRBC # BLD: 0 /100 WBCS — SIGNIFICANT CHANGE UP (ref 0–0)
PLATELET # BLD AUTO: 280 K/UL — SIGNIFICANT CHANGE UP (ref 150–400)
POTASSIUM SERPL-MCNC: 4.4 MMOL/L — SIGNIFICANT CHANGE UP (ref 3.5–5.3)
POTASSIUM SERPL-SCNC: 4.4 MMOL/L — SIGNIFICANT CHANGE UP (ref 3.5–5.3)
RBC # BLD: 3.57 M/UL — LOW (ref 3.8–5.2)
RBC # FLD: 14.6 % — HIGH (ref 10.3–14.5)
SODIUM SERPL-SCNC: 138 MMOL/L — SIGNIFICANT CHANGE UP (ref 135–145)
WBC # BLD: 5.45 K/UL — SIGNIFICANT CHANGE UP (ref 3.8–10.5)
WBC # FLD AUTO: 5.45 K/UL — SIGNIFICANT CHANGE UP (ref 3.8–10.5)

## 2019-07-15 PROCEDURE — 99232 SBSQ HOSP IP/OBS MODERATE 35: CPT

## 2019-07-15 PROCEDURE — 74230 X-RAY XM SWLNG FUNCJ C+: CPT | Mod: 26

## 2019-07-15 RX ORDER — SODIUM CHLORIDE 0.65 %
1 AEROSOL, SPRAY (ML) NASAL THREE TIMES A DAY
Refills: 0 | Status: DISCONTINUED | OUTPATIENT
Start: 2019-07-15 | End: 2019-07-23

## 2019-07-15 RX ADMIN — Medication 1 DROP(S): at 05:27

## 2019-07-15 RX ADMIN — AMLODIPINE BESYLATE 5 MILLIGRAM(S): 2.5 TABLET ORAL at 05:25

## 2019-07-15 RX ADMIN — NYSTATIN CREAM 1 APPLICATION(S): 100000 CREAM TOPICAL at 05:26

## 2019-07-15 RX ADMIN — BUDESONIDE AND FORMOTEROL FUMARATE DIHYDRATE 2 PUFF(S): 160; 4.5 AEROSOL RESPIRATORY (INHALATION) at 09:06

## 2019-07-15 RX ADMIN — ENOXAPARIN SODIUM 30 MILLIGRAM(S): 100 INJECTION SUBCUTANEOUS at 17:39

## 2019-07-15 RX ADMIN — Medication 1 DROP(S): at 11:48

## 2019-07-15 RX ADMIN — Medication 1 SPRAY(S): at 21:20

## 2019-07-15 RX ADMIN — Medication 1 APPLICATION(S): at 21:19

## 2019-07-15 RX ADMIN — Medication 100 MILLIGRAM(S): at 05:25

## 2019-07-15 RX ADMIN — NYSTATIN CREAM 1 APPLICATION(S): 100000 CREAM TOPICAL at 05:25

## 2019-07-15 RX ADMIN — Medication 500 MILLIGRAM(S): at 05:25

## 2019-07-15 RX ADMIN — Medication 500000 UNIT(S): at 05:27

## 2019-07-15 RX ADMIN — Medication 500000 UNIT(S): at 13:08

## 2019-07-15 RX ADMIN — ENOXAPARIN SODIUM 30 MILLIGRAM(S): 100 INJECTION SUBCUTANEOUS at 05:25

## 2019-07-15 RX ADMIN — LATANOPROST 1 DROP(S): 0.05 SOLUTION/ DROPS OPHTHALMIC; TOPICAL at 21:19

## 2019-07-15 RX ADMIN — POLYETHYLENE GLYCOL 3350 17 GRAM(S): 17 POWDER, FOR SOLUTION ORAL at 11:48

## 2019-07-15 RX ADMIN — Medication 1 DROP(S): at 17:39

## 2019-07-15 RX ADMIN — Medication 1 DROP(S): at 11:47

## 2019-07-15 RX ADMIN — Medication 500000 UNIT(S): at 21:19

## 2019-07-15 RX ADMIN — Medication 1 DROP(S): at 17:40

## 2019-07-15 RX ADMIN — BUDESONIDE AND FORMOTEROL FUMARATE DIHYDRATE 2 PUFF(S): 160; 4.5 AEROSOL RESPIRATORY (INHALATION) at 21:08

## 2019-07-15 RX ADMIN — Medication 250 MILLIGRAM(S): at 05:25

## 2019-07-15 RX ADMIN — Medication 1 SPRAY(S): at 13:08

## 2019-07-15 RX ADMIN — Medication 1 DROP(S): at 05:24

## 2019-07-15 NOTE — PROGRESS NOTE ADULT - SUBJECTIVE AND OBJECTIVE BOX
INTERVAL HPI/OVERNIGHT EVENTS:  HPI:    68 y/o female pmh asthma, glaucoma, hypertension and osteoarthritis who presented for dizziness, balance issues found to have a posterior fossa-4th ventricle mass who was admitted to [facility] on 6/24/19 s/p planned craniotomy and resection on 6/24/19 who is admitted to rehab. GI following patient due to bleeding around peg. Peg site examined today. No bleeding noted at peg site.    MEDICATIONS  (STANDING):  amLODIPine   Tablet 5 milliGRAM(s) Oral daily  artificial  tears Solution 1 Drop(s) Right EYE every 6 hours  buDESOnide 160 MICROgram(s)/formoterol 4.5 MICROgram(s) Inhaler 2 Puff(s) Inhalation two times a day  docusate sodium 100 milliGRAM(s) Oral two times a day  enoxaparin Injectable 30 milliGRAM(s) SubCutaneous every 12 hours  latanoprost 0.005% Ophthalmic Solution 1 Drop(s) Both EYES at bedtime  nystatin    Suspension 200050 Unit(s) Oral three times a day  petrolatum Ophthalmic Ointment 1 Application(s) Right EYE at bedtime  polyethylene glycol 3350 17 Gram(s) Oral daily  prednisoLONE acetate 1% Suspension 1 Drop(s) Both EYES four times a day  sodium chloride 0.65% Nasal 1 Spray(s) Both Nostrils three times a day    MEDICATIONS  (PRN):  acetaminophen   Tablet .. 650 milliGRAM(s) Oral every 6 hours PRN Temp greater or equal to 38C (100.4F), Mild Pain (1 - 3)  ALBUTerol    90 MICROgram(s) HFA Inhaler 2 Puff(s) Inhalation every 6 hours PRN Shortness of Breath and/or Wheezing  ALBUTerol/ipratropium for Nebulization 3 milliLiter(s) Nebulizer every 6 hours PRN Shortness of Breath and/or Wheezing  nystatin Powder 1 Application(s) Topical two times a day PRN Rash  senna 2 Tablet(s) Oral at bedtime PRN Constipation      Allergies    No Known Drug Allergies  shellfish (Angioedema; Rash)    Intolerances        PHYSICAL EXAM:   Vital Signs:  Vital Signs Last 24 Hrs  T(C): 37.6 (15 Jul 2019 07:49), Max: 38.1 (14 Jul 2019 21:38)  T(F): 99.6 (15 Jul 2019 07:49), Max: 100.5 (14 Jul 2019 21:38)  HR: 107 (15 Jul 2019 09:09) (93 - 107)  BP: 121/75 (15 Jul 2019 07:49) (121/75 - 130/77)  BP(mean): --  RR: 14 (15 Jul 2019 07:49) (14 - 15)  SpO2: 97% (15 Jul 2019 09:09) (97% - 100%)  Daily     Daily I&O's Summary    14 Jul 2019 07:01  -  15 Jul 2019 07:00  --------------------------------------------------------  IN: 0 mL / OUT: 300 mL / NET: -300 mL    15 Jul 2019 07:01  -  15 Jul 2019 12:25  --------------------------------------------------------  IN: 490 mL / OUT: 600 mL / NET: -110 mL      GENERAL:  Appears stated age, sleepy during exam  CHEST:  Full & symmetric excursion, no increased effort, breath sounds clear  HEART:  Regular rhythm, S1, S2, no murmur  ABDOMEN:  Soft, non-tender, non-distended, normoactive bowel sounds, Peg site clean, dry, intact  NEURO:  Alert,       LABS:                        9.5    5.45  )-----------( 280      ( 15 Jul 2019 06:30 )             31.5     07-15    138  |  100  |  11  ----------------------------<  110<H>  4.4   |  35<H>  |  0.68    Ca    9.1      15 Jul 2019 06:30          amylase   lipase  RADIOLOGY & ADDITIONAL TESTS:

## 2019-07-15 NOTE — PROGRESS NOTE ADULT - ASSESSMENT
68 y/o female with multiple medical issues who is admitted to rehab. Abdomen soft, non-tender. Peg site CDI.

## 2019-07-15 NOTE — PROGRESS NOTE ADULT - ASSESSMENT
ASSESSMENT/PLAN  The patient is a 69 year old right handed, morbidly obese (BMI 49) woman with a past medical history of asthma, glaucoma, hypertension and osteoarthritis who presented for dizziness, balance issues found to have a posterior fossa-4th ventricle mass who was admitted to [facility] on 6/24/19 s/p planned craniotomy and resection on 6/24/19 being admitted here for rehab with Gait Instability, ADL impairments and Functional impairments.    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  -Continue with comprehensive rehab program  -Completed dexamethasone taper as per OSH  -Pain control: Tylenol  -Follow up with NSGY upon discharge    AMS: Resolved. Episode on 7/12/19. CBC, BMP, Lactate and troponin unremarkable. CTH demonstrated postsurgical changes, no hemorrhage or mass affect.   -Continue with neuro checks  -Reached out to NSGY at [facility] to review CTH and reported that it is stable. Does not recommend acute intervention at this time    HTN: BP stable  -Continue with amlodipine  -Monitor vitals    Asthma:  -Continue with duoneb  -Monitor vitals  -Supplemental oxygen prn     Congestion:  -Start nasal spray    Right facial nerve palsy/Glaucoma:  -Continue with ocular drops: Latanoprost, Lacri-Lube, Prednisolone, Artificial tears  -Will need outpatient followup    Tongue Mass:  -Completed keflex for 5 day course  -Follow up with outpatient for further workup    Thrush:  -Continue nystatin  -Continue oral care    Dysphagia: s/p PEG  -NPO with tube feeds  -Change to Vital given loose stool  -Continue bolus feeds and free water flush  -GI consult for bleeding around PEG, recs reviewed  -Plan for MBS 7/15/19    Ovarian cystic mass:  -Follow up as an outpatient for further workup    Leukocytosis: Resolved. Patient afebrile and no localizing sx  -Monitor weekly labs  -Monitor vitals    Pain Mgmt - Tylenol PRN  GI/Bowel Mgmt -  Continent c/w Colace, Lizzie  /Bladder Mgmt - Monitor output    FEN   - Diet - NPO with bolus tube feeds  - Dysphagia  SLP - evaluation and treatment    Precautions / PROPHYLAXIS:   - Falls, Seizure   - Lungs: Aspiration, Incentive Spirometer   - Pressure injury/Skin: Turn Q2hrs while in bed, OOB to Chair, PT/OT    - DVT: Lovenox, SCDs, Sujit ASSESSMENT/PLAN  The patient is a 69 year old right handed, morbidly obese (BMI 49) woman with a past medical history of asthma, glaucoma, hypertension and osteoarthritis who presented for dizziness, balance issues found to have a posterior fossa-4th ventricle mass who was admitted to [facility] on 6/24/19 s/p planned craniotomy and resection on 6/24/19 being admitted here for rehab with Gait Instability, ADL impairments and Functional impairments.    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  -Continue with comprehensive rehab program  -Completed dexamethasone taper as per OSH  -Pain control: Tylenol  -Follow up with NSGY upon discharge    AMS: Resolved. Episode on 7/12/19. CBC, BMP, Lactate and troponin unremarkable. CTH demonstrated postsurgical changes, no hemorrhage or mass affect.   -Continue with neuro checks  -Reached out to NSGY at [facility] to review CTH and reported that it is stable. Does not recommend acute intervention at this time    HTN: BP stable  -Continue with amlodipine  -Monitor vitals    Asthma:  -Continue with duoneb  -Monitor vitals  -Supplemental oxygen prn     Congestion:  -Start nasal spray    Right facial nerve palsy/Glaucoma:  -Continue with ocular drops: Latanoprost, Lacri-Lube, Prednisolone, Artificial tears  -Will need outpatient followup    Tongue Mass:  -Completed keflex for 5 day course  -Follow up with outpatient for further workup    Thrush:  -Continue nystatin  -Continue oral care    Dysphagia: s/p PEG  -NPO with tube feeds  -Change to Vital given loose stool  -Continue bolus feeds and free water flush  -GI consult for bleeding around PEG, recs reviewed  -Plan for MBS 7/15/19    Ovarian cystic mass:  -Follow up as an outpatient for further workup    Leukocytosis: Resolved. Patient afebrile and no localizing sx  -Monitor weekly labs  -Monitor vitals    Pain Mgmt - Tylenol PRN  GI/Bowel Mgmt -  Continent c/w Colace, Lizzie  /Bladder Mgmt - Monitor output    FEN   - Diet - NPO with bolus tube feeds  - Dysphagia  SLP - evaluation and treatment    Precautions / PROPHYLAXIS:   - Falls, Seizure   - Lungs: Aspiration, Incentive Spirometer   - Pressure injury/Skin: Turn Q2hrs while in bed, OOB to Chair, PT/OT    - DVT: Lovenox, SCDs, Sujit

## 2019-07-16 PROCEDURE — 99232 SBSQ HOSP IP/OBS MODERATE 35: CPT

## 2019-07-16 RX ADMIN — BUDESONIDE AND FORMOTEROL FUMARATE DIHYDRATE 2 PUFF(S): 160; 4.5 AEROSOL RESPIRATORY (INHALATION) at 09:16

## 2019-07-16 RX ADMIN — Medication 1 DROP(S): at 21:39

## 2019-07-16 RX ADMIN — LATANOPROST 1 DROP(S): 0.05 SOLUTION/ DROPS OPHTHALMIC; TOPICAL at 21:39

## 2019-07-16 RX ADMIN — Medication 1 DROP(S): at 12:06

## 2019-07-16 RX ADMIN — BUDESONIDE AND FORMOTEROL FUMARATE DIHYDRATE 2 PUFF(S): 160; 4.5 AEROSOL RESPIRATORY (INHALATION) at 20:53

## 2019-07-16 RX ADMIN — Medication 1 DROP(S): at 06:14

## 2019-07-16 RX ADMIN — Medication 1 SPRAY(S): at 06:15

## 2019-07-16 RX ADMIN — Medication 1 DROP(S): at 06:11

## 2019-07-16 RX ADMIN — Medication 1 APPLICATION(S): at 21:39

## 2019-07-16 RX ADMIN — Medication 650 MILLIGRAM(S): at 22:30

## 2019-07-16 RX ADMIN — Medication 1 SPRAY(S): at 21:39

## 2019-07-16 RX ADMIN — AMLODIPINE BESYLATE 5 MILLIGRAM(S): 2.5 TABLET ORAL at 06:13

## 2019-07-16 RX ADMIN — Medication 1 SPRAY(S): at 12:07

## 2019-07-16 RX ADMIN — ENOXAPARIN SODIUM 30 MILLIGRAM(S): 100 INJECTION SUBCUTANEOUS at 18:30

## 2019-07-16 RX ADMIN — Medication 1 DROP(S): at 18:30

## 2019-07-16 RX ADMIN — Medication 500000 UNIT(S): at 06:15

## 2019-07-16 RX ADMIN — ENOXAPARIN SODIUM 30 MILLIGRAM(S): 100 INJECTION SUBCUTANEOUS at 06:13

## 2019-07-16 RX ADMIN — Medication 650 MILLIGRAM(S): at 21:39

## 2019-07-16 NOTE — PROGRESS NOTE ADULT - ASSESSMENT
70yo F with aPMHx of asthma, glaucoma, hypertension and OAwho presented for dizziness, balance issues found to have a posterior fossa-4th ventricle masss/p planned craniotomy and resection  here for acute rehab.    Posterior fossa-4th ventricle mass s/p craniotomy and resection on 6/24:  -Continue Comprehensive Rehab Program of PT/OT/SLP  -Cont dexmethasone taper  -Neurosurgery follow up at discharge    #Diarrhea:  No BM today  -Change TF from 2 avelino HN to Vital 1.5 yesterday  -Monitor stools  -GI following    #HTN: BP stable  -Continue with amlodipine  -Monitor vitals    #Asthma:  -Continue with duoneb  -Supplemental oxygen prn     #Right facial nerve palsy/Glaucoma:  -Continue with ocular drops: Latanoprost, Lacri-Lube, Prednisolone, Artificial tears  -Will need outpatient followup    #Dysphagia:   s/p PEG  -NPO with tube feeds  -TF changed to vital yesterdau  -MBS completed on 7/15/19 and plan to trial pureed+nectar during therapy    Ovarian cystic mass:  -Follow up as an outpatient for further workup    #Leukocytosis:   Resolved. Patient afebrile and non toxic appearing      #DVT ppx lovenox

## 2019-07-16 NOTE — PROGRESS NOTE ADULT - ASSESSMENT
ASSESSMENT/PLAN  The patient is a 69 year old right handed, morbidly obese (BMI 49) woman with a past medical history of asthma, glaucoma, hypertension and osteoarthritis who presented for dizziness, balance issues found to have a posterior fossa-4th ventricle mass who was admitted to [facility] on 6/24/19 s/p planned craniotomy and resection on 6/24/19 being admitted here for rehab with Gait Instability, ADL impairments and Functional impairments.    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  -Continue with comprehensive rehab program  -Completed dexamethasone taper as per OSH  -Pain control: Tylenol  -Follow up with NSGY upon discharge    AMS: Resolved. Episode on 7/12/19. CBC, BMP, Lactate and troponin unremarkable. CTH demonstrated postsurgical changes, no hemorrhage or mass affect.   -Continue with neuro checks  -Reached out to NSGY at [facility] to review CTH and reported that it is stable. Does not recommend acute intervention at this time    HTN: BP stable  -Continue with amlodipine  -Monitor vitals    Asthma:  -Continue with duoneb  -Monitor vitals  -Supplemental oxygen prn     Congestion:  -Continue nasal spray    Right facial nerve palsy/Glaucoma:  -Continue with ocular drops: Latanoprost, Lacri-Lube, Prednisolone, Artificial tears  -Will need outpatient followup    Tongue Mass:  -Completed keflex for 5 day course  -Follow up with outpatient for further workup    Thrush:  -Continue nystatin  -Continue oral care    Dysphagia: s/p PEG  -NPO with tube feeds  -Change to Vital given loose stool  -Continue bolus feeds and free water flush  -GI consult for bleeding around PEG, recs reviewed  -MBS completed on 7/15/19 and plan to trial pureed+nectar during therapy    Ovarian cystic mass:  -Follow up as an outpatient for further workup    Leukocytosis: Resolved. Patient afebrile and no localizing sx  -Monitor weekly labs  -Monitor vitals    Pain Mgmt - Tylenol PRN  GI/Bowel Mgmt -  Continent c/w Colace, Lizzie  /Bladder Mgmt - Monitor output    FEN   - Diet - NPO with bolus tube feeds  - Dysphagia  SLP - evaluation and treatment    Precautions / PROPHYLAXIS:   - Falls, Seizure   - Lungs: Aspiration, Incentive Spirometer   - Pressure injury/Skin: Turn Q2hrs while in bed, OOB to Chair, PT/OT    - DVT: Lovenox, SCDs, TEDs ASSESSMENT/PLAN  The patient is a 69 year old right handed, morbidly obese (BMI 49) woman with a past medical history of asthma, glaucoma, hypertension and osteoarthritis who presented for dizziness, balance issues found to have a posterior fossa-4th ventricle mass who was admitted to [facility] on 6/24/19 s/p planned craniotomy and resection on 6/24/19 being admitted here for rehab with Gait Instability, ADL impairments and Functional impairments.    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  -Continue with comprehensive rehab program  -Completed dexamethasone taper as per OSH  -Pain control: Tylenol  -Follow up with NSGY upon discharge    AMS: Resolved. Episode on 7/12/19. CBC, BMP, Lactate and troponin unremarkable. CTH demonstrated postsurgical changes, no hemorrhage or mass affect.   -Continue with neuro checks  -Reached out to NSGY at [facility] to review CTH and reported that it is stable. Does not recommend acute intervention at this time    HTN: BP stable  -Continue with amlodipine  -Monitor vitals    Asthma:  -Continue with duoneb  -Monitor vitals  -Supplemental oxygen prn. Will check O2 on room and titrate off as tolerated    Congestion:  -Continue nasal spray    Right facial nerve palsy/Glaucoma:  -Continue with ocular drops: Latanoprost, Lacri-Lube, Prednisolone, Artificial tears  -Will need outpatient followup    Tongue Mass:  -Completed keflex for 5 day course  -Follow up with outpatient for further workup    Thrush:  -Continue nystatin  -Continue oral care    Dysphagia: s/p PEG  -NPO with tube feeds  -Change to Vital given loose stool  -Continue bolus feeds and free water flush  -GI consult for bleeding around PEG, recs reviewed  -MBS completed on 7/15/19 and plan to trial pureed+nectar during therapy    Ovarian cystic mass:  -Follow up as an outpatient for further workup    Leukocytosis: Resolved. Patient afebrile and no localizing sx  -Monitor weekly labs  -Monitor vitals    Pain Mgmt - Tylenol PRN  GI/Bowel Mgmt -  Holding miralax, senna and colace given loose stool  /Bladder Mgmt - Monitor output    FEN   - Diet - NPO with bolus tube feeds  - Dysphagia  SLP - evaluation and treatment    Precautions / PROPHYLAXIS:   - Falls, Seizure   - Lungs: Aspiration, Incentive Spirometer   - Pressure injury/Skin: Turn Q2hrs while in bed, OOB to Chair, PT/OT    - DVT: Lovenox, SCDs, TEDs     Dispo:  -Patient discussed during IDT 7/16/19  -Patient progressing well with therapy, but barriers include poor endurance, visual impairments  -Currently min assist with UB dressing and mod assist with LB dressing. Min assist to contact guard with transfers. Ambulating 30-40ft with min assist. Completed 4 steps with min assist. Mild cognitive deficits.   -Goals supervision with ADLs, transfers and gait  -Plan for discharge home 7/31/19 with home PT/OT/SLP or [facility] [place] 7/23/19 if unable to safely discharge home

## 2019-07-16 NOTE — CHART NOTE - NSCHARTNOTEFT_GEN_A_CORE
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    SOURCE: Patient [X)   Family [ ]    Medical Record (X)    DIET: NPO + enteral nutrition    Pt c/o loose BM's while on TwoCal enteral nutrition regimen- MD changed formula to Vital 1.5. Pt s/p MBS on 7/15. Speech therapy recommending upgrade to puree c nectar thick liquids. Observed first meal with speech therapist at lunch today- tolerated well and consumed about 50%. Would recommend continuation of PRN feeds if pt consumes less than 50% at meals.     Enteral Nutrition : Vital 1.5 @ 237cc 4x/day via PEG   Which is providing :  1,422 Calories, 64 g/ protein                 CURRENT WEIGHT:   (7/10) 262.6lbs  (7/15) 257lbs    PERTINENT MEDS:   Pertinent Medications: MEDICATIONS  (STANDING):  amLODIPine   Tablet 5 milliGRAM(s) Oral daily  artificial  tears Solution 1 Drop(s) Right EYE every 6 hours  buDESOnide 160 MICROgram(s)/formoterol 4.5 MICROgram(s) Inhaler 2 Puff(s) Inhalation two times a day  enoxaparin Injectable 30 milliGRAM(s) SubCutaneous every 12 hours  latanoprost 0.005% Ophthalmic Solution 1 Drop(s) Both EYES at bedtime  petrolatum Ophthalmic Ointment 1 Application(s) Right EYE at bedtime  prednisoLONE acetate 1% Suspension 1 Drop(s) Both EYES four times a day  sodium chloride 0.65% Nasal 1 Spray(s) Both Nostrils three times a day    MEDICATIONS  (PRN):  acetaminophen   Tablet .. 650 milliGRAM(s) Oral every 6 hours PRN Temp greater or equal to 38C (100.4F), Mild Pain (1 - 3)  ALBUTerol/ipratropium for Nebulization 3 milliLiter(s) Nebulizer every 6 hours PRN Shortness of Breath and/or Wheezing  nystatin Powder 1 Application(s) Topical two times a day PRN Rash      PERTINENT LABS:  07-15 Na138 mmol/L Glu 110 mg/dL<H> K+ 4.4 mmol/L Cr  0.68 mg/dL BUN 11 mg/dL 07-12 Phos 3.4 mg/dL 07-12 Alb 2.5 g/dL<L> 06-26 [id number] 5.1 %      SKIN:  intact  EDEMA: none  LAST BM: 7/16- loose     ESTIMATED NEEDS:   [X] no change since previous assessment  [ ] recalculated:     PREVIOUS NUTRITION DIAGNOSIS:    1. Chewing/swallowing difficulty- now upgraded to puree c nectar per speech therapy    NUTRITION DIAGNOSIS is :  (X)  Ongoing      (    ) Resolved,  RD will follow as per nutrition department protocol.    NEW NUTRITION DIAGNOSIS: N/A    NUTRITION RECOMMENDATIONS:   1. Recommend change diet to puree c nectar (as per speech therapy recommendations)  2. Continue tube feeding PRN if pt consumes <50% at meals. Continue hydration via PEG  3. Monitor po intake to assess need for po supplements  4. Monitor BM's- now on solid foods     MONITORING AND EVALUATION:   1. Tolerance to diet prescription   2. PO intake  3. Weights  4. Labs  5. Follow Up per protocol     RD to remain available   Niyah Ramirez RDN

## 2019-07-16 NOTE — PROGRESS NOTE ADULT - SUBJECTIVE AND OBJECTIVE BOX
Patient is a 69y old  Female who presents with a chief complaint of Intracranial mass resection (16 Jul 2019 08:49). As per nursing, no BM today, She had two loose BM yesterday.  No issues overnight       Patient seen and examined at bedside.    ALLERGIES:  No Known Drug Allergies  shellfish (Angioedema; Rash)    MEDICATIONS  (STANDING):  amLODIPine   Tablet 5 milliGRAM(s) Oral daily  artificial  tears Solution 1 Drop(s) Right EYE every 6 hours  buDESOnide 160 MICROgram(s)/formoterol 4.5 MICROgram(s) Inhaler 2 Puff(s) Inhalation two times a day  docusate sodium 100 milliGRAM(s) Oral two times a day  enoxaparin Injectable 30 milliGRAM(s) SubCutaneous every 12 hours  latanoprost 0.005% Ophthalmic Solution 1 Drop(s) Both EYES at bedtime  petrolatum Ophthalmic Ointment 1 Application(s) Right EYE at bedtime  polyethylene glycol 3350 17 Gram(s) Oral daily  prednisoLONE acetate 1% Suspension 1 Drop(s) Both EYES four times a day  sodium chloride 0.65% Nasal 1 Spray(s) Both Nostrils three times a day    MEDICATIONS  (PRN):  acetaminophen   Tablet .. 650 milliGRAM(s) Oral every 6 hours PRN Temp greater or equal to 38C (100.4F), Mild Pain (1 - 3)  ALBUTerol    90 MICROgram(s) HFA Inhaler 2 Puff(s) Inhalation every 6 hours PRN Shortness of Breath and/or Wheezing  ALBUTerol/ipratropium for Nebulization 3 milliLiter(s) Nebulizer every 6 hours PRN Shortness of Breath and/or Wheezing  nystatin Powder 1 Application(s) Topical two times a day PRN Rash  senna 2 Tablet(s) Oral at bedtime PRN Constipation    Vital Signs Last 24 Hrs  T(F): 98.8 (16 Jul 2019 07:58), Max: 99.1 (15 Jul 2019 10:52)  HR: 99 (16 Jul 2019 07:58) (95 - 101)  BP: 100/64 (16 Jul 2019 07:58) (100/64 - 114/68)  RR: 14 (16 Jul 2019 07:58) (14 - 14)  SpO2: 97% (16 Jul 2019 07:58) (97% - 99%)  I&O's Summary    15 Jul 2019 07:01  -  16 Jul 2019 07:00  --------------------------------------------------------  IN: 1467 mL / OUT: 600 mL / NET: 867 mL        PHYSICAL EXAM:  General: NAD,   Neuro: posterior cranial incision c/d/i. Knows name, place and year,  ENT: MMM  Neck: Supple  Lungs: Clear to auscultation bilaterally  Cardio: RRR, S1/S2,   Abdomen: Soft, obese Nontender, Nondistended; Bowel sounds present, PEG site c/d/i  Extremities: No calf tenderness, No pitting edema    LABS:                        9.5    5.45  )-----------( 280      ( 15 Jul 2019 06:30 )             31.5       07-15    138  |  100  |  11  ----------------------------<  110  4.4   |  35  |  0.68    Ca    9.1      15 Jul 2019 06:30       eGFR if Non African American: 89 mL/min/1.73M2 (07-15-19 @ 06:30)  eGFR if : 103 mL/min/1.73M2 (07-15-19 @ 06:30)                             06-26 [id number] 5.1          RADIOLOGY & ADDITIONAL TESTS:    Care Discussed with Consultants/Other Providers:

## 2019-07-16 NOTE — PROGRESS NOTE ADULT - SUBJECTIVE AND OBJECTIVE BOX
Subjective: No acute events overnight. Patient reports that she is doing well. Denies chest pain, SOB, or urinary sx. Reports that she has some diarrhea. Per nursing, patient had two loose stool yesterday, but did not have BM today yet. Denies abdominal pain, nausea or vomiting.     REVIEW OF SYMPTOMS  Pertinent in the last 24hrs: Neurological deficits, stable  Loose stool, none this morning    VITALS  Vital Signs Last 24 Hrs  T(C): 37.1 (15 Jul 2019 21:29), Max: 37.3 (15 Jul 2019 10:52)  T(F): 98.7 (15 Jul 2019 21:29), Max: 99.1 (15 Jul 2019 10:52)  HR: 96 (16 Jul 2019 06:34) (95 - 107)  BP: 114/68 (16 Jul 2019 06:34) (104/65 - 114/68)  BP(mean): --  RR: 14 (16 Jul 2019 06:34) (14 - 14)  SpO2: 98% (16 Jul 2019 06:34) (97% - 99%)      PHYSICAL EXAM  Constitutional - NAD, awake  	HEENT - posterior cranial incision, c/d/i, mild erythema surrounding, non-TTP, right eye with conjunctival erythema, improving  	Chest - CTA bilaterally, no increased work of breathing, on supplemental oxygen  	Cardiovascular - RRR, S1S2  	Abdomen - BS+, Soft, NTND, non-TTP around PEG, mild serosanguineous drainage around PEG  	Extremities - No calf tenderness   	Neurologic Exam -                 	   Cognitive - Awake, Oriented to self, place, date, year, situation. Able to follow multistep commands across midline.  	   Communication - Fluent, low volume, dysarthric  	   Cranial Nerves - PERRL, no horizontal eye movements, left facial droop, inability to fully close right eye  	   Motor - No focal deficits 5/5 in BL UE and LE  	   Sensory - Intact to light touch diffusely, impaired proprioception   	   Coordination - HTS intact BL, FTN intact BL but slow movements  Psychiatric - Affect WNL      RECENT LABS                        9.5    5.45  )-----------( 280      ( 15 Jul 2019 06:30 )             31.5     07-15    138  |  100  |  11  ----------------------------<  110<H>  4.4   |  35<H>  |  0.68    Ca    9.1      15 Jul 2019 06:30              RADIOLOGY/OTHER RESULTS      MEDICATIONS  (STANDING):  amLODIPine   Tablet 5 milliGRAM(s) Oral daily  artificial  tears Solution 1 Drop(s) Right EYE every 6 hours  buDESOnide 160 MICROgram(s)/formoterol 4.5 MICROgram(s) Inhaler 2 Puff(s) Inhalation two times a day  docusate sodium 100 milliGRAM(s) Oral two times a day  enoxaparin Injectable 30 milliGRAM(s) SubCutaneous every 12 hours  latanoprost 0.005% Ophthalmic Solution 1 Drop(s) Both EYES at bedtime  petrolatum Ophthalmic Ointment 1 Application(s) Right EYE at bedtime  polyethylene glycol 3350 17 Gram(s) Oral daily  prednisoLONE acetate 1% Suspension 1 Drop(s) Both EYES four times a day  sodium chloride 0.65% Nasal 1 Spray(s) Both Nostrils three times a day    MEDICATIONS  (PRN):  acetaminophen   Tablet .. 650 milliGRAM(s) Oral every 6 hours PRN Temp greater or equal to 38C (100.4F), Mild Pain (1 - 3)  ALBUTerol    90 MICROgram(s) HFA Inhaler 2 Puff(s) Inhalation every 6 hours PRN Shortness of Breath and/or Wheezing  ALBUTerol/ipratropium for Nebulization 3 milliLiter(s) Nebulizer every 6 hours PRN Shortness of Breath and/or Wheezing  nystatin Powder 1 Application(s) Topical two times a day PRN Rash  senna 2 Tablet(s) Oral at bedtime PRN Constipation

## 2019-07-17 PROCEDURE — 99232 SBSQ HOSP IP/OBS MODERATE 35: CPT

## 2019-07-17 RX ADMIN — Medication 1 DROP(S): at 18:19

## 2019-07-17 RX ADMIN — Medication 1 DROP(S): at 05:43

## 2019-07-17 RX ADMIN — Medication 1 SPRAY(S): at 05:43

## 2019-07-17 RX ADMIN — BUDESONIDE AND FORMOTEROL FUMARATE DIHYDRATE 2 PUFF(S): 160; 4.5 AEROSOL RESPIRATORY (INHALATION) at 20:39

## 2019-07-17 RX ADMIN — Medication 1 DROP(S): at 12:06

## 2019-07-17 RX ADMIN — Medication 1 SPRAY(S): at 12:05

## 2019-07-17 RX ADMIN — ENOXAPARIN SODIUM 30 MILLIGRAM(S): 100 INJECTION SUBCUTANEOUS at 05:43

## 2019-07-17 RX ADMIN — Medication 650 MILLIGRAM(S): at 14:01

## 2019-07-17 RX ADMIN — Medication 650 MILLIGRAM(S): at 14:40

## 2019-07-17 RX ADMIN — Medication 1 SPRAY(S): at 21:25

## 2019-07-17 RX ADMIN — Medication 1 APPLICATION(S): at 21:24

## 2019-07-17 RX ADMIN — BUDESONIDE AND FORMOTEROL FUMARATE DIHYDRATE 2 PUFF(S): 160; 4.5 AEROSOL RESPIRATORY (INHALATION) at 08:57

## 2019-07-17 RX ADMIN — ENOXAPARIN SODIUM 30 MILLIGRAM(S): 100 INJECTION SUBCUTANEOUS at 18:20

## 2019-07-17 RX ADMIN — LATANOPROST 1 DROP(S): 0.05 SOLUTION/ DROPS OPHTHALMIC; TOPICAL at 21:24

## 2019-07-17 RX ADMIN — AMLODIPINE BESYLATE 5 MILLIGRAM(S): 2.5 TABLET ORAL at 05:43

## 2019-07-17 NOTE — PROGRESS NOTE ADULT - SUBJECTIVE AND OBJECTIVE BOX
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 She presented to her PMD with light headedness, dizziness & balance disturbance of three months duration. Outpatient MRI revealed a heterogeneously enhancing lesion completely filling the fourth ventricle, obstructing the circulation of cerebrospinal fluid and early hydrocephalus with transependymal resorption.  She was admitted on 6/24/19 to [facility] for planned stereotactic suboccipital craniotomy for 4th ventricular tumor resection. Gross total resection was achieved and the patient was transferred to the neurosurgical ICU post op. Pathology revealed sub ependymoma. Her course was complicated by post op imaging bilateral intraventricular hemorrhage and was monitored closely. Patient did not require further surgical intervention.     Post operatively her course was complicated by dysphagia now s/p peg on 7/5 with post procedural bleeding around the PEG site controlled with silver nitrate. Her course was complicated by throat and left ear pain. Laryngoscopy revealed a tongue mass and CT imaging showed soft tissue swelling extending into hypopharynx and left AE fold, along with a soft tissue mass in left thyroid gland. ENT consulted and recommended keflex x 5 days and follow up outpatient in 2 weeks for thyroid ultrasound. She was also evaluated post operatively for right facial nerve weakness and incomplete eye closure and treated with topical eye lubrication. Patient medically stabilized and admitted here for rehab. 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  Light-headedness  Glaucoma  Asthma: controlled with meds  HTN (hypertension)  History of ankle fracture: h/o repair  Fractured elbow: left elbow fracture repair,2007  H/O appendicitis: h/o appendicectomy      Subjective:  Pt. tolerating Puree diet with nectar liquids.  Ate 75% of breakfast and 50% of dinner last night.  Nursing reports pt. took her meds crushed in puree.  Pt. O2 sat 96% on RA and while walking in therapy went 81%.     REVIEW OF SYMPTOMS  Pertinent in the last 24hrs: Neurological deficits, stable  Loose stool--improved, had 1 yesterday.  None today      VITALS  Vital Signs Last 24 Hrs  T(C): 37.2 (17 Jul 2019 07:44), Max: 37.2 (16 Jul 2019 20:48)  T(F): 98.9 (17 Jul 2019 07:44), Max: 99 (16 Jul 2019 20:48)  HR: 109 (17 Jul 2019 08:59) (73 - 109)  BP: 108/70 (17 Jul 2019 07:44) (108/70 - 144/74)  BP(mean): --  RR: 14 (17 Jul 2019 07:44) (14 - 15)  SpO2: 98% (17 Jul 2019 08:59) (94% - 98%)      PHYSICAL EXAM  Constitutional - NAD, awake  	HEENT - posterior cranial incision, c/d/i, mild erythema surrounding, non-TTP, right eye with conjunctival erythema, improving  	Chest - CTA bilaterally, no increased work of breathing, on supplemental oxygen  	Cardiovascular - RRR, S1S2  	Abdomen - BS+, Soft, NTND, non-TTP around PEG, mild serosanguineous drainage around PEG  	Extremities - No calf tenderness   	Neurologic Exam -                 	   Cognitive - Awake, Oriented to self, place, date, year, situation. Able to follow multistep commands across midline.  	   Communication - Fluent, low volume, dysarthric  	   Cranial Nerves - PERRL, no horizontal eye movements, left facial droop, inability to fully close right eye  	   Motor - No focal deficits 5/5 in BL UE and LE  	   Sensory - Intact to light touch diffusely, impaired proprioception   	   Coordination - HTS intact BL, FTN intact BL but slow movements  Psychiatric - Affect WNL    RECENT LABS                  RADIOLOGY/OTHER RESULTS      MEDICATIONS  (STANDING):  amLODIPine   Tablet 5 milliGRAM(s) Oral daily  artificial  tears Solution 1 Drop(s) Right EYE every 6 hours  buDESOnide 160 MICROgram(s)/formoterol 4.5 MICROgram(s) Inhaler 2 Puff(s) Inhalation two times a day  enoxaparin Injectable 30 milliGRAM(s) SubCutaneous every 12 hours  latanoprost 0.005% Ophthalmic Solution 1 Drop(s) Both EYES at bedtime  petrolatum Ophthalmic Ointment 1 Application(s) Right EYE at bedtime  prednisoLONE acetate 1% Suspension 1 Drop(s) Both EYES four times a day  sodium chloride 0.65% Nasal 1 Spray(s) Both Nostrils three times a day    MEDICATIONS  (PRN):  acetaminophen   Tablet .. 650 milliGRAM(s) Oral every 6 hours PRN Temp greater or equal to 38C (100.4F), Mild Pain (1 - 3)  ALBUTerol/ipratropium for Nebulization 3 milliLiter(s) Nebulizer every 6 hours PRN Shortness of Breath and/or Wheezing  nystatin Powder 1 Application(s) Topical two times a day PRN Rash

## 2019-07-17 NOTE — PROGRESS NOTE ADULT - ASSESSMENT
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  -Continue with comprehensive rehab program  -Completed dexamethasone taper as per OSH  -Pain control: Tylenol  -Follow up with NSGY upon discharge      HTN: BP stable  -Continue with amlodipine  -Monitor vitals    Asthma:  -Continue with duoneb  -Monitor vitals  -Supplemental oxygen prn. Will check O2 on room and titrate off as tolerated    Congestion:  -Continue nasal spray    Right facial nerve palsy/Glaucoma:  -Continue with ocular drops: Latanoprost, Lacri-Lube, Prednisolone, Artificial tears  -Will need outpatient followup    Tongue Mass:  -Completed keflex for 5 day course  -Follow up with outpatient for further workup    Thrush:  -Continue nystatin  -Continue oral care    Dysphagia: s/p PEG  -NPO with tube feeds  -Change to Vital given loose stool  -Continue bolus feeds and free water flush  -GI consult for bleeding around PEG, recs reviewed  -MBS completed on 7/15/19 and plan to trial pureed+nectar during therapy    Ovarian cystic mass:  -Follow up as an outpatient for further workup    Leukocytosis: Resolved. Patient afebrile and no localizing sx  -Monitor weekly labs  -Monitor vitals    Pain Mgmt - Tylenol PRN  GI/Bowel Mgmt -  Holding miralax, senna and colace given loose stool  /Bladder Mgmt - Monitor output    FEN   - Diet - NPO with bolus tube feeds  - Dysphagia  SLP - evaluation and treatment    Precautions / PROPHYLAXIS:   - Falls, Seizure   - Lungs: Aspiration, Incentive Spirometer   - Pressure injury/Skin: Turn Q2hrs while in bed, OOB to Chair, PT/OT    - DVT: Lovenox, SCDs, TEDs     Dispo:  -Patient discussed during IDT 7/16/19  -Patient progressing well with therapy, but barriers include poor endurance, visual impairments  -Currently min assist with UB dressing and mod assist with LB dressing. Min assist to contact guard with transfers. Ambulating 30-40ft with min assist. Completed 4 steps with min assist. Mild cognitive deficits.   -Goals supervision with ADLs, transfers and gait  -Plan for discharge  DEMARCUS 7/23/19     Spoke with pt's daughter, Salina, to discuss pt's status, progress in therapy, and discharge options.  DAughter is from [place] and will be returning home next month.  Pt will not have adequate supervision/assistance in her current home situation and will benefit from DEMARCUS to improve independence prior to discharge home.  Daughter understands that pt will still be recovering after discharge DEMARCUS.

## 2019-07-18 LAB
ANION GAP SERPL CALC-SCNC: 5 MMOL/L — SIGNIFICANT CHANGE UP (ref 5–17)
BASOPHILS # BLD AUTO: 0.03 K/UL — SIGNIFICANT CHANGE UP (ref 0–0.2)
BASOPHILS NFR BLD AUTO: 0.6 % — SIGNIFICANT CHANGE UP (ref 0–2)
BUN SERPL-MCNC: 10 MG/DL — SIGNIFICANT CHANGE UP (ref 7–23)
CALCIUM SERPL-MCNC: 9.5 MG/DL — SIGNIFICANT CHANGE UP (ref 8.4–10.5)
CHLORIDE SERPL-SCNC: 100 MMOL/L — SIGNIFICANT CHANGE UP (ref 96–108)
CO2 SERPL-SCNC: 32 MMOL/L — HIGH (ref 22–31)
CREAT SERPL-MCNC: 0.68 MG/DL — SIGNIFICANT CHANGE UP (ref 0.5–1.3)
EOSINOPHIL # BLD AUTO: 0.16 K/UL — SIGNIFICANT CHANGE UP (ref 0–0.5)
EOSINOPHIL NFR BLD AUTO: 3.3 % — SIGNIFICANT CHANGE UP (ref 0–6)
GLUCOSE SERPL-MCNC: 108 MG/DL — HIGH (ref 70–99)
HCT VFR BLD CALC: 36.4 % — SIGNIFICANT CHANGE UP (ref 34.5–45)
HGB BLD-MCNC: 11.2 G/DL — LOW (ref 11.5–15.5)
IMM GRANULOCYTES NFR BLD AUTO: 0.4 % — SIGNIFICANT CHANGE UP (ref 0–1.5)
LYMPHOCYTES # BLD AUTO: 1.07 K/UL — SIGNIFICANT CHANGE UP (ref 1–3.3)
LYMPHOCYTES # BLD AUTO: 22 % — SIGNIFICANT CHANGE UP (ref 13–44)
MCHC RBC-ENTMCNC: 27.3 PG — SIGNIFICANT CHANGE UP (ref 27–34)
MCHC RBC-ENTMCNC: 30.8 GM/DL — LOW (ref 32–36)
MCV RBC AUTO: 88.8 FL — SIGNIFICANT CHANGE UP (ref 80–100)
MONOCYTES # BLD AUTO: 0.71 K/UL — SIGNIFICANT CHANGE UP (ref 0–0.9)
MONOCYTES NFR BLD AUTO: 14.6 % — HIGH (ref 2–14)
NEUTROPHILS # BLD AUTO: 2.88 K/UL — SIGNIFICANT CHANGE UP (ref 1.8–7.4)
NEUTROPHILS NFR BLD AUTO: 59.1 % — SIGNIFICANT CHANGE UP (ref 43–77)
NRBC # BLD: 0 /100 WBCS — SIGNIFICANT CHANGE UP (ref 0–0)
PLATELET # BLD AUTO: 185 K/UL — SIGNIFICANT CHANGE UP (ref 150–400)
POTASSIUM SERPL-MCNC: 4.4 MMOL/L — SIGNIFICANT CHANGE UP (ref 3.5–5.3)
POTASSIUM SERPL-SCNC: 4.4 MMOL/L — SIGNIFICANT CHANGE UP (ref 3.5–5.3)
RBC # BLD: 4.1 M/UL — SIGNIFICANT CHANGE UP (ref 3.8–5.2)
RBC # FLD: 15.7 % — HIGH (ref 10.3–14.5)
SODIUM SERPL-SCNC: 137 MMOL/L — SIGNIFICANT CHANGE UP (ref 135–145)
WBC # BLD: 4.87 K/UL — SIGNIFICANT CHANGE UP (ref 3.8–10.5)
WBC # FLD AUTO: 4.87 K/UL — SIGNIFICANT CHANGE UP (ref 3.8–10.5)

## 2019-07-18 PROCEDURE — 99233 SBSQ HOSP IP/OBS HIGH 50: CPT

## 2019-07-18 PROCEDURE — 99232 SBSQ HOSP IP/OBS MODERATE 35: CPT

## 2019-07-18 RX ORDER — ENOXAPARIN SODIUM 100 MG/ML
40 INJECTION SUBCUTANEOUS
Refills: 0 | Status: DISCONTINUED | OUTPATIENT
Start: 2019-07-18 | End: 2019-07-23

## 2019-07-18 RX ADMIN — Medication 1 DROP(S): at 23:29

## 2019-07-18 RX ADMIN — Medication 1 APPLICATION(S): at 21:18

## 2019-07-18 RX ADMIN — ENOXAPARIN SODIUM 40 MILLIGRAM(S): 100 INJECTION SUBCUTANEOUS at 17:59

## 2019-07-18 RX ADMIN — Medication 1 DROP(S): at 18:00

## 2019-07-18 RX ADMIN — Medication 1 SPRAY(S): at 21:18

## 2019-07-18 RX ADMIN — AMLODIPINE BESYLATE 5 MILLIGRAM(S): 2.5 TABLET ORAL at 05:51

## 2019-07-18 RX ADMIN — LATANOPROST 1 DROP(S): 0.05 SOLUTION/ DROPS OPHTHALMIC; TOPICAL at 21:18

## 2019-07-18 RX ADMIN — Medication 1 DROP(S): at 12:19

## 2019-07-18 RX ADMIN — BUDESONIDE AND FORMOTEROL FUMARATE DIHYDRATE 2 PUFF(S): 160; 4.5 AEROSOL RESPIRATORY (INHALATION) at 09:13

## 2019-07-18 RX ADMIN — Medication 1 DROP(S): at 05:51

## 2019-07-18 RX ADMIN — ENOXAPARIN SODIUM 30 MILLIGRAM(S): 100 INJECTION SUBCUTANEOUS at 05:53

## 2019-07-18 NOTE — PROGRESS NOTE ADULT - ASSESSMENT
ASSESSMENT/PLAN  The patient is a 69 year old right handed, morbidly obese (BMI 49) woman with a past medical history of asthma, glaucoma, hypertension and osteoarthritis who presented for dizziness, balance issues found to have a posterior fossa-4th ventricle mass who was admitted to [facility] on 6/24/19 s/p planned craniotomy and resection on 6/24/19 being admitted here for rehab with Gait Instability, ADL impairments and Functional impairments.    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  -Continue with comprehensive rehab program  -Completed dexamethasone taper as per OSH  -Pain control: Tylenol  -Follow up with NSGY upon discharge      HTN: BP stable  -Continue with amlodipine  -Monitor vitals    Asthma:  -Continue with duoneb  -Monitor vitals  -Supplemental oxygen prn. Will check O2 on room and titrate off as tolerated    Congestion:  -Continue nasal spray    Right facial nerve palsy/Glaucoma:  -Continue with ocular drops: Latanoprost, Lacri-Lube, Prednisolone, Artificial tears  -Will need outpatient followup    Tongue Mass:  -Completed keflex for 5 day course  -Follow up with outpatient for further workup    Thrush:  -Continue nystatin  -Continue oral care    Dysphagia: s/p PEG  -NPO with tube feeds  -Change to Vital given loose stool  -Continue bolus feeds and free water flush  -GI consult for bleeding around PEG, recs reviewed  -MBS completed on 7/15/19 and plan to trial pureed+nectar during therapy    Ovarian cystic mass:  -Follow up as an outpatient for further workup    Leukocytosis: Resolved. Patient afebrile and no localizing sx  -Monitor weekly labs  -Monitor vitals    Pain Mgmt - Tylenol PRN  GI/Bowel Mgmt -  Holding miralax, senna and colace given loose stool  /Bladder Mgmt - Monitor output    FEN   - Diet - NPO with bolus tube feeds  - Dysphagia  SLP - evaluation and treatment    Precautions / PROPHYLAXIS:   - Falls, Seizure   - Lungs: Aspiration, Incentive Spirometer   - Pressure injury/Skin: Turn Q2hrs while in bed, OOB to Chair, PT/OT    - DVT: Lovenox, SCDs, TEDs     Dispo:  -Patient discussed during IDT 7/16/19  -Patient progressing well with therapy, but barriers include poor endurance, visual impairments  -Currently min assist with UB dressing and mod assist with LB dressing. Min assist to contact guard with transfers. Ambulating 30-40ft with min assist. Completed 4 steps with min assist. Mild cognitive deficits.   -Goals supervision with ADLs, transfers and gait  -Plan for discharge  DEMARCUS 7/23/19     Spoke with pt's daughter, Salina, to discuss pt's status, progress in therapy, and discharge options.  DAughter is from [place] and will be returning home next month.  Pt will not have adequate supervision/assistance in her current home situation and will benefit from DEMARCUS to improve independence prior to discharge home.  Daughter understands that pt will still be recovering after discharge DEMARCUS. ASSESSMENT/PLAN  The patient is a 69 year old right handed, morbidly obese (BMI 49) woman with a past medical history of asthma, glaucoma, hypertension and osteoarthritis who presented for dizziness, balance issues found to have a posterior fossa-4th ventricle mass who was admitted to [facility] on 6/24/19 s/p planned craniotomy and resection on 6/24/19 being admitted here for rehab with Gait Instability, ADL impairments and Functional impairments.    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  -Continue with comprehensive rehab program  -Completed dexamethasone taper as per OSH  -Pain control: Tylenol  -Follow up with NSGY upon discharge    HTN: BP stable  -Continue with amlodipine  -Monitor vitals    Tachycardia: Unclear etiology. May be due to poor PO intake v poor endurance.   -Monitor vitals  -Continue with FWF three times a  day through the PEG while on modified diet    Asthma: Pt desated to 81% during therapy, saturating well on room air   -Continue with duoneb  -Monitor vitals  -Supplemental oxygen prn and titrate as tolerated    Congestion: resolved  -Continue nasal spray prn    Right facial nerve palsy/Glaucoma:  -Continue with ocular drops: Latanoprost, Lacri-Lube, Prednisolone, Artificial tears  -Will need outpatient followup    Tongue Mass:  -Completed keflex for 5 day course  -Follow up with outpatient for further workup    Thrush:  -Continue nystatin  -Continue oral care    Dysphagia: s/p PEG  -Pureed +Nectar, tsp liquids  -1:1 supervision with meals  -If eating less than 50% of meals, will supplement with TF  -Continue free water flush  -GI consult for bleeding around PEG, recs reviewed (resolved s/p silver nitrate)    Ovarian cystic mass:  -Follow up as an outpatient for further workup    Leukocytosis: Resolved. Patient afebrile and no localizing sx  -Monitor weekly labs  -Monitor vitals    Pain Mgmt - Tylenol PRN  GI/Bowel Mgmt -  Holding miralax, senna and colace given loose stool  /Bladder Mgmt - Monitor output    FEN   - Diet - Pureed +Nectar, tsp liquids  - Dysphagia  SLP - evaluation and treatment    Precautions / PROPHYLAXIS:   - Falls, Seizure   - Lungs: Aspiration, Incentive Spirometer   - Pressure injury/Skin: Turn Q2hrs while in bed, OOB to Chair, PT/OT    - DVT: Lovenox, SCDs, TEDs     Dispo:  -Patient discussed during IDT 7/16/19  -Patient progressing well with therapy, but barriers include poor endurance, visual impairments  -Currently min assist with UB dressing and mod assist with LB dressing. Min assist to contact guard with transfers. Ambulating 30-40ft with min assist. Completed 4 steps with min assist. Mild cognitive deficits.   -Goals supervision with ADLs, transfers and gait  -Plan for discharge  DEMARCUS 7/23/19     Spoke with pt's daughter, Sailna, to discuss pt's status, progress in therapy, and discharge options.  DAughter is from [place] and will be returning home next month.  Pt will not have adequate supervision/assistance in her current home situation and will benefit from DEMARCUS to improve independence prior to discharge home.  Daughter understands that pt will still be recovering after discharge DEMARCUS. ASSESSMENT/PLAN  The patient is a 69 year old right handed, morbidly obese (BMI 49) woman with a past medical history of asthma, glaucoma, hypertension and osteoarthritis who presented for dizziness, balance issues found to have a posterior fossa-4th ventricle mass who was admitted to [facility] on 6/24/19 s/p planned craniotomy and resection on 6/24/19 being admitted here for rehab with Gait Instability, ADL impairments and Functional impairments.    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  -Continue with comprehensive rehab program  -Completed dexamethasone taper as per OSH  -Pain control: Tylenol  -Follow up with NSGY upon discharge    HTN: BP stable  -Continue with amlodipine  -Monitor vitals    Tachycardia: Unclear etiology. May be due to poor PO intake v poor endurance.   d/w hospitalist--  -Monitor vitals  -Continue with FWF three times a  day through the PEG while on modified diet--nectar liquids.     Asthma: Pt desated to 81% during therapy, saturating well on room air   -Continue with duoneb  -Monitor vitals  -Supplemental oxygen prn and titrate as tolerated    Congestion: resolved  -Continue nasal spray prn    Right facial nerve palsy/Glaucoma:  -Continue with ocular drops: Latanoprost, Lacri-Lube, Prednisolone, Artificial tears  -Will need outpatient followup    Tongue Mass:  -Completed keflex for 5 day course  -Follow up with outpatient for further workup    Thrush:  -Continue nystatin  -Continue oral care    Dysphagia: s/p PEG  -Pureed +Nectar, tsp liquids  -1:1 supervision with meals  -If eating less than 50% of meals, will supplement with TF  -Continue free water flush  -GI consult for bleeding around PEG, recs reviewed (resolved s/p silver nitrate)    Ovarian cystic mass:  -Follow up as an outpatient for further workup    Leukocytosis: Resolved. Patient afebrile and no localizing sx  -Monitor weekly labs  -Monitor vitals    Pain Mgmt - Tylenol PRN  GI/Bowel Mgmt -  Holding miralax, senna and colace given loose stool  /Bladder Mgmt - Monitor output    FEN   - Diet - Pureed +Nectar, tsp liquids  - Dysphagia  SLP - evaluation and treatment    Precautions / PROPHYLAXIS:   - Falls, Seizure   - Lungs: Aspiration, Incentive Spirometer   - Pressure injury/Skin: Turn Q2hrs while in bed, OOB to Chair, PT/OT    - DVT: Lovenox, SCDs, TEDs     Dispo:  -Patient discussed during IDT 7/16/19  -Patient progressing well with therapy, but barriers include poor endurance, visual impairments  -Currently min assist with UB dressing and mod assist with LB dressing. Min assist to contact guard with transfers. Ambulating 30-40ft with min assist. Completed 4 steps with min assist. Mild cognitive deficits.   -Goals supervision with ADLs, transfers and gait  -Plan for discharge  [facility] 7/23/19

## 2019-07-18 NOTE — PROGRESS NOTE ADULT - ASSESSMENT
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    #Tachycardia  -Could be multifactorial, including deconditioning, plus intravascular depletion (patient changed to dysphagia PO diet with PEG supplementation - may not be getting adequate intake)  -Trial of IVF  -Reassess HR in AM, further workup pending response to fluids  -Monitor for hypoxia    #Intracranial mass s/p craniotomy and resection on 6/24:  -PT/OT/SLP  -Decadron taper completed  #Dysphagia secondary to #1 above - treated with SLP. Now on dysphagia diet     #HTN: BP stable  -Continue with amlodipine  -Monitor vitals    #Asthma, controlled:  -Continue with duoneb  -Supplemental oxygen prn     #Ovarian mass:  -Follow up as an outpatient for further workup    #DVT ppx lovenox

## 2019-07-18 NOTE — PROGRESS NOTE ADULT - SUBJECTIVE AND OBJECTIVE BOX
Subjective: Overnight, patient tachycardic to 112, /92.     REVIEW OF SYMPTOMS  Pertinent in the last 24hrs: Neurological deficits, stable        VITALS  Vital Signs Last 24 Hrs  T(C): 37.3 (18 Jul 2019 08:05), Max: 37.3 (17 Jul 2019 23:59)  T(F): 99.2 (18 Jul 2019 08:05), Max: 99.2 (17 Jul 2019 23:59)  HR: 97 (18 Jul 2019 08:05) (97 - 114)  BP: 124/77 (18 Jul 2019 08:05) (121/76 - 145/92)  BP(mean): --  RR: 14 (18 Jul 2019 08:05) (14 - 15)  SpO2: 94% (18 Jul 2019 08:05) (91% - 98%)      PHYSICAL EXAM  Constitutional - NAD, awake  	HEENT - posterior cranial incision, c/d/i, mild erythema surrounding, non-TTP, right eye with conjunctival erythema, improving  	Chest - CTA bilaterally, no increased work of breathing, on supplemental oxygen  	Cardiovascular - RRR, S1S2  	Abdomen - BS+, Soft, NTND, non-TTP around PEG, mild serosanguineous drainage around PEG  	Extremities - No calf tenderness   	Neurologic Exam -                 	   Cognitive - Awake, Oriented to self, place, date, year, situation. Able to follow multistep commands across midline.  	   Communication - Fluent, low volume, dysarthric  	   Cranial Nerves - PERRL, no horizontal eye movements, left facial droop, inability to fully close right eye  	   Motor - No focal deficits 5/5 in BL UE and LE  	   Sensory - Intact to light touch diffusely, impaired proprioception   	   Coordination - HTS intact BL, FTN intact BL but slow movements  Psychiatric - Affect WNL      RECENT LABS                        11.2   4.87  )-----------( 185      ( 18 Jul 2019 06:20 )             36.4     07-18    137  |  100  |  10  ----------------------------<  108<H>  4.4   |  32<H>  |  0.68    Ca    9.5      18 Jul 2019 06:20              RADIOLOGY/OTHER RESULTS      MEDICATIONS  (STANDING):  amLODIPine   Tablet 5 milliGRAM(s) Oral daily  artificial  tears Solution 1 Drop(s) Right EYE every 6 hours  buDESOnide 160 MICROgram(s)/formoterol 4.5 MICROgram(s) Inhaler 2 Puff(s) Inhalation two times a day  enoxaparin Injectable 30 milliGRAM(s) SubCutaneous every 12 hours  latanoprost 0.005% Ophthalmic Solution 1 Drop(s) Both EYES at bedtime  petrolatum Ophthalmic Ointment 1 Application(s) Right EYE at bedtime  prednisoLONE acetate 1% Suspension 1 Drop(s) Both EYES four times a day  sodium chloride 0.65% Nasal 1 Spray(s) Both Nostrils three times a day    MEDICATIONS  (PRN):  acetaminophen   Tablet .. 650 milliGRAM(s) Oral every 6 hours PRN Temp greater or equal to 38C (100.4F), Mild Pain (1 - 3)  ALBUTerol/ipratropium for Nebulization 3 milliLiter(s) Nebulizer every 6 hours PRN Shortness of Breath and/or Wheezing  nystatin Powder 1 Application(s) Topical two times a day PRN Rash Subjective: Overnight, patient tachycardic to 112, /92. This morning HR 98, /76. Patient reports that she is doing well. Denies chest pain, SOB, abdominal or urinary sx. Per chart review, patient eating 10-25% of meals.     REVIEW OF SYMPTOMS  Pertinent in the last 24hrs: Neurological deficits, stable  Tachycardia    VITALS  Vital Signs Last 24 Hrs  T(C): 37.3 (18 Jul 2019 08:05), Max: 37.3 (17 Jul 2019 23:59)  T(F): 99.2 (18 Jul 2019 08:05), Max: 99.2 (17 Jul 2019 23:59)  HR: 97 (18 Jul 2019 08:05) (97 - 114)  BP: 124/77 (18 Jul 2019 08:05) (121/76 - 145/92)  BP(mean): --  RR: 14 (18 Jul 2019 08:05) (14 - 15)  SpO2: 94% (18 Jul 2019 08:05) (91% - 98%)      PHYSICAL EXAM  Constitutional - NAD, awake  	HEENT - posterior cranial incision, c/d/i, mild erythema surrounding, non-TTP, right eye with conjunctival erythema, improving  	Chest - CTA bilaterally, no increased work of breathing, on room air  	Cardiovascular - RRR, S1S2  	Abdomen - BS+, Soft, NTND, non-TTP around PEG, mild serosanguineous drainage around PEG  	Extremities - No calf tenderness   	Neurologic Exam -                 	   Cognitive - Awake, Oriented to self, place, date, year, situation. Able to follow multistep commands across midline.  	   Communication - Fluent, low volume, dysarthric  	   Cranial Nerves - PERRL, no horizontal eye movements, left facial droop, inability to fully close right eye  	   Motor - No focal deficits 5/5 in BL UE and LE  	   Sensory - Intact to light touch diffusely, impaired proprioception   	   Coordination - HTS intact BL, FTN intact BL but slow movements  Psychiatric - Affect WNL      RECENT LABS                        11.2   4.87  )-----------( 185      ( 18 Jul 2019 06:20 )             36.4     07-18    137  |  100  |  10  ----------------------------<  108<H>  4.4   |  32<H>  |  0.68    Ca    9.5      18 Jul 2019 06:20              RADIOLOGY/OTHER RESULTS      MEDICATIONS  (STANDING):  amLODIPine   Tablet 5 milliGRAM(s) Oral daily  artificial  tears Solution 1 Drop(s) Right EYE every 6 hours  buDESOnide 160 MICROgram(s)/formoterol 4.5 MICROgram(s) Inhaler 2 Puff(s) Inhalation two times a day  enoxaparin Injectable 30 milliGRAM(s) SubCutaneous every 12 hours  latanoprost 0.005% Ophthalmic Solution 1 Drop(s) Both EYES at bedtime  petrolatum Ophthalmic Ointment 1 Application(s) Right EYE at bedtime  prednisoLONE acetate 1% Suspension 1 Drop(s) Both EYES four times a day  sodium chloride 0.65% Nasal 1 Spray(s) Both Nostrils three times a day    MEDICATIONS  (PRN):  acetaminophen   Tablet .. 650 milliGRAM(s) Oral every 6 hours PRN Temp greater or equal to 38C (100.4F), Mild Pain (1 - 3)  ALBUTerol/ipratropium for Nebulization 3 milliLiter(s) Nebulizer every 6 hours PRN Shortness of Breath and/or Wheezing  nystatin Powder 1 Application(s) Topical two times a day PRN Rash Subjective: Overnight, patient tachycardic to 112, /92. This morning HR 98, /76. Patient reports that she is doing well. Denies chest pain, SOB, abdominal or urinary sx. Per chart review, patient eating 10-25% of meals.  Denies diarrhea/loose stools.     REVIEW OF SYMPTOMS  Pertinent in the last 24hrs: Neurological deficits, stable  Tachycardia    VITALS  Vital Signs Last 24 Hrs  T(C): 37.3 (18 Jul 2019 08:05), Max: 37.3 (17 Jul 2019 23:59)  T(F): 99.2 (18 Jul 2019 08:05), Max: 99.2 (17 Jul 2019 23:59)  HR: 97 (18 Jul 2019 08:05) (97 - 114)  BP: 124/77 (18 Jul 2019 08:05) (121/76 - 145/92)  BP(mean): --  RR: 14 (18 Jul 2019 08:05) (14 - 15)  SpO2: 94% (18 Jul 2019 08:05) (91% - 98%)      PHYSICAL EXAM  Constitutional - NAD, awake  	HEENT - posterior cranial incision, c/d/i, mild erythema surrounding, non-TTP, right eye with conjunctival erythema, improving  	Chest - CTA bilaterally, no increased work of breathing, on room air  	Cardiovascular - RRR, S1S2  	Abdomen - BS+, Soft, NTND, non-TTP around PEG, mild serosanguineous drainage around PEG  	Extremities - No calf tenderness   	Neurologic Exam -                 	   Cognitive - Drowsy, Oriented to self, place, date, year, situation. Able to follow multistep commands across midline.  	   Communication - Fluent, low volume, dysarthric  	   Cranial Nerves - PERRL, no horizontal eye movements, left facial droop, inability to fully close right eye  	   Motor - No focal deficits 5/5 in BL UE and LE  	   Sensory - Intact to light touch diffusely, impaired proprioception   	   Coordination - HTS intact BL, FTN intact BL but slow movements  Psychiatric - Affect WNL      RECENT LABS                        11.2   4.87  )-----------( 185      ( 18 Jul 2019 06:20 )             36.4     07-18    137  |  100  |  10  ----------------------------<  108<H>  4.4   |  32<H>  |  0.68    Ca    9.5      18 Jul 2019 06:20              RADIOLOGY/OTHER RESULTS      MEDICATIONS  (STANDING):  amLODIPine   Tablet 5 milliGRAM(s) Oral daily  artificial  tears Solution 1 Drop(s) Right EYE every 6 hours  buDESOnide 160 MICROgram(s)/formoterol 4.5 MICROgram(s) Inhaler 2 Puff(s) Inhalation two times a day  enoxaparin Injectable 30 milliGRAM(s) SubCutaneous every 12 hours  latanoprost 0.005% Ophthalmic Solution 1 Drop(s) Both EYES at bedtime  petrolatum Ophthalmic Ointment 1 Application(s) Right EYE at bedtime  prednisoLONE acetate 1% Suspension 1 Drop(s) Both EYES four times a day  sodium chloride 0.65% Nasal 1 Spray(s) Both Nostrils three times a day    MEDICATIONS  (PRN):  acetaminophen   Tablet .. 650 milliGRAM(s) Oral every 6 hours PRN Temp greater or equal to 38C (100.4F), Mild Pain (1 - 3)  ALBUTerol/ipratropium for Nebulization 3 milliLiter(s) Nebulizer every 6 hours PRN Shortness of Breath and/or Wheezing  nystatin Powder 1 Application(s) Topical two times a day PRN Rash

## 2019-07-18 NOTE — PROGRESS NOTE ADULT - SUBJECTIVE AND OBJECTIVE BOX
Patient is a 69y old  Female who presents with a chief complaint of Intracranial mass resection (18 Jul 2019 08:51)      Patient seen and examined at bedside.  Called to see patient by primary team due to tachycardia.     ALLERGIES:  No Known Drug Allergies  shellfish (Angioedema; Rash)    MEDICATIONS  (STANDING):  amLODIPine   Tablet 5 milliGRAM(s) Oral daily  artificial  tears Solution 1 Drop(s) Right EYE every 6 hours  buDESOnide 160 MICROgram(s)/formoterol 4.5 MICROgram(s) Inhaler 2 Puff(s) Inhalation two times a day  enoxaparin Injectable 40 milliGRAM(s) SubCutaneous two times a day  latanoprost 0.005% Ophthalmic Solution 1 Drop(s) Both EYES at bedtime  petrolatum Ophthalmic Ointment 1 Application(s) Right EYE at bedtime  prednisoLONE acetate 1% Suspension 1 Drop(s) Both EYES four times a day  sodium chloride 0.65% Nasal 1 Spray(s) Both Nostrils three times a day    MEDICATIONS  (PRN):  acetaminophen   Tablet .. 650 milliGRAM(s) Oral every 6 hours PRN Temp greater or equal to 38C (100.4F), Mild Pain (1 - 3)  ALBUTerol/ipratropium for Nebulization 3 milliLiter(s) Nebulizer every 6 hours PRN Shortness of Breath and/or Wheezing  nystatin Powder 1 Application(s) Topical two times a day PRN Rash    Vital Signs Last 24 Hrs  T(F): 98.4 (18 Jul 2019 12:10), Max: 99.2 (17 Jul 2019 23:59)  HR: 116 (18 Jul 2019 12:10) (97 - 116)  BP: 123/79 (18 Jul 2019 12:10) (121/76 - 145/92)  RR: 14 (18 Jul 2019 12:10) (14 - 15)  SpO2: 94% (18 Jul 2019 12:10) (91% - 96%)  I&O's Summary    17 Jul 2019 07:01  -  18 Jul 2019 07:00  --------------------------------------------------------  IN: 360 mL / OUT: 0 mL / NET: 360 mL    18 Jul 2019 07:01  -  18 Jul 2019 13:44  --------------------------------------------------------  IN: 320 mL / OUT: 0 mL / NET: 320 mL      PHYSICAL EXAM:  General: NAD, A/O x 3  ENT: MMM, no thrush  Neck: Supple, No JVD  Lungs: Clear to auscultation bilaterally, good air entry, non-labored breathing  Cardio: tachycardic, S1/S2, No murmur  Abdomen: Soft, Nontender, Nondistended; Bowel sounds present; obese; PEG+  Extremities: No calf tenderness, No pitting edema, moves all extremities  Psych: Flat affect    LABS:                        11.2   4.87  )-----------( 185      ( 18 Jul 2019 06:20 )             36.4     07-18    137  |  100  |  10  ----------------------------<  108  4.4   |  32  |  0.68    Ca    9.5      18 Jul 2019 06:20      eGFR if Non African American: 89 mL/min/1.73M2 (07-18-19 @ 06:20)  eGFR if : 103 mL/min/1.73M2 (07-18-19 @ 06:20)    06-26 [id number] 5.1    Care Discussed with Consultants/Other Providers: Dr. East

## 2019-07-18 NOTE — PROVIDER CONTACT NOTE (OTHER) - SITUATION
, 145/92, Temp 99.2 axillary. O2 Sat 94% RA, RR 15. Pt refused oral /rectal temp, stating "leave me alone".

## 2019-07-19 PROCEDURE — 99232 SBSQ HOSP IP/OBS MODERATE 35: CPT

## 2019-07-19 RX ADMIN — Medication 1 APPLICATION(S): at 21:36

## 2019-07-19 RX ADMIN — Medication 1 DROP(S): at 13:14

## 2019-07-19 RX ADMIN — LATANOPROST 1 DROP(S): 0.05 SOLUTION/ DROPS OPHTHALMIC; TOPICAL at 21:35

## 2019-07-19 RX ADMIN — BUDESONIDE AND FORMOTEROL FUMARATE DIHYDRATE 2 PUFF(S): 160; 4.5 AEROSOL RESPIRATORY (INHALATION) at 08:20

## 2019-07-19 RX ADMIN — BUDESONIDE AND FORMOTEROL FUMARATE DIHYDRATE 2 PUFF(S): 160; 4.5 AEROSOL RESPIRATORY (INHALATION) at 20:54

## 2019-07-19 RX ADMIN — Medication 1 DROP(S): at 21:36

## 2019-07-19 RX ADMIN — Medication 1 DROP(S): at 18:22

## 2019-07-19 RX ADMIN — Medication 1 SPRAY(S): at 21:35

## 2019-07-19 RX ADMIN — Medication 1 DROP(S): at 13:13

## 2019-07-19 RX ADMIN — ENOXAPARIN SODIUM 40 MILLIGRAM(S): 100 INJECTION SUBCUTANEOUS at 06:29

## 2019-07-19 RX ADMIN — Medication 1 SPRAY(S): at 13:14

## 2019-07-19 RX ADMIN — AMLODIPINE BESYLATE 5 MILLIGRAM(S): 2.5 TABLET ORAL at 05:32

## 2019-07-19 RX ADMIN — Medication 1 SPRAY(S): at 05:33

## 2019-07-19 RX ADMIN — Medication 1 DROP(S): at 05:33

## 2019-07-19 RX ADMIN — ENOXAPARIN SODIUM 40 MILLIGRAM(S): 100 INJECTION SUBCUTANEOUS at 18:23

## 2019-07-19 NOTE — CHART NOTE - NSCHARTNOTEFT_GEN_A_CORE
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    SOURCE: Patient [X)   Family [ ]    Medical Record (X)    DIET: Puree c nectar thick liquids + PRN tube feeding if pt consumes <50% at meals   Diet upgraded as per speech therapy on 7/16/19    Pt has had poor appetite over the last 3 days. Noted po 10-50% per flow sheets. Pt c/o nausea after free H2O bolus 2/2 too much volume. Noted pt with loose BM this morning. As per RN, pt consumed about 25% at breakfast this morning and required tube feeding bolus- no BM since then. Pt agreeable to trying Ensure supplement.    Enteral Nutrition : Vital 1.5 @ 237cc  PRN if pt consumes <50% at meals   Which is providing : 355 kcals, 16g protein per serving        CURRENT WEIGHT:   (7/10) 262.6lbs  (7/15) 257lbs- no new weight available     PERTINENT MEDS:   Pertinent Medications: MEDICATIONS  (STANDING):  amLODIPine   Tablet 5 milliGRAM(s) Oral daily  artificial  tears Solution 1 Drop(s) Right EYE every 6 hours  buDESOnide 160 MICROgram(s)/formoterol 4.5 MICROgram(s) Inhaler 2 Puff(s) Inhalation two times a day  enoxaparin Injectable 40 milliGRAM(s) SubCutaneous two times a day  latanoprost 0.005% Ophthalmic Solution 1 Drop(s) Both EYES at bedtime  petrolatum Ophthalmic Ointment 1 Application(s) Right EYE at bedtime  prednisoLONE acetate 1% Suspension 1 Drop(s) Both EYES four times a day  sodium chloride 0.65% Nasal 1 Spray(s) Both Nostrils three times a day    MEDICATIONS  (PRN):  acetaminophen   Tablet .. 650 milliGRAM(s) Oral every 6 hours PRN Temp greater or equal to 38C (100.4F), Mild Pain (1 - 3)  nystatin Powder 1 Application(s) Topical two times a day PRN Rash      PERTINENT LABS:  07-18 Na137 mmol/L Glu 108 mg/dL<H> K+ 4.4 mmol/L Cr  0.68 mg/dL BUN 10 mg/dL 06-26 [id number] 5.1 %    SKIN:  intact  EDEMA: none  LAST BM: 7/19- loose     ESTIMATED NEEDS:   [X] no change since previous assessment  [ ] recalculated:     PREVIOUS NUTRITION DIAGNOSIS:    1. Chewing/swallowing difficulty- now upgraded to puree c nectar per speech therapy    NUTRITION DIAGNOSIS is :  (X)  Ongoing      (    ) Resolved,  RD will follow as per nutrition department protocol.    NEW NUTRITION DIAGNOSIS: Inadequate oral intake related to decreased appetite c nausea/diarrhea as evidenced by po <50% x 3 days     NUTRITION RECOMMENDATIONS:   1. Recommend add Ensure Enlive 8oz BID thickened to nectar consistency (700kcals, 40g protein)  2. Continue tube feeding PRN if pt consumes <50% at meals. Continue hydration via PEG  3. Monitor po intake to assess need for po supplements  4. Monitor BM's- may suggest probiotic     MONITORING AND EVALUATION:   1. Tolerance to diet prescription   2. PO intake  3. Weights  4. Labs  5. Follow Up per protocol     RD to remain available   Niyah Ramirez RDN.

## 2019-07-19 NOTE — PROGRESS NOTE ADULT - ASSESSMENT
ASSESSMENT/PLAN  The patient is a 69 year old right handed, morbidly obese (BMI 49) woman with a past medical history of asthma, glaucoma, hypertension and osteoarthritis who presented for dizziness, balance issues found to have a posterior fossa-4th ventricle mass who was admitted to [facility] on 6/24/19 s/p planned craniotomy and resection on 6/24/19 being admitted here for rehab with Gait Instability, ADL impairments and Functional impairments.    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  -Continue with comprehensive rehab program  -Completed dexamethasone taper as per OSH  -Pain control: Tylenol  -Follow up with NSGY upon discharge    HTN: BP stable  -Continue with amlodipine  -Monitor vitals    Tachycardia: Unclear etiology. May be due to poor PO intake v poor endurance.   d/w hospitalist--  -Monitor vitals  -Continue with FWF three times a  day through the PEG while on modified diet--nectar liquids.     Asthma: Pt desated to 81% during therapy, saturating well on room air   -Continue with duoneb  -Monitor vitals  -Supplemental oxygen prn and titrate as tolerated    Congestion: resolved  -Continue nasal spray prn    Right facial nerve palsy/Glaucoma:  -Continue with ocular drops: Latanoprost, Lacri-Lube, Prednisolone, Artificial tears  -Will need outpatient followup    Tongue Mass:  -Completed keflex for 5 day course  -Follow up with outpatient for further workup    Thrush:  -Continue nystatin  -Continue oral care    Dysphagia: s/p PEG  -Pureed +Nectar, tsp liquids  -1:1 supervision with meals  -If eating less than 50% of meals, will supplement with TF  -Continue free water flush  -GI consult for bleeding around PEG, recs reviewed (resolved s/p silver nitrate)    Ovarian cystic mass:  -Follow up as an outpatient for further workup    Leukocytosis: Resolved. Patient afebrile and no localizing sx  -Monitor weekly labs  -Monitor vitals    Pain Mgmt - Tylenol PRN  GI/Bowel Mgmt -  Holding miralax, senna and colace given loose stool  /Bladder Mgmt - Monitor output    FEN   - Diet - Pureed +Nectar, tsp liquids  - Dysphagia  SLP - evaluation and treatment    Precautions / PROPHYLAXIS:   - Falls, Seizure   - Lungs: Aspiration, Incentive Spirometer   - Pressure injury/Skin: Turn Q2hrs while in bed, OOB to Chair, PT/OT    - DVT: Lovenox, SCDs, TEDs     Dispo:  -Patient discussed during IDT 7/16/19  -Patient progressing well with therapy, but barriers include poor endurance, visual impairments  -Currently min assist with UB dressing and mod assist with LB dressing. Min assist to contact guard with transfers. Ambulating 30-40ft with min assist. Completed 4 steps with min assist. Mild cognitive deficits.   -Goals supervision with ADLs, transfers and gait  -Plan for discharge  [facility] 7/23/19 ASSESSMENT/PLAN  The patient is a 69 year old right handed, morbidly obese (BMI 49) woman with a past medical history of asthma, glaucoma, hypertension and osteoarthritis who presented for dizziness, balance issues found to have a posterior fossa-4th ventricle mass who was admitted to [facility] on 6/24/19 s/p planned craniotomy and resection on 6/24/19 being admitted here for rehab with Gait Instability, ADL impairments and Functional impairments.    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  -Continue with comprehensive rehab program  -Completed dexamethasone taper as per OSH  -Pain control: Tylenol  -Follow up with NSGY upon discharge    HTN: BP stable  -Continue with amlodipine  -Monitor vitals    Tachycardia: Unclear etiology. May be due to poor PO intake v poor endurance. However, still has HR in 100s after fluid hydration yesterday  -Check Cta to rule out PE 7/19  -Monitor vitals  -Continue with FWF three times a day through the PEG while on modified diet--nectar liquids. Will decrease to 300cc as patient reports nausea with larger volumes 7/19    Asthma: Pt desated to 81% during therapy, saturating well on room air   -Discontinue duoneb as patient has persistent tachycardia and headache after treatment  -Continue with symbicort   -Monitor vitals  -Supplemental oxygen prn and titrate as tolerated    Congestion: resolved  -Continue nasal spray prn    Right facial nerve palsy/Glaucoma:  -Continue with ocular drops: Latanoprost, Lacri-Lube, Prednisolone, Artificial tears  -Will need outpatient followup    Tongue Mass:  -Completed keflex for 5 day course  -Follow up with outpatient for further workup    Thrush:  -Continue nystatin  -Continue oral care    Dysphagia: s/p PEG  -Pureed +Nectar, tsp liquids  -1:1 supervision with meals  -If eating less than 50% of meals, will supplement with TF  -Continue free water flush  -GI consult for bleeding around PEG, recs reviewed (resolved s/p silver nitrate)    Ovarian cystic mass:  -Follow up as an outpatient for further workup    Leukocytosis: Resolved. Patient afebrile and no localizing sx  -Monitor weekly labs  -Monitor vitals    Pain Mgmt - Tylenol PRN  GI/Bowel Mgmt -  Holding miralax, senna and colace given loose stool  /Bladder Mgmt - Monitor output    FEN   - Diet - Pureed +Nectar, tsp liquids  - Dysphagia  SLP - evaluation and treatment    Precautions / PROPHYLAXIS:   - Falls, Seizure   - Lungs: Aspiration, Incentive Spirometer   - Pressure injury/Skin: Turn Q2hrs while in bed, OOB to Chair, PT/OT    - DVT: Lovenox, SCDs, TEDs     Dispo:  -Patient discussed during IDT 7/16/19  -Patient progressing well with therapy, but barriers include poor endurance, visual impairments  -Currently min assist with UB dressing and mod assist with LB dressing. Min assist to contact guard with transfers. Ambulating 30-40ft with min assist. Completed 4 steps with min assist. Mild cognitive deficits.   -Goals supervision with ADLs, transfers and gait  -Plan for discharge  [facility] 7/23/19 ASSESSMENT/PLAN  The patient is a 69 year old right handed, morbidly obese (BMI 49) woman with a past medical history of asthma, glaucoma, hypertension and osteoarthritis who presented for dizziness, balance issues found to have a posterior fossa-4th ventricle mass who was admitted to [facility] on 6/24/19 s/p planned craniotomy and resection on 6/24/19 being admitted here for rehab with Gait Instability, ADL impairments and Functional impairments.    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  -Continue with comprehensive rehab program  -Completed dexamethasone taper as per OSH  -Pain control: Tylenol  -Follow up with NSGY upon discharge    HTN: BP stable  -Continue with amlodipine  -Monitor vitals    Tachycardia: Unclear etiology. May be due to poor PO intake v poor endurance. However, still has HR in 100s after fluid hydration yesterday, but this is as per her baseline of HR 90s-100s.   - Attempted Cta to rule out PE 7/19--but pt. with very difficult IV access.  Given pt's tachycardia back to her baseline and no desaturations/ SOB (except on RA with exertion which is expected given her asthma/COPD), will hold off on CTA as suspicion for PE is low  -Monitor vitals  -Continue with FWF three times a day through the PEG while on modified diet--nectar liquids. Will decrease to 300cc as patient reports nausea with larger volumes 7/19    Asthma: Pt desated to 81% during therapy when ambulating on room air,  -- saturating well at rest on room air   -Discontinue duoneb as patient has persistent tachycardia and headache after treatment (likely due to atrovent)  -Continue with symbicort   -Monitor vitals  -Supplemental oxygen prn and titrate as tolerated    Congestion: resolved  -Continue nasal spray prn    Right facial nerve palsy/Glaucoma:  -Continue with ocular drops: Latanoprost, Lacri-Lube, Prednisolone, Artificial tears  -Will need outpatient followup    Tongue Mass:  -Completed keflex for 5 day course  -Follow up with outpatient for further workup    Thrush:  -Continue nystatin  -Continue oral care    Dysphagia: s/p PEG  -Pureed +Nectar, tsp liquids  -1:1 supervision with meals  -If eating less than 50% of meals, will supplement with TF  -Continue free water flush  -GI consult for bleeding around PEG, recs reviewed (resolved s/p silver nitrate)    Ovarian cystic mass:  -Follow up as an outpatient for further workup    Leukocytosis: Resolved. Patient afebrile and no localizing sx  -Monitor weekly labs  -Monitor vitals    Pain Mgmt - Tylenol PRN  GI/Bowel Mgmt -  Holding miralax, senna and colace given loose stool  /Bladder Mgmt - Monitor output    FEN   - Diet - Pureed +Nectar, tsp liquids  - Dysphagia  SLP - evaluation and treatment    Precautions / PROPHYLAXIS:   - Falls, Seizure   - Lungs: Aspiration, Incentive Spirometer   - Pressure injury/Skin: Turn Q2hrs while in bed, OOB to Chair, PT/OT    - DVT: Lovenox, SCDs, TEDs     Dispo:  -Patient discussed during IDT 7/16/19  -Patient progressing well with therapy, but barriers include poor endurance, visual impairments  -Currently min assist with UB dressing and mod assist with LB dressing. Min assist to contact guard with transfers. Ambulating 30-40ft with min assist. Completed 4 steps with min assist. Mild cognitive deficits.   -Goals supervision with ADLs, transfers and gait  -Plan for discharge  [facility] 7/23/19

## 2019-07-19 NOTE — PROGRESS NOTE ADULT - SUBJECTIVE AND OBJECTIVE BOX
Subjective: No acute events overnight. Patient reports that she had a headache after the nebulizer treatment this morning. Reports that it was a 9/10, but has since resolved. Reports she had an episode of diarrhea this morning. Patient denies SOB, chest pain, changes in weakness or urinary sx.     REVIEW OF SYMPTOMS  Pertinent in the last 24hrs: Neurological deficits, stable  Tachycardia      VITALS  Vital Signs Last 24 Hrs  T(C): 37.1 (19 Jul 2019 08:09), Max: 37.6 (18 Jul 2019 21:03)  T(F): 98.8 (19 Jul 2019 08:09), Max: 99.7 (18 Jul 2019 21:03)  HR: 98 (19 Jul 2019 08:21) (98 - 116)  BP: 122/79 (19 Jul 2019 08:09) (122/79 - 129/84)  BP(mean): --  RR: 14 (19 Jul 2019 08:09) (14 - 15)  SpO2: 93% (19 Jul 2019 08:21) (93% - 95%)      PHYSICAL EXAM  Constitutional - NAD, awake  	HEENT - posterior cranial incision, c/d/i, mild erythema surrounding, non-TTP, right eye with conjunctival erythema, improving  	Chest - CTA bilaterally, no increased work of breathing, on room air  	Cardiovascular - RRR, S1S2  	Abdomen - BS+, Soft, NTND, non-TTP around PEG, no draiange  	Extremities - No calf tenderness   	Neurologic Exam -                 	   Cognitive - Drowsy, Oriented to self, place, date, year, situation. Able to follow multistep commands across midline.  	   Communication - Fluent, low volume, dysarthric  	   Cranial Nerves - PERRL, no horizontal eye movements, left facial droop, inability to fully close right eye  	   Motor - No focal deficits 5/5 in BL UE and LE  	   Sensory - Intact to light touch diffusely, impaired proprioception   Psychiatric - Affect WNL      RECENT LABS                        11.2   4.87  )-----------( 185      ( 18 Jul 2019 06:20 )             36.4     07-18    137  |  100  |  10  ----------------------------<  108<H>  4.4   |  32<H>  |  0.68    Ca    9.5      18 Jul 2019 06:20              RADIOLOGY/OTHER RESULTS      MEDICATIONS  (STANDING):  amLODIPine   Tablet 5 milliGRAM(s) Oral daily  artificial  tears Solution 1 Drop(s) Right EYE every 6 hours  buDESOnide 160 MICROgram(s)/formoterol 4.5 MICROgram(s) Inhaler 2 Puff(s) Inhalation two times a day  enoxaparin Injectable 40 milliGRAM(s) SubCutaneous two times a day  latanoprost 0.005% Ophthalmic Solution 1 Drop(s) Both EYES at bedtime  petrolatum Ophthalmic Ointment 1 Application(s) Right EYE at bedtime  prednisoLONE acetate 1% Suspension 1 Drop(s) Both EYES four times a day  sodium chloride 0.65% Nasal 1 Spray(s) Both Nostrils three times a day    MEDICATIONS  (PRN):  acetaminophen   Tablet .. 650 milliGRAM(s) Oral every 6 hours PRN Temp greater or equal to 38C (100.4F), Mild Pain (1 - 3)  nystatin Powder 1 Application(s) Topical two times a day PRN Rash Subjective: No acute events overnight. Patient reports that she had a headache after the nebulizer treatment this morning. Reports that it was a 9/10, but has since resolved. Reports she had an episode of diarrhea this morning. Reports that she had nausea after free water flush because it felt like a large volume. Patient denies SOB, chest pain, changes in weakness or urinary sx. Per chart review, eating 10-50% of meals.    REVIEW OF SYMPTOMS  Pertinent in the last 24hrs: Neurological deficits, stable  Tachycardia      VITALS  Vital Signs Last 24 Hrs  T(C): 37.1 (19 Jul 2019 08:09), Max: 37.6 (18 Jul 2019 21:03)  T(F): 98.8 (19 Jul 2019 08:09), Max: 99.7 (18 Jul 2019 21:03)  HR: 98 (19 Jul 2019 08:21) (98 - 116)  BP: 122/79 (19 Jul 2019 08:09) (122/79 - 129/84)  BP(mean): --  RR: 14 (19 Jul 2019 08:09) (14 - 15)  SpO2: 93% (19 Jul 2019 08:21) (93% - 95%)      PHYSICAL EXAM  Constitutional - NAD, awake  	HEENT - posterior cranial incision, c/d/i, mild erythema surrounding, non-TTP, right eye with conjunctival erythema, improving  	Chest - CTA bilaterally, no increased work of breathing, on room air  	Cardiovascular - RRR, S1S2  	Abdomen - BS+, Soft, NTND, non-TTP around PEG, no drainage  	Extremities - No calf tenderness   	Neurologic Exam -                 	   Cognitive - Drowsy, Oriented to self, place, date, year, situation. Able to follow multistep commands across midline.  	   Communication - Fluent, low volume, dysarthric  	   Cranial Nerves - PERRL, no horizontal eye movements, left facial droop, inability to fully close right eye  	   Motor - No focal deficits 5/5 in BL UE and LE  	   Sensory - Intact to light touch diffusely, impaired proprioception   Psychiatric - Affect WNL      RECENT LABS                        11.2   4.87  )-----------( 185      ( 18 Jul 2019 06:20 )             36.4     07-18    137  |  100  |  10  ----------------------------<  108<H>  4.4   |  32<H>  |  0.68    Ca    9.5      18 Jul 2019 06:20              RADIOLOGY/OTHER RESULTS      MEDICATIONS  (STANDING):  amLODIPine   Tablet 5 milliGRAM(s) Oral daily  artificial  tears Solution 1 Drop(s) Right EYE every 6 hours  buDESOnide 160 MICROgram(s)/formoterol 4.5 MICROgram(s) Inhaler 2 Puff(s) Inhalation two times a day  enoxaparin Injectable 40 milliGRAM(s) SubCutaneous two times a day  latanoprost 0.005% Ophthalmic Solution 1 Drop(s) Both EYES at bedtime  petrolatum Ophthalmic Ointment 1 Application(s) Right EYE at bedtime  prednisoLONE acetate 1% Suspension 1 Drop(s) Both EYES four times a day  sodium chloride 0.65% Nasal 1 Spray(s) Both Nostrils three times a day    MEDICATIONS  (PRN):  acetaminophen   Tablet .. 650 milliGRAM(s) Oral every 6 hours PRN Temp greater or equal to 38C (100.4F), Mild Pain (1 - 3)  nystatin Powder 1 Application(s) Topical two times a day PRN Rash

## 2019-07-20 LAB
ANION GAP SERPL CALC-SCNC: 7 MMOL/L — SIGNIFICANT CHANGE UP (ref 5–17)
BASOPHILS # BLD AUTO: 0.02 K/UL — SIGNIFICANT CHANGE UP (ref 0–0.2)
BASOPHILS NFR BLD AUTO: 0.4 % — SIGNIFICANT CHANGE UP (ref 0–2)
BUN SERPL-MCNC: 10 MG/DL — SIGNIFICANT CHANGE UP (ref 7–23)
CALCIUM SERPL-MCNC: 9.1 MG/DL — SIGNIFICANT CHANGE UP (ref 8.4–10.5)
CHLORIDE SERPL-SCNC: 99 MMOL/L — SIGNIFICANT CHANGE UP (ref 96–108)
CO2 SERPL-SCNC: 32 MMOL/L — HIGH (ref 22–31)
CREAT SERPL-MCNC: 0.79 MG/DL — SIGNIFICANT CHANGE UP (ref 0.5–1.3)
EOSINOPHIL # BLD AUTO: 0.13 K/UL — SIGNIFICANT CHANGE UP (ref 0–0.5)
EOSINOPHIL NFR BLD AUTO: 2.8 % — SIGNIFICANT CHANGE UP (ref 0–6)
GLUCOSE SERPL-MCNC: 140 MG/DL — HIGH (ref 70–99)
HCT VFR BLD CALC: 33.4 % — LOW (ref 34.5–45)
HGB BLD-MCNC: 10.4 G/DL — LOW (ref 11.5–15.5)
IMM GRANULOCYTES NFR BLD AUTO: 0.8 % — SIGNIFICANT CHANGE UP (ref 0–1.5)
LYMPHOCYTES # BLD AUTO: 1.17 K/UL — SIGNIFICANT CHANGE UP (ref 1–3.3)
LYMPHOCYTES # BLD AUTO: 24.8 % — SIGNIFICANT CHANGE UP (ref 13–44)
MCHC RBC-ENTMCNC: 27.7 PG — SIGNIFICANT CHANGE UP (ref 27–34)
MCHC RBC-ENTMCNC: 31.1 GM/DL — LOW (ref 32–36)
MCV RBC AUTO: 88.8 FL — SIGNIFICANT CHANGE UP (ref 80–100)
MONOCYTES # BLD AUTO: 0.66 K/UL — SIGNIFICANT CHANGE UP (ref 0–0.9)
MONOCYTES NFR BLD AUTO: 14 % — SIGNIFICANT CHANGE UP (ref 2–14)
NEUTROPHILS # BLD AUTO: 2.7 K/UL — SIGNIFICANT CHANGE UP (ref 1.8–7.4)
NEUTROPHILS NFR BLD AUTO: 57.2 % — SIGNIFICANT CHANGE UP (ref 43–77)
NRBC # BLD: 0 /100 WBCS — SIGNIFICANT CHANGE UP (ref 0–0)
PLATELET # BLD AUTO: 299 K/UL — SIGNIFICANT CHANGE UP (ref 150–400)
POTASSIUM SERPL-MCNC: 3.4 MMOL/L — LOW (ref 3.5–5.3)
POTASSIUM SERPL-SCNC: 3.4 MMOL/L — LOW (ref 3.5–5.3)
RBC # BLD: 3.76 M/UL — LOW (ref 3.8–5.2)
RBC # FLD: 16.2 % — HIGH (ref 10.3–14.5)
SODIUM SERPL-SCNC: 138 MMOL/L — SIGNIFICANT CHANGE UP (ref 135–145)
WBC # BLD: 4.72 K/UL — SIGNIFICANT CHANGE UP (ref 3.8–10.5)
WBC # FLD AUTO: 4.72 K/UL — SIGNIFICANT CHANGE UP (ref 3.8–10.5)

## 2019-07-20 PROCEDURE — 99233 SBSQ HOSP IP/OBS HIGH 50: CPT

## 2019-07-20 PROCEDURE — 70450 CT HEAD/BRAIN W/O DYE: CPT | Mod: 26

## 2019-07-20 RX ORDER — POTASSIUM CHLORIDE 20 MEQ
20 PACKET (EA) ORAL ONCE
Refills: 0 | Status: COMPLETED | OUTPATIENT
Start: 2019-07-20 | End: 2019-07-20

## 2019-07-20 RX ADMIN — Medication 20 MILLIEQUIVALENT(S): at 15:10

## 2019-07-20 RX ADMIN — Medication 1 DROP(S): at 14:33

## 2019-07-20 RX ADMIN — Medication 1 DROP(S): at 21:01

## 2019-07-20 RX ADMIN — ENOXAPARIN SODIUM 40 MILLIGRAM(S): 100 INJECTION SUBCUTANEOUS at 18:10

## 2019-07-20 RX ADMIN — Medication 1 DROP(S): at 06:14

## 2019-07-20 RX ADMIN — Medication 1 SPRAY(S): at 21:01

## 2019-07-20 RX ADMIN — Medication 1 SPRAY(S): at 06:13

## 2019-07-20 RX ADMIN — BUDESONIDE AND FORMOTEROL FUMARATE DIHYDRATE 2 PUFF(S): 160; 4.5 AEROSOL RESPIRATORY (INHALATION) at 21:46

## 2019-07-20 RX ADMIN — ENOXAPARIN SODIUM 40 MILLIGRAM(S): 100 INJECTION SUBCUTANEOUS at 06:13

## 2019-07-20 RX ADMIN — BUDESONIDE AND FORMOTEROL FUMARATE DIHYDRATE 2 PUFF(S): 160; 4.5 AEROSOL RESPIRATORY (INHALATION) at 08:17

## 2019-07-20 RX ADMIN — Medication 1 DROP(S): at 18:10

## 2019-07-20 RX ADMIN — Medication 1 DROP(S): at 06:13

## 2019-07-20 RX ADMIN — LATANOPROST 1 DROP(S): 0.05 SOLUTION/ DROPS OPHTHALMIC; TOPICAL at 21:01

## 2019-07-20 RX ADMIN — Medication 1 APPLICATION(S): at 21:01

## 2019-07-20 NOTE — PROGRESS NOTE ADULT - ASSESSMENT
ASSESSMENT/PLAN  The patient is a 69 year old right handed, morbidly obese (BMI 49) woman with a past medical history of asthma, glaucoma, hypertension and osteoarthritis who presented for dizziness, balance issues found to have a posterior fossa-4th ventricle mass who was admitted to [facility] on 6/24/19 s/p planned craniotomy and resection on 6/24/19 being admitted here for rehab with Gait Instability, ADL impairments and Functional impairments.    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  -Continue with comprehensive rehab program  -Completed dexamethasone taper as per OSH  -Pain control: Tylenol  -Follow up with NSGY upon discharge    Agitation:   Labs (except K) WNL  Head CT stable, no new findings  Hold of on further testing for infection  Continue to monitor closely   If elevated temps persist (one temp of 99.1 F axillary this am), will collect UA and repeat CBC with diff     Hypokalemia:   20 meq Kchlor ordered   repeat BMP in am    HTN: BP stable  -Continue with amlodipine  -Monitor vitals    Tachycardia: Unclear etiology. May be due to poor PO intake v poor endurance. However, still has HR in 100s after fluid hydration yesterday, but this is as per her baseline of HR 90s-100s.   - Attempted Cta to rule out PE 7/19--but pt. with very difficult IV access.  Given pt's tachycardia back to her baseline and no desaturations/ SOB (except on RA with exertion which is expected given her asthma/COPD), will hold off on CTA as suspicion for PE is low  -Monitor vitals  -Continue with FWF three times a day through the PEG while on modified diet--nectar liquids. Will decrease to 300cc as patient reports nausea with larger volumes 7/19    Asthma: Pt desated to 81% during therapy when ambulating on room air,  -- saturating well at rest on room air   -Discontinue duoneb as patient has persistent tachycardia and headache after treatment (likely due to atrovent)  -Continue with symbicort   -Monitor vitals  -Supplemental oxygen prn and titrate as tolerated    Congestion: resolved  -Continue nasal spray prn    Right facial nerve palsy/Glaucoma:  -Continue with ocular drops: Latanoprost, Lacri-Lube, Prednisolone, Artificial tears  -Will need outpatient followup    Tongue Mass:  -Completed keflex for 5 day course  -Follow up with outpatient for further workup    Thrush:  -Continue nystatin  -Continue oral care    Dysphagia: s/p PEG  -Pureed +Nectar, tsp liquids  -1:1 supervision with meals  -If eating less than 50% of meals, will supplement with TF  -Continue free water flush  -GI consult for bleeding around PEG, recs reviewed (resolved s/p silver nitrate)    Ovarian cystic mass:  -Follow up as an outpatient for further workup    Leukocytosis: Resolved. Patient afebrile and no localizing sx  -Monitor weekly labs  -Monitor vitals    Pain Mgmt - Tylenol PRN  GI/Bowel Mgmt -  Holding miralax, senna and colace given loose stool  /Bladder Mgmt - Monitor output    FEN   - Diet - Pureed +Nectar, tsp liquids  - Dysphagia  SLP - evaluation and treatment    Precautions / PROPHYLAXIS:   - Falls, Seizure   - Lungs: Aspiration, Incentive Spirometer   - Pressure injury/Skin: Turn Q2hrs while in bed, OOB to Chair, PT/OT    - DVT: Lovenox, SCDs, TEDs     Dispo:  -Patient discussed during IDT 7/16/19  -Patient progressing well with therapy, but barriers include poor endurance, visual impairments  -Currently min assist with UB dressing and mod assist with LB dressing. Min assist to contact guard with transfers. Ambulating 30-40ft with min assist. Completed 4 steps with min assist. Mild cognitive deficits.   -Goals supervision with ADLs, transfers and gait  -Plan for discharge  [facility] 7/23/19

## 2019-07-20 NOTE — PROGRESS NOTE ADULT - SUBJECTIVE AND OBJECTIVE BOX
Subjective: No acute events overnight.  Patient denies SOB, chest pain, changes in weakness or urinary sx. Patient on exam this morning was very comfortable and denies any issues (no CP, SOB, HA, dizziness, abdominal pain, n/v, bowel or bladder issues). Later in the morning patient became agitated and would not allow nursing care. Vitals were obtained during this time and . Patient had labs and a CT of the head. Family now present and patient is calm, no agitation and allowing nursing to care for her.     REVIEW OF SYMPTOMS  Pertinent in the last 24hrs: Neurological deficits, stable  Tachycardia    Vital Signs Last 24 Hrs  T(C): 37.3 (20 Jul 2019 11:20), Max: 37.3 (20 Jul 2019 11:20)  T(F): 99.2 (20 Jul 2019 11:20), Max: 99.2 (20 Jul 2019 11:20)  HR: 123 (20 Jul 2019 11:20) (105 - 123)  BP: 138/78 (20 Jul 2019 11:20) (100/61 - 138/78)  RR: 16 (20 Jul 2019 11:20) (15 - 16)  SpO2: 96% (20 Jul 2019 11:20) (95% - 98%)      PHYSICAL EXAM  Constitutional - NAD, awake  	HEENT - posterior cranial incision, c/d/i, mild erythema surrounding, non-TTP, right eye with conjunctival erythema, improving  	Chest - CTA bilaterally, no increased work of breathing, on room air  	Cardiovascular - RRR, S1S2  	Abdomen - BS+, Soft, NTND, non-TTP around PEG, no drainage  	Extremities - No calf tenderness   	Neurologic Exam -                 	   Cognitive - Drowsy, Oriented to self, place, date, year, situation. Able to follow multistep commands across midline.  	   Communication - Fluent, low volume, dysarthric  	   Cranial Nerves - PERRL, no horizontal eye movements, left facial droop, inability to fully close right eye  	   Motor - No focal deficits 5/5 in BL UE and LE  	   Sensory - Intact to light touch diffusely, impaired proprioception   Psychiatric - Affect WNL      RECENT LABS     07-20    138  |  99  |  10  ----------------------------<  140<H>  3.4<L>   |  32<H>  |  0.79    Ca    9.1      20 Jul 2019 11:15                          10.4   4.72  )-----------( 299      ( 20 Jul 2019 11:15 )             33.4   < from: CT Head No Cont (07.20.19 @ 13:40) >    EXAM:  CT BRAIN      PROCEDURE DATE:  07/20/2019        INTERPRETATION:  CT brain without contrast    Comparison 8 days ago    History altered mental status    There is an inferior occipital craniotomy defect. There is no hemorrhage,   cortical edema, mass effect or hydrocephalus.    IMPRESSION:    No acute findings                  GLENDA THEODORE M.D., ATTENDING RADIOLOGIST  This document has been electronically signed. Jul 20 2019  2:00PM        < end of copied text >        MEDICATIONS  (STANDING):  amLODIPine   Tablet 5 milliGRAM(s) Oral daily  artificial  tears Solution 1 Drop(s) Right EYE every 6 hours  buDESOnide 160 MICROgram(s)/formoterol 4.5 MICROgram(s) Inhaler 2 Puff(s) Inhalation two times a day  enoxaparin Injectable 40 milliGRAM(s) SubCutaneous two times a day  latanoprost 0.005% Ophthalmic Solution 1 Drop(s) Both EYES at bedtime  petrolatum Ophthalmic Ointment 1 Application(s) Right EYE at bedtime  potassium chloride    Tablet ER 20 milliEquivalent(s) Oral once  prednisoLONE acetate 1% Suspension 1 Drop(s) Both EYES four times a day  sodium chloride 0.65% Nasal 1 Spray(s) Both Nostrils three times a day    MEDICATIONS  (PRN):  acetaminophen   Tablet .. 650 milliGRAM(s) Oral every 6 hours PRN Temp greater or equal to 38C (100.4F), Mild Pain (1 - 3)  nystatin Powder 1 Application(s) Topical two times a day PRN Rash

## 2019-07-21 LAB
ANION GAP SERPL CALC-SCNC: 8 MMOL/L — SIGNIFICANT CHANGE UP (ref 5–17)
BUN SERPL-MCNC: 10 MG/DL — SIGNIFICANT CHANGE UP (ref 7–23)
CALCIUM SERPL-MCNC: 9.4 MG/DL — SIGNIFICANT CHANGE UP (ref 8.4–10.5)
CHLORIDE SERPL-SCNC: 100 MMOL/L — SIGNIFICANT CHANGE UP (ref 96–108)
CO2 SERPL-SCNC: 30 MMOL/L — SIGNIFICANT CHANGE UP (ref 22–31)
CREAT SERPL-MCNC: 0.82 MG/DL — SIGNIFICANT CHANGE UP (ref 0.5–1.3)
GLUCOSE SERPL-MCNC: 106 MG/DL — HIGH (ref 70–99)
POTASSIUM SERPL-MCNC: 4 MMOL/L — SIGNIFICANT CHANGE UP (ref 3.5–5.3)
POTASSIUM SERPL-SCNC: 4 MMOL/L — SIGNIFICANT CHANGE UP (ref 3.5–5.3)
SODIUM SERPL-SCNC: 138 MMOL/L — SIGNIFICANT CHANGE UP (ref 135–145)

## 2019-07-21 PROCEDURE — 99233 SBSQ HOSP IP/OBS HIGH 50: CPT

## 2019-07-21 PROCEDURE — 99232 SBSQ HOSP IP/OBS MODERATE 35: CPT

## 2019-07-21 RX ORDER — NYSTATIN 500MM UNIT
500000 POWDER (EA) MISCELLANEOUS
Refills: 0 | Status: DISCONTINUED | OUTPATIENT
Start: 2019-07-21 | End: 2019-07-23

## 2019-07-21 RX ADMIN — Medication 1 DROP(S): at 05:40

## 2019-07-21 RX ADMIN — Medication 1 DROP(S): at 18:28

## 2019-07-21 RX ADMIN — BUDESONIDE AND FORMOTEROL FUMARATE DIHYDRATE 2 PUFF(S): 160; 4.5 AEROSOL RESPIRATORY (INHALATION) at 08:33

## 2019-07-21 RX ADMIN — Medication 1 DROP(S): at 11:39

## 2019-07-21 RX ADMIN — LATANOPROST 1 DROP(S): 0.05 SOLUTION/ DROPS OPHTHALMIC; TOPICAL at 21:23

## 2019-07-21 RX ADMIN — Medication 500000 UNIT(S): at 18:29

## 2019-07-21 RX ADMIN — ENOXAPARIN SODIUM 40 MILLIGRAM(S): 100 INJECTION SUBCUTANEOUS at 06:05

## 2019-07-21 RX ADMIN — Medication 1 SPRAY(S): at 21:23

## 2019-07-21 RX ADMIN — Medication 1 DROP(S): at 18:29

## 2019-07-21 RX ADMIN — ENOXAPARIN SODIUM 40 MILLIGRAM(S): 100 INJECTION SUBCUTANEOUS at 18:28

## 2019-07-21 RX ADMIN — Medication 1 APPLICATION(S): at 21:23

## 2019-07-21 RX ADMIN — BUDESONIDE AND FORMOTEROL FUMARATE DIHYDRATE 2 PUFF(S): 160; 4.5 AEROSOL RESPIRATORY (INHALATION) at 21:28

## 2019-07-21 RX ADMIN — AMLODIPINE BESYLATE 5 MILLIGRAM(S): 2.5 TABLET ORAL at 05:40

## 2019-07-21 RX ADMIN — Medication 1 SPRAY(S): at 05:40

## 2019-07-21 RX ADMIN — Medication 1 DROP(S): at 05:39

## 2019-07-21 NOTE — PROGRESS NOTE ADULT - SUBJECTIVE AND OBJECTIVE BOX
Subjective: No acute events overnight.  Patient denies SOB, chest pain, changes in weakness or urinary sx. Patient on exam this morning was very comfortable and denies any issues (no CP, SOB, HA, dizziness, abdominal pain, n/v, bowel or bladder issues).   No further episodes of agitation. Patient has new nurse yesterday afternoon and today. Doing well. Appears that patient did not like the nurse she had yesterday morning.     REVIEW OF SYMPTOMS  Pertinent in the last 24hrs: Neurological deficits, stable  Tachycardia    Vital Signs Last 24 Hrs  T(C): 37.2 (21 Jul 2019 09:43), Max: 37.2 (20 Jul 2019 21:00)  T(F): 99 (21 Jul 2019 09:43), Max: 99 (20 Jul 2019 21:00)  HR: 99 (21 Jul 2019 09:43) (99 - 112)  BP: 126/- (21 Jul 2019 09:43) (126/- - 134/82)  RR: 14 (21 Jul 2019 07:45) (14 - 16)  SpO2: 98% (21 Jul 2019 08:33) (95% - 98%)      PHYSICAL EXAM  Constitutional - NAD, awake  	HEENT - posterior cranial incision, c/d/i, mild erythema surrounding, non-TTP, right eye with conjunctival erythema, improving  	Chest - CTA bilaterally, no increased work of breathing, on room air  	Cardiovascular - RRR, S1S2  	Abdomen - BS+, Soft, NTND, non-TTP around PEG, no drainage  	Extremities - No calf tenderness   	Neurologic Exam -                 	   Cognitive - Drowsy, Oriented to self, place, date, year, situation. Able to follow multistep commands across midline.  	   Communication - Fluent, low volume, dysarthric  	   Cranial Nerves - PERRL, no horizontal eye movements, left facial droop, inability to fully close right eye  	   Motor - No focal deficits 5/5 in BL UE and LE  	   Sensory - Intact to light touch diffusely, impaired proprioception   Psychiatric - Affect WNL      RECENT LABS  07-21    138  |  100  |  10  ----------------------------<  106<H>  4.0   |  30  |  0.82    Ca    9.4      21 Jul 2019 05:32                          10.4   4.72  )-----------( 299      ( 20 Jul 2019 11:15 )             33.4     < from: CT Head No Cont (07.20.19 @ 13:40) >    EXAM:  CT BRAIN      PROCEDURE DATE:  07/20/2019        INTERPRETATION:  CT brain without contrast    Comparison 8 days ago    History altered mental status    There is an inferior occipital craniotomy defect. There is no hemorrhage,   cortical edema, mass effect or hydrocephalus.    IMPRESSION:    No acute findings                  GLENDA THEODORE M.D., ATTENDING RADIOLOGIST  This document has been electronically signed. Jul 20 2019  2:00PM        < end of copied text >        MEDICATIONS  (STANDING):  amLODIPine   Tablet 5 milliGRAM(s) Oral daily  artificial  tears Solution 1 Drop(s) Right EYE every 6 hours  buDESOnide 160 MICROgram(s)/formoterol 4.5 MICROgram(s) Inhaler 2 Puff(s) Inhalation two times a day  enoxaparin Injectable 40 milliGRAM(s) SubCutaneous two times a day  latanoprost 0.005% Ophthalmic Solution 1 Drop(s) Both EYES at bedtime  petrolatum Ophthalmic Ointment 1 Application(s) Right EYE at bedtime  potassium chloride    Tablet ER 20 milliEquivalent(s) Oral once  prednisoLONE acetate 1% Suspension 1 Drop(s) Both EYES four times a day  sodium chloride 0.65% Nasal 1 Spray(s) Both Nostrils three times a day    MEDICATIONS  (PRN):  acetaminophen   Tablet .. 650 milliGRAM(s) Oral every 6 hours PRN Temp greater or equal to 38C (100.4F), Mild Pain (1 - 3)  nystatin Powder 1 Application(s) Topical two times a day PRN Rash

## 2019-07-21 NOTE — PROGRESS NOTE ADULT - ASSESSMENT
ASSESSMENT/PLAN  The patient is a 69 year old right handed, morbidly obese (BMI 49) woman with a past medical history of asthma, glaucoma, hypertension and osteoarthritis who presented for dizziness, balance issues found to have a posterior fossa-4th ventricle mass who was admitted to [facility] on 6/24/19 s/p planned craniotomy and resection on 6/24/19 being admitted here for rehab with Gait Instability, ADL impairments and Functional impairments.    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  -Continue with comprehensive rehab program  -Completed dexamethasone taper as per OSH  -Pain control: Tylenol  -Follow up with NSGY upon discharge    Agitation:   Resolved   Head CT stable 7/20, no new findings  Continue to monitor closely     Hypokalemia:   resolved s/p supplement on 7/20    HTN: BP stable  -Continue with amlodipine  -Monitor vitals    Tachycardia: Unclear etiology. May be due to poor PO intake v poor endurance. However, still has HR in 100s after fluid hydration yesterday, but this is as per her baseline of HR 90s-100s.   - Attempted Cta to rule out PE 7/19--but pt. with very difficult IV access.  Given pt's tachycardia back to her baseline and no desaturations/ SOB (except on RA with exertion which is expected given her asthma/COPD), will hold off on CTA as suspicion for PE is low  -Monitor vitals  -Continue with FWF three times a day through the PEG while on modified diet--nectar liquids. Will decrease to 300cc as patient reports nausea with larger volumes 7/19    Asthma: Pt desated to 81% during therapy when ambulating on room air,  -- saturating well at rest on room air   -Discontinue duoneb as patient has persistent tachycardia and headache after treatment (likely due to atrovent)  -Continue with symbicort   -Monitor vitals  -Supplemental oxygen prn and titrate as tolerated    Congestion: resolved  -Continue nasal spray prn    Right facial nerve palsy/Glaucoma:  -Continue with ocular drops: Latanoprost, Lacri-Lube, Prednisolone, Artificial tears  -Will need outpatient followup    Tongue Mass:  -Completed keflex for 5 day course  -Follow up with outpatient for further workup    Thrush:  -Continue nystatin  -Continue oral care    Dysphagia: s/p PEG  -Pureed +Nectar, tsp liquids  -1:1 supervision with meals  -If eating less than 50% of meals, will supplement with TF  -Continue free water flush  -GI consult for bleeding around PEG, recs reviewed (resolved s/p silver nitrate)    Ovarian cystic mass:  -Follow up as an outpatient for further workup    Leukocytosis: Resolved. Patient afebrile and no localizing sx  -Monitor weekly labs  -Monitor vitals    Pain Mgmt - Tylenol PRN  GI/Bowel Mgmt -  Holding miralax, senna and colace given loose stool  /Bladder Mgmt - Monitor output    FEN   - Diet - Pureed +Nectar, tsp liquids  - Dysphagia  SLP - evaluation and treatment    Precautions / PROPHYLAXIS:   - Falls, Seizure   - Lungs: Aspiration, Incentive Spirometer   - Pressure injury/Skin: Turn Q2hrs while in bed, OOB to Chair, PT/OT    - DVT: Lovenox, SCDs, TEDs     Dispo:  -Patient discussed during IDT 7/16/19  -Patient progressing well with therapy, but barriers include poor endurance, visual impairments  -Currently min assist with UB dressing and mod assist with LB dressing. Min assist to contact guard with transfers. Ambulating 30-40ft with min assist. Completed 4 steps with min assist. Mild cognitive deficits.   -Goals supervision with ADLs, transfers and gait  -Plan for discharge  [facility] 7/23/19     Continue rehab efforts

## 2019-07-21 NOTE — PROGRESS NOTE ADULT - ASSESSMENT
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    #Tachycardia; improved  - multifactorial, including deconditioning, s/p trial of IVF  -Monitor for hypoxia    #Intracranial mass s/p craniotomy and resection on 6/24:  -PT/OT/SLP  -Decadron taper completed  #Dysphagia secondary to #1 above - treated with SLP. Now on dysphagia diet     #HTN: BP stable  -Continue with amlodipine  -Monitor vitals    #Asthma, controlled:  -Continue with duoneb  -Supplemental oxygen prn     #Ovarian mass:  -Follow up as an outpatient for further workup    #DVT ppx lovenox

## 2019-07-21 NOTE — PROGRESS NOTE ADULT - SUBJECTIVE AND OBJECTIVE BOX
Patient is a 69y old  Female who presents with a chief complaint of Intracranial mass resection (20 Jul 2019 14:42)  Patient seen and examined at bedside.  Pt. with no events overnight.  Pt. with no complaints this morning.    ALLERGIES:  No Known Drug Allergies  shellfish (Angioedema; Rash)    MEDICATIONS  (STANDING):  amLODIPine   Tablet 5 milliGRAM(s) Oral daily  artificial  tears Solution 1 Drop(s) Right EYE every 6 hours  buDESOnide 160 MICROgram(s)/formoterol 4.5 MICROgram(s) Inhaler 2 Puff(s) Inhalation two times a day  enoxaparin Injectable 40 milliGRAM(s) SubCutaneous two times a day  latanoprost 0.005% Ophthalmic Solution 1 Drop(s) Both EYES at bedtime  nystatin    Suspension 729936 Unit(s) Oral two times a day  petrolatum Ophthalmic Ointment 1 Application(s) Right EYE at bedtime  prednisoLONE acetate 1% Suspension 1 Drop(s) Both EYES four times a day  sodium chloride 0.65% Nasal 1 Spray(s) Both Nostrils three times a day    MEDICATIONS  (PRN):  acetaminophen   Tablet .. 650 milliGRAM(s) Oral every 6 hours PRN Temp greater or equal to 38C (100.4F), Mild Pain (1 - 3)  nystatin Powder 1 Application(s) Topical two times a day PRN Rash    Vital Signs Last 24 Hrs  T(F): 99 (21 Jul 2019 09:43), Max: 99.2 (20 Jul 2019 11:20)  HR: 99 (21 Jul 2019 09:43) (99 - 123)  BP: 126/- (21 Jul 2019 09:43) (126/- - 138/78)  RR: 14 (21 Jul 2019 07:45) (14 - 16)  SpO2: 98% (21 Jul 2019 08:33) (95% - 98%)  I&O's Summary    20 Jul 2019 07:01  -  21 Jul 2019 07:00  --------------------------------------------------------  IN: 1090 mL / OUT: 0 mL / NET: 1090 mL      PHYSICAL EXAM:  General: NAD, A/O x 3.  Pt. being fed by nurse at bedside.  Head:  s/p craniotomy, wound clean and dry  Neck: Supple, No JVD  Lungs: Clear to auscultation bilaterally  Cardio: RRR, S1/S2, No murmurs  Abdomen: Soft, Nontender, Nondistended; Bowel sounds present  Extremities: No calf tenderness, No pitting edema  Neuro: +dysarthria    LABS:                        10.4   4.72  )-----------( 299      ( 20 Jul 2019 11:15 )             33.4     07-21    138  |  100  |  10  ----------------------------<  106  4.0   |  30  |  0.82    Ca    9.4      21 Jul 2019 05:32      eGFR if Non African American: 72 mL/min/1.73M2 (07-21-19 @ 05:32)  eGFR if African American: 84 mL/min/1.73M2 (07-21-19 @ 05:32)          06-26 [id number] 5.1          RADIOLOGY & ADDITIONAL TESTS:    Care Discussed with Consultants/Other Providers:

## 2019-07-22 ENCOUNTER — TRANSCRIPTION ENCOUNTER (OUTPATIENT)
Age: 69
End: 2019-07-22

## 2019-07-22 LAB
ALBUMIN SERPL ELPH-MCNC: 2.9 G/DL — LOW (ref 3.3–5)
ALP SERPL-CCNC: 100 U/L — SIGNIFICANT CHANGE UP (ref 40–120)
ALT FLD-CCNC: 74 U/L DA — HIGH (ref 10–45)
ANION GAP SERPL CALC-SCNC: 6 MMOL/L — SIGNIFICANT CHANGE UP (ref 5–17)
ANION GAP SERPL CALC-SCNC: 8 MMOL/L — SIGNIFICANT CHANGE UP (ref 5–17)
APPEARANCE UR: CLEAR — SIGNIFICANT CHANGE UP
AST SERPL-CCNC: 26 U/L — SIGNIFICANT CHANGE UP (ref 10–40)
BACTERIA # UR AUTO: NEGATIVE /HPF — SIGNIFICANT CHANGE UP
BASOPHILS # BLD AUTO: 0.02 K/UL — SIGNIFICANT CHANGE UP (ref 0–0.2)
BASOPHILS # BLD AUTO: 0.03 K/UL — SIGNIFICANT CHANGE UP (ref 0–0.2)
BASOPHILS NFR BLD AUTO: 0.4 % — SIGNIFICANT CHANGE UP (ref 0–2)
BASOPHILS NFR BLD AUTO: 0.5 % — SIGNIFICANT CHANGE UP (ref 0–2)
BILIRUB SERPL-MCNC: 0.6 MG/DL — SIGNIFICANT CHANGE UP (ref 0.2–1.2)
BILIRUB UR-MCNC: NEGATIVE — SIGNIFICANT CHANGE UP
BUN SERPL-MCNC: 11 MG/DL — SIGNIFICANT CHANGE UP (ref 7–23)
BUN SERPL-MCNC: 11 MG/DL — SIGNIFICANT CHANGE UP (ref 7–23)
CALCIUM SERPL-MCNC: 9.2 MG/DL — SIGNIFICANT CHANGE UP (ref 8.4–10.5)
CALCIUM SERPL-MCNC: 9.6 MG/DL — SIGNIFICANT CHANGE UP (ref 8.4–10.5)
CHLORIDE SERPL-SCNC: 100 MMOL/L — SIGNIFICANT CHANGE UP (ref 96–108)
CHLORIDE SERPL-SCNC: 99 MMOL/L — SIGNIFICANT CHANGE UP (ref 96–108)
CO2 SERPL-SCNC: 32 MMOL/L — HIGH (ref 22–31)
CO2 SERPL-SCNC: 32 MMOL/L — HIGH (ref 22–31)
COLOR SPEC: YELLOW — SIGNIFICANT CHANGE UP
CREAT SERPL-MCNC: 0.72 MG/DL — SIGNIFICANT CHANGE UP (ref 0.5–1.3)
CREAT SERPL-MCNC: 0.79 MG/DL — SIGNIFICANT CHANGE UP (ref 0.5–1.3)
D DIMER BLD IA.RAPID-MCNC: 393 NG/ML DDU — HIGH
DIFF PNL FLD: NEGATIVE — SIGNIFICANT CHANGE UP
EOSINOPHIL # BLD AUTO: 0.24 K/UL — SIGNIFICANT CHANGE UP (ref 0–0.5)
EOSINOPHIL # BLD AUTO: 0.27 K/UL — SIGNIFICANT CHANGE UP (ref 0–0.5)
EOSINOPHIL NFR BLD AUTO: 4.5 % — SIGNIFICANT CHANGE UP (ref 0–6)
EOSINOPHIL NFR BLD AUTO: 4.6 % — SIGNIFICANT CHANGE UP (ref 0–6)
EPI CELLS # UR: SIGNIFICANT CHANGE UP
GLUCOSE SERPL-MCNC: 115 MG/DL — HIGH (ref 70–99)
GLUCOSE SERPL-MCNC: 98 MG/DL — SIGNIFICANT CHANGE UP (ref 70–99)
GLUCOSE UR QL: NEGATIVE — SIGNIFICANT CHANGE UP
HCT VFR BLD CALC: 33.5 % — LOW (ref 34.5–45)
HCT VFR BLD CALC: 35.6 % — SIGNIFICANT CHANGE UP (ref 34.5–45)
HGB BLD-MCNC: 10.3 G/DL — LOW (ref 11.5–15.5)
HGB BLD-MCNC: 11 G/DL — LOW (ref 11.5–15.5)
HYALINE CASTS # UR AUTO: ABNORMAL (ref 0–7)
IMM GRANULOCYTES NFR BLD AUTO: 0.6 % — SIGNIFICANT CHANGE UP (ref 0–1.5)
IMM GRANULOCYTES NFR BLD AUTO: 0.8 % — SIGNIFICANT CHANGE UP (ref 0–1.5)
KETONES UR-MCNC: ABNORMAL
LEUKOCYTE ESTERASE UR-ACNC: ABNORMAL
LYMPHOCYTES # BLD AUTO: 1.41 K/UL — SIGNIFICANT CHANGE UP (ref 1–3.3)
LYMPHOCYTES # BLD AUTO: 1.42 K/UL — SIGNIFICANT CHANGE UP (ref 1–3.3)
LYMPHOCYTES # BLD AUTO: 24.1 % — SIGNIFICANT CHANGE UP (ref 13–44)
LYMPHOCYTES # BLD AUTO: 26.6 % — SIGNIFICANT CHANGE UP (ref 13–44)
MCHC RBC-ENTMCNC: 27.5 PG — SIGNIFICANT CHANGE UP (ref 27–34)
MCHC RBC-ENTMCNC: 27.7 PG — SIGNIFICANT CHANGE UP (ref 27–34)
MCHC RBC-ENTMCNC: 30.7 GM/DL — LOW (ref 32–36)
MCHC RBC-ENTMCNC: 30.9 GM/DL — LOW (ref 32–36)
MCV RBC AUTO: 89.6 FL — SIGNIFICANT CHANGE UP (ref 80–100)
MCV RBC AUTO: 89.7 FL — SIGNIFICANT CHANGE UP (ref 80–100)
MONOCYTES # BLD AUTO: 0.79 K/UL — SIGNIFICANT CHANGE UP (ref 0–0.9)
MONOCYTES # BLD AUTO: 0.82 K/UL — SIGNIFICANT CHANGE UP (ref 0–0.9)
MONOCYTES NFR BLD AUTO: 13.9 % — SIGNIFICANT CHANGE UP (ref 2–14)
MONOCYTES NFR BLD AUTO: 14.9 % — HIGH (ref 2–14)
NEUTROPHILS # BLD AUTO: 2.82 K/UL — SIGNIFICANT CHANGE UP (ref 1.8–7.4)
NEUTROPHILS # BLD AUTO: 3.31 K/UL — SIGNIFICANT CHANGE UP (ref 1.8–7.4)
NEUTROPHILS NFR BLD AUTO: 53 % — SIGNIFICANT CHANGE UP (ref 43–77)
NEUTROPHILS NFR BLD AUTO: 56.1 % — SIGNIFICANT CHANGE UP (ref 43–77)
NITRITE UR-MCNC: NEGATIVE — SIGNIFICANT CHANGE UP
NRBC # BLD: 0 /100 WBCS — SIGNIFICANT CHANGE UP (ref 0–0)
NRBC # BLD: 0 /100 WBCS — SIGNIFICANT CHANGE UP (ref 0–0)
PH UR: 5 — SIGNIFICANT CHANGE UP (ref 5–8)
PLATELET # BLD AUTO: 320 K/UL — SIGNIFICANT CHANGE UP (ref 150–400)
PLATELET # BLD AUTO: 335 K/UL — SIGNIFICANT CHANGE UP (ref 150–400)
POTASSIUM SERPL-MCNC: 3.9 MMOL/L — SIGNIFICANT CHANGE UP (ref 3.5–5.3)
POTASSIUM SERPL-MCNC: 4 MMOL/L — SIGNIFICANT CHANGE UP (ref 3.5–5.3)
POTASSIUM SERPL-SCNC: 3.9 MMOL/L — SIGNIFICANT CHANGE UP (ref 3.5–5.3)
POTASSIUM SERPL-SCNC: 4 MMOL/L — SIGNIFICANT CHANGE UP (ref 3.5–5.3)
PROCALCITONIN SERPL-MCNC: 0.03 NG/ML — SIGNIFICANT CHANGE UP
PROT SERPL-MCNC: 7 G/DL — SIGNIFICANT CHANGE UP (ref 6–8.3)
PROT UR-MCNC: 15
RBC # BLD: 3.74 M/UL — LOW (ref 3.8–5.2)
RBC # BLD: 3.97 M/UL — SIGNIFICANT CHANGE UP (ref 3.8–5.2)
RBC # FLD: 16.4 % — HIGH (ref 10.3–14.5)
RBC # FLD: 16.5 % — HIGH (ref 10.3–14.5)
RBC CASTS # UR COMP ASSIST: NEGATIVE /HPF — SIGNIFICANT CHANGE UP (ref 0–4)
SODIUM SERPL-SCNC: 138 MMOL/L — SIGNIFICANT CHANGE UP (ref 135–145)
SODIUM SERPL-SCNC: 139 MMOL/L — SIGNIFICANT CHANGE UP (ref 135–145)
SP GR SPEC: 1.01 — SIGNIFICANT CHANGE UP (ref 1.01–1.02)
UROBILINOGEN FLD QL: 1
WBC # BLD: 5.31 K/UL — SIGNIFICANT CHANGE UP (ref 3.8–10.5)
WBC # BLD: 5.9 K/UL — SIGNIFICANT CHANGE UP (ref 3.8–10.5)
WBC # FLD AUTO: 5.31 K/UL — SIGNIFICANT CHANGE UP (ref 3.8–10.5)
WBC # FLD AUTO: 5.9 K/UL — SIGNIFICANT CHANGE UP (ref 3.8–10.5)
WBC UR QL: SIGNIFICANT CHANGE UP /HPF (ref 0–5)

## 2019-07-22 PROCEDURE — 99232 SBSQ HOSP IP/OBS MODERATE 35: CPT

## 2019-07-22 PROCEDURE — 71045 X-RAY EXAM CHEST 1 VIEW: CPT | Mod: 26

## 2019-07-22 RX ORDER — VANCOMYCIN HCL 1 G
1750 VIAL (EA) INTRAVENOUS ONCE
Refills: 0 | Status: COMPLETED | OUTPATIENT
Start: 2019-07-22 | End: 2019-07-22

## 2019-07-22 RX ORDER — VANCOMYCIN HCL 1 G
VIAL (EA) INTRAVENOUS
Refills: 0 | Status: DISCONTINUED | OUTPATIENT
Start: 2019-07-22 | End: 2019-07-22

## 2019-07-22 RX ORDER — FLUTICASONE PROPIONATE 220 MCG
2 AEROSOL WITH ADAPTER (GRAM) INHALATION
Qty: 0 | Refills: 0 | DISCHARGE

## 2019-07-22 RX ORDER — NYSTATIN CREAM 100000 [USP'U]/G
1 CREAM TOPICAL
Qty: 0 | Refills: 0 | DISCHARGE
Start: 2019-07-22

## 2019-07-22 RX ORDER — PIPERACILLIN AND TAZOBACTAM 4; .5 G/20ML; G/20ML
3.38 INJECTION, POWDER, LYOPHILIZED, FOR SOLUTION INTRAVENOUS EVERY 8 HOURS
Refills: 0 | Status: DISCONTINUED | OUTPATIENT
Start: 2019-07-22 | End: 2019-07-23

## 2019-07-22 RX ORDER — PREDNISOLONE SODIUM PHOSPHATE 1 %
1 DROPS OPHTHALMIC (EYE)
Qty: 0 | Refills: 0 | DISCHARGE
Start: 2019-07-22

## 2019-07-22 RX ORDER — PREDNISOLONE SODIUM PHOSPHATE 1 %
1 DROPS OPHTHALMIC (EYE)
Qty: 0 | Refills: 0 | DISCHARGE

## 2019-07-22 RX ORDER — ACETAMINOPHEN 500 MG
2 TABLET ORAL
Qty: 0 | Refills: 0 | DISCHARGE
Start: 2019-07-22

## 2019-07-22 RX ORDER — PIPERACILLIN AND TAZOBACTAM 4; .5 G/20ML; G/20ML
3.38 INJECTION, POWDER, LYOPHILIZED, FOR SOLUTION INTRAVENOUS ONCE
Refills: 0 | Status: COMPLETED | OUTPATIENT
Start: 2019-07-22 | End: 2019-07-23

## 2019-07-22 RX ORDER — VANCOMYCIN HCL 1 G
1750 VIAL (EA) INTRAVENOUS EVERY 12 HOURS
Refills: 0 | Status: DISCONTINUED | OUTPATIENT
Start: 2019-07-23 | End: 2019-07-23

## 2019-07-22 RX ORDER — SODIUM CHLORIDE 9 MG/ML
3600 INJECTION INTRAMUSCULAR; INTRAVENOUS; SUBCUTANEOUS ONCE
Refills: 0 | Status: COMPLETED | OUTPATIENT
Start: 2019-07-22 | End: 2019-07-22

## 2019-07-22 RX ORDER — VANCOMYCIN HCL 1 G
VIAL (EA) INTRAVENOUS
Refills: 0 | Status: COMPLETED | OUTPATIENT
Start: 2019-07-22 | End: 2019-07-23

## 2019-07-22 RX ORDER — NYSTATIN 500MM UNIT
5 POWDER (EA) MISCELLANEOUS
Qty: 0 | Refills: 0 | DISCHARGE
Start: 2019-07-22

## 2019-07-22 RX ORDER — BUDESONIDE AND FORMOTEROL FUMARATE DIHYDRATE 160; 4.5 UG/1; UG/1
2 AEROSOL RESPIRATORY (INHALATION)
Qty: 0 | Refills: 0 | DISCHARGE
Start: 2019-07-22

## 2019-07-22 RX ORDER — VANCOMYCIN HCL 1 G
1750 VIAL (EA) INTRAVENOUS ONCE
Refills: 0 | Status: DISCONTINUED | OUTPATIENT
Start: 2019-07-22 | End: 2019-07-22

## 2019-07-22 RX ORDER — SODIUM CHLORIDE 0.65 %
1 AEROSOL, SPRAY (ML) NASAL
Qty: 0 | Refills: 0 | DISCHARGE
Start: 2019-07-22

## 2019-07-22 RX ORDER — AMLODIPINE BESYLATE 2.5 MG/1
1 TABLET ORAL
Qty: 0 | Refills: 0 | DISCHARGE
Start: 2019-07-22

## 2019-07-22 RX ADMIN — Medication 1 SPRAY(S): at 05:44

## 2019-07-22 RX ADMIN — Medication 1 DROP(S): at 14:23

## 2019-07-22 RX ADMIN — Medication 500000 UNIT(S): at 17:52

## 2019-07-22 RX ADMIN — BUDESONIDE AND FORMOTEROL FUMARATE DIHYDRATE 2 PUFF(S): 160; 4.5 AEROSOL RESPIRATORY (INHALATION) at 08:46

## 2019-07-22 RX ADMIN — ENOXAPARIN SODIUM 40 MILLIGRAM(S): 100 INJECTION SUBCUTANEOUS at 17:50

## 2019-07-22 RX ADMIN — Medication 500000 UNIT(S): at 05:45

## 2019-07-22 RX ADMIN — SODIUM CHLORIDE 3600 MILLILITER(S): 9 INJECTION INTRAMUSCULAR; INTRAVENOUS; SUBCUTANEOUS at 23:00

## 2019-07-22 RX ADMIN — Medication 1 DROP(S): at 17:51

## 2019-07-22 RX ADMIN — BUDESONIDE AND FORMOTEROL FUMARATE DIHYDRATE 2 PUFF(S): 160; 4.5 AEROSOL RESPIRATORY (INHALATION) at 21:20

## 2019-07-22 RX ADMIN — NYSTATIN CREAM 1 APPLICATION(S): 100000 CREAM TOPICAL at 17:50

## 2019-07-22 RX ADMIN — Medication 1 SPRAY(S): at 21:29

## 2019-07-22 RX ADMIN — Medication 1 DROP(S): at 05:44

## 2019-07-22 RX ADMIN — ENOXAPARIN SODIUM 40 MILLIGRAM(S): 100 INJECTION SUBCUTANEOUS at 05:44

## 2019-07-22 RX ADMIN — LATANOPROST 1 DROP(S): 0.05 SOLUTION/ DROPS OPHTHALMIC; TOPICAL at 21:29

## 2019-07-22 RX ADMIN — Medication 1 DROP(S): at 12:01

## 2019-07-22 RX ADMIN — AMLODIPINE BESYLATE 5 MILLIGRAM(S): 2.5 TABLET ORAL at 05:44

## 2019-07-22 RX ADMIN — Medication 250 MILLIGRAM(S): at 23:02

## 2019-07-22 RX ADMIN — Medication 650 MILLIGRAM(S): at 21:23

## 2019-07-22 RX ADMIN — Medication 1 APPLICATION(S): at 21:30

## 2019-07-22 NOTE — PROGRESS NOTE ADULT - SUBJECTIVE AND OBJECTIVE BOX
Subjective: No acute events overnight. Patient reports that she is doing well. Denies headache, chest pain, SOB, abdominal or urinary sx. Reports that she did not have episode of diarrhea over the weekend.     REVIEW OF SYMPTOMS  Pertinent in the last 24hrs: Neurological deficits, stable  Tachycardia, stable    VITALS  Vital Signs Last 24 Hrs  T(C): 37.4 (22 Jul 2019 08:27), Max: 37.4 (22 Jul 2019 08:27)  T(F): 99.3 (22 Jul 2019 08:27), Max: 99.3 (22 Jul 2019 08:27)  HR: 62 (22 Jul 2019 08:47) (62 - 109)  BP: 112/83 (22 Jul 2019 08:27) (108/68 - 131/81)  BP(mean): --  RR: 14 (22 Jul 2019 08:27) (14 - 14)  SpO2: 96% (22 Jul 2019 08:47) (92% - 96%)      PHYSICAL EXAM  Constitutional - NAD, awake  	HEENT - posterior cranial incision, c/d/i, mild erythema surrounding, non-TTP, right eye with conjunctival erythema, improving  	Chest - CTA bilaterally, no increased work of breathing, on room air  	Cardiovascular - RRR, S1S2  	Abdomen - BS+, Soft, NTND, non-TTP around PEG, no drainage  	Extremities - No calf tenderness   	Neurologic Exam -                 	   Cognitive - Drowsy, Oriented to self, place, date, year, situation. Able to follow multistep commands across midline.  	   Communication - Fluent, low volume, dysarthric  	   Cranial Nerves - PERRL, no horizontal eye movements, left facial droop, inability to fully close right eye  	   Motor - No focal deficits 5/5 in BL UE and LE  	   Sensory - Intact to light touch diffusely, impaired proprioception   Psychiatric - Affect WNL      RECENT LABS                        10.3   5.31  )-----------( 320      ( 22 Jul 2019 07:47 )             33.5     07-22    138  |  100  |  11  ----------------------------<  98  3.9   |  32<H>  |  0.72    Ca    9.2      22 Jul 2019 07:47              RADIOLOGY/OTHER RESULTS      MEDICATIONS  (STANDING):  amLODIPine   Tablet 5 milliGRAM(s) Oral daily  artificial  tears Solution 1 Drop(s) Right EYE every 6 hours  buDESOnide 160 MICROgram(s)/formoterol 4.5 MICROgram(s) Inhaler 2 Puff(s) Inhalation two times a day  enoxaparin Injectable 40 milliGRAM(s) SubCutaneous two times a day  latanoprost 0.005% Ophthalmic Solution 1 Drop(s) Both EYES at bedtime  nystatin    Suspension [id number] Unit(s) Oral two times a day  petrolatum Ophthalmic Ointment 1 Application(s) Right EYE at bedtime  prednisoLONE acetate 1% Suspension 1 Drop(s) Both EYES four times a day  sodium chloride 0.65% Nasal 1 Spray(s) Both Nostrils three times a day    MEDICATIONS  (PRN):  acetaminophen   Tablet .. 650 milliGRAM(s) Oral every 6 hours PRN Temp greater or equal to 38C (100.4F), Mild Pain (1 - 3)  nystatin Powder 1 Application(s) Topical two times a day PRN Rash

## 2019-07-22 NOTE — CHART NOTE - NSCHARTNOTEFT_GEN_A_CORE
PGY-3 Event Note    Notified by RN that patient had oral temp of 100.5, temp was then taken rectally- 101.0 with tachycardia . Patient noted to have low grade temp earlier in the day 99.3  O2 sat 91% on room air, placed on 2L O2 via NC and O2 sat improved to 96%.   Denied any pain, headaches, shortness of breath, chest pain, nausea. No lightheadedness or dizziness.  On exam patient was awake slightly lethargic but answering questions, oriented to person, place, and time  No focal neurological deficits noted. Surgical incision site clean.  Lungs clear to auscultation bilaterally. Abdomen soft, non-distended, no calf tenderness or lower extremity edema    Sepsis protocol ordered- Labs ordered- CBC, CMP stable, slight increase ALT. Procalcitonin wnl  Blood cultures and fluids ordered via protocol. Lactate order not initially activated, delay in drawing lactate and D-dimer due to difficult venipuncture and IV access.  Empiric IV antibiotics started- Vanc and Zosyn  Lactate drawn after about 600 ccs Normal saline given  CXR viewed, no acute process identified- confirmed on phone with radiology on call.  UA grossly negative.    Patient in no acute distress. Will follow up labs    Discussed with Hospitalist Dr. Morfin PGY-3 Event Note    Notified by RN that patient had oral temp of 100.5, temp was then taken rectally- 101.0 with tachycardia . Patient noted to have low grade temp earlier in the day 99.3  O2 sat 91% on room air, placed on 2L O2 via NC and O2 sat improved to 96%.   Denied any pain, headaches, shortness of breath, chest pain, nausea. No lightheadedness or dizziness.  On exam patient was awake slightly lethargic but answering questions, oriented to person, place, and time  No focal neurological deficits noted. Surgical incision site clean.  Lungs clear to auscultation bilaterally. Abdomen soft, non-distended, no calf tenderness or lower extremity edema    Sepsis protocol ordered- Labs ordered- CBC, CMP stable, slight increase ALT. Procalcitonin wnl  Blood cultures and fluids ordered via protocol. Lactate order not initially activated, delay in drawing lactate and D-dimer due to difficult venipuncture and IV access.  Empiric IV antibiotics started- Vanc and Zosyn  Lactate drawn after about 600 ccs Normal saline given  CXR viewed, no acute process identified- confirmed on phone with radiology on call.  UA grossly negative.    Patient in no acute distress. Will follow up labs    Discussed with Hospitalist Dr. Morfin      Addendum: 1AM  Patient with elevated D-dimer 393, lactate normal. Can stop fluids and stop antibiotics after completing first dose.  Patient with difficult IV access, Cr okay but has shellfish allergy with reported angioedema, rash.  Will check ABG on supplemental oxygen to make sure patient not hypoxic, if okay, can do V/Q scan tomorrow. Will also order Dopplers Lower Extremities for AM.  Vitals- still with elevated HR, at 98. T improved to 98.9. /74.  Repeat Labs ordered for morning. PGY-3 Event Note    Notified by RN that patient had oral temp of 100.5, temp was then taken rectally- 101.0 with tachycardia . Patient noted to have low grade temp earlier in the day 99.3  O2 sat 91% on room air, placed on 2L O2 via NC and O2 sat improved to 96%.   Denied any pain, headaches, shortness of breath, chest pain, nausea. No lightheadedness or dizziness.  On exam patient was awake slightly lethargic but answering questions, oriented to person, place, and time  No focal neurological deficits noted. Surgical incision site clean.  Lungs clear to auscultation bilaterally. Abdomen soft, non-distended, no calf tenderness or lower extremity edema    Sepsis protocol ordered- Labs ordered- CBC, CMP stable, slight increase ALT. Procalcitonin wnl  Blood cultures and fluids ordered via protocol. Lactate order not initially activated, delay in drawing lactate and D-dimer due to difficult venipuncture and IV access.  Empiric IV antibiotics started- Vanc and Zosyn  Lactate drawn after about 600 ccs Normal saline given  CXR viewed, no acute process identified- confirmed on phone with radiology on call.  UA grossly negative.    Patient in no acute distress. Will follow up labs    Discussed with Hospitalist Dr. Morfin      Addendum: 1AM  Patient with elevated D-dimer 393, lactate normal. Can stop fluids and stop antibiotics after completing first dose.  Patient with difficult IV access, Cr okay but has shellfish allergy with reported angioedema, rash.  Will check ABG on supplemental oxygen to make sure patient not hypoxic, if okay, can do V/Q scan tomorrow. Will also order Dopplers Lower Extremities for AM.  Vitals- still with elevated HR, at 98. T improved to 98.9. /74.  Repeat Labs ordered for morning.    ABG with normal range pH and pO2; pCO2 slightly elevated at 50.8; Patient with no increased work of breathing, no accessory muscle use; alert and oriented.

## 2019-07-22 NOTE — PROGRESS NOTE ADULT - ASSESSMENT
ASSESSMENT/PLAN  The patient is a 69 year old right handed, morbidly obese (BMI 49) woman with a past medical history of asthma, glaucoma, hypertension and osteoarthritis who presented for dizziness, balance issues found to have a posterior fossa-4th ventricle mass who was admitted to [facility] on 6/24/19 s/p planned craniotomy and resection on 6/24/19 being admitted here for rehab with Gait Instability, ADL impairments and Functional impairments.    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  -Continue with comprehensive rehab program  -Completed dexamethasone taper as per OSH  -Pain control: Tylenol  -Follow up with NSGY upon discharge    Agitation: Resolved. Head CT stable 7/20, no new findings  -Continue to monitor closely     HTN: BP stable  -Continue with amlodipine  -Monitor vitals    Tachycardia: Unclear etiology. May be due to poor PO intake v poor endurance. HR at baseline with HR 90s-100s.   - Attempted Cta to rule out PE 7/19--but pt. with very difficult IV access.  Given pt's tachycardia back to her baseline and no desaturations/ SOB (except on RA with exertion which is expected given her asthma/COPD), will hold off on CTA as suspicion for PE is low  -Monitor vitals  -Continue with FWF three times a day through the PEG while on modified diet--nectar liquids. Will decrease to 300cc as patient reports nausea with larger volumes 7/19    Asthma: Pt desated to 81% during therapy when ambulating on room air. Saturating well at rest on room air   -Discontinue duoneb as patient has persistent tachycardia and headache after treatment (likely due to atrovent)  -Continue with symbicort   -Monitor vitals  -Supplemental oxygen prn and titrate as tolerated    Right facial nerve palsy/Glaucoma:  -Continue with ocular drops: Latanoprost, Lacri-Lube, Prednisolone, Artificial tears  -Will need outpatient followup    Tongue Mass:  -Completed keflex for 5 day course  -Follow up with outpatient for further workup    Thrush:  -Continue nystatin  -Continue oral care    Dysphagia: s/p PEG  -Pureed +Nectar, tsp liquids  -1:1 supervision with meals  -If eating less than 50% of meals, will supplement with TF  -Continue free water flush  -GI consult for bleeding around PEG, recs reviewed (resolved s/p silver nitrate)    Ovarian cystic mass:  -Follow up as an outpatient for further workup    Leukocytosis: Resolved. Patient afebrile and no localizing sx  -Monitor weekly labs  -Monitor vitals    Pain Mgmt - Tylenol PRN  GI/Bowel Mgmt -  Holding miralax, senna and colace given loose stool  /Bladder Mgmt - Monitor output    FEN   - Diet - Pureed +Nectar, tsp liquids  - Dysphagia  SLP - evaluation and treatment    Precautions / PROPHYLAXIS:   - Falls, Seizure   - Lungs: Aspiration, Incentive Spirometer   - Pressure injury/Skin: Turn Q2hrs while in bed, OOB to Chair, PT/OT    - DVT: Lovenox, SCDs, TEDs     Dispo:  -Patient discussed during IDT 7/16/19  -Patient progressing well with therapy, but barriers include poor endurance, visual impairments  -Currently min assist with UB dressing and mod assist with LB dressing. Min assist to contact guard with transfers. Ambulating 30-40ft with min assist. Completed 4 steps with min assist. Mild cognitive deficits.   -Goals supervision with ADLs, transfers and gait  -Plan for discharge  [facility] 7/23/19

## 2019-07-22 NOTE — PROGRESS NOTE ADULT - ASSESSMENT
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    #Tachycardia: stable  - multifactorial, including deconditioning, s/p trial of IVF  -Monitor for hypoxia    #Intracranial mass s/p craniotomy and resection on 6/24:  -PT/OT/SLP  -Decadron taper completed  #Dysphagia secondary to #1 above - treated with SLP. Now on dysphagia diet     #HTN: BP stable  -Continue with amlodipine  -Monitor vitals    #Asthma, controlled:  -Continue with symbicort  -Duoneb PRN    #Ovarian mass:  - Follow up as an outpatient for further workup    #DVT ppx lovenox

## 2019-07-22 NOTE — DISCHARGE NOTE PROVIDER - INSTRUCTIONS
Puree+Nectar Liquid Diet  Ensure Enlive 1 can, 2x a day  If eating less than 50% of meals, consider 1 can Vital 1.5 tube feed  Free water flush 300cc 3 times a day, at least 1 hour after meals Puree+Nectar Liquid Diet  Ensure Enlive 1 can, 2x a day  If eating less than 50% of meals, consider 1 can Vital 1.5 tube feed PRN  Free water flush 300cc 3 times a day, at least 1 hour after meals

## 2019-07-22 NOTE — DISCHARGE NOTE PROVIDER - NSDCACTIVITY_GEN_ALL_CORE
Walking - Outdoors allowed/Do not drive or operate machinery/Walking - Indoors allowed/Bathing allowed/No heavy lifting/straining/Stairs allowed/Activity allowed as recommended by physician and therapists/Showering allowed

## 2019-07-22 NOTE — DISCHARGE NOTE PROVIDER - HOSPITAL COURSE
The patient is a 69 year old right handed, morbidly obese (BMI 49) woman with a past medical history of asthma, glaucoma, hypertension and osteoarthritis who presented for dizziness, balance issues found to have a posterior fossa-4th ventricle mass who was admitted to [facility] on 6/24/19 s/p planned craniotomy and resection on 6/24/19 being admitted here for rehab. She presented to her PMD with light headedness, dizziness & balance disturbance of three months duration. Outpatient MRI revealed a heterogeneously enhancing lesion completely filling the fourth ventricle, obstructing the circulation of cerebrospinal fluid and early hydrocephalus with transependymal resorption.  She was admitted on 6/24/19 to [facility] for planned stereotactic suboccipital craniotomy for 4th ventricular tumor resection. Gross total resection was achieved and the patient was transferred to the neurosurgical ICU post op. Pathology revealed sub ependymoma. Her course was complicated by post op imaging bilateral intraventricular hemorrhage and was monitored closely. Patient did not require further surgical intervention.         Post operatively her course was complicated by dysphagia now s/p peg on 7/5 with post procedural bleeding around the PEG site controlled with silver nitrate. Her course was complicated by throat and left ear pain. Laryngoscopy revealed a tongue mass and CT imaging showed soft tissue swelling extending into hypopharynx and left AE fold, along with a soft tissue mass in left thyroid gland. ENT consulted and recommended keflex x 5 days and follow up outpatient in 2 weeks for thyroid ultrasound. She was also evaluated post operatively for right facial nerve weakness and incomplete eye closure and treated with topical eye lubrication. Patient medically stabilized and admitted here for rehab. The patient is a 69 year old right handed, morbidly obese (BMI 49) woman with a past medical history of asthma, glaucoma, hypertension and osteoarthritis who presented for dizziness, balance issues found to have a posterior fossa-4th ventricle mass who was admitted to [facility] on 6/24/19 s/p planned craniotomy and resection on 6/24/19 being admitted here for rehab. She presented to her PMD with light headedness, dizziness & balance disturbance of three months duration. Outpatient MRI revealed a heterogeneously enhancing lesion completely filling the fourth ventricle, obstructing the circulation of cerebrospinal fluid and early hydrocephalus with transependymal resorption.  She was admitted on 6/24/19 to [facility] for planned stereotactic suboccipital craniotomy for 4th ventricular tumor resection. Gross total resection was achieved and the patient was transferred to the neurosurgical ICU post op. Pathology revealed sub ependymoma. Her course was complicated by post op imaging bilateral intraventricular hemorrhage and was monitored closely. Patient did not require further surgical intervention.         Post operatively her course was complicated by dysphagia now s/p peg on 7/5 with post procedural bleeding around the PEG site controlled with silver nitrate. Her course was complicated by throat and left ear pain. Laryngoscopy revealed a tongue mass and CT imaging showed soft tissue swelling extending into hypopharynx and left AE fold, along with a soft tissue mass in left thyroid gland. ENT consulted and recommended keflex x 5 days and follow up outpatient in 2 weeks for thyroid ultrasound. She was also evaluated post operatively for right facial nerve weakness and incomplete eye closure and treated with topical eye lubrication. Patient medically stabilized and admitted here for rehab.         Patient was admitted here for rehab. During her course, she was noted to have bleeding around her PEG tube. GI was consulted and applied silver nitrate with improvement.  Her course was complicated by diarrhea, which improved after upgrading her diet to pureed+nectar. She also had episodes of agitation. Labs were stable and CT head did not show acute findings. Patient remained stable at time of discharge. Patient had baseline tachycardia 90-100s likely due to poor endurance and poor PO intake due to modified diet. Patient remained stable at time of discharge and will continue to follow up with her primary care doctor for further management. For her asthma, patient desated to 80% while ambulating. She was conitnued on supplemental oxygen as needed and will continue to follow up with her primary care doctor for further management upon discharge. For her tongue mass, patient completed her course of Keflex and will follow up as an outpatient for further workup.         Patient made functional improvements with ADLs, mobility, cognition and swallowing through therapy. At time of discharge, patient was min assist to contact guard with transfers and ambulated 30-40ft with min assist. Patient will continue with therapy upon discharge. Patient medically stable for discharge to subacute facility. The patient is a 69 year old right handed, morbidly obese (BMI 49) woman with a past medical history of asthma, glaucoma, hypertension and osteoarthritis who presented for dizziness, balance issues found to have a posterior fossa-4th ventricle mass who was admitted to [facility] on 6/24/19 s/p planned craniotomy and resection on 6/24/19 being admitted here for rehab. She presented to her PMD with light headedness, dizziness & balance disturbance of three months duration. Outpatient MRI revealed a heterogeneously enhancing lesion completely filling the fourth ventricle, obstructing the circulation of cerebrospinal fluid and early hydrocephalus with transependymal resorption.  She was admitted on 6/24/19 to [facility] for planned stereotactic suboccipital craniotomy for 4th ventricular tumor resection. Gross total resection was achieved and the patient was transferred to the neurosurgical ICU post op. Pathology revealed sub ependymoma. Her course was complicated by post op imaging bilateral intraventricular hemorrhage and was monitored closely. Patient did not require further surgical intervention.         Post operatively her course was complicated by dysphagia now s/p peg on 7/5 with post procedural bleeding around the PEG site controlled with silver nitrate. Her course was complicated by throat and left ear pain. Laryngoscopy revealed a tongue mass and CT imaging showed soft tissue swelling extending into hypopharynx and left AE fold, along with a soft tissue mass in left thyroid gland. ENT consulted and recommended keflex x 5 days and follow up outpatient in 2 weeks for thyroid ultrasound. She was also evaluated post operatively for right facial nerve weakness and incomplete eye closure and treated with topical eye lubrication. Patient medically stabilized and admitted here for rehab.         Patient was admitted here for rehab. During her course, she was noted to have bleeding around her PEG tube. GI was consulted and applied silver nitrate with improvement.  Her course was complicated by diarrhea, which improved after upgrading her diet to pureed+nectar and adjusting her tube feeds. She will continue with oral feeds and be supplemented with tube feeds if eating <50% of meals. She also had an episode of agitation and confusion. Labs were stable and CT head did not show acute findings. Patient remained stable at time of discharge. Patient had baseline tachycardia 90-100s likely due to poor endurance and poor PO intake due to modified diet. Patient remained stable at time of discharge and will continue to follow up with her primary care doctor for further management. For her asthma, patient desated to 80% while ambulating. She was conitnued on supplemental oxygen as needed and will continue to follow up with her primary care doctor for further management upon discharge. For her tongue mass, patient completed her course of Keflex and will follow up as an outpatient for further workup.         Patient made functional improvements with ADLs, mobility, cognition and swallowing through therapy. At time of discharge, patient was min assist to contact guard with transfers and ambulated 30-40ft with min assist. Patient will continue with therapy upon discharge. Patient medically stable for discharge to subacute facility. The patient is a 69 year old right handed, morbidly obese (BMI 49) woman with a past medical history of asthma, glaucoma, hypertension and osteoarthritis who presented for dizziness, balance issues found to have a posterior fossa-4th ventricle mass who was admitted to [facility] on 6/24/19 s/p planned craniotomy and resection on 6/24/19 being admitted here for rehab. She presented to her PMD with light headedness, dizziness & balance disturbance of three months duration. Outpatient MRI revealed a heterogeneously enhancing lesion completely filling the fourth ventricle, obstructing the circulation of cerebrospinal fluid and early hydrocephalus with transependymal resorption.  She was admitted on 6/24/19 to [facility] for planned stereotactic suboccipital craniotomy for 4th ventricular tumor resection. Gross total resection was achieved and the patient was transferred to the neurosurgical ICU post op. Pathology revealed sub ependymoma. Her course was complicated by post op imaging bilateral intraventricular hemorrhage and was monitored closely. Patient did not require further surgical intervention.         Post operatively her course was complicated by dysphagia now s/p peg on 7/5 with post procedural bleeding around the PEG site controlled with silver nitrate. Her course was complicated by throat and left ear pain. Laryngoscopy revealed a tongue mass and CT imaging showed soft tissue swelling extending into hypopharynx and left AE fold, along with a soft tissue mass in left thyroid gland. ENT consulted and recommended keflex x 5 days and follow up outpatient in 2 weeks for thyroid ultrasound. She was also evaluated post operatively for right facial nerve weakness and incomplete eye closure and treated with topical eye lubrication. Patient medically stabilized and admitted here for rehab.         Patient was admitted here for rehab. During her course, she was noted to have bleeding around her PEG tube. GI was consulted and applied silver nitrate with improvement.  Her course was complicated by diarrhea, which improved after upgrading her diet to pureed+nectar and adjusting her tube feeds. She will continue with oral feeds and be supplemented with tube feeds if eating <50% of meals. She also had an episode of agitation and confusion. Labs were stable and CT head did not show acute findings. Patient remained stable at time of discharge. Patient had baseline tachycardia 90-100s likely due to poor endurance and poor PO intake due to modified diet. Patient remained stable at time of discharge and will continue to follow up with her primary care doctor for further management. For her asthma, patient desated to 80% while ambulating. She was conitnued on supplemental oxygen as needed and will continue to follow up with her primary care doctor for further management upon discharge. For her tongue mass, patient completed her course of Keflex and will follow up as an outpatient for further workup.         Patient made functional improvements with ADLs, mobility, cognition and swallowing through therapy. At time of discharge, patient was min assist to contact guard with transfers and ambulated 30-40ft with min assist. She was tolerating a pureed and nectar liquid diet. Patient will continue with therapy upon discharge. Patient medically stable for discharge to subacute facility. The patient is a 69 year old right handed, morbidly obese (BMI 49) woman with a past medical history of asthma, glaucoma, hypertension and osteoarthritis who presented for dizziness, balance issues found to have a posterior fossa-4th ventricle mass who was admitted to [facility] on 6/24/19 s/p planned craniotomy and resection on 6/24/19 being admitted here for rehab. She presented to her PMD with light headedness, dizziness & balance disturbance of three months duration. Outpatient MRI revealed a heterogeneously enhancing lesion completely filling the fourth ventricle, obstructing the circulation of cerebrospinal fluid and early hydrocephalus with transependymal resorption.  She was admitted on 6/24/19 to [facility] for planned stereotactic suboccipital craniotomy for 4th ventricular tumor resection. Gross total resection was achieved and the patient was transferred to the neurosurgical ICU post op. Pathology revealed sub ependymoma. Her course was complicated by post op imaging bilateral intraventricular hemorrhage and was monitored closely. Patient did not require further surgical intervention.         Post operatively her course was complicated by dysphagia now s/p peg on 7/5 with post procedural bleeding around the PEG site controlled with silver nitrate. Her course was complicated by throat and left ear pain. Laryngoscopy revealed a tongue mass and CT imaging showed soft tissue swelling extending into hypopharynx and left AE fold, along with a soft tissue mass in left thyroid gland. ENT consulted and recommended keflex x 5 days and follow up outpatient in 2 weeks for thyroid ultrasound. She was also evaluated post operatively for right facial nerve weakness and incomplete eye closure and treated with topical eye lubrication. Patient medically stabilized and admitted here for rehab.         Patient was admitted here for rehab. During her course, she was noted to have bleeding around her PEG tube. GI was consulted and applied silver nitrate with improvement.  Her course was complicated by diarrhea, which improved after upgrading her diet to pureed+nectar and adjusting her tube feeds. She will continue with oral feeds and be supplemented with tube feeds if eating <50% of meals. She also had an episode of agitation and confusion. Labs were stable and CT head did not show acute findings. Patient remained stable at time of discharge. She had a fever on 7/22. Labs including CBC, BMP, lactate and procalcitonin were stable. Infectious workup including chest xray and UA were negative. Blood cultures were collected with results pending. She also had viral panel collected. Patient remained afebrile and stable at time of discharge. Patient had baseline tachycardia 90-100s likely due to poor endurance and poor PO intake due to modified diet. Patient remained stable at time of discharge and will continue to follow up with her primary care doctor for further management. For her asthma, patient desated to 80% while ambulating. She was conitnued on supplemental oxygen as needed and will continue to follow up with her primary care doctor for further management upon discharge. For her tongue mass, patient completed her course of Keflex and will follow up as an outpatient for further workup.         Patient made functional improvements with ADLs, mobility, cognition and swallowing through therapy. At time of discharge, patient was min assist to contact guard with transfers and ambulated 30-40ft with min assist. She was tolerating a pureed and nectar liquid diet. Patient will continue with therapy upon discharge. Patient medically stable for discharge to subacute facility.

## 2019-07-22 NOTE — DISCHARGE NOTE PROVIDER - NSDCCPCAREPLAN_GEN_ALL_CORE_FT
PRINCIPAL DISCHARGE DIAGNOSIS  Diagnosis: Subependymoma  Assessment and Plan of Treatment: s/p sub-occipital craniotomy for 4th ventricular resection. Upon discharge from rehab, follow up with Dr. Monroy. Please call office for appointment.      SECONDARY DISCHARGE DIAGNOSES  Diagnosis: Thyroid mass  Assessment and Plan of Treatment: soft tissue mass on left thyroid gland noted on CT soft tissue neck. Please follow up with Dr. Thakur for outpatient thyroid ultrasound upon discharge from rehab.    Diagnosis: Glaucoma  Assessment and Plan of Treatment: Continue present medications. Follow up with PMD for further management upon discharge from rehab    Diagnosis: Hypertension  Assessment and Plan of Treatment: Continue present medications. Follow up with PMD for further managment upon discharge from rehab. Please note home medications regimen changed.    Diagnosis: Dysphagia  Assessment and Plan of Treatment: insertion of PEG on 7/5 secondary to dysphagia    Diagnosis: Ovarian cystic mass  Assessment and Plan of Treatment: Noted on CT Chest/Abdomen/Pelvis on 6/21/19. Follow up with PMD/gyn upon discharge from rehab.    Diagnosis: Pulmonary nodule  Assessment and Plan of Treatment: two pulmonary nodules 4 mm. Noted on CT Chest/Abdomen/Pelvis on 6/21/19. Upon discharge from rehab, follow up with PMD

## 2019-07-22 NOTE — DISCHARGE NOTE PROVIDER - NSDCFUADDINST_GEN_ALL_CORE_FT
When discharged from rehab, please make a follow up appointment with the surgeon.    In addition, please make follow up appointments with your primary care doctor in regards to your antihypertensive medications due to changes, ovarian cystic mass, and pulmonary nodules noted on the CT of the Chest/Abdomen/Pelvis.     Please follow up with ENT in regards to the swelling noted on the back of the tongue and outpatient thyroid ultrasound due to mass on thyroid gland noted on CT soft tissue neck.    If you experience any of the following:  severe headache, changes in vision, changes in level of consciousness,  seizures, increase in nausea/vomiting, chest pain, fainting, please go to the ER immediately. Do not resume aspirin until cleared with surgeon.

## 2019-07-22 NOTE — DISCHARGE NOTE PROVIDER - CARE PROVIDER_API CALL
Joelle Monroy)  Neurosurgery  General  [address], [address] [place] [id number]  Phone: [phone]  Fax: [phone]  Follow Up Time:     Miley Thakur)  Otolaryngology  [address] [place] [address] [address]  [place] [address]  Phone: [phone]  Fax: [phone]  Follow Up Time:     Suzanna East ()  Brain Injury Medicine; PhysicalRehab Medicine  [address] [place] [address]  Phone: [phone]  Fax: [phone]  Follow Up Time:

## 2019-07-22 NOTE — DISCHARGE NOTE PROVIDER - PROVIDER TOKENS
PROVIDER:[TOKEN:[[id number]]],PROVIDER:[TOKEN:[[id number]]],PROVIDER:[TOKEN:[[id number]]]

## 2019-07-22 NOTE — DISCHARGE NOTE PROVIDER - CARE PROVIDERS DIRECT ADDRESSES
[email or web address]

## 2019-07-22 NOTE — PROGRESS NOTE ADULT - SUBJECTIVE AND OBJECTIVE BOX
Patient is a 69y old  Female who presents with a chief complaint of Intracranial mass resection (21 Jul 2019 15:11)      Interval HPI/ Overnight events: No events overnight    Patient seen and examined at bedside. Pt denies chest pain, SOB, nausea, vomiting        ALLERGIES:  No Known Drug Allergies  shellfish (Angioedema; Rash)    MEDICATIONS  (STANDING):  amLODIPine   Tablet 5 milliGRAM(s) Oral daily  artificial  tears Solution 1 Drop(s) Right EYE every 6 hours  buDESOnide 160 MICROgram(s)/formoterol 4.5 MICROgram(s) Inhaler 2 Puff(s) Inhalation two times a day  enoxaparin Injectable 40 milliGRAM(s) SubCutaneous two times a day  latanoprost 0.005% Ophthalmic Solution 1 Drop(s) Both EYES at bedtime  nystatin    Suspension 310103 Unit(s) Oral two times a day  petrolatum Ophthalmic Ointment 1 Application(s) Right EYE at bedtime  prednisoLONE acetate 1% Suspension 1 Drop(s) Both EYES four times a day  sodium chloride 0.65% Nasal 1 Spray(s) Both Nostrils three times a day    MEDICATIONS  (PRN):  acetaminophen   Tablet .. 650 milliGRAM(s) Oral every 6 hours PRN Temp greater or equal to 38C (100.4F), Mild Pain (1 - 3)  nystatin Powder 1 Application(s) Topical two times a day PRN Rash    Vital Signs Last 24 Hrs  T(F): 99.3 (22 Jul 2019 08:27), Max: 99.3 (22 Jul 2019 08:27)  HR: 62 (22 Jul 2019 08:47) (62 - 109)  BP: 112/83 (22 Jul 2019 08:27) (108/68 - 131/81)  RR: 14 (22 Jul 2019 08:27) (14 - 14)  SpO2: 96% (22 Jul 2019 08:47) (92% - 96%)  I&O's Summary      PHYSICAL EXAM:  General: NAD, well dressed, well nourished  Head: s/p craniotomy, posterior neck scar well healed, no erythema or exudates  ENT: MMM, no oropharyngeal erythema or exudates  Neck: Supple, No JVD  Lungs: Clear to auscultation bilaterally, no wheezing, rales, or rhonchi, no accessory muscle use  Cardio: RRR, normal S1/S2, No M/R/G  Abdomen: Soft, Nontender, Nondistended; Bowel sounds present, no organomegaly  Extremities: No calf tenderness, no clubbing or  cyanosis  Vascular: no LE edema  Skin: warm and dry  Neuro: AAOx3, answers all questions appropriately, moves all extremities    LABS:                        10.3   5.31  )-----------( 320      ( 22 Jul 2019 07:47 )             33.5     07-22    138  |  100  |  11  ----------------------------<  98  3.9   |  32  |  0.72    Ca    9.2      22 Jul 2019 07:47      eGFR if Non African American: 85 mL/min/1.73M2 (07-22-19 @ 07:47)  eGFR if : 98 mL/min/1.73M2 (07-22-19 @ 07:47)                            06-26 [id number] 5.1          RADIOLOGY & ADDITIONAL TESTS:    Care Discussed with Consultants/Other Providers:

## 2019-07-23 ENCOUNTER — TRANSCRIPTION ENCOUNTER (OUTPATIENT)
Age: 69
End: 2019-07-23

## 2019-07-23 VITALS — OXYGEN SATURATION: 97 %

## 2019-07-23 LAB
ANION GAP SERPL CALC-SCNC: 7 MMOL/L — SIGNIFICANT CHANGE UP (ref 5–17)
BUN SERPL-MCNC: 10 MG/DL — SIGNIFICANT CHANGE UP (ref 7–23)
CALCIUM SERPL-MCNC: 9 MG/DL — SIGNIFICANT CHANGE UP (ref 8.4–10.5)
CHLORIDE SERPL-SCNC: 102 MMOL/L — SIGNIFICANT CHANGE UP (ref 96–108)
CO2 BLDA-SCNC: 30 MMOL/L — SIGNIFICANT CHANGE UP (ref 22–30)
CO2 SERPL-SCNC: 28 MMOL/L — SIGNIFICANT CHANGE UP (ref 22–31)
CREAT SERPL-MCNC: 0.7 MG/DL — SIGNIFICANT CHANGE UP (ref 0.5–1.3)
GAS PNL BLDA: SIGNIFICANT CHANGE UP
GLUCOSE SERPL-MCNC: 94 MG/DL — SIGNIFICANT CHANGE UP (ref 70–99)
HCT VFR BLD CALC: 33.2 % — LOW (ref 34.5–45)
HGB BLD-MCNC: 10.4 G/DL — LOW (ref 11.5–15.5)
HOROWITZ INDEX BLDA+IHG-RTO: SIGNIFICANT CHANGE UP
LACTATE SERPL-SCNC: 0.7 MMOL/L — SIGNIFICANT CHANGE UP (ref 0.7–2)
LACTATE SERPL-SCNC: 0.8 MMOL/L — SIGNIFICANT CHANGE UP (ref 0.7–2)
MCHC RBC-ENTMCNC: 28 PG — SIGNIFICANT CHANGE UP (ref 27–34)
MCHC RBC-ENTMCNC: 31.3 GM/DL — LOW (ref 32–36)
MCV RBC AUTO: 89.2 FL — SIGNIFICANT CHANGE UP (ref 80–100)
NRBC # BLD: 0 /100 WBCS — SIGNIFICANT CHANGE UP (ref 0–0)
PCO2 BLDA: 51 MMHG — HIGH (ref 32–46)
PH BLDA: 7.37 — SIGNIFICANT CHANGE UP (ref 7.35–7.45)
PLATELET # BLD AUTO: 351 K/UL — SIGNIFICANT CHANGE UP (ref 150–400)
PO2 BLDA: 91 MMHG — SIGNIFICANT CHANGE UP (ref 74–108)
POTASSIUM SERPL-MCNC: 4.6 MMOL/L — SIGNIFICANT CHANGE UP (ref 3.5–5.3)
POTASSIUM SERPL-SCNC: 4.6 MMOL/L — SIGNIFICANT CHANGE UP (ref 3.5–5.3)
PROCALCITONIN SERPL-MCNC: 0.03 NG/ML — SIGNIFICANT CHANGE UP
RAPID RVP RESULT: SIGNIFICANT CHANGE UP
RBC # BLD: 3.72 M/UL — LOW (ref 3.8–5.2)
RBC # FLD: 16.3 % — HIGH (ref 10.3–14.5)
SAO2 % BLDA: 97 % — HIGH (ref 92–96)
SODIUM SERPL-SCNC: 137 MMOL/L — SIGNIFICANT CHANGE UP (ref 135–145)
WBC # BLD: 4.78 K/UL — SIGNIFICANT CHANGE UP (ref 3.8–10.5)
WBC # FLD AUTO: 4.78 K/UL — SIGNIFICANT CHANGE UP (ref 3.8–10.5)

## 2019-07-23 PROCEDURE — 97530 THERAPEUTIC ACTIVITIES: CPT

## 2019-07-23 PROCEDURE — 71045 X-RAY EXAM CHEST 1 VIEW: CPT

## 2019-07-23 PROCEDURE — 85027 COMPLETE CBC AUTOMATED: CPT

## 2019-07-23 PROCEDURE — 87486 CHLMYD PNEUM DNA AMP PROBE: CPT

## 2019-07-23 PROCEDURE — 92523 SPEECH SOUND LANG COMPREHEN: CPT

## 2019-07-23 PROCEDURE — 84100 ASSAY OF PHOSPHORUS: CPT

## 2019-07-23 PROCEDURE — 84145 PROCALCITONIN (PCT): CPT

## 2019-07-23 PROCEDURE — 70450 CT HEAD/BRAIN W/O DYE: CPT

## 2019-07-23 PROCEDURE — 85730 THROMBOPLASTIN TIME PARTIAL: CPT

## 2019-07-23 PROCEDURE — 92526 ORAL FUNCTION THERAPY: CPT

## 2019-07-23 PROCEDURE — 36600 WITHDRAWAL OF ARTERIAL BLOOD: CPT

## 2019-07-23 PROCEDURE — 92610 EVALUATE SWALLOWING FUNCTION: CPT

## 2019-07-23 PROCEDURE — 87633 RESP VIRUS 12-25 TARGETS: CPT

## 2019-07-23 PROCEDURE — 84146 ASSAY OF PROLACTIN: CPT

## 2019-07-23 PROCEDURE — 87798 DETECT AGENT NOS DNA AMP: CPT

## 2019-07-23 PROCEDURE — 82550 ASSAY OF CK (CPK): CPT

## 2019-07-23 PROCEDURE — 97110 THERAPEUTIC EXERCISES: CPT

## 2019-07-23 PROCEDURE — 82962 GLUCOSE BLOOD TEST: CPT

## 2019-07-23 PROCEDURE — 87040 BLOOD CULTURE FOR BACTERIA: CPT

## 2019-07-23 PROCEDURE — 85610 PROTHROMBIN TIME: CPT

## 2019-07-23 PROCEDURE — 99233 SBSQ HOSP IP/OBS HIGH 50: CPT

## 2019-07-23 PROCEDURE — 97163 PT EVAL HIGH COMPLEX 45 MIN: CPT

## 2019-07-23 PROCEDURE — 86803 HEPATITIS C AB TEST: CPT

## 2019-07-23 PROCEDURE — 97535 SELF CARE MNGMENT TRAINING: CPT

## 2019-07-23 PROCEDURE — 94640 AIRWAY INHALATION TREATMENT: CPT

## 2019-07-23 PROCEDURE — 78582 LUNG VENTILAT&PERFUS IMAGING: CPT

## 2019-07-23 PROCEDURE — 83605 ASSAY OF LACTIC ACID: CPT

## 2019-07-23 PROCEDURE — 97116 GAIT TRAINING THERAPY: CPT

## 2019-07-23 PROCEDURE — 84484 ASSAY OF TROPONIN QUANT: CPT

## 2019-07-23 PROCEDURE — 80048 BASIC METABOLIC PNL TOTAL CA: CPT

## 2019-07-23 PROCEDURE — 80053 COMPREHEN METABOLIC PANEL: CPT

## 2019-07-23 PROCEDURE — 95812 EEG 41-60 MINUTES: CPT

## 2019-07-23 PROCEDURE — 85379 FIBRIN DEGRADATION QUANT: CPT

## 2019-07-23 PROCEDURE — 81001 URINALYSIS AUTO W/SCOPE: CPT

## 2019-07-23 PROCEDURE — 92507 TX SP LANG VOICE COMM INDIV: CPT

## 2019-07-23 PROCEDURE — 87581 M.PNEUMON DNA AMP PROBE: CPT

## 2019-07-23 PROCEDURE — 82803 BLOOD GASES ANY COMBINATION: CPT

## 2019-07-23 PROCEDURE — 74230 X-RAY XM SWLNG FUNCJ C+: CPT

## 2019-07-23 PROCEDURE — 92611 MOTION FLUOROSCOPY/SWALLOW: CPT

## 2019-07-23 PROCEDURE — 83735 ASSAY OF MAGNESIUM: CPT

## 2019-07-23 PROCEDURE — 99238 HOSP IP/OBS DSCHRG MGMT 30/<: CPT

## 2019-07-23 PROCEDURE — 36415 COLL VENOUS BLD VENIPUNCTURE: CPT

## 2019-07-23 RX ORDER — IPRATROPIUM/ALBUTEROL SULFATE 18-103MCG
3 AEROSOL WITH ADAPTER (GRAM) INHALATION EVERY 6 HOURS
Refills: 0 | Status: DISCONTINUED | OUTPATIENT
Start: 2019-07-23 | End: 2019-07-23

## 2019-07-23 RX ADMIN — ENOXAPARIN SODIUM 40 MILLIGRAM(S): 100 INJECTION SUBCUTANEOUS at 05:17

## 2019-07-23 RX ADMIN — Medication 3 MILLILITER(S): at 01:25

## 2019-07-23 RX ADMIN — Medication 1 DROP(S): at 05:16

## 2019-07-23 RX ADMIN — Medication 650 MILLIGRAM(S): at 01:16

## 2019-07-23 RX ADMIN — Medication 650 MILLIGRAM(S): at 12:31

## 2019-07-23 RX ADMIN — Medication 500000 UNIT(S): at 05:17

## 2019-07-23 RX ADMIN — BUDESONIDE AND FORMOTEROL FUMARATE DIHYDRATE 2 PUFF(S): 160; 4.5 AEROSOL RESPIRATORY (INHALATION) at 08:11

## 2019-07-23 RX ADMIN — Medication 1 DROP(S): at 11:32

## 2019-07-23 RX ADMIN — Medication 1 SPRAY(S): at 05:17

## 2019-07-23 RX ADMIN — AMLODIPINE BESYLATE 5 MILLIGRAM(S): 2.5 TABLET ORAL at 05:16

## 2019-07-23 RX ADMIN — PIPERACILLIN AND TAZOBACTAM 25 GRAM(S): 4; .5 INJECTION, POWDER, LYOPHILIZED, FOR SOLUTION INTRAVENOUS at 00:35

## 2019-07-23 RX ADMIN — PIPERACILLIN AND TAZOBACTAM 200 GRAM(S): 4; .5 INJECTION, POWDER, LYOPHILIZED, FOR SOLUTION INTRAVENOUS at 00:33

## 2019-07-23 NOTE — PROGRESS NOTE ADULT - REASON FOR ADMISSION
Intracranial mass resection

## 2019-07-23 NOTE — PROGRESS NOTE ADULT - SUBJECTIVE AND OBJECTIVE BOX
Patient is a 69y old  Female who presents with a chief complaint of Intracranial mass resection (2019 13:17)      Interval HPI/ Overnight events: Pt spiked fever to 101 rectally at 8:30 pm last night, R 112 /82, RR 14, SpO2 98% on RA. Sepsis protocol initiated CBC, CMP, lactate wnl. ABG shows chronic respiratory alkalosis with compensation. Pt received one dose of Vancomycin and Zosyn. CXR without focal consolidations, UA negative. D-dimer was checked, elevated at 393. VQ scan was ordered to assess for PE.      Patient seen and examined at bedside. Pt was agitated this morning, not fully cooperative with exam or ROS.      ALLERGIES:  No Known Drug Allergies  shellfish (Angioedema; Rash)    MEDICATIONS  (STANDING):  amLODIPine   Tablet 5 milliGRAM(s) Oral daily  artificial  tears Solution 1 Drop(s) Right EYE every 6 hours  buDESOnide 160 MICROgram(s)/formoterol 4.5 MICROgram(s) Inhaler 2 Puff(s) Inhalation two times a day  enoxaparin Injectable 40 milliGRAM(s) SubCutaneous two times a day  latanoprost 0.005% Ophthalmic Solution 1 Drop(s) Both EYES at bedtime  nystatin    Suspension 879896 Unit(s) Oral two times a day  petrolatum Ophthalmic Ointment 1 Application(s) Right EYE at bedtime  prednisoLONE acetate 1% Suspension 1 Drop(s) Both EYES four times a day  sodium chloride 0.65% Nasal 1 Spray(s) Both Nostrils three times a day    MEDICATIONS  (PRN):  acetaminophen   Tablet .. 650 milliGRAM(s) Oral every 6 hours PRN Temp greater or equal to 38C (100.4F), Mild Pain (1 - 3)  ALBUTerol/ipratropium for Nebulization 3 milliLiter(s) Nebulizer every 6 hours PRN Shortness of Breath  nystatin Powder 1 Application(s) Topical two times a day PRN Rash    Vital Signs Last 24 Hrs  T(F): 98.7 (2019 07:44), Max: 101 (2019 20:30)  HR: 103 (2019 07:44) (91 - 112)  BP: 127/78 (2019 07:44) (119/78 - 145/76)  RR: 14 (2019 07:44) (14 - 15)  SpO2: 97% (2019 08:11) (97% - 112%)  I&O's Summary    2019 07:01  -  2019 07:00  --------------------------------------------------------  IN: 2440 mL / OUT: 0 mL / NET: 2440 mL      PHYSICAL EXAM:  General: NAD, well dressed, well nourished  Head: s/p craniotomy, posterior neck scar well healed, no erythema or exudates  ENT: MMM, no oropharyngeal erythema or exudates  Neck: Supple, No JVD  Lungs: Clear to auscultation bilaterally, no wheezing, rales, or rhonchi, no accessory muscle use  Cardio: RRR, normal S1/S2, No M/R/G  Abdomen: Soft, Nontender, Nondistended; no organomegaly  Extremities: No calf tenderness, no clubbing or  cyanosis  Vascular: no LE edema  Skin: warm and dry  Neuro: AAOx3, eyes closed, somnolent but easily arousable to voice, answers questions and follows some commands. partial cooperation with exam    LABS:                        10.4   4.78  )-----------( 351      ( 2019 05:00 )             33.2         137  |  102  |  10  ----------------------------<  94  4.6   |  28  |  0.70    Ca    9.0      2019 05:00    TPro  7.0  /  Alb  2.9  /  TBili  0.6  /  DBili  x   /  AST  26  /  ALT  74  /  AlkPhos  100      eGFR if Non African American: 88 mL/min/1.73M2 (19 @ 05:00)  eGFR if African American: 102 mL/min/1.73M2 (19 @ 05:00)      Lactate, Blood: 0.8 mmol/L ( @ 05:00)  Lactate, Blood: 0.7 mmol/L ( @ 23:30)              ABG - ( 2019 01:03 )  pH, Arterial: 7.37  pH, Blood: x     /  pCO2: 51    /  pO2: 91    / HCO3: x     / Base Excess: x     /  SaO2: 97                        06-26 [id number] 5.1    Urinalysis Basic - ( 2019 22:30 )    Color: Yellow / Appearance: Clear / S.015 / pH: x  Gluc: x / Ketone: Trace  / Bili: Negative / Urobili: 1   Blood: x / Protein: 15 / Nitrite: Negative   Leuk Esterase: Trace / RBC: Negative /HPF / WBC 0-2 /HPF   Sq Epi: x / Non Sq Epi: Neg.-Few / Bacteria: Negative /HPF          RADIOLOGY & ADDITIONAL TESTS:    Care Discussed with Consultants/Other Providers:

## 2019-07-23 NOTE — DISCHARGE NOTE NURSING/CASE MANAGEMENT/SOCIAL WORK - NSDCDPATPORTLINK_GEN_ALL_CORE
You can access the [facility] Patient Portal, offered by [facility], by registering with the following website: [email or web address]

## 2019-07-23 NOTE — PROGRESS NOTE ADULT - ASSESSMENT
ASSESSMENT/PLAN  The patient is a 69 year old right handed, morbidly obese (BMI 49) woman with a past medical history of asthma, glaucoma, hypertension and osteoarthritis who presented for dizziness, balance issues found to have a posterior fossa-4th ventricle mass who was admitted to [facility] on 6/24/19 s/p planned craniotomy and resection on 6/24/19 being admitted here for rehab with Gait Instability, ADL impairments and Functional impairments.    COMORBIDITES/ACTIVE MEDICAL ISSUES     Gait Instability, ADL impairments and Functional impairments:   -Continue Comprehensive Rehab Program of PT/OT/SLP    Fever: Tm of 101 on 7/22. CBC and BMP stable. UA neg. CXR with no acute pathology. Lactate 0.7 and procal 0.03. ABG with chronic respiratory alkalosis with compensation. D-dimer 393. s/p 1 dose of vanc/zosyn  -Discussed with hospitalist, less likely infectious as workup negative and vitals stable this morning  -Low suspicion of PE as tachycardia at baseline, no desaturations/SOB and patient declines further testing at this time. 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  -Continue with comprehensive rehab program  -Completed dexamethasone taper as per OSH  -Pain control: Tylenol  -Follow up with NSGY upon discharge    Agitation: Resolved. Head CT stable 7/20, no new findings  -Continue to monitor closely     HTN: BP stable  -Continue with amlodipine  -Monitor vitals    Tachycardia: Unclear etiology. May be due to poor PO intake v poor endurance. HR at baseline with HR 90s-100s.   - Attempted Cta to rule out PE 7/19--but pt. with very difficult IV access.  Given pt's tachycardia back to her baseline and no desaturations/ SOB (except on RA with exertion which is expected given her asthma/COPD), will hold off on CTA as suspicion for PE is low  -Monitor vitals  -Continue with FWF three times a day through the PEG while on modified diet--nectar liquids. Will decrease to 300cc as patient reports nausea with larger volumes 7/19    Asthma: Pt desated to 81% during therapy when ambulating on room air. Saturating well at rest on room air   -Discontinue duoneb as patient has persistent tachycardia and headache after treatment (likely due to atrovent)  -Continue with symbicort   -Monitor vitals  -Supplemental oxygen prn and titrate as tolerated    Right facial nerve palsy/Glaucoma:  -Continue with ocular drops: Latanoprost, Lacri-Lube, Prednisolone, Artificial tears  -Will need outpatient followup    Tongue Mass:  -Completed keflex for 5 day course  -Follow up with outpatient for further workup    Thrush:  -Continue nystatin  -Continue oral care    Dysphagia: s/p PEG  -Pureed +Nectar, tsp liquids  -1:1 supervision with meals  -If eating less than 50% of meals, will supplement with TF  -Continue free water flush  -GI consult for bleeding around PEG, recs reviewed (resolved s/p silver nitrate)    Ovarian cystic mass:  -Follow up as an outpatient for further workup    Leukocytosis: Resolved. Patient afebrile and no localizing sx  -Monitor weekly labs  -Monitor vitals    Pain Mgmt - Tylenol PRN  GI/Bowel Mgmt -  Holding miralax, senna and colace given loose stool  /Bladder Mgmt - Monitor output    FEN   - Diet - Pureed +Nectar, tsp liquids  - Dysphagia  SLP - evaluation and treatment    Precautions / PROPHYLAXIS:   - Falls, Seizure   - Lungs: Aspiration, Incentive Spirometer   - Pressure injury/Skin: Turn Q2hrs while in bed, OOB to Chair, PT/OT    - DVT: Lovenox, SCDs, TEDs     Dispo:  -Patient discussed during IDT 7/16/19  -Patient progressing well with therapy, but barriers include poor endurance, visual impairments  -Currently min assist with UB dressing and mod assist with LB dressing. Min assist to contact guard with transfers. Ambulating 30-40ft with min assist. Completed 4 steps with min assist. Mild cognitive deficits.   -Goals supervision with ADLs, transfers and gait  -Plan for discharge  [facility] 7/23/19 ASSESSMENT/PLAN  The patient is a 69 year old right handed, morbidly obese (BMI 49) woman with a past medical history of asthma, glaucoma, hypertension and osteoarthritis who presented for dizziness, balance issues found to have a posterior fossa-4th ventricle mass who was admitted to [facility] on 6/24/19 s/p planned craniotomy and resection on 6/24/19 being admitted here for rehab with Gait Instability, ADL impairments and Functional impairments.    COMORBIDITES/ACTIVE MEDICAL ISSUES     Gait Instability, ADL impairments and Functional impairments:   -Continue Comprehensive Rehab Program of PT/OT/SLP    Fever: Tm of 101 on 7/22, afebrile this morning. CBC and BMP stable. UA neg. CXR with no acute pathology. Lactate 0.7 and procal 0.03. ABG with chronic respiratory alkalosis with compensation. D-dimer 393. s/p 1 dose of vanc/zosyn  -Discussed with hospitalist, less likely infectious as workup negative and vitals stable this morning  -Low suspicion of PE as tachycardia at baseline, no desaturations/SOB and patient declines further testing at this time. 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  -Continue with comprehensive rehab program  -Completed dexamethasone taper as per OSH  -Pain control: Tylenol  -Follow up with NSGY upon discharge    Agitation: Resolved. Head CT stable 7/20, no new findings  -Continue to monitor closely     HTN: BP stable  -Continue with amlodipine  -Monitor vitals    Tachycardia: Unclear etiology. May be due to poor PO intake v poor endurance. HR at baseline with HR 90s-100s.   - Attempted Cta to rule out PE 7/19--but pt. with very difficult IV access.  Given pt's tachycardia back to her baseline and no desaturations/ SOB (except on RA with exertion which is expected given her asthma/COPD), will hold off on CTA as suspicion for PE is low  -Monitor vitals  -Continue with FWF three times a day through the PEG while on modified diet--nectar liquids. Will decrease to 300cc as patient reports nausea with larger volumes 7/19    Asthma: Pt desated to 81% during therapy when ambulating on room air. Saturating well at rest on room air   -Discontinue duoneb as patient has persistent tachycardia and headache after treatment (likely due to atrovent)  -Continue with symbicort   -Monitor vitals  -Supplemental oxygen prn and titrate as tolerated    Right facial nerve palsy/Glaucoma:  -Continue with ocular drops: Latanoprost, Lacri-Lube, Prednisolone, Artificial tears  -Will need outpatient followup    Tongue Mass:  -Completed keflex for 5 day course  -Follow up with outpatient for further workup    Thrush:  -Continue nystatin  -Continue oral care    Dysphagia: s/p PEG  -Pureed +Nectar, tsp liquids  -1:1 supervision with meals  -If eating less than 50% of meals, will supplement with TF  -Continue free water flush  -GI consult for bleeding around PEG, recs reviewed (resolved s/p silver nitrate)    Ovarian cystic mass:  -Follow up as an outpatient for further workup    Leukocytosis: Resolved. Patient afebrile and no localizing sx  -Monitor weekly labs  -Monitor vitals    Pain Mgmt - Tylenol PRN  GI/Bowel Mgmt -  Holding miralax, senna and colace given loose stool  /Bladder Mgmt - Monitor output    FEN   - Diet - Pureed +Nectar, tsp liquids  - Dysphagia  SLP - evaluation and treatment    Precautions / PROPHYLAXIS:   - Falls, Seizure   - Lungs: Aspiration, Incentive Spirometer   - Pressure injury/Skin: Turn Q2hrs while in bed, OOB to Chair, PT/OT    - DVT: Lovenox, SCDs, TEDs     Dispo:  -Patient discussed during IDT 7/23/19  -Patient progressing well with therapy, but barriers include poor endurance, visual impairments  -Currently min assist with UB dressing and mod assist with LB dressing. Min assist to contact guard with transfers. Ambulating 30-40ft with min assist. Completed 4 steps with min assist. Mild cognitive deficits.   -Goals supervision with ADLs, transfers and gait  -Patient cleared for discharge to [facility] today after discussing with hospitalist ASSESSMENT/PLAN  The patient is a 69 year old right handed, morbidly obese (BMI 49) woman with a past medical history of asthma, glaucoma, hypertension and osteoarthritis who presented for dizziness, balance issues found to have a posterior fossa-4th ventricle mass who was admitted to [facility] on 6/24/19 s/p planned craniotomy and resection on 6/24/19 being admitted here for rehab with Gait Instability, ADL impairments and Functional impairments.    COMORBIDITES/ACTIVE MEDICAL ISSUES     Gait Instability, ADL impairments and Functional impairments:   -Continue Comprehensive Rehab Program of PT/OT/SLP in [facility]    Fever: Tm of 101 on 7/22, afebrile this morning. CBC and BMP stable. UA neg. CXR with no acute pathology. Lactate 0.7 and procal 0.03. ABG with chronic respiratory alkalosis with compensation. D-dimer 393. s/p 1 dose of vanc/zosyn  -Discussed with hospitalist, less likely infectious as workup negative and vitals stable this morning  -Low suspicion of PE as tachycardia at baseline, no desaturations/SOB and patient declines further testing at this time. d/c V/Q scan  -Ordered STAT RVP--if neg. pt. cleared for discharge  -- f/u blood cultures  --LE duplex--on 6/30--neg.  No calf tenderness. no LE swelling or suspicion for new DVT.   --Pt. 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  -Continue with comprehensive rehab program in [facility]  -Completed dexamethasone taper as per OSH  -Pain control: Tylenol  -Follow up with [facility]SDERIK upon discharge    Agitation: Resolved. Head CT stable 7/20, no new findings  -Continue to monitor closely     HTN: BP stable  -Continue with amlodipine  -Monitor vitals    Tachycardia: Unclear etiology. May be due to poor PO intake v poor endurance. HR at baseline with HR 90s-100s.   - Attempted Cta to rule out PE 7/19--but pt. with very difficult IV access.  Given pt's tachycardia back to her baseline and no desaturations/ SOB (except on RA with exertion which is expected given her asthma/COPD), will hold off on CTA as suspicion for PE is low  -VS have been stable.   -Continue with FWF three times a day through the PEG while on modified diet--nectar liquids. Will decrease to 300cc as patient reports nausea with larger volumes 7/19    Asthma: Pt desated to 81% during therapy when ambulating on room air. Saturating well at rest on room air   -Discontinue duoneb as patient has persistent tachycardia and headache after treatment (likely due to atrovent)  -Continue with symbicort   -Monitor vitals  -Supplemental oxygen prn and titrate as tolerated    Right facial nerve palsy/Glaucoma:  -Continue with ocular drops: Latanoprost, Lacri-Lube, Prednisolone, Artificial tears  -Will need outpatient followup    Tongue Mass:  -Completed keflex for 5 day course  -Follow up with outpatient for further workup    Thrush:  -Continue nystatin  -Continue oral care    Dysphagia: s/p PEG  -Pureed +Nectar, tsp liquids  -1:1 supervision with meals  -If eating less than 50% of meals, will supplement with TF  -Continue free water flush  -GI consult for bleeding around PEG, recs reviewed (resolved s/p silver nitrate)    Ovarian cystic mass:  -Follow up as an outpatient for further workup    Leukocytosis: Resolved. Patient afebrile and no localizing sx  -Monitor weekly labs  -Monitor vitals    Pain Mgmt - Tylenol PRN  GI/Bowel Mgmt -  Holding miralax, senna and colace given loose stool  /Bladder Mgmt - voiding    FEN   - Diet - Pureed +Nectar, tsp liquids      Precautions / PROPHYLAXIS:   - Falls, Seizure   - Lungs: Aspiration, Incentive Spirometer   - Pressure injury/Skin: Turn Q2hrs while in bed, OOB to Chair, PT/OT    - DVT: Lovenox, SCDs, TEDs     Dispo:  -Patient discussed during IDT 7/23/19  -Patient progressing well with therapy, but barriers include poor endurance, visual impairments  -Currently min assist with UB dressing and mod assist with LB dressing. Min assist to contact guard with transfers. Ambulating 30-40ft with min assist. Completed 4 steps with min assist. Mild cognitive deficits.   -Goals supervision with ADLs, transfers and gait  -Patient cleared for discharge to [facility] today after discussing with hospitalist

## 2019-07-23 NOTE — PROGRESS NOTE ADULT - ATTENDING COMMENTS
Pt. seen with resident.  Agree with documentation above as per resident with amendments made as appropriate. Patient medically stable. Making progress towards rehab goals.     No agitation events today or last night.
Pt. seen with resident.  Agree with documentation above as per resident with amendments made as appropriate. Patient medically stable.     GI consulted for PEG site bleeding.    Lethargic in AM but was able to improve arousal and participate in therapy.
Pt. seen with resident.  Agree with documentation above as per resident with amendments made as appropriate. Patient medically stable. Making progress towards rehab goals.     -Change to Vital 1.5 TFs due to diarrhea    --dysphagia--had MBS. d/w speech therapy. She will do trial tray of puree with nectar liquids with pt. before considering clearance for PO diet.  Will f/u.
Pt. seen with resident.  Agree with documentation above as per resident. Patient medically stable. Making progress towards rehab goals.     Diarrhea seems to be improving on Vital 1.5.  Will cont. to monitor.  f/u with speech therapy regarding diet upgrade.
Pt. seen with resident.  Agree with documentation above as per resident with amendments made as appropriate. Patient medically stable. Making progress towards rehab goals.     Pt. did spike fever last night but w/u neg. and pt. afebrile this AM and clinically looks at her baseline.  d/w hospitalist.  Ok for discharge to [facility].  STAT RVP sent which is pending.
Pt. seen with resident.  Agree with documentation above as per resident with amendments made as appropriate. Patient medically stable. Making progress towards rehab goals.     Pt. had RR this AM for AMS.  STAT Head CT and full Lab w/u stable.  Pt. seen this AM on rounds and is at her baseline--drowsy but following all commands and answering questions appropriately.  Denies headache, nausea, vomiting.  Motor exam at baseline.  Stable --Participated in therapy today
Patient seen and examined.  Agree with assessment and plan as above.  No further bleeding noted at gastrostomy tube site.  Feedings resumed.    VSS.  Abdomen soft, nontender.  G tube site clean and dry    Monitor Hgb/Hct and G tube for further bleeding
Pt. seen with resident.  Agree with documentation above as per resident with amendments made as appropriate. Patient medically stable. Making progress towards rehab goals.       Tachycardia: Unclear etiology. May be due to poor PO intake v poor endurance. However, still has HR in 100s after fluid hydration yesterday, but this is as per her baseline of HR 90s-100s.   - Attempted Cta to rule out PE 7/19--but pt. with very difficult IV access.  Given pt's tachycardia back to her baseline and no desaturations/ SOB (except on RA with exertion which is expected given her asthma/COPD), will hold off on CTA as suspicion for PE is low  -Monitor vitals  -Continue with FWF three times a day through the PEG while on modified diet--nectar liquids. Will decrease to 300cc as patient reports nausea with larger volumes 7/19
Pt. seen with resident.  Agree with documentation above as per resident with amendments made as appropriate. Patient medically stable. Making progress towards rehab goals.    Tachycardia episode of 114 last night.  Pt. asymptomatic.  d/w hospitalist.  Likely due to dehydration as pt. on puree with nectar liquids and only taking a limited amount PO.  Will increase FWF via PEG to 500cc QID.  Monitor VS.  Low suspicion for PE.  IF tachycardia does not resolve with hydration, will get CTA chest to r/o PE.

## 2019-07-23 NOTE — DISCHARGE NOTE NURSING/CASE MANAGEMENT/SOCIAL WORK - NSDCFUADDAPPT_GEN_ALL_CORE_FT
You are being transferred to [facility], located at [address] [place], [place] [address], [phone].      Ba will be providing transportation from [facility] to [facility].

## 2019-07-23 NOTE — PROGRESS NOTE ADULT - ASSESSMENT
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    #Fever: No identified source of infections. UA negative, CXR without infiltrates, no diarrhea reported per RN, no grimace on abdominal exam. WBC wnl, procalcitonin 0.03. Fever maybe aspiration pneumonitis vs. central fever?  - check RVP  - would hold off Abx at this time, f/u blood cx  - low suspicion for PE at this time, pt was placed on supplemental O2 last night, but was breathing comfortably on RA this morning. Hold off on CTA/VQ as pt is unable to cooperate with exam. LE duplex is reasonable    #Tachycardia: HR up to 114 during fever but now back to baseline  - low suspicion of PE at this time, continue to monitor for hypoxia    #Intracranial mass s/p craniotomy and resection on 6/24:  -PT/OT/SLP  -Decadron taper completed  #Dysphagia secondary to #1 above - treated with SLP. Now on dysphagia diet     #HTN: BP stable  - Continue with amlodipine 5mg daily  - Monitor vitals    #Asthma, controlled:  -Continue with symbicort  -Duoneb PRN    #Ovarian mass:  - Follow up as an outpatient for further workup    #DVT ppx lovenox    Plan discussed with Dr. East

## 2019-07-23 NOTE — PROGRESS NOTE ADULT - SUBJECTIVE AND OBJECTIVE BOX
Subjective: Overnight, patient noted to have a fever of 101 rectally. Infectious workup was done and patient was given one dose of vanc/zosyn. Patient seen this morning, sleeping comfortably. Per nursing, patient was awake earlier this morning, but did not get much sleep because of the events overnight.     REVIEW OF SYMPTOMS  Pertinent in the last 24hrs: Neurological deficits, stable  Tachycardia, stable    VITALS  Vital Signs Last 24 Hrs  T(C): 37.1 (2019 07:44), Max: 38.3 (2019 20:30)  T(F): 98.7 (2019 07:44), Max: 101 (2019 20:30)  HR: 103 (2019 07:44) (91 - 112)  BP: 127/78 (2019 07:44) (119/78 - 145/76)  BP(mean): --  RR: 14 (2019 07:44) (14 - 15)  SpO2: 97% (2019 08:11) (97% - 112%)      PHYSICAL EXAM  Constitutional - NAD, sleeping comfortably   	HEENT - posterior cranial incision, c/d/i, mild erythema surrounding, non-TTP, right eye with conjunctival erythema, improving  	Chest - CTA bilaterally, no increased work of breathing, on room air  	Cardiovascular - RRR, S1S2  	Abdomen - BS+, Soft, NTND, non-TTP around PEG, no drainage  	Extremities - No calf tenderness   	Neurologic Exam -                 	   Cognitive - Sleeping comfortably     RECENT LABS                        10.4   4.78  )-----------( 351      ( 2019 05:00 )             33.2     07-    137  |  102  |  10  ----------------------------<  94  4.6   |  28  |  0.70    Ca    9.0      2019 05:00    TPro  7.0  /  Alb  2.9<L>  /  TBili  0.6  /  DBili  x   /  AST  26  /  ALT  74<H>  /  AlkPhos  100  07-22      Urinalysis Basic - ( 2019 22:30 )    Color: Yellow / Appearance: Clear / S.015 / pH: x  Gluc: x / Ketone: Trace  / Bili: Negative / Urobili: 1   Blood: x / Protein: 15 / Nitrite: Negative   Leuk Esterase: Trace / RBC: Negative /HPF / WBC 0-2 /HPF   Sq Epi: x / Non Sq Epi: Neg.-Few / Bacteria: Negative /HPF          RADIOLOGY/OTHER RESULTS      MEDICATIONS  (STANDING):  amLODIPine   Tablet 5 milliGRAM(s) Oral daily  artificial  tears Solution 1 Drop(s) Right EYE every 6 hours  buDESOnide 160 MICROgram(s)/formoterol 4.5 MICROgram(s) Inhaler 2 Puff(s) Inhalation two times a day  enoxaparin Injectable 40 milliGRAM(s) SubCutaneous two times a day  latanoprost 0.005% Ophthalmic Solution 1 Drop(s) Both EYES at bedtime  nystatin    Suspension 460982 Unit(s) Oral two times a day  petrolatum Ophthalmic Ointment 1 Application(s) Right EYE at bedtime  prednisoLONE acetate 1% Suspension 1 Drop(s) Both EYES four times a day  sodium chloride 0.65% Nasal 1 Spray(s) Both Nostrils three times a day    MEDICATIONS  (PRN):  acetaminophen   Tablet .. 650 milliGRAM(s) Oral every 6 hours PRN Temp greater or equal to 38C (100.4F), Mild Pain (1 - 3)  ALBUTerol/ipratropium for Nebulization 3 milliLiter(s) Nebulizer every 6 hours PRN Shortness of Breath  nystatin Powder 1 Application(s) Topical two times a day PRN Rash

## 2019-07-24 ENCOUNTER — APPOINTMENT (OUTPATIENT)
Dept: INTERNAL MEDICINE | Facility: CLINIC | Age: 69
End: 2019-07-24

## 2019-07-24 ENCOUNTER — RECORD ABSTRACTING (OUTPATIENT)
Age: 69
End: 2019-07-24

## 2019-07-26 NOTE — CONSULT NOTE ADULT - NEGATIVE CARDIOVASCULAR SYMPTOMS
no palpitations/no chest pain
61 y/o Female with a PMHx of HTN on Losartan here for dizziness, nausea, and vomiting today. Likely vertigo but will order labs, head CT, EKG, and meclizine. Will reassess.

## 2019-07-28 LAB
CULTURE RESULTS: SIGNIFICANT CHANGE UP
CULTURE RESULTS: SIGNIFICANT CHANGE UP
SPECIMEN SOURCE: SIGNIFICANT CHANGE UP
SPECIMEN SOURCE: SIGNIFICANT CHANGE UP

## 2019-08-11 ENCOUNTER — INPATIENT (INPATIENT)
Facility: HOSPITAL | Age: 69
LOS: 15 days | Discharge: NURSING, REHAB OR LONG-TERM CARE | DRG: 871 | End: 2019-08-27
Attending: HOSPITALIST | Admitting: STUDENT IN AN ORGANIZED HEALTH CARE EDUCATION/TRAINING PROGRAM
Payer: MEDICARE

## 2019-08-11 VITALS
RESPIRATION RATE: 20 BRPM | HEART RATE: 130 BPM | TEMPERATURE: 99 F | OXYGEN SATURATION: 99 % | DIASTOLIC BLOOD PRESSURE: 78 MMHG | SYSTOLIC BLOOD PRESSURE: 124 MMHG

## 2019-08-11 DIAGNOSIS — Z87.81 PERSONAL HISTORY OF (HEALED) TRAUMATIC FRACTURE: Chronic | ICD-10-CM

## 2019-08-11 DIAGNOSIS — J18.9 PNEUMONIA, UNSPECIFIED ORGANISM: ICD-10-CM

## 2019-08-11 DIAGNOSIS — Z87.19 PERSONAL HISTORY OF OTHER DISEASES OF THE DIGESTIVE SYSTEM: Chronic | ICD-10-CM

## 2019-08-11 DIAGNOSIS — S42.409A UNSPECIFIED FRACTURE OF LOWER END OF UNSPECIFIED HUMERUS, INITIAL ENCOUNTER FOR CLOSED FRACTURE: Chronic | ICD-10-CM

## 2019-08-11 LAB
ALBUMIN SERPL ELPH-MCNC: 2.7 G/DL — LOW (ref 3.3–5)
ALBUMIN SERPL ELPH-MCNC: 2.8 G/DL — LOW (ref 3.3–5)
ALP SERPL-CCNC: 76 U/L — SIGNIFICANT CHANGE UP (ref 40–120)
ALP SERPL-CCNC: 94 U/L — SIGNIFICANT CHANGE UP (ref 40–120)
ALT FLD-CCNC: 36 U/L — SIGNIFICANT CHANGE UP (ref 10–45)
ALT FLD-CCNC: 43 U/L — SIGNIFICANT CHANGE UP (ref 10–45)
ANION GAP SERPL CALC-SCNC: 14 MMOL/L — SIGNIFICANT CHANGE UP (ref 5–17)
ANION GAP SERPL CALC-SCNC: 9 MMOL/L — SIGNIFICANT CHANGE UP (ref 5–17)
APPEARANCE UR: ABNORMAL
APTT BLD: 30.2 SEC — SIGNIFICANT CHANGE UP (ref 27.5–36.3)
APTT BLD: 37.8 SEC — HIGH (ref 27.5–36.3)
AST SERPL-CCNC: 15 U/L — SIGNIFICANT CHANGE UP (ref 10–40)
AST SERPL-CCNC: 24 U/L — SIGNIFICANT CHANGE UP (ref 10–40)
BACTERIA # UR AUTO: 0 — SIGNIFICANT CHANGE UP
BASE EXCESS BLDV CALC-SCNC: 13 MMOL/L — HIGH (ref -2–2)
BASOPHILS # BLD AUTO: 0.1 K/UL — SIGNIFICANT CHANGE UP (ref 0–0.2)
BASOPHILS NFR BLD AUTO: 0.2 % — SIGNIFICANT CHANGE UP (ref 0–2)
BILIRUB SERPL-MCNC: 0.6 MG/DL — SIGNIFICANT CHANGE UP (ref 0.2–1.2)
BILIRUB SERPL-MCNC: 0.7 MG/DL — SIGNIFICANT CHANGE UP (ref 0.2–1.2)
BILIRUB UR-MCNC: NEGATIVE — SIGNIFICANT CHANGE UP
BLD GP AB SCN SERPL QL: NEGATIVE — SIGNIFICANT CHANGE UP
BUN SERPL-MCNC: 33 MG/DL — HIGH (ref 7–23)
BUN SERPL-MCNC: 43 MG/DL — HIGH (ref 7–23)
CA-I SERPL-SCNC: 1.17 MMOL/L — SIGNIFICANT CHANGE UP (ref 1.12–1.3)
CALCIUM SERPL-MCNC: 9.3 MG/DL — SIGNIFICANT CHANGE UP (ref 8.4–10.5)
CALCIUM SERPL-MCNC: 9.9 MG/DL — SIGNIFICANT CHANGE UP (ref 8.4–10.5)
CHLORIDE BLDV-SCNC: 97 MMOL/L — SIGNIFICANT CHANGE UP (ref 96–108)
CHLORIDE SERPL-SCNC: 91 MMOL/L — LOW (ref 96–108)
CHLORIDE SERPL-SCNC: 94 MMOL/L — LOW (ref 96–108)
CO2 BLDV-SCNC: 40 MMOL/L — HIGH (ref 22–30)
CO2 SERPL-SCNC: 32 MMOL/L — HIGH (ref 22–31)
CO2 SERPL-SCNC: 35 MMOL/L — HIGH (ref 22–31)
COLOR SPEC: YELLOW — SIGNIFICANT CHANGE UP
CREAT SERPL-MCNC: 0.84 MG/DL — SIGNIFICANT CHANGE UP (ref 0.5–1.3)
CREAT SERPL-MCNC: 1.04 MG/DL — SIGNIFICANT CHANGE UP (ref 0.5–1.3)
DIFF PNL FLD: NEGATIVE — SIGNIFICANT CHANGE UP
EOSINOPHIL # BLD AUTO: 0 K/UL — SIGNIFICANT CHANGE UP (ref 0–0.5)
EOSINOPHIL NFR BLD AUTO: 0.1 % — SIGNIFICANT CHANGE UP (ref 0–6)
EPI CELLS # UR: 2 — SIGNIFICANT CHANGE UP
GAS PNL BLDA: SIGNIFICANT CHANGE UP
GAS PNL BLDV: 136 MMOL/L — SIGNIFICANT CHANGE UP (ref 135–145)
GAS PNL BLDV: SIGNIFICANT CHANGE UP
GAS PNL BLDV: SIGNIFICANT CHANGE UP
GLUCOSE BLDV-MCNC: 144 MG/DL — HIGH (ref 70–99)
GLUCOSE SERPL-MCNC: 133 MG/DL — HIGH (ref 70–99)
GLUCOSE SERPL-MCNC: 141 MG/DL — HIGH (ref 70–99)
GLUCOSE UR QL: NEGATIVE — SIGNIFICANT CHANGE UP
GRAN CASTS # UR COMP ASSIST: 2 /LPF — SIGNIFICANT CHANGE UP
HCO3 BLDV-SCNC: 39 MMOL/L — HIGH (ref 21–29)
HCT VFR BLD CALC: 32.2 % — LOW (ref 34.5–45)
HCT VFR BLD CALC: 36 % — SIGNIFICANT CHANGE UP (ref 34.5–45)
HCT VFR BLDA CALC: 35 % — LOW (ref 39–50)
HGB BLD CALC-MCNC: 11.2 G/DL — LOW (ref 11.5–15.5)
HGB BLD-MCNC: 11 G/DL — LOW (ref 11.5–15.5)
HGB BLD-MCNC: 9.4 G/DL — LOW (ref 11.5–15.5)
HYALINE CASTS # UR AUTO: 5 /LPF — SIGNIFICANT CHANGE UP (ref 0–7)
INR BLD: 1.29 RATIO — HIGH (ref 0.88–1.16)
INR BLD: 1.36 RATIO — HIGH (ref 0.88–1.16)
KETONES UR-MCNC: NEGATIVE — SIGNIFICANT CHANGE UP
LACTATE BLDV-MCNC: 1.3 MMOL/L — SIGNIFICANT CHANGE UP (ref 0.7–2)
LACTATE BLDV-MCNC: 1.3 MMOL/L — SIGNIFICANT CHANGE UP (ref 0.7–2)
LEUKOCYTE ESTERASE UR-ACNC: NEGATIVE — SIGNIFICANT CHANGE UP
LYMPHOCYTES # BLD AUTO: 2 K/UL — SIGNIFICANT CHANGE UP (ref 1–3.3)
LYMPHOCYTES # BLD AUTO: 6.8 % — LOW (ref 13–44)
MAGNESIUM SERPL-MCNC: 2.3 MG/DL — SIGNIFICANT CHANGE UP (ref 1.6–2.6)
MCHC RBC-ENTMCNC: 26.3 PG — LOW (ref 27–34)
MCHC RBC-ENTMCNC: 27.3 PG — SIGNIFICANT CHANGE UP (ref 27–34)
MCHC RBC-ENTMCNC: 29.2 GM/DL — LOW (ref 32–36)
MCHC RBC-ENTMCNC: 30.5 GM/DL — LOW (ref 32–36)
MCV RBC AUTO: 89.6 FL — SIGNIFICANT CHANGE UP (ref 80–100)
MCV RBC AUTO: 89.8 FL — SIGNIFICANT CHANGE UP (ref 80–100)
MONOCYTES # BLD AUTO: 2.8 K/UL — HIGH (ref 0–0.9)
MONOCYTES NFR BLD AUTO: 9.3 % — SIGNIFICANT CHANGE UP (ref 2–14)
NEUTROPHILS # BLD AUTO: 25 K/UL — HIGH (ref 1.8–7.4)
NEUTROPHILS NFR BLD AUTO: 83.6 % — HIGH (ref 43–77)
NITRITE UR-MCNC: NEGATIVE — SIGNIFICANT CHANGE UP
PCO2 BLDV: 54 MMHG — HIGH (ref 35–50)
PH BLDV: 7.46 — HIGH (ref 7.35–7.45)
PH UR: 5.5 — SIGNIFICANT CHANGE UP (ref 5–8)
PLAT MORPH BLD: NORMAL — SIGNIFICANT CHANGE UP
PLATELET # BLD AUTO: 316 K/UL — SIGNIFICANT CHANGE UP (ref 150–400)
PLATELET # BLD AUTO: 329 K/UL — SIGNIFICANT CHANGE UP (ref 150–400)
PO2 BLDV: 62 MMHG — HIGH (ref 25–45)
POTASSIUM BLDV-SCNC: 3.8 MMOL/L — SIGNIFICANT CHANGE UP (ref 3.5–5.3)
POTASSIUM SERPL-MCNC: 3.3 MMOL/L — LOW (ref 3.5–5.3)
POTASSIUM SERPL-MCNC: 4.3 MMOL/L — SIGNIFICANT CHANGE UP (ref 3.5–5.3)
POTASSIUM SERPL-SCNC: 3.3 MMOL/L — LOW (ref 3.5–5.3)
POTASSIUM SERPL-SCNC: 4.3 MMOL/L — SIGNIFICANT CHANGE UP (ref 3.5–5.3)
PROT SERPL-MCNC: 6.9 G/DL — SIGNIFICANT CHANGE UP (ref 6–8.3)
PROT SERPL-MCNC: 7.5 G/DL — SIGNIFICANT CHANGE UP (ref 6–8.3)
PROT UR-MCNC: ABNORMAL
PROTHROM AB SERPL-ACNC: 14.8 SEC — HIGH (ref 10–12.9)
PROTHROM AB SERPL-ACNC: 15.6 SEC — HIGH (ref 10–12.9)
RAPID RVP RESULT: SIGNIFICANT CHANGE UP
RBC # BLD: 3.58 M/UL — LOW (ref 3.8–5.2)
RBC # BLD: 4.02 M/UL — SIGNIFICANT CHANGE UP (ref 3.8–5.2)
RBC # FLD: 14.4 % — SIGNIFICANT CHANGE UP (ref 10.3–14.5)
RBC # FLD: 14.5 % — SIGNIFICANT CHANGE UP (ref 10.3–14.5)
RBC BLD AUTO: SIGNIFICANT CHANGE UP
RBC CASTS # UR COMP ASSIST: 4 /HPF — SIGNIFICANT CHANGE UP (ref 0–4)
RH IG SCN BLD-IMP: POSITIVE — SIGNIFICANT CHANGE UP
SAO2 % BLDV: 92 % — HIGH (ref 67–88)
SODIUM SERPL-SCNC: 137 MMOL/L — SIGNIFICANT CHANGE UP (ref 135–145)
SODIUM SERPL-SCNC: 138 MMOL/L — SIGNIFICANT CHANGE UP (ref 135–145)
SP GR SPEC: 1.03 — HIGH (ref 1.01–1.02)
UROBILINOGEN FLD QL: ABNORMAL
WBC # BLD: 26 K/UL — HIGH (ref 3.8–10.5)
WBC # BLD: 29.9 K/UL — HIGH (ref 3.8–10.5)
WBC # FLD AUTO: 26 K/UL — HIGH (ref 3.8–10.5)
WBC # FLD AUTO: 29.9 K/UL — HIGH (ref 3.8–10.5)
WBC UR QL: 3 /HPF — SIGNIFICANT CHANGE UP (ref 0–5)

## 2019-08-11 PROCEDURE — 70450 CT HEAD/BRAIN W/O DYE: CPT | Mod: 26

## 2019-08-11 PROCEDURE — 74177 CT ABD & PELVIS W/CONTRAST: CPT | Mod: 26

## 2019-08-11 PROCEDURE — 93010 ELECTROCARDIOGRAM REPORT: CPT | Mod: 59,GC

## 2019-08-11 PROCEDURE — 99291 CRITICAL CARE FIRST HOUR: CPT | Mod: 25,GC

## 2019-08-11 PROCEDURE — 71045 X-RAY EXAM CHEST 1 VIEW: CPT | Mod: 26

## 2019-08-11 PROCEDURE — 36556 INSERT NON-TUNNEL CV CATH: CPT | Mod: GC

## 2019-08-11 RX ORDER — PREDNISOLONE SODIUM PHOSPHATE 1 %
1 DROPS OPHTHALMIC (EYE)
Refills: 0 | Status: DISCONTINUED | OUTPATIENT
Start: 2019-08-11 | End: 2019-08-27

## 2019-08-11 RX ORDER — CHLORHEXIDINE GLUCONATE 213 G/1000ML
1 SOLUTION TOPICAL
Refills: 0 | Status: DISCONTINUED | OUTPATIENT
Start: 2019-08-11 | End: 2019-08-26

## 2019-08-11 RX ORDER — HEPARIN SODIUM 5000 [USP'U]/ML
5000 INJECTION INTRAVENOUS; SUBCUTANEOUS EVERY 8 HOURS
Refills: 0 | Status: DISCONTINUED | OUTPATIENT
Start: 2019-08-11 | End: 2019-08-11

## 2019-08-11 RX ORDER — ENOXAPARIN SODIUM 100 MG/ML
40 INJECTION SUBCUTANEOUS
Refills: 0 | Status: DISCONTINUED | OUTPATIENT
Start: 2019-08-11 | End: 2019-08-11

## 2019-08-11 RX ORDER — PIPERACILLIN AND TAZOBACTAM 4; .5 G/20ML; G/20ML
3.38 INJECTION, POWDER, LYOPHILIZED, FOR SOLUTION INTRAVENOUS ONCE
Refills: 0 | Status: COMPLETED | OUTPATIENT
Start: 2019-08-11 | End: 2019-08-11

## 2019-08-11 RX ORDER — LATANOPROST 0.05 MG/ML
1 SOLUTION/ DROPS OPHTHALMIC; TOPICAL AT BEDTIME
Refills: 0 | Status: DISCONTINUED | OUTPATIENT
Start: 2019-08-11 | End: 2019-08-27

## 2019-08-11 RX ORDER — MEROPENEM 1 G/30ML
2000 INJECTION INTRAVENOUS EVERY 8 HOURS
Refills: 0 | Status: DISCONTINUED | OUTPATIENT
Start: 2019-08-12 | End: 2019-08-18

## 2019-08-11 RX ORDER — NOREPINEPHRINE BITARTRATE/D5W 8 MG/250ML
0.05 PLASTIC BAG, INJECTION (ML) INTRAVENOUS
Qty: 8 | Refills: 0 | Status: DISCONTINUED | OUTPATIENT
Start: 2019-08-11 | End: 2019-08-13

## 2019-08-11 RX ORDER — SODIUM CHLORIDE 9 MG/ML
2000 INJECTION INTRAMUSCULAR; INTRAVENOUS; SUBCUTANEOUS ONCE
Refills: 0 | Status: COMPLETED | OUTPATIENT
Start: 2019-08-11 | End: 2019-08-11

## 2019-08-11 RX ORDER — MEROPENEM 1 G/30ML
INJECTION INTRAVENOUS
Refills: 0 | Status: DISCONTINUED | OUTPATIENT
Start: 2019-08-12 | End: 2019-08-18

## 2019-08-11 RX ORDER — VANCOMYCIN HCL 1 G
VIAL (EA) INTRAVENOUS
Refills: 0 | Status: DISCONTINUED | OUTPATIENT
Start: 2019-08-12 | End: 2019-08-13

## 2019-08-11 RX ORDER — DEXAMETHASONE 0.5 MG/5ML
10 ELIXIR ORAL ONCE
Refills: 0 | Status: COMPLETED | OUTPATIENT
Start: 2019-08-11 | End: 2019-08-11

## 2019-08-11 RX ORDER — BUDESONIDE AND FORMOTEROL FUMARATE DIHYDRATE 160; 4.5 UG/1; UG/1
2 AEROSOL RESPIRATORY (INHALATION)
Refills: 0 | Status: DISCONTINUED | OUTPATIENT
Start: 2019-08-11 | End: 2019-08-12

## 2019-08-11 RX ORDER — VANCOMYCIN HCL 1 G
1250 VIAL (EA) INTRAVENOUS ONCE
Refills: 0 | Status: COMPLETED | OUTPATIENT
Start: 2019-08-11 | End: 2019-08-12

## 2019-08-11 RX ORDER — MEROPENEM 1 G/30ML
1000 INJECTION INTRAVENOUS ONCE
Refills: 0 | Status: COMPLETED | OUTPATIENT
Start: 2019-08-11 | End: 2019-08-11

## 2019-08-11 RX ORDER — VANCOMYCIN HCL 1 G
1000 VIAL (EA) INTRAVENOUS ONCE
Refills: 0 | Status: COMPLETED | OUTPATIENT
Start: 2019-08-11 | End: 2019-08-11

## 2019-08-11 RX ORDER — AMLODIPINE BESYLATE 2.5 MG/1
5 TABLET ORAL DAILY
Refills: 0 | Status: DISCONTINUED | OUTPATIENT
Start: 2019-08-11 | End: 2019-08-11

## 2019-08-11 RX ORDER — ACYCLOVIR SODIUM 500 MG
650 VIAL (EA) INTRAVENOUS EVERY 8 HOURS
Refills: 0 | Status: DISCONTINUED | OUTPATIENT
Start: 2019-08-11 | End: 2019-08-13

## 2019-08-11 RX ORDER — VANCOMYCIN HCL 1 G
1250 VIAL (EA) INTRAVENOUS EVERY 12 HOURS
Refills: 0 | Status: DISCONTINUED | OUTPATIENT
Start: 2019-08-12 | End: 2019-08-13

## 2019-08-11 RX ORDER — NYSTATIN 500MM UNIT
500000 POWDER (EA) MISCELLANEOUS
Refills: 0 | Status: DISCONTINUED | OUTPATIENT
Start: 2019-08-11 | End: 2019-08-27

## 2019-08-11 RX ORDER — ACETAMINOPHEN 500 MG
650 TABLET ORAL ONCE
Refills: 0 | Status: COMPLETED | OUTPATIENT
Start: 2019-08-11 | End: 2019-08-11

## 2019-08-11 RX ORDER — MEROPENEM 1 G/30ML
2000 INJECTION INTRAVENOUS ONCE
Refills: 0 | Status: COMPLETED | OUTPATIENT
Start: 2019-08-11 | End: 2019-08-12

## 2019-08-11 RX ADMIN — Medication 9.38 MICROGRAM(S)/KG/MIN: at 15:24

## 2019-08-11 RX ADMIN — SODIUM CHLORIDE 2000 MILLILITER(S): 9 INJECTION INTRAMUSCULAR; INTRAVENOUS; SUBCUTANEOUS at 14:33

## 2019-08-11 RX ADMIN — Medication 1 APPLICATION(S): at 23:18

## 2019-08-11 RX ADMIN — CHLORHEXIDINE GLUCONATE 1 APPLICATION(S): 213 SOLUTION TOPICAL at 22:47

## 2019-08-11 RX ADMIN — Medication 9.38 MICROGRAM(S)/KG/MIN: at 22:46

## 2019-08-11 RX ADMIN — Medication 250 MILLIGRAM(S): at 15:42

## 2019-08-11 RX ADMIN — Medication 9.38 MICROGRAM(S)/KG/MIN: at 15:25

## 2019-08-11 RX ADMIN — MEROPENEM 100 MILLIGRAM(S): 1 INJECTION INTRAVENOUS at 17:53

## 2019-08-11 RX ADMIN — PIPERACILLIN AND TAZOBACTAM 200 GRAM(S): 4; .5 INJECTION, POWDER, LYOPHILIZED, FOR SOLUTION INTRAVENOUS at 14:33

## 2019-08-11 RX ADMIN — Medication 650 MILLIGRAM(S): at 16:32

## 2019-08-11 RX ADMIN — Medication 102 MILLIGRAM(S): at 22:45

## 2019-08-11 RX ADMIN — Medication 650 MILLIGRAM(S): at 14:25

## 2019-08-11 NOTE — ED PROVIDER NOTE - PSH
Fractured elbow  left elbow fracture repair,2007  H/O appendicitis  h/o appendicectomy  History of ankle fracture  h/o repair

## 2019-08-11 NOTE — ED PROVIDER NOTE - PROGRESS NOTE DETAILS
Attd:  Alerted by nursing staff that patients BP 70's systolic.  Appears clinically unchanged from presentation.  Additional fluid bolus initiated, next BP now in 100's systolic.  Nurse looking for 3rd IV access.  Family present in Emergency Department, will consent them for possible central line placement for pressors if necessary.  Harsha Russell M.D. Attd:  BP now worsening.  Will start levophed peripherally.  Will place CVC for pressors.  Harsha Russell M.D. Attd: BP improved on peripheral levophed GTT.  MICU evaluated patient, will admit patient if no acute surgical etiology of symptoms after CT head.  Harsha Russell M.D. Attd:  Alerted by nursing staff that patients BP 70's systolic.  Appears clinically unchanged from presentation.  Post 2L IV bolus.  Additional fluid bolus initiated, next BP now in 100's systolic.  Nurse looking for 3rd IV access.  Family present in Emergency Department, will consent them for possible central line placement for pressors if necessary.  Harsha Russell M.D. Attd:  BP now worsening despite 3rd liter infusing.  Will start levophed peripherally.  Will place CVC for pressors.  Harsha Russell M.D. Attd: BP improved on peripheral levophed GTT.  MICU evaluated patient, will admit patient if no acute surgical etiology of symptoms after CT head.  Neurosurgery aware of patient, will see in Emergency Department.  Harsha Russell M.D.

## 2019-08-11 NOTE — ED ADULT NURSE REASSESSMENT NOTE - NS ED NURSE REASSESS COMMENT FT1
Central line placed by MD Rojas. X-ray confirmation of placement by MD Rojas, Levophed infusing moved to central line.

## 2019-08-11 NOTE — CHART NOTE - NSCHARTNOTEFT_GEN_A_CORE
HCT reviewed, no absolute neurosurgical contraindication to LP, but must weight risk benefits in context of sepsis. Please f/u coags.

## 2019-08-11 NOTE — ED PROVIDER NOTE - CLINICAL SUMMARY MEDICAL DECISION MAKING FREE TEXT BOX
69F s/p brain lesion resection in late June p/w AMS and fever. Will obtain CT head, sepsis w/u. neuro in origin vs infection.

## 2019-08-11 NOTE — ED ADULT NURSE NOTE - NSIMPLEMENTINTERV_GEN_ALL_ED
Implemented All Fall with Harm Risk Interventions:  [place] to call system. Call bell, personal items and telephone within reach. Instruct patient to call for assistance. Room bathroom lighting operational. Non-slip footwear when patient is off stretcher. Physically safe environment: no spills, clutter or unnecessary equipment. Stretcher in lowest position, wheels locked, appropriate side rails in place. Provide visual cue, wrist band, yellow gown, etc. Monitor gait and stability. Monitor for mental status changes and reorient to person, place, and time. Review medications for side effects contributing to fall risk. Reinforce activity limits and safety measures with patient and family. Provide visual clues: red socks.

## 2019-08-11 NOTE — H&P ADULT - ASSESSMENT
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 Em on 6/24, recurrent aspiration s/p PEG tube, who presents from [facility] s/p a week of gradually worsening mental status along with fever x 3 days found to be septic 2/2 meningitis vs pneumonia.    #Neuro  - Currently altered, moaning in pain and unable to answer pointed questions, but protecting airway  - most likely toxic metabolic encephalopathy in the setting of sepsis, will cont to monitor    #CV  - hypotensive to 60s systolic in ED and not adequately responsive to fluids. Started on levophed in ED. Most likely distributive shock from sepsis.  - will c/w levophed and titrate as tolerated, goal MAP > 65  - holding home antihypertensives in setting of sepsis    #Pulm  - currently saturating well on room air and protecting airway, however slightly more hypercapnic on blood gas compared with priors  - cont to monitor respiratory status; low threshold for intubation given AMS and concern about inability to protect airway    #ID  - pt in septic shock with elevated WBC, fever, and likely source of meningitis vs pneumonia on CXR. Intraabdominal source also possible given tenderness on exam  - will c/w vanc and meropenem for broad spectrum coverage (8/11 - )  - f/u CT a/p with IV contrast for possible intraabdominal source of infection  - neurosurgery consulted in ED, determined no acute surgical intervention. 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 Em on 6/24, recurrent aspiration s/p PEG tube, who presents from [facility] s/p a week of gradually worsening mental status along with fever x 3 days found to be septic 2/2 meningitis vs pneumonia.    #Neuro  - Currently altered, moaning in pain and unable to answer pointed questions, but protecting airway  - CT head showing no acute changes, however increase in size of CSF density fluid collection representing CSF leak vs meningocele, neurosurgery following, appreciate recs   - most likely toxic metabolic encephalopathy in the setting of sepsis, will cont to monitor    #CV  - hypotensive to 60s systolic in ED and not adequately responsive to fluids. Started on levophed in ED. Most likely distributive shock from sepsis.  - will c/w levophed and titrate as tolerated, goal MAP > 65  - holding home antihypertensives in setting of sepsis    #Pulm  - currently saturating well on room air and protecting airway, however slightly more hypercapnic on blood gas compared with priors  - cont to monitor respiratory status; low threshold for intubation given AMS and concern about inability to protect airway    #ID  - pt in septic shock with elevated WBC, fever, and likely source of meningitis vs pneumonia on CXR. Intraabdominal source also possible given tenderness on exam  - will c/w vanc and meropenem for broad spectrum coverage (8/11 - )  - f/u CT a/p with IV contrast for possible intraabdominal source of infection  - neurosurgery consulted in ED, determined no acute surgical intervention. 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 Em on 6/24, recurrent aspiration s/p PEG tube, who presents from [facility] s/p a week of gradually worsening mental status along with fever x 3 days found to be septic 2/2 meningitis vs pneumonia.    #Neuro  - Currently altered, moaning in pain and unable to answer pointed questions, but protecting airway  - CT head showing no acute changes, however increase in size of CSF density fluid collection representing CSF leak vs meningocele, neurosurgery following, appreciate recs   - most likely toxic metabolic encephalopathy in the setting of sepsis, will cont to monitor    #CV  - hypotensive to 60s systolic in ED and not adequately responsive to fluids. Started on levophed in ED. Most likely distributive shock from sepsis.  - will c/w levophed and titrate as tolerated, goal MAP > 65  - holding home antihypertensives in setting of sepsis    #Pulm  - currently saturating well on room air and protecting airway, however slightly more hypercapnic on blood gas compared with priors  - cont to monitor respiratory status; low threshold for intubation given AMS and concern about inability to protect airway    #ID  - pt in septic shock with elevated WBC, fever, and likely source of meningitis vs pneumonia on CXR. Intraabdominal source also possible given tenderness on exam  - will c/w vanc and meropenem for broad spectrum coverage (8/11 - )  - f/u CT a/p with IV contrast for possible intraabdominal source of infection  - neurosurgery consulted in ED, determined no acute surgical intervention. Unsure if safe to perform LP at this time, will f/u and do LP if clinically indicated  - f/u blood and urine cultures    #GI  - will restart tube feeds via PEG    #Endo  - A1C 5.8 in 6/2018  - no active issues    #Renal  - creatinine slightly elevated at 1.04 (baseline 0.7 - 0.8), received contrast for CT in ED  - will cont to monitor BMP QD    #PPX  - will hold DVT ppx for possible LP  - per family at bedside, pt is full code

## 2019-08-11 NOTE — CONSULT NOTE ADULT - SUBJECTIVE AND OBJECTIVE BOX
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 Em on 6/24, recurrent aspiration s/p PEG tube, who presents from [facility] s/p a week of gradually worsening mental status along with fever x 3 days.  Per EMS, nurse at [facility] dx'd pt w/ pneumonia today and started pt on IV zosyn (pt arrives with IV in left hand). Per family at bedside, pt initially was "doing much better" after her neurosurgery and was conversant and oriented. Pt had residual word slurring but the change that prompted evaluation in the ED was marked, with the pt becoming acutely obtunded and nonverbal. Pt has been moaning all morning without making any discernibly intelligent words. Family denies hx of F/C/N/V, CP, diarrhea. States she appeared short of breath earlier in the AM and earlier in the ED, now resolved.     In the ED, patient was found to be hypotensive with systolic BPs between  and given 3L IV fluids. She remained hypotensive and was placed on levophed which resulted in improvement of her BP. Labs notable for WBC of 29.9. (11 Aug 2019 18:56)      Imaging: HCT shows no acute hemorrhage, increased size of extracalvarial CSF fluid collection/ pseudomeningoceal.    Exam:AAOx0, not FC, mumbling nonsensical words and moaning, CONTRERAS strongly. Pseudomeningoceal not tense, wound c/d/i.    --Anticoagulation:    =====================  PAST MEDICAL HISTORY   Intracranial mass  Light-headedness  Glaucoma  Asthma  HTN (hypertension)    PAST SURGICAL HISTORY   History of ankle fracture  Fractured elbow  H/O appendicitis    shellfish (Angioedema; Rash)      MEDICATIONS:  Antibiotics:  nystatin    Suspension 739028 Unit(s) Oral two times a day    Neuro:    Other:  buDESOnide 160 MICROgram(s)/formoterol 4.5 MICROgram(s) Inhaler 2 Puff(s) Inhalation two times a day  norepinephrine Infusion 0.05 MICROgram(s)/kG/Min IV Continuous <Continuous>      SOCIAL HISTORY:   Occupation:   Marital Status:     FAMILY HISTORY:      ROS: Negative except per HPI    LABS:  PT/INR - ( 11 Aug 2019 14:47 )   PT: 15.6 sec;   INR: 1.36 ratio         PTT - ( 11 Aug 2019 14:47 )  PTT:37.8 sec                        11.0   29.9  )-----------( 329      ( 11 Aug 2019 14:47 )             36.0     08-11    137  |  91<L>  |  43<H>  ----------------------------<  133<H>  4.3   |  32<H>  |  1.04    Ca    9.9      11 Aug 2019 14:47    TPro  7.5  /  Alb  2.8<L>  /  TBili  0.7  /  DBili  x   /  AST  24  /  ALT  43  /  AlkPhos  94  08-11

## 2019-08-11 NOTE — H&P ADULT - ATTENDING COMMENTS
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 Em on 6/24, recurrent aspiration s/p PEG tube, who presents from [facility] s/p a week of gradually worsening mental status along with fever x 3 days found to be septic 2/2 meningitis vs pna.  concern for maningitis recent operation, CSF leak on CT and history/exam most consistnet with meningitis.  Discussed with neurosurgery resident and as per discussion no contraindication to LP based on imaging.  Will be technichally difficult LP based on body habitus and mental status..  Will attempt bedside LP and if unable will consult IR.   Monitor BAG given respiratory pattern and hypercapnia.    - Meropenem 2g q8, Aguilar 15mg/kg  - Acyclovir  - Follow up LP.    GOC discussed, pt would want intubation and CPR

## 2019-08-11 NOTE — H&P ADULT - HISTORY OF PRESENT ILLNESS
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 Em on 6/24, p/f [facility] s/p a week of gradually worsening mental status along with fever x 3 days. Pt has PEG tube in place for feeding. Per EMS, nurse at [facility] dx'd pt w/ pneumonia today and started pt on IV zosyn (pt arrives with IV in left hand). Per daughter at bedside post-operatively pt was "doing much better", had " some word slurring" but this change had been much more dramatic. Pt not speaking, has been moaning all morning without making any discernibly intelligent words.    In the ED, patient was found to be hypotensive with systolic BPs between  and given 3L IV fluids. She remained hypotensive and was placed on levophed which resulted in improvement of her BP. 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 Em on 6/24, recurrent aspiration s/p PEG tube, who presents from [facility] s/p a week of gradually worsening mental status along with fever x 3 days.  Per EMS, nurse at [facility] dx'd pt w/ pneumonia today and started pt on IV zosyn (pt arrives with IV in left hand). Per family at bedside, pt initially was "doing much better" after her neurosurgery and was conversant and oriented. Pt had residual word slurring but the change that prompted evaluation in the ED was marked, with the pt becoming acutely obtunded and nonverbal. Pt has been moaning all morning without making any discernibly intelligent words. Family denies hx of F/C/N/V, CP, diarrhea. States she appeared short of breath earlier in the AM and earlier in the ED, now resolved.     In the ED, patient was found to be hypotensive with systolic BPs between  and given 3L IV fluids. She remained hypotensive and was placed on levophed which resulted in improvement of her BP. Labs notable for WBC of 29.9.

## 2019-08-11 NOTE — ED ADULT NURSE NOTE - OBJECTIVE STATEMENT
68 y/o female PMH HTN, neoplasm of brain, asthma and HTN presents to ED via EMS for fever, altered mental status and pneumonia diagnosis per daughter. EMS reports pt is at baseline speaking, since yesterday has not been speaking coherently, slurred speech. On exam, responsive to painful stimuli and voice. CM in place ST HR 120s. Hypotensive 90s/70s, MD Russell at bedside. 16 Fr indwelling Marcos catheter placed at bedside by RN under sterile technique with second RN at bedside for verification of sterile technique.

## 2019-08-11 NOTE — ED ADULT NURSE REASSESSMENT NOTE - NS ED NURSE REASSESS COMMENT FT1
Report received from BROOKLYN Corral. pt in no distress. NC in place. VS as documented. awaiting RVP results so pt can be transported to MICU. lab contacted

## 2019-08-11 NOTE — ED PROCEDURE NOTE - CPROC ED POST RADIOGRAPHY1
central line located in the/R subclav/central line located in the superior vena cava/post-procedure radiography performed/no pneumothorax

## 2019-08-11 NOTE — ED PROVIDER NOTE - PMH
Asthma  controlled with meds  Glaucoma    HTN (hypertension)    Intracranial mass  4th intraventricular mass  Light-headedness

## 2019-08-11 NOTE — H&P ADULT - NSHPLABSRESULTS_GEN_ALL_CORE
08-11    137  |  91<L>  |  43<H>  ----------------------------<  133<H>  4.3   |  32<H>  |  1.04    Ca    9.9      11 Aug 2019 14:47    TPro  7.5  /  Alb  2.8<L>  /  TBili  0.7  /  DBili  x   /  AST  24  /  ALT  43  /  AlkPhos  94  08-11                          11.0   29.9  )-----------( 329      ( 11 Aug 2019 14:47 )             36.0

## 2019-08-11 NOTE — ED ADULT NURSE REASSESSMENT NOTE - NS ED NURSE REASSESS COMMENT FT1
Pt admitted to MICU, diagnosis of pneumonia. Awaiting bed placement at this time. Family aware of plan of care at this time. Pt to get additional CT scan when en route to MICU with RN, EDT and MD.

## 2019-08-11 NOTE — H&P ADULT - NSHPPHYSICALEXAM_GEN_ALL_CORE
GENERAL: obtunded  HEENT: NC/AT, Moist mucous membranes, PERRL  LUNGS: CTAB, no wheezes or crackles.   CARDIAC: RRR, no m/r/g.    ABDOMEN: Soft, NT, ND, No rebound, guarding. No CVA tenderness.   EXT: No edema. No calf tenderness. CV 2+DP/PT bilaterally.   MSK: 2+ pulses all extremities  NEURO: obtunded Moving all extremities.  SKIN: Warm and dry. No rash. GENERAL: awake, alert, but altered and moaning in pain  HEENT: NC/AT, Moist mucous membranes, PERRL  LUNGS: CTAB, no wheezes or crackles.   CARDIAC: tachycardi, no r/m/g  ABDOMEN: diffusely ttp in all quadrants, no rebound or guarding. PEG tube C/D/I without erythema or drainage  EXT: No edema. No calf tenderness. CV 2+DP/PT bilaterally.   MSK: 2+ pulses all extremities  NEURO: obtunded, but moving all extremities, +nuchal rigidity  SKIN: Warm and dry. No rash.

## 2019-08-11 NOTE — CONSULT NOTE ADULT - ASSESSMENT
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 On zosyn for PNA. In ED she is hypotensive, tachycardia and febrile with WBC count of 29, on pressors. HCT shows no acute hemorrhage, increased size of extracalvarial CSF fluid collection/ pseudomeningoceal. Exam: AAOx0, not FC, mumbling nonsensical words and moaning, CONTRERAS strongly. Pseudomeningoceal not tense, wound c/d/i.  -exam likely 2/2 to sig septic shock  -Sepsis management per MICU  -no absolute contraindication to LP for ruling out Meningitis.   -Will Continue to monitor

## 2019-08-11 NOTE — ED PROVIDER NOTE - OBJECTIVE STATEMENT
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 Em on 6/24, p/f [facility] s/p a week of gradually worsening mental status along with fever x 3 days. Pt has PEG tube in place for feeding. Per EMS, nurse at [facility] dx'd pt w/ pneumonia today and started pt on IV zosyn (pt arrives with IV in left hand). Per daughter at bedside post-operatively pt was "doing much better", had " some word slurring" but this change had been much more dramatic. Pt not speaking, has been moaning all morning without making any discernably intelligent words.

## 2019-08-11 NOTE — ED PROVIDER NOTE - ATTENDING CONTRIBUTION TO CARE
Patient BIBEMS from nursing home.  Patient admitted [facility] in June for removal of fourth ventricular mass, discharged to [facility], then from rehab to subacute facility.  Progressively more lethargic/altered there, so sent to Emergency Department today.  Per nursing home records ordered for zosyn starting today.  Patient unable to give history secondary to mental status.    Unable to obtain ROS secondary to mental status.    Exam:  General: Patient ill appearing, tachycardic, warm to touch, febrile rectally  HEENT: airway patent with dry mucous membranes  Cardiac: regular tachycardia S1/S2  Respiratory: limited exam, no gross abnormal sounds  GI: abdomen obese, soft, non tender  Neuro: no gross facial droop, otherwise exam very limited  Skin: warm, well perfused    Patient presenting with likely sepsis, abnormal vital signs, will start sepsis bundle, broad spectrum antibiotics, cultures, straight cath, admission. Patient BIBEMS from nursing home.  Patient admitted [facility] in June for removal of fourth ventricular mass, discharged to [facility], then from rehab to subacute facility.  Progressively more lethargic/altered there, so sent to Emergency Department today.  Per nursing home records ordered for zosyn starting today.  Patient unable to give history secondary to mental status.    Unable to obtain ROS secondary to mental status.    Exam:  General: Patient ill appearing, tachycardic, warm to touch, febrile rectally  HEENT: airway patent with dry mucous membranes  Cardiac: regular tachycardia S1/S2  Respiratory: limited exam, no gross abnormal sounds  GI: abdomen obese, soft, non tender  Neuro: no gross facial droop, otherwise exam very limited  Skin: warm, well perfused    Patient presenting with likely sepsis of unclear source.  Abnormal vital signs, will start sepsis bundle, broad spectrum antibiotics, cultures, straight cath, admission.  Will obtain CT head and consult neurosurgery given recent surgical procedure, CXR, labs.

## 2019-08-12 ENCOUNTER — APPOINTMENT (OUTPATIENT)
Dept: NEUROSURGERY | Facility: CLINIC | Age: 69
End: 2019-08-12

## 2019-08-12 LAB
CULTURE RESULTS: NO GROWTH — SIGNIFICANT CHANGE UP
GLUCOSE BLDC GLUCOMTR-MCNC: 139 MG/DL — HIGH (ref 70–99)
GLUCOSE BLDC GLUCOMTR-MCNC: 155 MG/DL — HIGH (ref 70–99)
GLUCOSE BLDC GLUCOMTR-MCNC: 159 MG/DL — HIGH (ref 70–99)
GLUCOSE BLDC GLUCOMTR-MCNC: 168 MG/DL — HIGH (ref 70–99)
LEGIONELLA AG UR QL: NEGATIVE — SIGNIFICANT CHANGE UP
SPECIMEN SOURCE: SIGNIFICANT CHANGE UP

## 2019-08-12 PROCEDURE — 99291 CRITICAL CARE FIRST HOUR: CPT | Mod: 25

## 2019-08-12 PROCEDURE — 76705 ECHO EXAM OF ABDOMEN: CPT | Mod: 26,RT

## 2019-08-12 PROCEDURE — 93308 TTE F-UP OR LMTD: CPT | Mod: 26,GC

## 2019-08-12 PROCEDURE — 76604 US EXAM CHEST: CPT | Mod: 26,GC

## 2019-08-12 RX ORDER — GLUCAGON INJECTION, SOLUTION 0.5 MG/.1ML
1 INJECTION, SOLUTION SUBCUTANEOUS ONCE
Refills: 0 | Status: DISCONTINUED | OUTPATIENT
Start: 2019-08-12 | End: 2019-08-12

## 2019-08-12 RX ORDER — DEXTROSE 50 % IN WATER 50 %
12.5 SYRINGE (ML) INTRAVENOUS ONCE
Refills: 0 | Status: DISCONTINUED | OUTPATIENT
Start: 2019-08-12 | End: 2019-08-12

## 2019-08-12 RX ORDER — FENTANYL CITRATE 50 UG/ML
50 INJECTION INTRAVENOUS ONCE
Refills: 0 | Status: DISCONTINUED | OUTPATIENT
Start: 2019-08-12 | End: 2019-08-12

## 2019-08-12 RX ORDER — DEXMEDETOMIDINE HYDROCHLORIDE IN 0.9% SODIUM CHLORIDE 4 UG/ML
0.5 INJECTION INTRAVENOUS
Qty: 200 | Refills: 0 | Status: DISCONTINUED | OUTPATIENT
Start: 2019-08-12 | End: 2019-08-12

## 2019-08-12 RX ORDER — DEXAMETHASONE 0.5 MG/5ML
10 ELIXIR ORAL EVERY 6 HOURS
Refills: 0 | Status: DISCONTINUED | OUTPATIENT
Start: 2019-08-12 | End: 2019-08-18

## 2019-08-12 RX ORDER — BUDESONIDE, MICRONIZED 100 %
0.5 POWDER (GRAM) MISCELLANEOUS
Refills: 0 | Status: DISCONTINUED | OUTPATIENT
Start: 2019-08-12 | End: 2019-08-27

## 2019-08-12 RX ORDER — SODIUM CHLORIDE 9 MG/ML
1000 INJECTION, SOLUTION INTRAVENOUS
Refills: 0 | Status: DISCONTINUED | OUTPATIENT
Start: 2019-08-12 | End: 2019-08-12

## 2019-08-12 RX ORDER — INSULIN LISPRO 100/ML
VIAL (ML) SUBCUTANEOUS EVERY 6 HOURS
Refills: 0 | Status: DISCONTINUED | OUTPATIENT
Start: 2019-08-12 | End: 2019-08-27

## 2019-08-12 RX ORDER — DEXTROSE 50 % IN WATER 50 %
15 SYRINGE (ML) INTRAVENOUS ONCE
Refills: 0 | Status: DISCONTINUED | OUTPATIENT
Start: 2019-08-12 | End: 2019-08-12

## 2019-08-12 RX ORDER — POTASSIUM CHLORIDE 20 MEQ
20 PACKET (EA) ORAL
Refills: 0 | Status: COMPLETED | OUTPATIENT
Start: 2019-08-12 | End: 2019-08-12

## 2019-08-12 RX ORDER — DEXTROSE 50 % IN WATER 50 %
25 SYRINGE (ML) INTRAVENOUS ONCE
Refills: 0 | Status: DISCONTINUED | OUTPATIENT
Start: 2019-08-12 | End: 2019-08-12

## 2019-08-12 RX ORDER — MIDODRINE HYDROCHLORIDE 2.5 MG/1
30 TABLET ORAL EVERY 8 HOURS
Refills: 0 | Status: DISCONTINUED | OUTPATIENT
Start: 2019-08-12 | End: 2019-08-13

## 2019-08-12 RX ADMIN — Medication 500000 UNIT(S): at 17:48

## 2019-08-12 RX ADMIN — Medication 50 MILLIEQUIVALENT(S): at 05:10

## 2019-08-12 RX ADMIN — Medication 1: at 11:45

## 2019-08-12 RX ADMIN — Medication 102 MILLIGRAM(S): at 23:08

## 2019-08-12 RX ADMIN — CHLORHEXIDINE GLUCONATE 1 APPLICATION(S): 213 SOLUTION TOPICAL at 06:03

## 2019-08-12 RX ADMIN — MEROPENEM 200 MILLIGRAM(S): 1 INJECTION INTRAVENOUS at 06:02

## 2019-08-12 RX ADMIN — Medication 1: at 05:54

## 2019-08-12 RX ADMIN — Medication 125 MILLIGRAM(S): at 17:47

## 2019-08-12 RX ADMIN — LATANOPROST 1 DROP(S): 0.05 SOLUTION/ DROPS OPHTHALMIC; TOPICAL at 22:13

## 2019-08-12 RX ADMIN — MIDODRINE HYDROCHLORIDE 30 MILLIGRAM(S): 2.5 TABLET ORAL at 13:10

## 2019-08-12 RX ADMIN — MEROPENEM 200 MILLIGRAM(S): 1 INJECTION INTRAVENOUS at 00:30

## 2019-08-12 RX ADMIN — Medication 500000 UNIT(S): at 05:59

## 2019-08-12 RX ADMIN — Medication 125 MILLIGRAM(S): at 00:43

## 2019-08-12 RX ADMIN — Medication 102 MILLIGRAM(S): at 11:44

## 2019-08-12 RX ADMIN — Medication 1 DROP(S): at 06:02

## 2019-08-12 RX ADMIN — Medication 1 DROP(S): at 06:01

## 2019-08-12 RX ADMIN — DEXMEDETOMIDINE HYDROCHLORIDE IN 0.9% SODIUM CHLORIDE 13.09 MICROGRAM(S)/KG/HR: 4 INJECTION INTRAVENOUS at 01:06

## 2019-08-12 RX ADMIN — Medication 9.38 MICROGRAM(S)/KG/MIN: at 11:51

## 2019-08-12 RX ADMIN — MEROPENEM 200 MILLIGRAM(S): 1 INJECTION INTRAVENOUS at 14:00

## 2019-08-12 RX ADMIN — Medication 50 MILLIEQUIVALENT(S): at 03:05

## 2019-08-12 RX ADMIN — Medication 1 DROP(S): at 11:46

## 2019-08-12 RX ADMIN — MIDODRINE HYDROCHLORIDE 30 MILLIGRAM(S): 2.5 TABLET ORAL at 05:58

## 2019-08-12 RX ADMIN — Medication 50 MILLIEQUIVALENT(S): at 01:05

## 2019-08-12 RX ADMIN — Medication 102 MILLIGRAM(S): at 05:59

## 2019-08-12 RX ADMIN — Medication 163 MILLIGRAM(S): at 22:11

## 2019-08-12 RX ADMIN — Medication 163 MILLIGRAM(S): at 13:12

## 2019-08-12 RX ADMIN — MEROPENEM 200 MILLIGRAM(S): 1 INJECTION INTRAVENOUS at 22:11

## 2019-08-12 RX ADMIN — Medication 163 MILLIGRAM(S): at 00:30

## 2019-08-12 RX ADMIN — Medication 0.5 MILLIGRAM(S): at 23:24

## 2019-08-12 RX ADMIN — FENTANYL CITRATE 50 MICROGRAM(S): 50 INJECTION INTRAVENOUS at 01:05

## 2019-08-12 RX ADMIN — Medication 1: at 23:09

## 2019-08-12 RX ADMIN — Medication 125 MILLIGRAM(S): at 06:02

## 2019-08-12 RX ADMIN — MIDODRINE HYDROCHLORIDE 30 MILLIGRAM(S): 2.5 TABLET ORAL at 22:12

## 2019-08-12 RX ADMIN — FENTANYL CITRATE 50 MICROGRAM(S): 50 INJECTION INTRAVENOUS at 01:44

## 2019-08-12 RX ADMIN — Medication 1 DROP(S): at 17:48

## 2019-08-12 RX ADMIN — Medication 102 MILLIGRAM(S): at 17:48

## 2019-08-12 RX ADMIN — Medication 1 DROP(S): at 23:09

## 2019-08-12 RX ADMIN — Medication 1 APPLICATION(S): at 22:13

## 2019-08-12 RX ADMIN — Medication 163 MILLIGRAM(S): at 05:57

## 2019-08-12 RX ADMIN — LATANOPROST 1 DROP(S): 0.05 SOLUTION/ DROPS OPHTHALMIC; TOPICAL at 06:01

## 2019-08-12 NOTE — PROGRESS NOTE ADULT - SUBJECTIVE AND OBJECTIVE BOX
CHIEF COMPLAINT:    Interval Events:    REVIEW OF SYSTEMS:  Constitutional: [ ] negative [ ] fevers [ ] chills [ ] weight loss [ ] weight gain  HEENT: [ ] negative [ ] dry eyes [ ] eye irritation [ ] postnasal drip [ ] nasal congestion  CV: [ ] negative  [ ] chest pain [ ] orthopnea [ ] palpitations [ ] murmur  Resp: [ ] negative [ ] cough [ ] shortness of breath [ ] dyspnea [ ] wheezing [ ] sputum [ ] hemoptysis  GI: [ ] negative [ ] nausea [ ] vomiting [ ] diarrhea [ ] constipation [ ] abd pain [ ] dysphagia   : [ ] negative [ ] dysuria [ ] nocturia [ ] hematuria [ ] increased urinary frequency  Musculoskeletal: [ ] negative [ ] back pain [ ] myalgias [ ] arthralgias [ ] fracture  Skin: [ ] negative [ ] rash [ ] itch  Neurological: [ ] negative [ ] headache [ ] dizziness [ ] syncope [ ] weakness [ ] numbness  Psychiatric: [ ] negative [ ] anxiety [ ] depression  Endocrine: [ ] negative [ ] diabetes [ ] thyroid problem  Hematologic/Lymphatic: [ ] negative [ ] anemia [ ] bleeding problem  Allergic/Immunologic: [ ] negative [ ] itchy eyes [ ] nasal discharge [ ] hives [ ] angioedema  [ ] All other systems negative  [ ] Unable to assess ROS because ________    OBJECTIVE:  ICU Vital Signs Last 24 Hrs  T(C): 36.4 (12 Aug 2019 04:00), Max: 38 (11 Aug 2019 14:07)  T(F): 97.6 (12 Aug 2019 04:00), Max: 100.4 (11 Aug 2019 14:07)  HR: 73 (12 Aug 2019 05:15) (71 - 130)  BP: 105/53 (12 Aug 2019 05:00) (64/32 - 141/77)  BP(mean): 76 (12 Aug 2019 05:00) (39 - 104)  ABP: --  ABP(mean): --  RR: 11 (12 Aug 2019 05:15) (11 - 39)  SpO2: 100% (12 Aug 2019 05:15) (96% - 100%)        08-11 @ 07:01  -  08-12 @ 06:31  --------------------------------------------------------  IN: 1033.8 mL / OUT: 965 mL / NET: 68.8 mL      CAPILLARY BLOOD GLUCOSE      POCT Blood Glucose.: 155 mg/dL (12 Aug 2019 05:50)      PHYSICAL EXAM:  General:   HEENT:   Lymph Nodes:  Neck:   Respiratory:   Cardiovascular:   Abdomen:   Extremities:   Skin:   Neurological:  Psychiatry:    LINES:    HOSPITAL MEDICATIONS:  Standing Meds:  acyclovir IVPB 650 milliGRAM(s) IV Intermittent every 8 hours  buDESOnide 160 MICROgram(s)/formoterol 4.5 MICROgram(s) Inhaler 2 Puff(s) Inhalation two times a day  chlorhexidine 4% Liquid 1 Application(s) Topical <User Schedule>  dexamethasone  IVPB 10 milliGRAM(s) IV Intermittent every 6 hours  dexmedetomidine Infusion 0.5 MICROgram(s)/kG/Hr IV Continuous <Continuous>  dextrose 5%. 1000 milliLiter(s) IV Continuous <Continuous>  dextrose 50% Injectable 12.5 Gram(s) IV Push once  dextrose 50% Injectable 25 Gram(s) IV Push once  dextrose 50% Injectable 25 Gram(s) IV Push once  insulin lispro (HumaLOG) corrective regimen sliding scale   SubCutaneous every 6 hours  latanoprost 0.005% Ophthalmic Solution 1 Drop(s) Both EYES at bedtime  meropenem  IVPB 2000 milliGRAM(s) IV Intermittent every 8 hours  meropenem  IVPB      midodrine 30 milliGRAM(s) Oral every 8 hours  norepinephrine Infusion 0.05 MICROgram(s)/kG/Min IV Continuous <Continuous>  nystatin    Suspension 693375 Unit(s) Oral two times a day  petrolatum Ophthalmic Ointment 1 Application(s) Both EYES at bedtime  prednisoLONE acetate 1% Suspension 1 Drop(s) Both EYES four times a day  vancomycin  IVPB 1250 milliGRAM(s) IV Intermittent every 12 hours  vancomycin  IVPB          PRN Meds:  dextrose 40% Gel 15 Gram(s) Oral once PRN  glucagon  Injectable 1 milliGRAM(s) IntraMuscular once PRN      LABS:                        9.4    26.0  )-----------( 316      ( 11 Aug 2019 23:17 )             32.2     Hgb Trend: 9.4<--, 11.0<--  08-11    138  |  94<L>  |  33<H>  ----------------------------<  141<H>  3.3<L>   |  35<H>  |  0.84    Ca    9.3      11 Aug 2019 23:17  Mg     2.3         TPro  6.9  /  Alb  2.7<L>  /  TBili  0.6  /  DBili  x   /  AST  15  /  ALT  36  /  AlkPhos  76      Creatinine Trend: 0.84<--, 1.04<--, 0.70<--, 0.79<--, 0.72<--, 0.82<--  PT/INR - ( 11 Aug 2019 23:17 )   PT: 14.8 sec;   INR: 1.29 ratio         PTT - ( 11 Aug 2019 23:17 )  PTT:30.2 sec  Urinalysis Basic - ( 11 Aug 2019 14:47 )    Color: Yellow / Appearance: Slightly Turbid / S.026 / pH: x  Gluc: x / Ketone: Negative  / Bili: Negative / Urobili: 3 mg/dL   Blood: x / Protein: 30 mg/dL / Nitrite: Negative   Leuk Esterase: Negative / RBC: 4 /hpf / WBC 3 /HPF   Sq Epi: x / Non Sq Epi: 2 / Bacteria: 0.0      Arterial Blood Gas:   @ 21:58  7.43/57/90/37/97/11.4  ABG lactate: --    Venous Blood Gas:   @ 18:33  --/--/--/--/--  VBG Lactate: 1.3  Venous Blood Gas:   @ 14:41  7.46/54/62/39/92  VBG Lactate: 1.3      MICROBIOLOGY:     RADIOLOGY:  [ ] Reviewed and interpreted by me    EKG: CHIEF COMPLAINT:    Interval Events:    Pt grimacing appearing to be in general discomfort. As per nurse, no fevers, chills, sob, n/v/d.    REVIEW OF SYSTEMS:  Constitutional: [ ] negative [ ] fevers [ ] chills [ ] weight loss [ ] weight gain  HEENT: [ ] negative [ ] dry eyes [ ] eye irritation [ ] postnasal drip [ ] nasal congestion  CV: [ ] negative  [ ] chest pain [ ] orthopnea [ ] palpitations [ ] murmur  Resp: [ ] negative [ ] cough [ ] shortness of breath [ ] dyspnea [ ] wheezing [ ] sputum [ ] hemoptysis  GI: [ ] negative [ ] nausea [ ] vomiting [ ] diarrhea [ ] constipation [ ] abd pain [ ] dysphagia   : [ ] negative [ ] dysuria [ ] nocturia [ ] hematuria [ ] increased urinary frequency  Musculoskeletal: [ ] negative [ ] back pain [ ] myalgias [ ] arthralgias [ ] fracture  Skin: [ ] negative [ ] rash [ ] itch  Neurological: [ ] negative [ ] headache [ ] dizziness [ ] syncope [ ] weakness [ ] numbness  Psychiatric: [ ] negative [ ] anxiety [ ] depression  Endocrine: [ ] negative [ ] diabetes [ ] thyroid problem  Hematologic/Lymphatic: [ ] negative [ ] anemia [ ] bleeding problem  Allergic/Immunologic: [ ] negative [ ] itchy eyes [ ] nasal discharge [ ] hives [ ] angioedema  [ ] All other systems negative  [ ] Unable to assess ROS because ________    OBJECTIVE:  ICU Vital Signs Last 24 Hrs  T(C): 36.4 (12 Aug 2019 04:00), Max: 38 (11 Aug 2019 14:07)  T(F): 97.6 (12 Aug 2019 04:00), Max: 100.4 (11 Aug 2019 14:07)  HR: 73 (12 Aug 2019 05:15) (71 - 130)  BP: 105/53 (12 Aug 2019 05:00) (64/32 - 141/77)  BP(mean): 76 (12 Aug 2019 05:00) (39 - 104)  ABP: --  ABP(mean): --  RR: 11 (12 Aug 2019 05:15) (11 - 39)  SpO2: 100% (12 Aug 2019 05:15) (96% - 100%)        - @ 07:01  -  08-12 @ 06:31  --------------------------------------------------------  IN: 1033.8 mL / OUT: 965 mL / NET: 68.8 mL      CAPILLARY BLOOD GLUCOSE      POCT Blood Glucose.: 155 mg/dL (12 Aug 2019 05:50)      PHYSICAL EXAM:   GENERAL: awake, alert, but altered and moaning in pain  	HEENT: NC/AT, Moist mucous membranes, PERRL  	LUNGS: CTAB, no wheezes or crackles.   	CARDIAC: tachycardi, no r/m/g  	ABDOMEN: diffusely ttp in all quadrants, no rebound or guarding. PEG tube C/D/I without erythema or drainage  	EXT: No edema. No calf tenderness. CV 2+DP/PT bilaterally.   	MSK: 2+ pulses all extremities  	NEURO: obtunded, but moving all extremities, +nuchal rigidity  SKIN: Warm and dry. No rash.    LINES:    HOSPITAL MEDICATIONS:  Standing Meds:  acyclovir IVPB 650 milliGRAM(s) IV Intermittent every 8 hours  buDESOnide 160 MICROgram(s)/formoterol 4.5 MICROgram(s) Inhaler 2 Puff(s) Inhalation two times a day  chlorhexidine 4% Liquid 1 Application(s) Topical <User Schedule>  dexamethasone  IVPB 10 milliGRAM(s) IV Intermittent every 6 hours  dexmedetomidine Infusion 0.5 MICROgram(s)/kG/Hr IV Continuous <Continuous>  dextrose 5%. 1000 milliLiter(s) IV Continuous <Continuous>  dextrose 50% Injectable 12.5 Gram(s) IV Push once  dextrose 50% Injectable 25 Gram(s) IV Push once  dextrose 50% Injectable 25 Gram(s) IV Push once  insulin lispro (HumaLOG) corrective regimen sliding scale   SubCutaneous every 6 hours  latanoprost 0.005% Ophthalmic Solution 1 Drop(s) Both EYES at bedtime  meropenem  IVPB 2000 milliGRAM(s) IV Intermittent every 8 hours  meropenem  IVPB      midodrine 30 milliGRAM(s) Oral every 8 hours  norepinephrine Infusion 0.05 MICROgram(s)/kG/Min IV Continuous <Continuous>  nystatin    Suspension 857523 Unit(s) Oral two times a day  petrolatum Ophthalmic Ointment 1 Application(s) Both EYES at bedtime  prednisoLONE acetate 1% Suspension 1 Drop(s) Both EYES four times a day  vancomycin  IVPB 1250 milliGRAM(s) IV Intermittent every 12 hours  vancomycin  IVPB          PRN Meds:  dextrose 40% Gel 15 Gram(s) Oral once PRN  glucagon  Injectable 1 milliGRAM(s) IntraMuscular once PRN      LABS:                        9.4    26.0  )-----------( 316      ( 11 Aug 2019 23:17 )             32.2     Hgb Trend: 9.4<--, 11.0<--      138  |  94<L>  |  33<H>  ----------------------------<  141<H>  3.3<L>   |  35<H>  |  0.84    Ca    9.3      11 Aug 2019 23:17  Mg     2.3         TPro  6.9  /  Alb  2.7<L>  /  TBili  0.6  /  DBili  x   /  AST  15  /  ALT  36  /  AlkPhos  76      Creatinine Trend: 0.84<--, 1.04<--, 0.70<--, 0.79<--, 0.72<--, 0.82<--  PT/INR - ( 11 Aug 2019 23:17 )   PT: 14.8 sec;   INR: 1.29 ratio         PTT - ( 11 Aug 2019 23:17 )  PTT:30.2 sec  Urinalysis Basic - ( 11 Aug 2019 14:47 )    Color: Yellow / Appearance: Slightly Turbid / S.026 / pH: x  Gluc: x / Ketone: Negative  / Bili: Negative / Urobili: 3 mg/dL   Blood: x / Protein: 30 mg/dL / Nitrite: Negative   Leuk Esterase: Negative / RBC: 4 /hpf / WBC 3 /HPF   Sq Epi: x / Non Sq Epi: 2 / Bacteria: 0.0      Arterial Blood Gas:   @ 21:58  7.43/57/90/37/97/11.4  ABG lactate: --    Venous Blood Gas:   @ 18:33  --/--/--/--/--  VBG Lactate: 1.3  Venous Blood Gas:   @ 14:41  7.46/54/62/39/92  VBG Lactate: 1.3      MICROBIOLOGY:     RADIOLOGY:  [ ] Reviewed and interpreted by me    EKG:

## 2019-08-12 NOTE — PROGRESS NOTE ADULT - ASSESSMENT
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    #Neuro  - Currently altered, moaning in pain and unable to answer pointed questions, but protecting airway  - CT head showing no acute changes, however increase in size of CSF density fluid collection representing CSF leak vs meningocele, neurosurgery following, appreciate recs   - most likely toxic metabolic encephalopathy in the setting of sepsis, will cont to monitor    #CV  - hypotensive to 60s systolic in ED and not adequately responsive to fluids. Started on levophed in ED. Most likely distributive shock from sepsis.  - will c/w levophed and titrate as tolerated, goal MAP > 65  - holding home antihypertensives in setting of sepsis    #Pulm  - currently saturating well on room air and protecting airway, however slightly more hypercapnic on blood gas compared with priors  - cont to monitor respiratory status; low threshold for intubation given AMS and concern about inability to protect airway    #ID  - pt in septic shock with elevated WBC, fever, and likely source of meningitis vs pneumonia on CXR. Intraabdominal source also possible given tenderness on exam  - will c/w vanc and meropenem for broad spectrum coverage (8/11 - )  - f/u CT a/p with IV contrast for possible intraabdominal source of infection  - neurosurgery consulted in ED, determined no acute surgical intervention. 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    #Neuro  - Currently altered, moaning in pain, responding to verbal and pain, protecting airway  - CT head showing no acute changes, however increase in size of CSF density fluid collection representing CSF leak vs meningocele, neurosurgery following, appreciate recs   - most likely toxic metabolic encephalopathy in the setting of sepsis, will cont to monitor    #CV  - hypotensive to 60s systolic in ED and not adequately responsive to fluids. Started on levophed in ED. Most likely distributive shock from sepsis.  - will c/w levophed and titrate as tolerated, goal MAP > 65  - holding home antihypertensives in setting of sepsis    #Pulm  - currently saturating well on room air and protecting airway, hypercapnic   - cont to monitor respiratory status; low threshold for intubation given AMS and concern about inability to protect airway    #ID  - pt in septic shock with elevated WBC, fever, and likely source of meningitis vs pneumonia on CXR. Intraabdominal source also possible given tenderness on exam  - will c/w vanc and meropenem for broad spectrum coverage (8/11 - )  - CTA showing thickened gallbladder, fluid collection, possibly 2/2 acute cholecystitis, but - LFTS  - neurosurgery consulted in ED, determined no acute surgical intervention.  - LP today to r/o meningitis  - f/u blood and urine cultures    #GI  - c/w feeds via PEG    #Endo  - A1C 5.8 in 6/2018  - no active issues  - moderate ISS given dex 10mg q6    #Renal  - creatinine slightly elevated at 1.04 (baseline 0.7 - 0.8), received contrast for CT in ED  - will cont to monitor BMP QD    #PPX  - will hold DVT ppx for possible LP  - per family at bedside, pt is full code

## 2019-08-12 NOTE — PROGRESS NOTE ADULT - SUBJECTIVE AND OBJECTIVE BOX
Patient seen and examined at bedside.    --Anticoagulation--    T(C): 36.4 (08-12-19 @ 04:00), Max: 38 (08-11-19 @ 14:07)  HR: 73 (08-12-19 @ 05:15) (71 - 130)  BP: 105/53 (08-12-19 @ 05:00) (64/32 - 141/77)  RR: 11 (08-12-19 @ 05:15) (11 - 39)  SpO2: 100% (08-12-19 @ 05:15) (96% - 100%)  Wt(kg): --    Exam:  AAOx0, not FC, mumbling nonsensical words and moaning, CONTRERAS strongly. Pseudomeningoceal not tense, wound c/d/i.

## 2019-08-12 NOTE — CHART NOTE - NSCHARTNOTEFT_GEN_A_CORE
Upon Nutritional Assessment by the Registered Dietitian your patient was determined to meet criteria / has evidence of the following diagnosis/diagnoses:          [ ]  Mild Protein Calorie Malnutrition        [ ]  Moderate Protein Calorie Malnutrition        [ ] Severe Protein Calorie Malnutrition        [ ] Unspecified Protein Calorie Malnutrition        [ ] Underweight / BMI <19        [x ] Morbid Obesity / BMI > 40      Findings as based on:  [x ] Comprehensive nutrition assessment   [ ] Nutrition Focused Physical Exam  [ ] Other:       Nutrition Plan/Recommendations:  hypocaloric tube feeds        PROVIDER Section:     By signing this assessment you are acknowledging and agree with the diagnosis/diagnoses assigned by the Registered Dietitian    Comments:

## 2019-08-12 NOTE — DIETITIAN INITIAL EVALUATION ADULT. - ENTERAL
recommend Vital 1.2 at 65 cc/hr x 18 hrs provides 1404 kcals, 88 gm protein, 949 cc free water  meets 13Kcal/Kg, dosing wt 104.7kg 1.8 Gm protein/kg IBW 50kg

## 2019-08-12 NOTE — DIETITIAN INITIAL EVALUATION ADULT. - OTHER INFO
Pt seen for: consult  Adm dx: sepsis, hypotension, ? acute cholecystitis    GI issues: no vomiting, no abd distention  Last BM: none since adm   Food allergies/Intolerances: shellfish   Vit/supplement PTA: none    Diet PTA: Jevity 1.5 1 L daily at [facility]     Subjective/Objective information: pt lethargic, no visitors at bedside. Info obtained from EMR, SNF transfer info. Wt hx: 6/26 270 lb, 7/10 252 lb, 7/15 257 lb, 7/23 230.6 lb (SNF wt), dosing wt this adm 231 lb 8/11 ? wt discrepancies, ht on previous adm 62", ht this adm 68"    Education: Not indicated at this time Pt seen for: consult  Adm dx: sepsis, hypotension, ? acute cholecystitis    GI issues: no vomiting, no abd distention  Last BM: none since adm   Food allergies/Intolerances: shellfish   Vit/supplement PTA: none    Diet PTA: Jevity 1.5 1 L daily at [facility]     Subjective/Objective information: pt lethargic, no visitors at bedside. Info obtained from EMR, SNF transfer info. Wt hx: 6/26 270 lb, 7/10 252 lb, 7/15 257 lb, 7/23 230.6 lb (SNF wt), dosing wt this adm 231 lb 8/11 ? wt discrepancies, ht on previous adm 62", ht this adm 68" ?, pt appears closer to 62"    Education: Not indicated at this time

## 2019-08-12 NOTE — DIETITIAN INITIAL EVALUATION ADULT. - NS FNS WEIGHT USED FOR CALC
dosing/8/11 104.7 kg for energy needs, IBW 63.6 kg for protein needs 8/11 104.7 kg for energy needs, IBW 50 kg for protein needs/dosing

## 2019-08-12 NOTE — DIETITIAN INITIAL EVALUATION ADULT. - PERTINENT MEDS FT
MEDICATIONS  (STANDING):  acyclovir IVPB 650 milliGRAM(s) IV Intermittent every 8 hours  buDESOnide 160 MICROgram(s)/formoterol 4.5 MICROgram(s) Inhaler 2 Puff(s) Inhalation two times a day  chlorhexidine 4% Liquid 1 Application(s) Topical <User Schedule>  dexamethasone  IVPB 10 milliGRAM(s) IV Intermittent every 6 hours  dexmedetomidine Infusion 0.5 MICROgram(s)/kG/Hr (13.088 mL/Hr) IV Continuous <Continuous>  dextrose 5%. 1000 milliLiter(s) (50 mL/Hr) IV Continuous <Continuous>  dextrose 50% Injectable 12.5 Gram(s) IV Push once  dextrose 50% Injectable 25 Gram(s) IV Push once  dextrose 50% Injectable 25 Gram(s) IV Push once  insulin lispro (HumaLOG) corrective regimen sliding scale   SubCutaneous every 6 hours  latanoprost 0.005% Ophthalmic Solution 1 Drop(s) Both EYES at bedtime  meropenem  IVPB 2000 milliGRAM(s) IV Intermittent every 8 hours  meropenem  IVPB      midodrine 30 milliGRAM(s) Oral every 8 hours  norepinephrine Infusion 0.05 MICROgram(s)/kG/Min (9.375 mL/Hr) IV Continuous <Continuous>  nystatin    Suspension [id number] Unit(s) Oral two times a day  petrolatum Ophthalmic Ointment 1 Application(s) Both EYES at bedtime  prednisoLONE acetate 1% Suspension 1 Drop(s) Both EYES four times a day  vancomycin  IVPB 1250 milliGRAM(s) IV Intermittent every 12 hours  vancomycin  IVPB        MEDICATIONS  (PRN):  dextrose 40% Gel 15 Gram(s) Oral once PRN Blood Glucose LESS THAN 70 milliGRAM(s)/deciliter  glucagon  Injectable 1 milliGRAM(s) IntraMuscular once PRN Glucose LESS THAN 70 milligrams/deciliter

## 2019-08-12 NOTE — CHART NOTE - NSCHARTNOTEFT_GEN_A_CORE
: Chon Cordoba    INDICATION: septic shock    PROCEDURE:  [x] LIMITED ECHO  [x] LIMITED CHEST  [ ] LIMITED RETROPERITONEAL  [ ] LIMITED ABDOMINAL  [ ] LIMITED DVT  [ ] NEEDLE GUIDANCE VASCULAR  [ ] NEEDLE GUIDANCE THORACENTESIS  [ ] NEEDLE GUIDANCE PARACENTESIS  [ ] NEEDLE GUIDANCE PERICARDIOCENTESIS  [ ] OTHER    FINDINGS:  - A lines anteriorly  - small subpleural consolidation in RLL  - normal LV function on limited views    INTERPRETATION:  - improving sepsis : Chon Stone    INDICATION: septic shock    PROCEDURE:  [x] LIMITED ECHO  [x] LIMITED CHEST  [ ] LIMITED RETROPERITONEAL  [ ] LIMITED ABDOMINAL  [ ] LIMITED DVT  [ ] NEEDLE GUIDANCE VASCULAR  [ ] NEEDLE GUIDANCE THORACENTESIS  [ ] NEEDLE GUIDANCE PARACENTESIS  [ ] NEEDLE GUIDANCE PERICARDIOCENTESIS  [ ] OTHER    FINDINGS:  - A lines anteriorly  - small subpleural consolidation in RLL  - normal LV function on limited views    INTERPRETATION:  - improving sepsis

## 2019-08-12 NOTE — DIETITIAN INITIAL EVALUATION ADULT. - ENERGY NEEDS
Energy needs estimated using 11-14 kcals/kg dosing wt 104.7 [id number] kcals  Ht:   Wt:   BMI:  kg/m2   IBW:  (+/-10%)     % IBW  Edema:         Skin: Energy needs estimated using 11-14 kcals/kg dosing wt 104.7 [id number] kcals  Ht: 62"  Wt: 231   BMI: 42.3 kg/m2   IBW: 110 (+/-10%)     210% IBW  Edema: 1+ generalized     Skin: no pressure injuries documented

## 2019-08-12 NOTE — PROGRESS NOTE ADULT - ASSESSMENT
Readmitted for sepsis s/p suboccipital crani  - Pending blood and urine cultures, Respiratory panel  - No absolute contraindication for LP to rule out Meningitis  - Would recommend trending ESR CRP

## 2019-08-13 LAB
-  COAGULASE NEGATIVE STAPHYLOCOCCUS: SIGNIFICANT CHANGE UP
ALBUMIN SERPL ELPH-MCNC: 2.4 G/DL — LOW (ref 3.3–5)
ALP SERPL-CCNC: 71 U/L — SIGNIFICANT CHANGE UP (ref 40–120)
ALT FLD-CCNC: 43 U/L — SIGNIFICANT CHANGE UP (ref 10–45)
AMYLASE P1 CFR SERPL: 38 U/L — SIGNIFICANT CHANGE UP (ref 25–125)
ANION GAP SERPL CALC-SCNC: 10 MMOL/L — SIGNIFICANT CHANGE UP (ref 5–17)
APTT BLD: 30.8 SEC — SIGNIFICANT CHANGE UP (ref 27.5–36.3)
AST SERPL-CCNC: 25 U/L — SIGNIFICANT CHANGE UP (ref 10–40)
BILIRUB SERPL-MCNC: 0.4 MG/DL — SIGNIFICANT CHANGE UP (ref 0.2–1.2)
BUN SERPL-MCNC: 25 MG/DL — HIGH (ref 7–23)
CALCIUM SERPL-MCNC: 9.2 MG/DL — SIGNIFICANT CHANGE UP (ref 8.4–10.5)
CHLORIDE SERPL-SCNC: 99 MMOL/L — SIGNIFICANT CHANGE UP (ref 96–108)
CO2 SERPL-SCNC: 31 MMOL/L — SIGNIFICANT CHANGE UP (ref 22–31)
CREAT SERPL-MCNC: 0.64 MG/DL — SIGNIFICANT CHANGE UP (ref 0.5–1.3)
CRP SERPL-MCNC: 21.14 MG/DL — HIGH (ref 0–0.4)
ERYTHROCYTE [SEDIMENTATION RATE] IN BLOOD: 116 MM/HR — HIGH (ref 0–20)
GAS PNL BLDA: SIGNIFICANT CHANGE UP
GLUCOSE BLDC GLUCOMTR-MCNC: 135 MG/DL — HIGH (ref 70–99)
GLUCOSE BLDC GLUCOMTR-MCNC: 145 MG/DL — HIGH (ref 70–99)
GLUCOSE SERPL-MCNC: 150 MG/DL — HIGH (ref 70–99)
GRAM STN FLD: SIGNIFICANT CHANGE UP
HCT VFR BLD CALC: 27.6 % — LOW (ref 34.5–45)
HGB BLD-MCNC: 8.3 G/DL — LOW (ref 11.5–15.5)
INR BLD: 1.3 RATIO — HIGH (ref 0.88–1.16)
LIDOCAIN IGE QN: 22 U/L — SIGNIFICANT CHANGE UP (ref 7–60)
MAGNESIUM SERPL-MCNC: 2.3 MG/DL — SIGNIFICANT CHANGE UP (ref 1.6–2.6)
MCHC RBC-ENTMCNC: 26.7 PG — LOW (ref 27–34)
MCHC RBC-ENTMCNC: 30 GM/DL — LOW (ref 32–36)
MCV RBC AUTO: 88.8 FL — SIGNIFICANT CHANGE UP (ref 80–100)
METHOD TYPE: SIGNIFICANT CHANGE UP
PHOSPHATE SERPL-MCNC: 2 MG/DL — LOW (ref 2.5–4.5)
PLATELET # BLD AUTO: 353 K/UL — SIGNIFICANT CHANGE UP (ref 150–400)
POTASSIUM SERPL-MCNC: 4.3 MMOL/L — SIGNIFICANT CHANGE UP (ref 3.5–5.3)
POTASSIUM SERPL-SCNC: 4.3 MMOL/L — SIGNIFICANT CHANGE UP (ref 3.5–5.3)
PROT SERPL-MCNC: 6.5 G/DL — SIGNIFICANT CHANGE UP (ref 6–8.3)
PROTHROM AB SERPL-ACNC: 15.1 SEC — HIGH (ref 10–12.9)
RBC # BLD: 3.11 M/UL — LOW (ref 3.8–5.2)
RBC # FLD: 14.3 % — SIGNIFICANT CHANGE UP (ref 10.3–14.5)
SODIUM SERPL-SCNC: 140 MMOL/L — SIGNIFICANT CHANGE UP (ref 135–145)
VANCOMYCIN TROUGH SERPL-MCNC: 21.8 UG/ML — HIGH (ref 10–20)
WBC # BLD: 24.9 K/UL — HIGH (ref 3.8–10.5)
WBC # FLD AUTO: 24.9 K/UL — HIGH (ref 3.8–10.5)

## 2019-08-13 PROCEDURE — 99223 1ST HOSP IP/OBS HIGH 75: CPT

## 2019-08-13 PROCEDURE — 99233 SBSQ HOSP IP/OBS HIGH 50: CPT | Mod: GC

## 2019-08-13 PROCEDURE — 88108 CYTOPATH CONCENTRATE TECH: CPT | Mod: 26

## 2019-08-13 RX ORDER — POTASSIUM PHOSPHATE, MONOBASIC POTASSIUM PHOSPHATE, DIBASIC 236; 224 MG/ML; MG/ML
15 INJECTION, SOLUTION INTRAVENOUS ONCE
Refills: 0 | Status: COMPLETED | OUTPATIENT
Start: 2019-08-13 | End: 2019-08-13

## 2019-08-13 RX ORDER — VANCOMYCIN HCL 1 G
1000 VIAL (EA) INTRAVENOUS EVERY 12 HOURS
Refills: 0 | Status: DISCONTINUED | OUTPATIENT
Start: 2019-08-14 | End: 2019-08-15

## 2019-08-13 RX ORDER — MIDODRINE HYDROCHLORIDE 2.5 MG/1
20 TABLET ORAL EVERY 8 HOURS
Refills: 0 | Status: DISCONTINUED | OUTPATIENT
Start: 2019-08-13 | End: 2019-08-13

## 2019-08-13 RX ORDER — FENTANYL CITRATE 50 UG/ML
100 INJECTION INTRAVENOUS ONCE
Refills: 0 | Status: DISCONTINUED | OUTPATIENT
Start: 2019-08-13 | End: 2019-08-13

## 2019-08-13 RX ORDER — MIDODRINE HYDROCHLORIDE 2.5 MG/1
20 TABLET ORAL EVERY 8 HOURS
Refills: 0 | Status: DISCONTINUED | OUTPATIENT
Start: 2019-08-13 | End: 2019-08-14

## 2019-08-13 RX ADMIN — Medication 102 MILLIGRAM(S): at 11:33

## 2019-08-13 RX ADMIN — Medication 163 MILLIGRAM(S): at 06:16

## 2019-08-13 RX ADMIN — FENTANYL CITRATE 100 MICROGRAM(S): 50 INJECTION INTRAVENOUS at 16:15

## 2019-08-13 RX ADMIN — MEROPENEM 200 MILLIGRAM(S): 1 INJECTION INTRAVENOUS at 22:42

## 2019-08-13 RX ADMIN — Medication 0.5 MILLIGRAM(S): at 06:14

## 2019-08-13 RX ADMIN — MEROPENEM 200 MILLIGRAM(S): 1 INJECTION INTRAVENOUS at 14:33

## 2019-08-13 RX ADMIN — Medication 1 DROP(S): at 11:33

## 2019-08-13 RX ADMIN — Medication 1 APPLICATION(S): at 22:42

## 2019-08-13 RX ADMIN — Medication 1 DROP(S): at 17:47

## 2019-08-13 RX ADMIN — POTASSIUM PHOSPHATE, MONOBASIC POTASSIUM PHOSPHATE, DIBASIC 62.5 MILLIMOLE(S): 236; 224 INJECTION, SOLUTION INTRAVENOUS at 02:27

## 2019-08-13 RX ADMIN — CHLORHEXIDINE GLUCONATE 1 APPLICATION(S): 213 SOLUTION TOPICAL at 06:17

## 2019-08-13 RX ADMIN — MEROPENEM 200 MILLIGRAM(S): 1 INJECTION INTRAVENOUS at 06:16

## 2019-08-13 RX ADMIN — Medication 1 DROP(S): at 06:17

## 2019-08-13 RX ADMIN — Medication 500000 UNIT(S): at 17:48

## 2019-08-13 RX ADMIN — MIDODRINE HYDROCHLORIDE 20 MILLIGRAM(S): 2.5 TABLET ORAL at 06:17

## 2019-08-13 RX ADMIN — Medication 102 MILLIGRAM(S): at 17:47

## 2019-08-13 RX ADMIN — LATANOPROST 1 DROP(S): 0.05 SOLUTION/ DROPS OPHTHALMIC; TOPICAL at 22:43

## 2019-08-13 RX ADMIN — Medication 500000 UNIT(S): at 06:16

## 2019-08-13 RX ADMIN — Medication 102 MILLIGRAM(S): at 06:16

## 2019-08-13 RX ADMIN — FENTANYL CITRATE 100 MICROGRAM(S): 50 INJECTION INTRAVENOUS at 15:58

## 2019-08-13 NOTE — PROGRESS NOTE ADULT - ASSESSMENT
Readmitted for sepsis s/p suboccipital crani  - Blood/Urine Cx NGTD  - Respiratory viral panel negative  - ESR/CRP elevated, continue to trend  - No absolute contraindication for LP to rule out Meningitis

## 2019-08-13 NOTE — PROGRESS NOTE ADULT - SUBJECTIVE AND OBJECTIVE BOX
CHIEF COMPLAINT:    Interval Events:    REVIEW OF SYSTEMS:  Constitutional: [ ] negative [ ] fevers [ ] chills [ ] weight loss [ ] weight gain  HEENT: [ ] negative [ ] dry eyes [ ] eye irritation [ ] postnasal drip [ ] nasal congestion  CV: [ ] negative  [ ] chest pain [ ] orthopnea [ ] palpitations [ ] murmur  Resp: [ ] negative [ ] cough [ ] shortness of breath [ ] dyspnea [ ] wheezing [ ] sputum [ ] hemoptysis  GI: [ ] negative [ ] nausea [ ] vomiting [ ] diarrhea [ ] constipation [ ] abd pain [ ] dysphagia   : [ ] negative [ ] dysuria [ ] nocturia [ ] hematuria [ ] increased urinary frequency  Musculoskeletal: [ ] negative [ ] back pain [ ] myalgias [ ] arthralgias [ ] fracture  Skin: [ ] negative [ ] rash [ ] itch  Neurological: [ ] negative [ ] headache [ ] dizziness [ ] syncope [ ] weakness [ ] numbness  Psychiatric: [ ] negative [ ] anxiety [ ] depression  Endocrine: [ ] negative [ ] diabetes [ ] thyroid problem  Hematologic/Lymphatic: [ ] negative [ ] anemia [ ] bleeding problem  Allergic/Immunologic: [ ] negative [ ] itchy eyes [ ] nasal discharge [ ] hives [ ] angioedema  [ ] All other systems negative  [ ] Unable to assess ROS because ________    OBJECTIVE:  ICU Vital Signs Last 24 Hrs  T(C): 36.8 (13 Aug 2019 04:00), Max: 36.8 (12 Aug 2019 20:00)  T(F): 98.2 (13 Aug 2019 04:00), Max: 98.2 (12 Aug 2019 20:00)  HR: 76 (13 Aug 2019 06:15) (63 - 97)  BP: 129/58 (13 Aug 2019 06:15) (80/43 - 166/69)  BP(mean): 84 (13 Aug 2019 06:15) (57 - 101)  ABP: --  ABP(mean): --  RR: 23 (13 Aug 2019 06:15) (11 - 39)  SpO2: 100% (13 Aug 2019 06:15) (90% - 100%)        08-11 @ 07:01  -  08-12 @ 07:00  --------------------------------------------------------  IN: 1709.3 mL / OUT: 1020 mL / NET: 689.3 mL     @ 07: @ 06:28  --------------------------------------------------------  IN: 1351.1 mL / OUT: 1105 mL / NET: 246.1 mL      CAPILLARY BLOOD GLUCOSE      POCT Blood Glucose.: 168 mg/dL (12 Aug 2019 23:08)      PHYSICAL EXAM:  GENERAL: awake, alert, but altered and moaning in pain  	HEENT: NC/AT, Moist mucous membranes, PERRL  	LUNGS: CTAB, no wheezes or crackles.   	CARDIAC: tachycardi, no r/m/g  	ABDOMEN: diffusely ttp in all quadrants, no rebound or guarding. PEG tube C/D/I without erythema or drainage  	EXT: No edema. No calf tenderness. CV 2+DP/PT bilaterally.   	MSK: 2+ pulses all extremities  	NEURO: obtunded, but moving all extremities, +nuchal rigidity  SKIN: Warm and dry. No rash.    LINES:    HOSPITAL MEDICATIONS:  Standing Meds:  acyclovir IVPB 650 milliGRAM(s) IV Intermittent every 8 hours  buDESOnide    Inhalation Suspension 0.5 milliGRAM(s) Inhalation two times a day  chlorhexidine 4% Liquid 1 Application(s) Topical <User Schedule>  dexamethasone  IVPB 10 milliGRAM(s) IV Intermittent every 6 hours  insulin lispro (HumaLOG) corrective regimen sliding scale   SubCutaneous every 6 hours  latanoprost 0.005% Ophthalmic Solution 1 Drop(s) Both EYES at bedtime  meropenem  IVPB 2000 milliGRAM(s) IV Intermittent every 8 hours  meropenem  IVPB      midodrine 20 milliGRAM(s) Oral every 8 hours  norepinephrine Infusion 0.05 MICROgram(s)/kG/Min IV Continuous <Continuous>  nystatin    Suspension 859688 Unit(s) Oral two times a day  petrolatum Ophthalmic Ointment 1 Application(s) Both EYES at bedtime  prednisoLONE acetate 1% Suspension 1 Drop(s) Both EYES four times a day  vancomycin  IVPB 1250 milliGRAM(s) IV Intermittent every 12 hours  vancomycin  IVPB          PRN Meds:      LABS:                        8.3    24.9  )-----------( 353      ( 13 Aug 2019 00:20 )             27.6     Hgb Trend: 8.3<--, 9.4<--, 11.0<--  08-13    140  |  99  |  25<H>  ----------------------------<  150<H>  4.3   |  31  |  0.64    Ca    9.2      13 Aug 2019 00:20  Phos  2.0       Mg     2.3         TPro  6.5  /  Alb  2.4<L>  /  TBili  0.4  /  DBili  x   /  AST  25  /  ALT  43  /  AlkPhos  71      Creatinine Trend: 0.64<--, 0.84<--, 1.04<--, 0.70<--, 0.79<--, 0.72<--  PT/INR - ( 13 Aug 2019 00:20 )   PT: 15.1 sec;   INR: 1.30 ratio         PTT - ( 13 Aug 2019 00:20 )  PTT:30.8 sec  Urinalysis Basic - ( 11 Aug 2019 14:47 )    Color: Yellow / Appearance: Slightly Turbid / S.026 / pH: x  Gluc: x / Ketone: Negative  / Bili: Negative / Urobili: 3 mg/dL   Blood: x / Protein: 30 mg/dL / Nitrite: Negative   Leuk Esterase: Negative / RBC: 4 /hpf / WBC 3 /HPF   Sq Epi: x / Non Sq Epi: 2 / Bacteria: 0.0      Arterial Blood Gas:   @ 05:18  7.49/44/77/33/96/9.3  ABG lactate: --  Arterial Blood Gas:   @ 21:58  7.43/57/90/37/97/11.4  ABG lactate: --    Venous Blood Gas:   @ 18:33  --/--/--/--/--  VBG Lactate: 1.3  Venous Blood Gas:   @ 14:41  7.46/54/62/39/92  VBG Lactate: 1.3      MICROBIOLOGY:     Culture - Urine (collected 11 Aug 2019 18:23)  Source: .Urine  Final Report (12 Aug 2019 15:29):    No growth    Culture - Blood (collected 11 Aug 2019 18:12)  Source: .Blood  Preliminary Report (12 Aug 2019 19:01):    No growth to date.    Culture - Blood (collected 11 Aug 2019 18:12)  Source: .Blood  Preliminary Report (12 Aug 2019 19:01):    No growth to date.      RADIOLOGY:  [ ] Reviewed and interpreted by me    EKG: CHIEF COMPLAINT:    Interval Events:    Pt groaning in pain, able to follow commands, responds to voice. As per nurse, no reports of fevers, chills, chest p/p, sob, n/v/d.     REVIEW OF SYSTEMS:  Constitutional: [ ] negative [ ] fevers [ ] chills [ ] weight loss [ ] weight gain  HEENT: [ ] negative [ ] dry eyes [ ] eye irritation [ ] postnasal drip [ ] nasal congestion  CV: [ ] negative  [ ] chest pain [ ] orthopnea [ ] palpitations [ ] murmur  Resp: [ ] negative [ ] cough [ ] shortness of breath [ ] dyspnea [ ] wheezing [ ] sputum [ ] hemoptysis  GI: [ ] negative [ ] nausea [ ] vomiting [ ] diarrhea [ ] constipation [ ] abd pain [ ] dysphagia   : [ ] negative [ ] dysuria [ ] nocturia [ ] hematuria [ ] increased urinary frequency  Musculoskeletal: [ ] negative [ ] back pain [ ] myalgias [ ] arthralgias [ ] fracture  Skin: [ ] negative [ ] rash [ ] itch  Neurological: [ ] negative [ ] headache [ ] dizziness [ ] syncope [ ] weakness [ ] numbness  Psychiatric: [ ] negative [ ] anxiety [ ] depression  Endocrine: [ ] negative [ ] diabetes [ ] thyroid problem  Hematologic/Lymphatic: [ ] negative [ ] anemia [ ] bleeding problem  Allergic/Immunologic: [ ] negative [ ] itchy eyes [ ] nasal discharge [ ] hives [ ] angioedema  [ ] All other systems negative  [ ] Unable to assess ROS because ________    OBJECTIVE:  ICU Vital Signs Last 24 Hrs  T(C): 36.8 (13 Aug 2019 04:00), Max: 36.8 (12 Aug 2019 20:00)  T(F): 98.2 (13 Aug 2019 04:00), Max: 98.2 (12 Aug 2019 20:00)  HR: 76 (13 Aug 2019 06:15) (63 - 97)  BP: 129/58 (13 Aug 2019 06:15) (80/43 - 166/69)  BP(mean): 84 (13 Aug 2019 06:15) (57 - 101)  ABP: --  ABP(mean): --  RR: 23 (13 Aug 2019 06:15) (11 - 39)  SpO2: 100% (13 Aug 2019 06:15) (90% - 100%)        08-11 @ 07:01  -  08-12 @ 07:00  --------------------------------------------------------  IN: 1709.3 mL / OUT: 1020 mL / NET: 689.3 mL     @ 07: @ 06:28  --------------------------------------------------------  IN: 1351.1 mL / OUT: 1105 mL / NET: 246.1 mL      CAPILLARY BLOOD GLUCOSE      POCT Blood Glucose.: 168 mg/dL (12 Aug 2019 23:08)      PHYSICAL EXAM:  GENERAL: awake, alert, but altered and moaning in pain  	HEENT: NC/AT, Moist mucous membranes, PERRL  	LUNGS: CTAB, no wheezes or crackles.   	CARDIAC: tachycardi, no r/m/g  	ABDOMEN: diffusely ttp in all quadrants, no rebound or guarding. PEG tube C/D/I without erythema or drainage  	EXT: No edema. No calf tenderness. CV 2+DP/PT bilaterally.   	MSK: 2+ pulses all extremities  	NEURO: obtunded, but moving all extremities, +nuchal rigidity  SKIN: Warm and dry. No rash.    LINES:    HOSPITAL MEDICATIONS:  Standing Meds:  acyclovir IVPB 650 milliGRAM(s) IV Intermittent every 8 hours  buDESOnide    Inhalation Suspension 0.5 milliGRAM(s) Inhalation two times a day  chlorhexidine 4% Liquid 1 Application(s) Topical <User Schedule>  dexamethasone  IVPB 10 milliGRAM(s) IV Intermittent every 6 hours  insulin lispro (HumaLOG) corrective regimen sliding scale   SubCutaneous every 6 hours  latanoprost 0.005% Ophthalmic Solution 1 Drop(s) Both EYES at bedtime  meropenem  IVPB 2000 milliGRAM(s) IV Intermittent every 8 hours  meropenem  IVPB      midodrine 20 milliGRAM(s) Oral every 8 hours  norepinephrine Infusion 0.05 MICROgram(s)/kG/Min IV Continuous <Continuous>  nystatin    Suspension [id number] Unit(s) Oral two times a day  petrolatum Ophthalmic Ointment 1 Application(s) Both EYES at bedtime  prednisoLONE acetate 1% Suspension 1 Drop(s) Both EYES four times a day  vancomycin  IVPB 1250 milliGRAM(s) IV Intermittent every 12 hours  vancomycin  IVPB          PRN Meds:      LABS:                        8.3    24.9  )-----------( 353      ( 13 Aug 2019 00:20 )             27.6     Hgb Trend: 8.3<--, 9.4<--, 11.0<--      140  |  99  |  25<H>  ----------------------------<  150<H>  4.3   |  31  |  0.64    Ca    9.2      13 Aug 2019 00:20  Phos  2.0       Mg     2.3         TPro  6.5  /  Alb  2.4<L>  /  TBili  0.4  /  DBili  x   /  AST  25  /  ALT  43  /  AlkPhos  71      Creatinine Trend: 0.64<--, 0.84<--, 1.04<--, 0.70<--, 0.79<--, 0.72<--  PT/INR - ( 13 Aug 2019 00:20 )   PT: 15.1 sec;   INR: 1.30 ratio         PTT - ( 13 Aug 2019 00:20 )  PTT:30.8 sec  Urinalysis Basic - ( 11 Aug 2019 14:47 )    Color: Yellow / Appearance: Slightly Turbid / S.026 / pH: x  Gluc: x / Ketone: Negative  / Bili: Negative / Urobili: 3 mg/dL   Blood: x / Protein: 30 mg/dL / Nitrite: Negative   Leuk Esterase: Negative / RBC: 4 /hpf / WBC 3 /HPF   Sq Epi: x / Non Sq Epi: 2 / Bacteria: 0.0      Arterial Blood Gas:   @ 05:18  7.49/44/77/33/96/9.3  ABG lactate: --  Arterial Blood Gas:   @ 21:58  7.43/57/90/37/97/11.4  ABG lactate: --    Venous Blood Gas:   @ 18:33  --/--/--/--/--  VBG Lactate: 1.3  Venous Blood Gas:   @ 14:41  7.46/54/62/39/92  VBG Lactate: 1.3      MICROBIOLOGY:     Culture - Urine (collected 11 Aug 2019 18:23)  Source: .Urine  Final Report (12 Aug 2019 15:29):    No growth    Culture - Blood (collected 11 Aug 2019 18:12)  Source: .Blood  Preliminary Report (12 Aug 2019 19:01):    No growth to date.    Culture - Blood (collected 11 Aug 2019 18:12)  Source: .Blood  Preliminary Report (12 Aug 2019 19:01):    No growth to date.      RADIOLOGY:  [ ] Reviewed and interpreted by me    EKG: CHIEF COMPLAINT:    Interval Events:    Pt groaning in pain, able to follow commands, responds to voice. Improved mental status today. As per nurse, no reports of fevers, chills, chest p/p, sob, n/v/d.     REVIEW OF SYSTEMS:  Constitutional: [ ] negative [ ] fevers [ ] chills [ ] weight loss [ ] weight gain  HEENT: [ ] negative [ ] dry eyes [ ] eye irritation [ ] postnasal drip [ ] nasal congestion  CV: [ ] negative  [ ] chest pain [ ] orthopnea [ ] palpitations [ ] murmur  Resp: [ ] negative [ ] cough [ ] shortness of breath [ ] dyspnea [ ] wheezing [ ] sputum [ ] hemoptysis  GI: [ ] negative [ ] nausea [ ] vomiting [ ] diarrhea [ ] constipation [ ] abd pain [ ] dysphagia   : [ ] negative [ ] dysuria [ ] nocturia [ ] hematuria [ ] increased urinary frequency  Musculoskeletal: [ ] negative [ ] back pain [ ] myalgias [ ] arthralgias [ ] fracture  Skin: [ ] negative [ ] rash [ ] itch  Neurological: [ ] negative [ ] headache [ ] dizziness [ ] syncope [ ] weakness [ ] numbness  Psychiatric: [ ] negative [ ] anxiety [ ] depression  Endocrine: [ ] negative [ ] diabetes [ ] thyroid problem  Hematologic/Lymphatic: [ ] negative [ ] anemia [ ] bleeding problem  Allergic/Immunologic: [ ] negative [ ] itchy eyes [ ] nasal discharge [ ] hives [ ] angioedema  [ ] All other systems negative  [ ] Unable to assess ROS because ________    OBJECTIVE:  ICU Vital Signs Last 24 Hrs  T(C): 36.8 (13 Aug 2019 04:00), Max: 36.8 (12 Aug 2019 20:00)  T(F): 98.2 (13 Aug 2019 04:00), Max: 98.2 (12 Aug 2019 20:00)  HR: 76 (13 Aug 2019 06:15) (63 - 97)  BP: 129/58 (13 Aug 2019 06:15) (80/43 - 166/69)  BP(mean): 84 (13 Aug 2019 06:15) (57 - 101)  ABP: --  ABP(mean): --  RR: 23 (13 Aug 2019 06:15) (11 - 39)  SpO2: 100% (13 Aug 2019 06:15) (90% - 100%)        08-11 @ 07: @ 07:00  --------------------------------------------------------  IN: 1709.3 mL / OUT: 1020 mL / NET: 689.3 mL     @ 07: @ 06:28  --------------------------------------------------------  IN: 1351.1 mL / OUT: 1105 mL / NET: 246.1 mL      CAPILLARY BLOOD GLUCOSE      POCT Blood Glucose.: 168 mg/dL (12 Aug 2019 23:08)      PHYSICAL EXAM:  GENERAL: awake, alert, but altered and moaning in pain  	HEENT: NC/AT, Moist mucous membranes, PERRL  	LUNGS: CTAB, no wheezes or crackles.   	CARDIAC: tachycardi, no r/m/g  	ABDOMEN: diffusely ttp in all quadrants, no rebound or guarding. PEG tube C/D/I without erythema or drainage  	EXT: No edema. No calf tenderness. CV 2+DP/PT bilaterally.   	MSK: 2+ pulses all extremities  	NEURO: obtunded, but moving all extremities, +nuchal rigidity  SKIN: Warm and dry. No rash.    LINES:    HOSPITAL MEDICATIONS:  Standing Meds:  acyclovir IVPB 650 milliGRAM(s) IV Intermittent every 8 hours  buDESOnide    Inhalation Suspension 0.5 milliGRAM(s) Inhalation two times a day  chlorhexidine 4% Liquid 1 Application(s) Topical <User Schedule>  dexamethasone  IVPB 10 milliGRAM(s) IV Intermittent every 6 hours  insulin lispro (HumaLOG) corrective regimen sliding scale   SubCutaneous every 6 hours  latanoprost 0.005% Ophthalmic Solution 1 Drop(s) Both EYES at bedtime  meropenem  IVPB 2000 milliGRAM(s) IV Intermittent every 8 hours  meropenem  IVPB      midodrine 20 milliGRAM(s) Oral every 8 hours  norepinephrine Infusion 0.05 MICROgram(s)/kG/Min IV Continuous <Continuous>  nystatin    Suspension 427366 Unit(s) Oral two times a day  petrolatum Ophthalmic Ointment 1 Application(s) Both EYES at bedtime  prednisoLONE acetate 1% Suspension 1 Drop(s) Both EYES four times a day  vancomycin  IVPB 1250 milliGRAM(s) IV Intermittent every 12 hours  vancomycin  IVPB          PRN Meds:      LABS:                        8.3    24.9  )-----------( 353      ( 13 Aug 2019 00:20 )             27.6     Hgb Trend: 8.3<--, 9.4<--, 11.0<--      140  |  99  |  25<H>  ----------------------------<  150<H>  4.3   |  31  |  0.64    Ca    9.2      13 Aug 2019 00:20  Phos  2.0       Mg     2.3         TPro  6.5  /  Alb  2.4<L>  /  TBili  0.4  /  DBili  x   /  AST  25  /  ALT  43  /  AlkPhos  71      Creatinine Trend: 0.64<--, 0.84<--, 1.04<--, 0.70<--, 0.79<--, 0.72<--  PT/INR - ( 13 Aug 2019 00:20 )   PT: 15.1 sec;   INR: 1.30 ratio         PTT - ( 13 Aug 2019 00:20 )  PTT:30.8 sec  Urinalysis Basic - ( 11 Aug 2019 14:47 )    Color: Yellow / Appearance: Slightly Turbid / S.026 / pH: x  Gluc: x / Ketone: Negative  / Bili: Negative / Urobili: 3 mg/dL   Blood: x / Protein: 30 mg/dL / Nitrite: Negative   Leuk Esterase: Negative / RBC: 4 /hpf / WBC 3 /HPF   Sq Epi: x / Non Sq Epi: 2 / Bacteria: 0.0      Arterial Blood Gas:   @ 05:18  7.49/44/77/33/96/9.3  ABG lactate: --  Arterial Blood Gas:   @ 21:58  7.43/57/90/37/97/11.4  ABG lactate: --    Venous Blood Gas:   @ 18:33  --/--/--/--/--  VBG Lactate: 1.3  Venous Blood Gas:   @ 14:41  7.46/54/62/39/92  VBG Lactate: 1.3      MICROBIOLOGY:     Culture - Urine (collected 11 Aug 2019 18:23)  Source: .Urine  Final Report (12 Aug 2019 15:29):    No growth    Culture - Blood (collected 11 Aug 2019 18:12)  Source: .Blood  Preliminary Report (12 Aug 2019 19:01):    No growth to date.    Culture - Blood (collected 11 Aug 2019 18:12)  Source: .Blood  Preliminary Report (12 Aug 2019 19:01):    No growth to date.      RADIOLOGY:  [ ] Reviewed and interpreted by me    EKG:

## 2019-08-13 NOTE — PROCEDURE NOTE - NSPROCDETAILS_GEN_ALL_CORE
location identified, draped/prepped, sterile technique used, needle inserted/introduced/procedure aborted/area cleaned in sterile fashion
lumen(s) aspirated and flushed/sterile dressing applied/guidewire recovered/sterile technique, catheter placed

## 2019-08-13 NOTE — PROGRESS NOTE ADULT - SUBJECTIVE AND OBJECTIVE BOX
Patient seen and examined at bedside.    --Anticoagulation--    T(C): 36.6 (08-13-19 @ 00:00), Max: 36.8 (08-12-19 @ 20:00)  HR: 82 (08-13-19 @ 04:45) (63 - 97)  BP: 166/69 (08-13-19 @ 04:45) (80/43 - 166/69)  RR: 21 (08-13-19 @ 04:45) (11 - 39)  SpO2: 96% (08-13-19 @ 04:45) (90% - 100%)  Wt(kg): --    Exam:  AAOx0, not FC, mumbling nonsensical words and moaning, CONTRERAS strongly. Pseudomeningoceal not tense, wound c/d/i.

## 2019-08-13 NOTE — CONSULT NOTE ADULT - SUBJECTIVE AND OBJECTIVE BOX
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  Per EMS, nurse at [facility] dx'd pt w/ pneumonia today and started pt on IV zosyn (pt arrives with IV in left hand). Per family at bedside, pt initially was "doing much better" after her neurosurgery and was conversant and oriented. Pt had residual word slurring but the change that prompted evaluation in the ED was marked, with the pt becoming acutely obtunded and nonverbal. Pt has been moaning all morning without making any discernibly intelligent words. Family denies hx of F/C/N/V, CP, diarrhea. States she appeared short of breath earlier in the AM and earlier in the ED, now resolved.     In the ED, patient was found to be hypotensive with systolic BPs between  and given 3L IV fluids. She remained hypotensive and was placed on levophed which resulted in improvement of her BP. Labs notable for WBC of 29.9. (11 Aug 2019 18:56)    Above reviewed. Patient with recent resection of 4th ventircle tumor and now comes in from rehab with AMS, fevers. Team concerned for meningitis but unable to obtain LP. She is being treated for meningitis empirically with Vanco, jenny and Acyclovir. Also concern for pneumonia vs acute cholecystitis.  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  Light-headedness  Glaucoma  Asthma: controlled with meds  HTN (hypertension)  History of ankle fracture: h/o repair  Fractured elbow: left elbow fracture repair,  H/O appendicitis: h/o appendicectomy      Allergies  No Known Drug Allergies  shellfish (Angioedema; Rash)        ANTIMICROBIALS:  acyclovir IVPB 650 every 8 hours  meropenem  IVPB 2000 every 8 hours  meropenem  IVPB    nystatin    Suspension [id number] two times a day  vancomycin  IVPB 1250 every 12 hours  vancomycin  IVPB        MEDICATIONS  (STANDING):  acyclovir IVPB   163 mL/Hr IV Intermittent (19 @ 06:16)   163 mL/Hr IV Intermittent (19 @ 22:11)   163 mL/Hr IV Intermittent (19 @ 13:12)   163 mL/Hr IV Intermittent (19 @ 05:57)   163 mL/Hr IV Intermittent (19 @ 00:30)    meropenem  IVPB   200 mL/Hr IV Intermittent (19 @ 00:30)    meropenem  IVPB   200 mL/Hr IV Intermittent (19 @ 06:16)   200 mL/Hr IV Intermittent (19 @ 22:11)   200 mL/Hr IV Intermittent (19 @ 14:00)   200 mL/Hr IV Intermittent (19 @ 06:02)    meropenem  IVPB   100 mL/Hr IV Intermittent (19 @ 17:53)    nystatin    Suspension   [id number] Unit(s) Oral (19 @ 06:16)   [id number] Unit(s) Oral (19 @ 17:48)   [id number] Unit(s) Oral (19 @ 05:59)    piperacillin/tazobactam IVPB.   200 mL/Hr IV Intermittent (19 @ 14:33)    vancomycin  IVPB   250 mL/Hr IV Intermittent (19 @ 15:42)    vancomycin  IVPB   125 mL/Hr IV Intermittent (19 @ 00:43)    vancomycin  IVPB   125 mL/Hr IV Intermittent (19 @ 17:47)   125 mL/Hr IV Intermittent (19 @ 06:02)        OTHER MEDS: MEDICATIONS  (STANDING):  buDESOnide    Inhalation Suspension 0.5 two times a day  dexamethasone  IVPB 10 every 6 hours  insulin lispro (HumaLOG) corrective regimen sliding scale  every 6 hours  midodrine 20 every 8 hours      SOCIAL HISTORY:     No significant alcohol, tobacco, or illicit drug use per family    FAMILY HISTORY:      REVIEW OF SYSTEMS  [  ] ROS unobtainable because:    [  ] All other systems negative except as noted below:	    Constitutional:  [ ] fever [ ] chills  [ ] weight loss  [ ] weakness  Skin:  [ ] rash [ ] phlebitis	  Eyes: [ ] icterus [ ] pain  [ ] discharge	  ENMT: [ ] sore throat  [ ] thrush [ ] ulcers [ ] exudates  Respiratory: [ ] dyspnea [ ] hemoptysis [ ] cough [ ] sputum	  Cardiovascular:  [ ] chest pain [ ] palpitations [ ] edema	  Gastrointestinal:  [ ] nausea [ ] vomiting [ ] diarrhea [ ] constipation [ ] pain	  Genitourinary:  [ ] dysuria [ ] frequency [ ] hematuria [ ] discharge [ ] flank pain  [ ] incontinence  Musculoskeletal:  [ ] myalgias [ ] arthralgias [ ] arthritis  [ ] back pain  Neurological:  [ ] headache [ ] seizures  [ ] confusion/altered mental status  Psychiatric:  [ ] anxiety [ ] depression	  Hematology/Lymphatics:  [ ] lymphadenopathy  Endocrine:  [ ] adrenal [ ] thyroid  Allergic/Immunologic:	 [ ] transplant [ ] seasonal    Vital Signs Last 24 Hrs  T(F): 98.2 (19 @ 08:00), Max: 100.4 (19 @ 14:07)    Vital Signs Last 24 Hrs  HR: 89 (19 @ 09:00) (63 - 101)  BP: 136/56 (19 @ 09:00) (80/43 - 166/69)  RR: 21 (19 @ 09:00)  SpO2: 94% (19 @ 09:00) (90% - 100%)  Wt(kg): --    PHYSICAL EXAM:  General: non-toxic  HEAD/EYES: anicteric, PERRL  ENT:  supple  Cardiovascular:   S1, S2  Respiratory:  clear bilaterally  GI:  soft, non-tender, normal bowel sounds  :  no CVA tenderness   Musculoskeletal:  no synovitis  Neurologic:  grossly non-focal  Skin:  no rash  Lymph: no lymphadenopathy  Psychiatric:  appropriate affect  Vascular:  no phlebitis          WBC Count: 24.9 K/uL ( @ 00:20)  WBC Count: 26.0 K/uL ( @ 23:17)  WBC Count: 29.9 K/uL ( @ 14:47)                            8.3    24.9  )-----------( 353      ( 13 Aug 2019 00:20 )             27.6           140  |  99  |  25<H>  ----------------------------<  150<H>  4.3   |  31  |  0.64    Ca    9.2      13 Aug 2019 00:20  Phos  2.0       Mg     2.3         TPro  6.5  /  Alb  2.4<L>  /  TBili  0.4  /  DBili  x   /  AST  25  /  ALT  43  /  AlkPhos  71        Creatinine Trend: 0.64<--, 0.84<--, 1.04<--, 0.70<--, 0.79<--, 0.72<--    Urinalysis Basic - ( 11 Aug 2019 14:47 )    Color: Yellow / Appearance: Slightly Turbid / S.026 / pH: x  Gluc: x / Ketone: Negative  / Bili: Negative / Urobili: 3 mg/dL   Blood: x / Protein: 30 mg/dL / Nitrite: Negative   Leuk Esterase: Negative / RBC: 4 /hpf / WBC 3 /HPF   Sq Epi: x / Non Sq Epi: 2 / Bacteria: 0.0        MICROBIOLOGY:    Culture - Urine (collected 19 @ 18:23)  Source: .Urine  Final Report (19 @ 15:29):    No growth    Culture - Blood (collected 19 @ 18:12)  Source: .Blood  Preliminary Report (19 @ 19:01):    No growth to date.    Culture - Blood (collected 19 @ 18:12)  Source: .Blood  Preliminary Report (19 @ 19:01):    No growth to date.    Vancomycin Level, Trough: 21.8 ug/mL (19 @ 05:22)  Rapid RVP Result: NotDetec ( @ 14:42)          RADIOLOGY:    CT Head No Cont (19 @ 16:06)  IMPRESSION:    No acute intracranial hemorrhage, mass effect, vasogenic edema, or evidence of acute territorial infarct.  Increased size of midline suboccipital extracalvarial CSF density fluid collection, likely representing a CSF leak/meningocele.    CT Abdomen and Pelvis w/ IV Cont (19 @ 21:08)  IMPRESSION:   Cholelithiasis, with small amount of pericholecystic fluid and suspicion of gallbladder wall thickening, concerning for acute cholecystitis. Correlation with ultrasound or DISIDA is recommended for further evaluation.    Interval placement of PEG tube    Partially imaged right lower lobe parenchymal consolidation, which may represent pneumonia in the correct clinical scenario.     Persistent right adnexal cystic mass, which may represent a neoplasm.  This could be further evaluated with pelvic ultrasound as indicated.       US Abdomen Upper Quadrant Right (19 @ 16:39)  IMPRESSION:     Cholelithiasis with findings suspicious for acute cholecystitis. Consider biliary scintigraphy for further confirmation. 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  Per EMS, nurse at [facility] dx'd pt w/ pneumonia today and started pt on IV zosyn (pt arrives with IV in left hand). Per family at bedside, pt initially was "doing much better" after her neurosurgery and was conversant and oriented. Pt had residual word slurring but the change that prompted evaluation in the ED was marked, with the pt becoming acutely obtunded and nonverbal. Pt has been moaning all morning without making any discernibly intelligent words. Family denies hx of F/C/N/V, CP, diarrhea. States she appeared short of breath earlier in the AM and earlier in the ED, now resolved.     In the ED, patient was found to be hypotensive with systolic BPs between  and given 3L IV fluids. She remained hypotensive and was placed on levophed which resulted in improvement of her BP. Labs notable for WBC of 29.9. (11 Aug 2019 18:56)    Above reviewed. Patient with recent resection of 4th ventircle tumor and now comes in from rehab with AMS, fevers. Team concerned for meningitis but unable to obtain LP. She is being treated for meningitis empirically with Vanco, jenny and Acyclovir. Also concern for pneumonia vs acute cholecystitis.  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  Light-headedness  Glaucoma  Asthma: controlled with meds  HTN (hypertension)  History of ankle fracture: h/o repair  Fractured elbow: left elbow fracture repair,  H/O appendicitis: h/o appendicectomy      Allergies  No Known Drug Allergies  shellfish (Angioedema; Rash)        ANTIMICROBIALS:  acyclovir IVPB 650 every 8 hours  meropenem  IVPB 2000 every 8 hours  meropenem  IVPB    nystatin    Suspension [id number] two times a day  vancomycin  IVPB 1250 every 12 hours  vancomycin  IVPB        MEDICATIONS  (STANDING):  acyclovir IVPB   163 mL/Hr IV Intermittent (19 @ 06:16)   163 mL/Hr IV Intermittent (19 @ 22:11)   163 mL/Hr IV Intermittent (19 @ 13:12)   163 mL/Hr IV Intermittent (19 @ 05:57)   163 mL/Hr IV Intermittent (19 @ 00:30)    meropenem  IVPB   200 mL/Hr IV Intermittent (19 @ 00:30)    meropenem  IVPB   200 mL/Hr IV Intermittent (19 @ 06:16)   200 mL/Hr IV Intermittent (19 @ 22:11)   200 mL/Hr IV Intermittent (19 @ 14:00)   200 mL/Hr IV Intermittent (19 @ 06:02)    meropenem  IVPB   100 mL/Hr IV Intermittent (19 @ 17:53)    nystatin    Suspension   [id number] Unit(s) Oral (19 @ 06:16)   [id number] Unit(s) Oral (19 @ 17:48)   [id number] Unit(s) Oral (19 @ 05:59)    piperacillin/tazobactam IVPB.   200 mL/Hr IV Intermittent (19 @ 14:33)    vancomycin  IVPB   250 mL/Hr IV Intermittent (19 @ 15:42)    vancomycin  IVPB   125 mL/Hr IV Intermittent (19 @ 00:43)    vancomycin  IVPB   125 mL/Hr IV Intermittent (19 @ 17:47)   125 mL/Hr IV Intermittent (19 @ 06:02)        OTHER MEDS: MEDICATIONS  (STANDING):  buDESOnide    Inhalation Suspension 0.5 two times a day  dexamethasone  IVPB 10 every 6 hours  insulin lispro (HumaLOG) corrective regimen sliding scale  every 6 hours  midodrine 20 every 8 hours      SOCIAL HISTORY:     No significant alcohol, tobacco, or illicit drug use per family    FAMILY HISTORY:  unable to obtain    REVIEW OF SYSTEMS  [X  ] ROS unobtainable because:  confused, minimally verbal  [  ] All other systems negative except as noted below:	    Constitutional:  [ ] fever [ ] chills  [ ] weight loss  [ ] weakness  Skin:  [ ] rash [ ] phlebitis	  Eyes: [ ] icterus [ ] pain  [ ] discharge	  ENMT: [ ] sore throat  [ ] thrush [ ] ulcers [ ] exudates  Respiratory: [ ] dyspnea [ ] hemoptysis [ ] cough [ ] sputum	  Cardiovascular:  [ ] chest pain [ ] palpitations [ ] edema	  Gastrointestinal:  [ ] nausea [ ] vomiting [ ] diarrhea [ ] constipation [ ] pain	  Genitourinary:  [ ] dysuria [ ] frequency [ ] hematuria [ ] discharge [ ] flank pain  [ ] incontinence  Musculoskeletal:  [ ] myalgias [ ] arthralgias [ ] arthritis  [ ] back pain  Neurological:  [ ] headache [ ] seizures  [ ] confusion/altered mental status  Psychiatric:  [ ] anxiety [ ] depression	  Hematology/Lymphatics:  [ ] lymphadenopathy  Endocrine:  [ ] adrenal [ ] thyroid  Allergic/Immunologic:	 [ ] transplant [ ] seasonal    Vital Signs Last 24 Hrs  T(F): 98.2 (19 @ 08:00), Max: 100.4 (19 @ 14:07)    Vital Signs Last 24 Hrs  HR: 89 (19 @ 09:00) (63 - 101)  BP: 136/56 (19 @ 09:00) (80/43 - 166/69)  RR: 21 (19 @ 09:00)  SpO2: 94% (19 @ 09:00) (90% - 100%)  Wt(kg): --    PHYSICAL EXAM:  General: ill appearing, obese   HEAD/EYES: red sclera   ENT:  supple, no meningismus   Cardiovascular:   S1, S2  Respiratory:  clear bilaterally  GI:  soft, non-tender, normal bowel sounds  :  no CVA tenderness   Musculoskeletal:  no synovitis  Neurologic:  awake but difficult to assess orientation  Skin:  no rash  Lymph: no lymphadenopathy  Psychiatric:  appropriate affect  Vascular:  no phlebitis          WBC Count: 24.9 K/uL ( @ 00:20)  WBC Count: 26.0 K/uL ( @ 23:17)  WBC Count: 29.9 K/uL ( @ 14:47)                            8.3    24.9  )-----------( 353      ( 13 Aug 2019 00:20 )             27.6           140  |  99  |  25<H>  ----------------------------<  150<H>  4.3   |  31  |  0.64    Ca    9.2      13 Aug 2019 00:20  Phos  2.0       Mg     2.3         TPro  6.5  /  Alb  2.4<L>  /  TBili  0.4  /  DBili  x   /  AST  25  /  ALT  43  /  AlkPhos  71        Creatinine Trend: 0.64<--, 0.84<--, 1.04<--, 0.70<--, 0.79<--, 0.72<--    Urinalysis Basic - ( 11 Aug 2019 14:47 )    Color: Yellow / Appearance: Slightly Turbid / S.026 / pH: x  Gluc: x / Ketone: Negative  / Bili: Negative / Urobili: 3 mg/dL   Blood: x / Protein: 30 mg/dL / Nitrite: Negative   Leuk Esterase: Negative / RBC: 4 /hpf / WBC 3 /HPF   Sq Epi: x / Non Sq Epi: 2 / Bacteria: 0.0        MICROBIOLOGY:    Culture - Urine (collected 19 @ 18:23)  Source: .Urine  Final Report (19 @ 15:29):    No growth    Culture - Blood (collected 19 @ 18:12)  Source: .Blood  Preliminary Report (19 @ 19:01):    No growth to date.    Culture - Blood (collected 19 @ 18:12)  Source: .Blood  Preliminary Report (19 @ 19:01):    No growth to date.    Vancomycin Level, Trough: 21.8 ug/mL (19 @ 05:22)  Rapid RVP Result: NotDetec ( @ 14:42)          RADIOLOGY:    CT Head No Cont (19 @ 16:06)  IMPRESSION:    No acute intracranial hemorrhage, mass effect, vasogenic edema, or evidence of acute territorial infarct.  Increased size of midline suboccipital extracalvarial CSF density fluid collection, likely representing a CSF leak/meningocele.    CT Abdomen and Pelvis w/ IV Cont (19 @ 21:08)  IMPRESSION:   Cholelithiasis, with small amount of pericholecystic fluid and suspicion of gallbladder wall thickening, concerning for acute cholecystitis. Correlation with ultrasound or DISIDA is recommended for further evaluation.    Interval placement of PEG tube    Partially imaged right lower lobe parenchymal consolidation, which may represent pneumonia in the correct clinical scenario.     Persistent right adnexal cystic mass, which may represent a neoplasm.  This could be further evaluated with pelvic ultrasound as indicated.       US Abdomen Upper Quadrant Right (19 @ 16:39)  IMPRESSION:     Cholelithiasis with findings suspicious for acute cholecystitis. Consider biliary scintigraphy for further confirmation.

## 2019-08-13 NOTE — CONSULT NOTE ADULT - ASSESSMENT
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 Em on 6/24, recurrent aspiration s/p PEG tube, who presents from [facility] s/p a week of gradually worsening mental status along with fever x 3 days found to be septic.   Workup thus far found patient to be febrile to 100.4 on admission and tachycardic/hypotensive   Leukocytosis to 29.9k, with normal CMP   ESR and CRP elevated  CT head: Increased size of midline suboccipital extracalvarial CSF density fluid collection, likely representing a CSF leak/meningocele  CT A/P: Cholelithiasis, with small amount of pericholecystic fluid and suspicion of gallbladder wall thickening, concerning for acute cholecystitis  US Abdomen: Cholelithiasis with findings suspicious for acute cholecystitis.   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   Workup thus far found patient to be febrile to 100.4 on admission and tachycardic/hypotensive   Leukocytosis to 29.9k, with normal CMP   ESR and CRP elevated  CT head: Increased size of midline suboccipital extracalvarial CSF density fluid collection, likely representing a CSF leak/meningocele  CT A/P: Cholelithiasis, with small amount of pericholecystic fluid and suspicion of gallbladder wall thickening, concerning for acute cholecystitis  US Abdomen: Cholelithiasis with findings suspicious for acute cholecystitis.   LP attempted but unable to obtain CSF thus was started on emperic tx with Vanc, Corey, Acyclovir   Meropenem would cover acute cholecystitis and aspiration pneumonia  Likely does not need acyclovir   Given recent neurosurgery and CT head findings concerned for empyema, meningocele     Recommendations:  Stop Acyclovir   Continue Meropenem 2g q8hrs  Change Vancomycin to 1g q12hrs starting tomorrow  Perform LP and send cell count, glucose, protein, routine gram stain and culture, CSF PCR panel, fungal Culture  Obtain CT Head with contrast of MRI brain with gadolinium  Follow up from Neurosurgery      Discussed with [facility] team

## 2019-08-13 NOTE — CHART NOTE - NSCHARTNOTEFT_GEN_A_CORE
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 CT head found to show Increased size of midline suboccipital extracalvarial CSF density fluid collection, likely representing a CSF leak/meningocele. Pt found to have WBC of 26, trending down. Pt placed on dexamethasone 10mg q6. Pt placed on vancomycin, meropenem, acyclovir. Pt also complaining of generalized abdominal pain. Pt  ID following, recommended d/c acyclovir.       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 CT head found to show Increased size of midline suboccipital extracalvarial CSF density fluid collection, likely representing a CSF leak/meningocele. Pt found to have WBC of 26, trending down. Pt placed on dexamethasone 10mg q6. Pt placed on vancomycin, meropenem, acyclovir for empiric coverage. Pt also complaining of generalized abdominal pain, received CT abdomen showing fluid collection in gallbladder with thickening of gallbladder wall, concerning for cholecystitis. US abd also showings signs concerning for cholecystitis, recommending further evaluation with HIDA. ID following, recommended d/c acyclovir based on renal toxcicity.       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    #Neuro  - Currently altered, moaning in pain, responding to verbal and pain, protecting airway  - CT head showing no acute changes, however increase in size of CSF density fluid collection representing CSF leak vs meningocele, neurosurgery following, appreciate recs   - most likely toxic metabolic encephalopathy in the setting of sepsis, will cont to monitor  - LP today to r/o meningitis    #CV  - hypotensive to 60s systolic in ED and not adequately responsive to fluids. Started on levophed in ED. Most likely distributive shock from sepsis.  - Off of pressors, BP maintaining   - holding home antihypertensives in setting of sepsis    #Pulm  - currently saturating well on room air and protecting airway,   - ABG (8/13): pCO2 44, c/w monitor  - cont to monitor respiratory status; low threshold for intubation given AMS and concern about inability to protect airway    #ID  - pt presenting with septic shock with elevated WBC, fever, and likely source of meningitis vs pneumonia on CXR. Intraabdominal source also possible given tenderness on exam  - will c/w vanc and meropenem for broad spectrum coverage (8/11 - )  - CTA showing thickened gallbladder, fluid collection, possibly 2/2 acute cholecystitis, but LFTS wnl  - HIDA after transfer to floors  - neurosurgery consulted in ED, determined no acute surgical intervention.  - LP today to r/o meningitis  - f/u blood and urine cultures    #GI  - c/w feeds via PEG    #Endo  - A1C 5.8 in 6/2018  - no active issues  - moderate ISS given dex 10mg q6 day 2 today    #Renal  - crt at baseline (0.67)  - will cont to monitor BMP QD    #PPX  - will hold DVT ppx for LP  - per family at bedside, pt is full code      FOR FOLLOW UP: MICU Transfer Note    Transfer from: MICU    Transfer to: ( x ) Medicine    (  ) Telemetry     (   ) RCU        (    ) Palliative         (   ) Stroke Unit          (   ) __________________    Accepting Physician:  Signout given to:     MICU COURSE:    Pt is a 69F hx obesity, htn, asthma, 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 Pt febrile and hypotensive at ED, CXR showing R lung opacities. BP was not responding to fluids, with slight BRENDA (crt 1.06, up from baseline of 0.7-0.8), pt placed on levoophed and admitted to MICU for management of septic in shock 2/2 meningitis vs pneumonia. CT head found to show increased size of midline suboccipital extracalvarial CSF density fluid collection, likely representing a CSF leak/meningocele. Pt found to have WBC of 26, trending down. Pt placed on dexamethasone 10mg q6. Pt placed on vancomycin, meropenem, acyclovir for empiric coverage. Pt also complaining of generalized abdominal pain, received CT abdomen showing fluid collection in gallbladder with thickening of gallbladder wall, concerning for cholecystitis. US abd also showings signs concerning for cholecystitis, recommending further evaluation with HIDA. ID following, recommended d/c acyclovir based on renal toxcicity. Patient's mental status improving, afebrile on admission, off pressors and sedation.       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    #Neuro  - Currently altered, moaning in pain, responding to verbal and pain, protecting airway  - CT head showing no acute changes, however increase in size of CSF density fluid collection representing CSF leak vs meningocele, neurosurgery following, appreciate recs   - most likely toxic metabolic encephalopathy in the setting of sepsis, will cont to monitor  - LP today to r/o meningitis    #CV  - hypotensive to 60s systolic in ED and not adequately responsive to fluids. Started on levophed in ED. Most likely distributive shock from sepsis.  - Off of pressors, BP maintaining   - holding home antihypertensives in setting of sepsis    #Pulm  - currently saturating well on room air and protecting airway,   - ABG (8/13): pCO2 44, c/w monitor  - cont to monitor respiratory status; low threshold for intubation given AMS and concern about inability to protect airway    #ID  - pt presenting with septic shock with elevated WBC, fever, and likely source of meningitis vs pneumonia on CXR. Intraabdominal source also possible given tenderness on exam  - will c/w vanc and meropenem for broad spectrum coverage (8/11 - )  - CTA showing thickened gallbladder, fluid collection, possibly 2/2 acute cholecystitis, but LFTS wnl  - HIDA after transfer to floors  - neurosurgery consulted in ED, determined no acute surgical intervention.  - LP today to r/o meningitis  - f/u blood and urine cultures    #GI  - c/w feeds via PEG    #Endo  - A1C 5.8 in 6/2018  - no active issues  - moderate ISS given dex 10mg q6 day 2 today    #Renal  - crt at baseline (0.67)  - will cont to monitor BMP QD    #PPX  - will hold DVT ppx for LP  - per family at bedside, pt is full code      FOR FOLLOW UP: MICU Transfer Note    Transfer from: MICU    Transfer to: ( x ) Medicine    (  ) Telemetry     (   ) RCU        (    ) Palliative         (   ) Stroke Unit          (   ) __________________    Accepting Physician: Dr. 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 Pt febrile and hypotensive at ED, CXR showing R lung opacities. BP was not responding to fluids, with slight BRENDA (crt 1.06, up from baseline of 0.7-0.8), pt placed on levoophed and admitted to MICU for management of septic in shock 2/2 meningitis vs pneumonia. CT head found to show increased size of midline suboccipital extracalvarial CSF density fluid collection, likely representing a CSF leak/meningocele. Pt found to have WBC of 26, trending down. Pt placed on dexamethasone 10mg q6. Pt placed on vancomycin, meropenem, acyclovir for empiric coverage. Pt also complaining of generalized abdominal pain, received CT abdomen showing fluid collection in gallbladder with thickening of gallbladder wall, concerning for cholecystitis. US abd also showings signs concerning for cholecystitis, recommending further evaluation with HIDA. ID following, recommended d/c acyclovir based on renal toxcicity. Patient's mental status improving, afebrile on admission, off pressors and sedation.       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    #Neuro  - Currently altered, moaning in pain, responding to verbal and pain, protecting airway  - CT head showing no acute changes, however increase in size of CSF density fluid collection representing CSF leak vs meningocele, neurosurgery following, appreciate recs   - most likely toxic metabolic encephalopathy in the setting of sepsis, will cont to monitor  - LP today to r/o meningitis    #CV  - hypotensive to 60s systolic in ED and not adequately responsive to fluids. Started on levophed in ED. Most likely distributive shock from sepsis.  - Off of pressors, BP maintaining   - holding home antihypertensives in setting of sepsis    #Pulm  - currently saturating well on room air and protecting airway,   - ABG (8/13): pCO2 44, c/w monitor  - cont to monitor respiratory status; low threshold for intubation given AMS and concern about inability to protect airway    #ID  - pt presenting with septic shock with elevated WBC, fever, and likely source of meningitis vs pneumonia on CXR. Intraabdominal source also possible given tenderness on exam  - will c/w vanc and meropenem for broad spectrum coverage (8/11 - )  - CTA showing thickened gallbladder, fluid collection, possibly 2/2 acute cholecystitis, but LFTS wnl  - HIDA after transfer to floors  - neurosurgery consulted in ED, determined no acute surgical intervention.  - LP today to r/o meningitis  - f/u blood and urine cultures    #GI  - c/w feeds via PEG    #Endo  - A1C 5.8 in 6/2018  - no active issues  - moderate ISS given dex 10mg q6 day 2 today    #Renal  - crt at baseline (0.67)  - will cont to monitor BMP QD    #PPX  - will hold DVT ppx for LP  - per family at bedside, pt is full code      FOR FOLLOW UP:  [ ] IR for LP, LP unsuccessful with long needle   [ ] f/u MRI head to rule out abscess MICU Transfer Note    Transfer from: MICU    Transfer to: ( x ) Medicine    (  ) Telemetry     (   ) RCU        (    ) Palliative         (   ) Stroke Unit          (   ) __________________    Accepting Physician: Dr. 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 Pt febrile and hypotensive at ED, CXR showing R lung opacities. BP was not responding to fluids, with slight BRENDA (crt 1.06, up from baseline of 0.7-0.8), pt placed on levoophed and admitted to MICU for management of septic in shock 2/2 meningitis vs pneumonia. CT head found to show increased size of midline suboccipital extracalvarial CSF density fluid collection, likely representing a CSF leak/meningocele. Pt found to have WBC of 26, trending down. Pt placed on dexamethasone 10mg q6. Pt placed on vancomycin, meropenem, acyclovir for empiric coverage. Pt also complaining of generalized abdominal pain, received CT abdomen showing fluid collection in gallbladder with thickening of gallbladder wall, concerning for cholecystitis. US abd also showings signs concerning for cholecystitis, recommending further evaluation with HIDA. ID following, recommended d/c acyclovir based on renal toxcicity. Patient's mental status improving, afebrile on admission, off pressors and sedation.       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    #Neuro  - Currently altered, moaning in pain, responding to verbal and pain, protecting airway  - CT head showing no acute changes, however increase in size of CSF density fluid collection representing CSF leak vs meningocele, neurosurgery following, appreciate recs   - most likely toxic metabolic encephalopathy in the setting of sepsis, will cont to monitor  - LP today to r/o meningitis    #CV  - hypotensive to 60s systolic in ED and not adequately responsive to fluids. Started on levophed in ED. Most likely distributive shock from sepsis.  - Off of pressors, BP maintaining   - holding home antihypertensives in setting of sepsis    #Pulm  - currently saturating well on room air and protecting airway,   - ABG (8/13): pCO2 44, c/w monitor  - cont to monitor respiratory status; low threshold for intubation given AMS and concern about inability to protect airway    #ID  - pt presenting with septic shock with elevated WBC, fever, and likely source of meningitis vs pneumonia on CXR. Intraabdominal source also possible given tenderness on exam  - will c/w vanc and meropenem for broad spectrum coverage (8/11 - )  - CTA showing thickened gallbladder, fluid collection, possibly 2/2 acute cholecystitis, but LFTS wnl  - HIDA after transfer to floors  - neurosurgery consulted in ED, determined no acute surgical intervention.  - LP today to r/o meningitis  - f/u blood and urine cultures    #GI  - c/w feeds via PEG    #Endo  - A1C 5.8 in 6/2018  - no active issues  - moderate ISS given dex 10mg q6 day 2 today    #Renal  - crt at baseline (0.67)  - will cont to monitor BMP QD    #PPX  - will hold DVT ppx for LP  - per family at bedside, pt is full code      FOR FOLLOW UP:  [ ] neurrad for LP, LP unsuccessful with long needle   [ ] f/u MRI head to rule out abscess  [ ] f/u HIDA scan for acute cholecystitis MICU Transfer Note    Transfer from: MICU    Transfer to: ( x ) Medicine    (  ) Telemetry     (   ) RCU        (    ) Palliative         (   ) Stroke Unit          (   ) __________________    Accepting Physician: Dr. 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 Pt febrile and hypotensive at ED, CXR showing R lung opacities. BP was not responding to fluids, with slight BRENDA (crt 1.06, up from baseline of 0.7-0.8), pt placed on levoophed and admitted to MICU for management of septic in shock 2/2 meningitis vs pneumonia. CT head found to show increased size of midline suboccipital extracalvarial CSF density fluid collection, likely representing a CSF leak/meningocele. Pt found to have WBC of 26, trending down. Pt placed on dexamethasone 10mg q6. Pt placed on vancomycin, meropenem, acyclovir for empiric coverage. Pt also complaining of generalized abdominal pain, received CT abdomen showing fluid collection in gallbladder with thickening of gallbladder wall, concerning for cholecystitis. US abd also showings signs concerning for cholecystitis, recommending further evaluation with HIDA. ID following, recommended d/c acyclovir based on renal toxcicity. Patient's mental status improving, afebrile on admission, off pressors and sedation. As per ID and infection control, patient has no indication for isolation.       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    #Neuro  - Currently altered, moaning in pain, responding to verbal and pain, protecting airway  - CT head showing no acute changes, however increase in size of CSF density fluid collection representing CSF leak vs meningocele, neurosurgery following, appreciate recs   - most likely toxic metabolic encephalopathy in the setting of sepsis, will cont to monitor  - LP today to r/o meningitis    #CV  - hypotensive to 60s systolic in ED and not adequately responsive to fluids. Started on levophed in ED. Most likely distributive shock from sepsis.  - Off of pressors, BP maintaining   - holding home antihypertensives in setting of sepsis    #Pulm  - currently saturating well on room air and protecting airway,   - ABG (8/13): pCO2 44, c/w monitor  - cont to monitor respiratory status; low threshold for intubation given AMS and concern about inability to protect airway    #ID  - pt presenting with septic shock with elevated WBC, fever, and likely source of meningitis vs pneumonia on CXR. Intraabdominal source also possible given tenderness on exam  - will c/w vanc and meropenem for broad spectrum coverage (8/11 - )  - CTA showing thickened gallbladder, fluid collection, possibly 2/2 acute cholecystitis, but LFTS wnl  - HIDA after transfer to floors  - neurosurgery consulted in ED, determined no acute surgical intervention.  - LP today to r/o meningitis  - f/u blood and urine cultures    #GI  - c/w feeds via PEG    #Endo  - A1C 5.8 in 6/2018  - no active issues  - moderate ISS given dex 10mg q6 day 2 today    #Renal  - crt at baseline (0.67)  - will cont to monitor BMP QD    #PPX  - will hold DVT ppx for LP  - per family at bedside, pt is full code      FOR FOLLOW UP:  [ ] neurrad for LP, LP unsuccessful with long needle   [ ] f/u MRI head to rule out abscess  [ ] f/u HIDA scan for acute cholecystitis

## 2019-08-14 DIAGNOSIS — R10.84 GENERALIZED ABDOMINAL PAIN: ICD-10-CM

## 2019-08-14 DIAGNOSIS — Z29.9 ENCOUNTER FOR PROPHYLACTIC MEASURES, UNSPECIFIED: ICD-10-CM

## 2019-08-14 DIAGNOSIS — J69.0 PNEUMONITIS DUE TO INHALATION OF FOOD AND VOMIT: ICD-10-CM

## 2019-08-14 DIAGNOSIS — G92 TOXIC ENCEPHALOPATHY: ICD-10-CM

## 2019-08-14 DIAGNOSIS — R53.2 FUNCTIONAL QUADRIPLEGIA: ICD-10-CM

## 2019-08-14 DIAGNOSIS — A41.9 SEPSIS, UNSPECIFIED ORGANISM: ICD-10-CM

## 2019-08-14 LAB
ALBUMIN SERPL ELPH-MCNC: 3.1 G/DL — LOW (ref 3.3–5)
ALP SERPL-CCNC: 80 U/L — SIGNIFICANT CHANGE UP (ref 40–120)
ALT FLD-CCNC: 72 U/L — HIGH (ref 10–45)
ANION GAP SERPL CALC-SCNC: 16 MMOL/L — SIGNIFICANT CHANGE UP (ref 5–17)
APPEARANCE CSF: CLEAR — SIGNIFICANT CHANGE UP
APPEARANCE SPUN FLD: COLORLESS — SIGNIFICANT CHANGE UP
APTT BLD: 30.3 SEC — SIGNIFICANT CHANGE UP (ref 27.5–36.3)
AST SERPL-CCNC: 37 U/L — SIGNIFICANT CHANGE UP (ref 10–40)
BASOPHILS # BLD AUTO: 0 K/UL — SIGNIFICANT CHANGE UP (ref 0–0.2)
BILIRUB SERPL-MCNC: 0.5 MG/DL — SIGNIFICANT CHANGE UP (ref 0.2–1.2)
BUN SERPL-MCNC: 26 MG/DL — HIGH (ref 7–23)
CALCIUM SERPL-MCNC: 9.7 MG/DL — SIGNIFICANT CHANGE UP (ref 8.4–10.5)
CHLORIDE SERPL-SCNC: 99 MMOL/L — SIGNIFICANT CHANGE UP (ref 96–108)
CO2 SERPL-SCNC: 29 MMOL/L — SIGNIFICANT CHANGE UP (ref 22–31)
COLOR CSF: SIGNIFICANT CHANGE UP
CREAT SERPL-MCNC: 0.63 MG/DL — SIGNIFICANT CHANGE UP (ref 0.5–1.3)
CSF PCR RESULT: SIGNIFICANT CHANGE UP
CULTURE RESULTS: SIGNIFICANT CHANGE UP
EOSINOPHIL # BLD AUTO: 0 K/UL — SIGNIFICANT CHANGE UP (ref 0–0.5)
GLUCOSE BLDC GLUCOMTR-MCNC: 133 MG/DL — HIGH (ref 70–99)
GLUCOSE BLDC GLUCOMTR-MCNC: 143 MG/DL — HIGH (ref 70–99)
GLUCOSE BLDC GLUCOMTR-MCNC: 144 MG/DL — HIGH (ref 70–99)
GLUCOSE BLDC GLUCOMTR-MCNC: 150 MG/DL — HIGH (ref 70–99)
GLUCOSE BLDC GLUCOMTR-MCNC: 152 MG/DL — HIGH (ref 70–99)
GLUCOSE CSF-MCNC: 65 MG/DL — SIGNIFICANT CHANGE UP (ref 40–70)
GLUCOSE SERPL-MCNC: 150 MG/DL — HIGH (ref 70–99)
GRAM STN FLD: SIGNIFICANT CHANGE UP
HCT VFR BLD CALC: 31.2 % — LOW (ref 34.5–45)
HGB BLD-MCNC: 9.7 G/DL — LOW (ref 11.5–15.5)
INR BLD: 1.24 RATIO — HIGH (ref 0.88–1.16)
LYMPHOCYTES # BLD AUTO: 0.8 K/UL — LOW (ref 1–3.3)
LYMPHOCYTES # BLD AUTO: 3 % — LOW (ref 13–44)
LYMPHOCYTES # CSF: 76 % — SIGNIFICANT CHANGE UP (ref 40–80)
MAGNESIUM SERPL-MCNC: 2.5 MG/DL — SIGNIFICANT CHANGE UP (ref 1.6–2.6)
MCHC RBC-ENTMCNC: 27.3 PG — SIGNIFICANT CHANGE UP (ref 27–34)
MCHC RBC-ENTMCNC: 31 GM/DL — LOW (ref 32–36)
MCV RBC AUTO: 88.2 FL — SIGNIFICANT CHANGE UP (ref 80–100)
METAMYELOCYTES # FLD: 1 % — HIGH (ref 0–0)
MONOCYTES # BLD AUTO: 0.6 K/UL — SIGNIFICANT CHANGE UP (ref 0–0.9)
MONOCYTES NFR BLD AUTO: 8 % — SIGNIFICANT CHANGE UP (ref 2–14)
MONOS+MACROS NFR CSF: 16 % — SIGNIFICANT CHANGE UP (ref 15–45)
NEUTROPHILS # BLD AUTO: 13.3 K/UL — HIGH (ref 1.8–7.4)
NEUTROPHILS # CSF: 5 % — SIGNIFICANT CHANGE UP (ref 0–6)
NEUTROPHILS NFR BLD AUTO: 87 % — HIGH (ref 43–77)
NEUTS BAND # BLD: 1 % — SIGNIFICANT CHANGE UP (ref 0–8)
NRBC NFR CSF: 108 /UL — HIGH (ref 0–5)
ORGANISM # SPEC MICROSCOPIC CNT: SIGNIFICANT CHANGE UP
ORGANISM # SPEC MICROSCOPIC CNT: SIGNIFICANT CHANGE UP
OTHER CELLS CSF MANUAL: 3 % — HIGH (ref 0–0)
PHOSPHATE SERPL-MCNC: 2.2 MG/DL — LOW (ref 2.5–4.5)
PLAT MORPH BLD: NORMAL — SIGNIFICANT CHANGE UP
PLATELET # BLD AUTO: 367 K/UL — SIGNIFICANT CHANGE UP (ref 150–400)
POTASSIUM SERPL-MCNC: 4.3 MMOL/L — SIGNIFICANT CHANGE UP (ref 3.5–5.3)
POTASSIUM SERPL-SCNC: 4.3 MMOL/L — SIGNIFICANT CHANGE UP (ref 3.5–5.3)
PROT CSF-MCNC: 135 MG/DL — HIGH (ref 15–45)
PROT SERPL-MCNC: 7.3 G/DL — SIGNIFICANT CHANGE UP (ref 6–8.3)
PROTHROM AB SERPL-ACNC: 14.2 SEC — HIGH (ref 10–12.9)
RBC # BLD: 3.54 M/UL — LOW (ref 3.8–5.2)
RBC # CSF: 1 /UL — HIGH (ref 0–0)
RBC # FLD: 14.4 % — SIGNIFICANT CHANGE UP (ref 10.3–14.5)
RBC BLD AUTO: SIGNIFICANT CHANGE UP
SODIUM SERPL-SCNC: 144 MMOL/L — SIGNIFICANT CHANGE UP (ref 135–145)
SPECIMEN SOURCE: SIGNIFICANT CHANGE UP
SPECIMEN SOURCE: SIGNIFICANT CHANGE UP
TUBE TYPE: SIGNIFICANT CHANGE UP
WBC # BLD: 14.7 K/UL — HIGH (ref 3.8–10.5)
WBC # FLD AUTO: 14.7 K/UL — HIGH (ref 3.8–10.5)

## 2019-08-14 PROCEDURE — 62270 DX LMBR SPI PNXR: CPT

## 2019-08-14 PROCEDURE — 99233 SBSQ HOSP IP/OBS HIGH 50: CPT

## 2019-08-14 PROCEDURE — 99233 SBSQ HOSP IP/OBS HIGH 50: CPT | Mod: GC

## 2019-08-14 PROCEDURE — 77003 FLUOROGUIDE FOR SPINE INJECT: CPT | Mod: 26

## 2019-08-14 PROCEDURE — 78226 HEPATOBILIARY SYSTEM IMAGING: CPT | Mod: 26

## 2019-08-14 RX ORDER — MIDODRINE HYDROCHLORIDE 2.5 MG/1
10 TABLET ORAL THREE TIMES A DAY
Refills: 0 | Status: DISCONTINUED | OUTPATIENT
Start: 2019-08-14 | End: 2019-08-15

## 2019-08-14 RX ADMIN — Medication 1: at 17:48

## 2019-08-14 RX ADMIN — Medication 1 APPLICATION(S): at 21:44

## 2019-08-14 RX ADMIN — Medication 1 DROP(S): at 00:10

## 2019-08-14 RX ADMIN — Medication 1 DROP(S): at 12:07

## 2019-08-14 RX ADMIN — Medication 102 MILLIGRAM(S): at 17:46

## 2019-08-14 RX ADMIN — Medication 102 MILLIGRAM(S): at 00:10

## 2019-08-14 RX ADMIN — MEROPENEM 200 MILLIGRAM(S): 1 INJECTION INTRAVENOUS at 15:44

## 2019-08-14 RX ADMIN — Medication 1 DROP(S): at 06:00

## 2019-08-14 RX ADMIN — MEROPENEM 200 MILLIGRAM(S): 1 INJECTION INTRAVENOUS at 21:44

## 2019-08-14 RX ADMIN — Medication 0.5 MILLIGRAM(S): at 17:33

## 2019-08-14 RX ADMIN — LATANOPROST 1 DROP(S): 0.05 SOLUTION/ DROPS OPHTHALMIC; TOPICAL at 21:44

## 2019-08-14 RX ADMIN — Medication 102 MILLIGRAM(S): at 12:07

## 2019-08-14 RX ADMIN — Medication 250 MILLIGRAM(S): at 06:17

## 2019-08-14 RX ADMIN — Medication 1 DROP(S): at 17:48

## 2019-08-14 RX ADMIN — Medication 102 MILLIGRAM(S): at 06:00

## 2019-08-14 RX ADMIN — Medication 250 MILLIGRAM(S): at 18:20

## 2019-08-14 RX ADMIN — MEROPENEM 200 MILLIGRAM(S): 1 INJECTION INTRAVENOUS at 06:12

## 2019-08-14 RX ADMIN — CHLORHEXIDINE GLUCONATE 1 APPLICATION(S): 213 SOLUTION TOPICAL at 06:16

## 2019-08-14 RX ADMIN — Medication 500000 UNIT(S): at 06:12

## 2019-08-14 RX ADMIN — Medication 500000 UNIT(S): at 17:46

## 2019-08-14 NOTE — PROGRESS NOTE ADULT - SUBJECTIVE AND OBJECTIVE BOX
Patient is a 69y old  Female who presents with a chief complaint of Hypotension (14 Aug 2019 07:06)    Being followed by ID for fevers, ams    Interval history:  patient transferred to regular floor  LP attempted but no CSF could be obtained   when seen awake-answers some queries but unable to provide reliable ROS  denies any pain or other complaints   No other acute events      ROS:  Not obtainable       Antimicrobials:    meropenem  IVPB 2000 milliGRAM(s) IV Intermittent every 8 hours  meropenem  IVPB      nystatin    Suspension [id number] Unit(s) Oral two times a day  vancomycin  IVPB 1000 milliGRAM(s) IV Intermittent every 12 hours      other medications reviewed    Vital Signs Last 24 Hrs  T(C): 36.7 (08-14-19 @ 06:00), Max: 36.9 (08-13-19 @ 20:00)  T(F): 98 (08-14-19 @ 06:00), Max: 98.5 (08-13-19 @ 20:00)  HR: 109 (08-14-19 @ 06:00) (70 - 117)  BP: 160/78 (08-14-19 @ 06:00) (102/51 - 167/69)  BP(mean): 89 (08-13-19 @ 20:00) (73 - 107)  RR: 18 (08-14-19 @ 06:00) (14 - 24)  SpO2: 93% (08-14-19 @ 06:00) (93% - 100%)    Physical Exam:    Constitutionalcomfortable,      Cranial incisions CDI no tenderness   HEENT PERRLA EOMI,No pallor or icterus    No oral exudate or erythema    Neck supple no JVD or LN    Chest Good AE,CTA    CVS RRR S1 S2 WNl No murmur or rub or gallop    Abd soft BS normal No tenderness no masses  Obese NO RUQ tenderness     Ext No cyanosis clubbing or edema    IV site no erythema tenderness or discharge    Joints no swelling or LOM    CNS as above     Lab Data:                          9.7    14.7  )-----------( 367      ( 14 Aug 2019 06:43 )             31.2   WBC Count: 14.7 (08-14-19 @ 06:43)  WBC Count: 24.9 (08-13-19 @ 00:20)  WBC Count: 26.0 (08-11-19 @ 23:17)  WBC Count: 29.9 (08-11-19 @ 14:47)      08-14    144  |  99  |  26<H>  ----------------------------<  150<H>  4.3   |  29  |  0.63    Ca    9.7      14 Aug 2019 06:40  Phos  2.2     08-14  Mg     2.5     08-14    TPro  7.3  /  Alb  3.1<L>  /  TBili  0.5  /  DBili  x   /  AST  37  /  ALT  72<H>  /  AlkPhos  80  08-14        Culture - Urine (collected 11 Aug 2019 18:23)  Source: .Urine  Final Report (12 Aug 2019 15:29):    No growth    Culture - Blood (collected 11 Aug 2019 18:12)  Source: .Blood  Gram Stain (13 Aug 2019 15:06):    Growth in aerobic bottle: Gram Positive Cocci in Clusters  Preliminary Report (13 Aug 2019 15:06):    Growth in aerobic bottle: Gram Positive Cocci in Clusters        -  Coagulase negative Staphylococcus: Detec      Method Type: PCR    Culture - Blood (collected 11 Aug 2019 18:12)  Source: .Blood  Preliminary Report (12 Aug 2019 19:01):    No growth to date.              Vancomycin Level, Trough: 21.8 ug/mL (08-13-19 @ 05:22)        < from: CT Abdomen and Pelvis w/ IV Cont (08.11.19 @ 21:08) >    IMPRESSION:   Cholelithiasis, with small amount of pericholecystic fluid and suspicion   of gallbladder wall thickening, concerning for acute cholecystitis.     Correlation with ultrasound or DISIDA is recommended for further   evaluation.    Interval placement of PEG tube    Partially imaged right lower lobe parenchymal consolidation, which may   represent pneumonia in the correct clinical scenario.     Persistent right adnexal cystic mass, which may represent a neoplasm.    This could be further evaluated with pelvic ultrasound as indicated.     Findings were discussed with Dr. Bauer on  8/12/2019 at 9:19 AM by Dr. Vinson with read back confirmation.        < from: CT Head No Cont (08.11.19 @ 16:06) >    IMPRESSION:    No acute intracranial hemorrhage, mass effect, vasogenic edema, or   evidence of acute territorial infarct.    Increased size of midline suboccipital extracalvarial CSF density fluid   collection, likely representing a CSF leak/meningocele.              < end of copied text >          < end of copied text >

## 2019-08-14 NOTE — PROGRESS NOTE ADULT - PROBLEM SELECTOR PLAN 2
-Likely source meningitis vs PNA vs intraabdominal source  -c/w vanc and meropenem  -BP maintaining, holding home anti-hypertensives -Likely source meningitis vs PNA vs intraabdominal source  -c/w vanc and meropenem  -BP maintaining, holding home anti-hypertensives  -Plan as above

## 2019-08-14 NOTE — PROGRESS NOTE ADULT - SUBJECTIVE AND OBJECTIVE BOX
Joie Augustine MD  Beeper: [phone] ([facility])/ [id number] ([facility])     Subjective:    Patient is a 69y old  Female who presents with a chief complaint of Hypotension (14 Aug 2019 05:54)    Overnight events:    ***    MEDICATIONS  (STANDING):  buDESOnide    Inhalation Suspension 0.5 milliGRAM(s) Inhalation two times a day  chlorhexidine 4% Liquid 1 Application(s) Topical <User Schedule>  dexamethasone  IVPB 10 milliGRAM(s) IV Intermittent every 6 hours  insulin lispro (HumaLOG) corrective regimen sliding scale   SubCutaneous every 6 hours  latanoprost 0.005% Ophthalmic Solution 1 Drop(s) Both EYES at bedtime  meropenem  IVPB 2000 milliGRAM(s) IV Intermittent every 8 hours  meropenem  IVPB      midodrine 20 milliGRAM(s) Oral every 8 hours  nystatin    Suspension 530090 Unit(s) Oral two times a day  petrolatum Ophthalmic Ointment 1 Application(s) Both EYES at bedtime  prednisoLONE acetate 1% Suspension 1 Drop(s) Both EYES four times a day  vancomycin  IVPB 1000 milliGRAM(s) IV Intermittent every 12 hours      Objective:  Vitals: Vital Signs Last 24 Hrs  T(C): 36.7 (08-14-19 @ 06:00), Max: 36.9 (08-13-19 @ 20:00)  T(F): 98 (08-14-19 @ 06:00), Max: 98.5 (08-13-19 @ 20:00)  HR: 109 (08-14-19 @ 06:00) (70 - 117)  BP: 160/78 (08-14-19 @ 06:00) (102/51 - 167/69)  BP(mean): 89 (08-13-19 @ 20:00) (73 - 107)  RR: 18 (08-14-19 @ 06:00) (14 - 24)  SpO2: 93% (08-14-19 @ 06:00) (93% - 100%)              I&O's Summary    13 Aug 2019 07:01  -  14 Aug 2019 07:00  --------------------------------------------------------  IN: 890 mL / OUT: 910 mL / NET: -20 mL    PHYSICAL EXAM:  GENERAL: awake, alert, but altered and moaning in pain  HEENT: NC/AT, Moist mucous membranes, PERRL  LUNGS: CTAB, no wheezes or crackles.   CARDIAC: tachycardic, no r/m/g  ABDOMEN: diffusely ttp in all quadrants, no rebound or guarding. PEG tube C/D/I without erythema or drainage  EXT: No edema. No calf tenderness. CV 2+DP/PT bilaterally.   MSK: 2+ pulses all extremities  NEURO: obtunded, but moving all extremities, +nuchal rigidity  SKIN: Warm and dry. No rash.                                          LABS:                      8.3    24.9  )-----------( 353      ( 13 Aug 2019 00:20 )             27.6     140  |  99  |  25<H>  ----------------------------<  150<H>  4.3   |  31  |  0.64    Ca    9.2      13 Aug 2019 00:20  Ca    9.3      11 Aug 2019 23:17  Ca    9.9      11 Aug 2019 14:47  Phos  2.0     08-13  Mg     2.3     08-13    TPro  6.5  /  Alb  2.4<L>  /  TBili  0.4  /  DBili  x   /  AST  25  /  ALT  43  /  AlkPhos  71  08-13    PT/INR - ( 13 Aug 2019 00:20 )   PT: 15.1 sec;   INR: 1.30 ratio    PTT - ( 13 Aug 2019 00:20 )  PTT:30.8 sec                ABG - ( 13 Aug 2019 05:18 )  pH, Arterial: 7.49  pH, Blood: x     /  pCO2: 44    /  pO2: 77    / HCO3: 33    / Base Excess: 9.3   /  SaO2: 96                 CAPILLARY BLOOD GLUCOSE  POCT Blood Glucose.: 133 mg/dL (14 Aug 2019 06:06)  POCT Blood Glucose.: 144 mg/dL (14 Aug 2019 00:00)  POCT Blood Glucose.: 145 mg/dL (13 Aug 2019 17:46)  POCT Blood Glucose.: 135 mg/dL (13 Aug 2019 11:37)    RECENT CULTURES:  08-11 @ 18:23  .Urine  No growth  --    08-11 @ 18:12  .Blood  No growth to date.  -- Joie Augustine MD  Beeper: [phone] ([facility])/ [id number] ([facility])     Subjective:    Patient is a 69y old  Female who presents with a chief complaint of Hypotension (14 Aug 2019 05:54)    Overnight events:    ***    MEDICATIONS  (STANDING):  buDESOnide    Inhalation Suspension 0.5 milliGRAM(s) Inhalation two times a day  chlorhexidine 4% Liquid 1 Application(s) Topical <User Schedule>  dexamethasone  IVPB 10 milliGRAM(s) IV Intermittent every 6 hours  insulin lispro (HumaLOG) corrective regimen sliding scale   SubCutaneous every 6 hours  latanoprost 0.005% Ophthalmic Solution 1 Drop(s) Both EYES at bedtime  meropenem  IVPB 2000 milliGRAM(s) IV Intermittent every 8 hours  meropenem  IVPB      midodrine 20 milliGRAM(s) Oral every 8 hours  nystatin    Suspension 160161 Unit(s) Oral two times a day  petrolatum Ophthalmic Ointment 1 Application(s) Both EYES at bedtime  prednisoLONE acetate 1% Suspension 1 Drop(s) Both EYES four times a day  vancomycin  IVPB 1000 milliGRAM(s) IV Intermittent every 12 hours      Objective:  Vitals: Vital Signs Last 24 Hrs  T(C): 36.7 (08-14-19 @ 06:00), Max: 36.9 (08-13-19 @ 20:00)  T(F): 98 (08-14-19 @ 06:00), Max: 98.5 (08-13-19 @ 20:00)  HR: 109 (08-14-19 @ 06:00) (70 - 117)  BP: 160/78 (08-14-19 @ 06:00) (102/51 - 167/69)  BP(mean): 89 (08-13-19 @ 20:00) (73 - 107)  RR: 18 (08-14-19 @ 06:00) (14 - 24)  SpO2: 93% (08-14-19 @ 06:00) (93% - 100%)              I&O's Summary    13 Aug 2019 07:01  -  14 Aug 2019 07:00  --------------------------------------------------------  IN: 890 mL / OUT: 910 mL / NET: -20 mL    PHYSICAL EXAM:  GENERAL: awake, alert, but altered and moaning in pain  HEENT: NC/AT, Moist mucous membranes, PERRL  LUNGS: CTAB, no wheezes or crackles.   CARDIAC: tachycardic, no r/m/g  ABDOMEN: diffusely ttp in all quadrants, no rebound or guarding. PEG tube C/D/I without erythema or drainage  EXT: No edema. No calf tenderness. CV 2+DP/PT bilaterally.   MSK: 2+ pulses all extremities  NEURO: obtunded, but moving all extremities, +nuchal rigidity  SKIN: Warm and dry. No rash.                                          LABS:                      8.3    24.9  )-----------( 353      ( 13 Aug 2019 00:20 )             27.6     140  |  99  |  25<H>  ----------------------------<  150<H>  4.3   |  31  |  0.64    Ca    9.2      13 Aug 2019 00:20  Ca    9.3      11 Aug 2019 23:17  Ca    9.9      11 Aug 2019 14:47  Phos  2.0     08-13  Mg     2.3     08-13    TPro  6.5  /  Alb  2.4<L>  /  TBili  0.4  /  DBili  x   /  AST  25  /  ALT  43  /  AlkPhos  71  08-13    PT/INR - ( 13 Aug 2019 00:20 )   PT: 15.1 sec;   INR: 1.30 ratio    PTT - ( 13 Aug 2019 00:20 )  PTT:30.8 sec                ABG - ( 13 Aug 2019 05:18 )  pH, Arterial: 7.49  pH, Blood: x     /  pCO2: 44    /  pO2: 77    / HCO3: 33    / Base Excess: 9.3   /  SaO2: 96                 CAPILLARY BLOOD GLUCOSE  POCT Blood Glucose.: 133 mg/dL (14 Aug 2019 06:06)  POCT Blood Glucose.: 144 mg/dL (14 Aug 2019 00:00)  POCT Blood Glucose.: 145 mg/dL (13 Aug 2019 17:46)  POCT Blood Glucose.: 135 mg/dL (13 Aug 2019 11:37)    RECENT CULTURES:  08-11 @ 18:23  .Urine  No growth  --    Culture - Blood (08.11.19 @ 18:12)    Gram Stain:   Growth in aerobic bottle: Gram Positive Cocci in Clusters    -  Coagulase negative Staphylococcus: Detec    Specimen Source: .Blood    Organism: Blood Culture PCR    Culture Results:   Growth in aerobic bottle: Gram Positive Cocci in Clusters  "Due to technical problems, Proteus sp. will Not be reported as part of  the BCID panel until further notice"  ***Blood Panel PCR results on this specimen are available  approximately 3 hours after the Gram stain result.***  Gram stain, PCR, and/or culture results may not always  correspond due to difference in methodologies. Joie Augustine MD  Beeper: [phone] ([facility])/ [phone] (J)     Subjective:    Patient is a 69y old Female who presents with a chief complaint of Hypotension (14 Aug 2019 05:54)    Overnight events:    Pt continues to be A&Ox1 with response to commands.    Unable to assess ROS 2/2 mental status.    MEDICATIONS  (STANDING):  buDESOnide    Inhalation Suspension 0.5 milliGRAM(s) Inhalation two times a day  chlorhexidine 4% Liquid 1 Application(s) Topical <User Schedule>  dexamethasone  IVPB 10 milliGRAM(s) IV Intermittent every 6 hours  insulin lispro (HumaLOG) corrective regimen sliding scale   SubCutaneous every 6 hours  latanoprost 0.005% Ophthalmic Solution 1 Drop(s) Both EYES at bedtime  meropenem  IVPB 2000 milliGRAM(s) IV Intermittent every 8 hours  meropenem  IVPB      midodrine 20 milliGRAM(s) Oral every 8 hours  nystatin    Suspension 602326 Unit(s) Oral two times a day  petrolatum Ophthalmic Ointment 1 Application(s) Both EYES at bedtime  prednisoLONE acetate 1% Suspension 1 Drop(s) Both EYES four times a day  vancomycin  IVPB 1000 milliGRAM(s) IV Intermittent every 12 hours    Objective:  Vitals: Vital Signs Last 24 Hrs  T(C): 36.7 (08-14-19 @ 06:00), Max: 36.9 (08-13-19 @ 20:00)  T(F): 98 (08-14-19 @ 06:00), Max: 98.5 (08-13-19 @ 20:00)  HR: 109 (08-14-19 @ 06:00) (70 - 117)  BP: 160/78 (08-14-19 @ 06:00) (102/51 - 167/69)  BP(mean): 89 (08-13-19 @ 20:00) (73 - 107)  RR: 18 (08-14-19 @ 06:00) (14 - 24)  SpO2: 93% (08-14-19 @ 06:00) (93% - 100%)              I&O's Summary    13 Aug 2019 07:01  -  14 Aug 2019 07:00  --------------------------------------------------------  IN: 890 mL / OUT: 910 mL / NET: -20 mL    PHYSICAL EXAM:  GENERAL: awake, alert, but altered and moaning in pain  HEENT: NC/AT, Moist mucous membranes, PERRL  LUNGS: CTAB, no wheezes or crackles.   CARDIAC: tachycardic, no r/m/g  ABDOMEN: Did not appear to be ttp in any quadrants, no rebound or guarding. PEG tube C/D/I without erythema or drainage  EXT: No edema. No calf tenderness. CV 2+DP/PT bilaterally   MSK: 2+ pulses all extremities  NEURO: Moving all extremities, hand  and ROM normal w/ 4/5 strength  SKIN: Warm and dry. No rash.                                          LABS:                     9.7    14.7  )-----------( 367      ( 14 Aug 2019 06:43 )             31.2     144  |  99  |  26<H>  ----------------------------<  150<H>  4.3   |  29  |  0.63    Ca    9.7      14 Aug 2019 06:40  Phos  2.2     08-14  Mg     2.5     08-14    TPro  7.3  /  Alb  3.1<L>  /  TBili  0.5  /  DBili  x   /  AST  37  /  ALT  72<H>  /  AlkPhos  80  08-14    ABG - ( 13 Aug 2019 05:18 )  pH, Arterial: 7.49  pH, Blood: x     /  pCO2: 44    /  pO2: 77    / HCO3: 33    / Base Excess: 9.3   /  SaO2: 96        LIVER FUNCTIONS - ( 14 Aug 2019 06:40 )  Alb: 3.1 g/dL / Pro: 7.3 g/dL / ALK PHOS: 80 U/L / ALT: 72 U/L / AST: 37 U/L / GGT: x           PT/INR - ( 14 Aug 2019 06:41 )   PT: 14.2 sec;   INR: 1.24 ratio    PTT - ( 14 Aug 2019 06:41 )  PTT:30.3 sec    Culture - Blood (08.11.19 @ 18:12)    Gram Stain:   Growth in aerobic bottle: Gram Positive Cocci in Clusters    -  Coagulase negative Staphylococcus: Detec    Specimen Source: .Blood    Organism: Blood Culture PCR    Culture Results:   Growth in aerobic bottle: Gram Positive Cocci in Clusters  "Due to technical problems, Proteus sp. will Not be reported as part of  the BCID panel until further notice"  ***Blood Panel PCR results on this specimen are available  approximately 3 hours after the Gram stain result.***  Gram stain, PCR, and/or culture results may not always  correspond due to difference in methodologies.    CT Abdomen and Pelvis w/ IV Cont (08.11.19 @ 21:08)  IMPRESSION:   Cholelithiasis, with small amount of pericholecystic fluid and suspicion   of gallbladder wall thickening, concerning for acute cholecystitis.     Correlation with ultrasound or DISIDA is recommended for further   evaluation.    Interval placement of PEG tube    Partially imaged right lower lobe parenchymal consolidation, which may   represent pneumonia in the correct clinical scenario.     Persistent right adnexal cystic mass, which may represent a neoplasm.    This could be further evaluated with pelvic ultrasound as indicated.

## 2019-08-14 NOTE — PHYSICAL THERAPY INITIAL EVALUATION ADULT - PRECAUTIONS/LIMITATIONS, REHAB EVAL
CONT: CXR 8/11: 1. Left lung remains clear. 2. New right lung opacities - due to unilateral pulmonary edema or to inflammation. CT head 8/11: No acute intracranial hemorrhage, mass effect, vasogenic edema, or evidence of acute territorial infarct. Increased size of midline suboccipital extracalvarial CSF density fluid collection, likely representing a CSF leak/meningocele. CT abdomen & pelvis: Cholelithiasis, with small amount of pericholecystic fluid and suspicion of gallbladder wall thickening, concerning for acute cholecystitis.   Correlation with ultrasound or DISIDA is recommended for further evaluation. Interval placement of PEG tube Partially imaged right lower lobe parenchymal consolidation, which may represent pneumonia in the correct clinical scenario. Persistent right adnexal cystic mass, which may represent a neoplasm./fall precautions

## 2019-08-14 NOTE — PHYSICAL THERAPY INITIAL EVALUATION ADULT - GENERAL OBSERVATIONS, REHAB EVAL
Pt received supine in bed, +continuous pulse ox, +z-flex, +PEG, +tele, A&Ox0, lethargic, agreeable to physical therapy carmel song 50 min.

## 2019-08-14 NOTE — PROGRESS NOTE ADULT - ASSESSMENT
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   Workup thus far found patient to be febrile to 100.4 on admission and tachycardic/hypotensive   Leukocytosis trending down   ESR and CRP elevated  CT head: Increased size of midline suboccipital extracalvarial CSF density fluid collection, likely representing a CSF leak/meningocele  CT A/P: Cholelithiasis, with small amount of pericholecystic fluid and suspicion of gallbladder wall thickening, concerning for acute cholecystitis  US Abdomen: Cholelithiasis with findings suspicious for acute cholecystitis.   LP attempted but unable to obtain CSF thus was started on emperic tx with Vanc, Corey    ? pna, ? cholecystitis ? CNS infection/post op infection   No fevers or s/s encephalitis, viral(HSV superinfection) would be unlikley  Blood Cx 1/4 with CNS likley contaminant     Rec:  1) LP by IR -send CSF for cell count, glucose,protein, CSF PCR, bacterial,fungal and mycobacterial Cx  2) CNS imaging with MRI or CT with IVC  3) neurosurgery follow up ? surgical intervention indicated  4) repeat  blood cx, follow urine,sputum Cx  5) Continue empiric vanco + meropnem.  Monitor vanco trough and creatinine-dose decreased yesterday   6) repeat blood Cx  7) follow HIDA consider surgical eval if positive     Will tailor plan for ID issues  per course,results.Will defer to primary team on management of other issues.  Case d/w medical team, Dr reyes   prognosis guarded  Will Follow.  Beeper [phone]-[id number]/afterhours/No [id number] .

## 2019-08-14 NOTE — CONSULT NOTE ADULT - SUBJECTIVE AND OBJECTIVE BOX
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 with 1 week of increasing AMS and 3 days of fevers.  She was admitted with sepsis either secondarily to PNA, meningitis, or brain abscess.  She underwent an attempted LP yesterday with neurosurg that was unsuccessful and now just came back from IR for an LP.  General surgery was called for a positive HIDA scan on .  She also had an US on  that showed luminimal distention, multiple stones including one that was 2.8cm, and trace pericholecystic fluid.      ROS: Unable to obtain as patient does not speak currently.    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  Light-headedness  Glaucoma  Asthma: controlled with meds  HTN (hypertension)  History of ankle fracture: h/o repair  Fractured elbow: left elbow fracture repair,  H/O appendicitis: h/o appendicectomy    ALLERGIES: No Known Drug Allergies  shellfish (Angioedema; Rash)    CURRENT MEDICATIONS  MEDICATIONS (STANDING): buDESOnide    Inhalation Suspension 0.5 milliGRAM(s) Inhalation two times a day  dexamethasone  IVPB 10 milliGRAM(s) IV Intermittent every 6 hours  insulin lispro (HumaLOG) corrective regimen sliding scale   SubCutaneous every 6 hours  meropenem  IVPB 2000 milliGRAM(s) IV Intermittent every 8 hours  meropenem  IVPB      midodrine 10 milliGRAM(s) Oral three times a day  nystatin    Suspension 048402 Unit(s) Oral two times a day  vancomycin  IVPB 1000 milliGRAM(s) IV Intermittent every 12 hours    MEDICATIONS (PRN):  --------------------------------------------------------------------------------------------    Vitals:   T(C): 36.6 (19 @ 13:25), Max: 36.9 (19 @ 20:00)  HR: 100 (19 @ 13:25) (70 - 117)  BP: 150/86 (19 @ 13:25) (102/51 - 170/80)  RR: 18 (19 @ 13:25) (14 - 18)  SpO2: 92% (19 @ 13:25) (92% - 100%)  CAPILLARY BLOOD GLUCOSE      POCT Blood Glucose.: 143 mg/dL (14 Aug 2019 12:13)  POCT Blood Glucose.: 133 mg/dL (14 Aug 2019 06:06)  POCT Blood Glucose.: 144 mg/dL (14 Aug 2019 00:00)  POCT Blood Glucose.: 145 mg/dL (13 Aug 2019 17:46)    CAPILLARY BLOOD GLUCOSE      POCT Blood Glucose.: 143 mg/dL (14 Aug 2019 12:13)  POCT Blood Glucose.: 133 mg/dL (14 Aug 2019 06:06)  POCT Blood Glucose.: 144 mg/dL (14 Aug 2019 00:00)  POCT Blood Glucose.: 145 mg/dL (13 Aug 2019 17:46)       @ 07:  -   @ 07:00  --------------------------------------------------------  IN:    Enteral Tube Flush: 60 mL    IV PiggyBack: 550 mL    ns in tub fed vital1: 80 mL    Solution: 100 mL    Solution: 100 mL  Total IN: 890 mL    OUT:    Incontinent per Collection Ba mL    Indwelling Catheter - Urethral: 510 mL  Total OUT: 910 mL    Total NET: -20 mL       @ 07:01  -   @ 16:34  --------------------------------------------------------  IN:    IV PiggyBack: 150 mL  Total IN: 150 mL    OUT:  Total OUT: 0 mL    Total NET: 150 mL        Height (cm): 172.7 ( @ 21:30)  Weight (kg): 104.7 ( 21:30)  BMI (kg/m2): 35.1 ( 21:30)  BSA (m2): 2.17 ( 21:30)    PHYSICAL EXAM:  General: NAD, Lying in bed comfortably.  Patient follows commands, but is not coherent when speaking.  Neuro: A+Ox 0  HEENT: NC/AT  Cardio: Regular rate  Resp: Good effort, no accessory muscle use  GI/Abd: Soft, with an old scar in the RLQ.  Patient is tender to palpation in the RUQ.  --------------------------------------------------------------------------------------------    LABS  CBC ( @ 06:43)                              9.7<L>                         14.7<H>  )----------------(  367        87.0<H>% Neutrophils, 3.0<L>% Lymphocytes, ANC: 13.3<H>                              31.2<L>    BMP ( @ 06:40)             144     |  99      |  26<H> 		Ca++ --      Ca 9.7                ---------------------------------( 150<H>		Mg 2.5                4.3     |  29      |  0.63  			Ph 2.2<L>    LFTs ( @ 06:40)      TPro 7.3 / Alb 3.1<L> / TBili 0.5 / DBili -- / AST 37 / ALT 72<H> / AlkPhos 80    Coags ( @ 06:41)  aPTT 30.3 / INR 1.24<H> / PT 14.2<H>      ABG ( @ 05:18)     7.49<H> / 44 / 77 / 33<H> / 9.3<H> / 96%     Lactate:        --------------------------------------------------------------------------------------------    MICROBIOLOGY    -> .Urine Culture ( @ 18:23)     NG    NG    No growth    -> .Blood Culture ( @ 18:12)       Growth in aerobic bottle: Gram Positive Cocci in Clusters    Blood Culture PCR    No growth to date.      --------------------------------------------------------------------------------------------    IMAGING  ***    ASSESSMENT: Patient is a 69y old f with acute cholecystitis.    PLAN:  - Patient has acute cholecystitis based on HIDA and she is tender in the RUQ.  - Given patients current ongoing medical issues with sepsis and possible meningitis, would recommend a Percutaneous cholecystostomy tube placed by IR.  - Continue abx coverage as patient has acute cholecystitis  - Plan discussed with Attending, Dr. 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  She was admitted with sepsis either secondarily to PNA, meningitis, or brain abscess.  She underwent an attempted LP yesterday with neurosurg that was unsuccessful and now just came back from IR for an LP.  General surgery was called for a positive HIDA scan on .  She also had an US on  that showed luminimal distention, multiple stones including one that was 2.8cm, and trace pericholecystic fluid.      ROS: Unable to obtain as patient does not speak currently.    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  Light-headedness  Glaucoma  Asthma: controlled with meds  HTN (hypertension)  History of ankle fracture: h/o repair  Fractured elbow: left elbow fracture repair,  H/O appendicitis: h/o appendicectomy    ALLERGIES: No Known Drug Allergies  shellfish (Angioedema; Rash)    CURRENT MEDICATIONS  MEDICATIONS (STANDING): buDESOnide    Inhalation Suspension 0.5 milliGRAM(s) Inhalation two times a day  dexamethasone  IVPB 10 milliGRAM(s) IV Intermittent every 6 hours  insulin lispro (HumaLOG) corrective regimen sliding scale   SubCutaneous every 6 hours  meropenem  IVPB 2000 milliGRAM(s) IV Intermittent every 8 hours  meropenem  IVPB      midodrine 10 milliGRAM(s) Oral three times a day  nystatin    Suspension 613453 Unit(s) Oral two times a day  vancomycin  IVPB 1000 milliGRAM(s) IV Intermittent every 12 hours    MEDICATIONS (PRN):  --------------------------------------------------------------------------------------------    Vitals:   T(C): 36.6 (19 @ 13:25), Max: 36.9 (19 @ 20:00)  HR: 100 (19 @ 13:25) (70 - 117)  BP: 150/86 (19 @ 13:25) (102/51 - 170/80)  RR: 18 (19 @ 13:25) (14 - 18)  SpO2: 92% (19 @ 13:25) (92% - 100%)  CAPILLARY BLOOD GLUCOSE      POCT Blood Glucose.: 143 mg/dL (14 Aug 2019 12:13)  POCT Blood Glucose.: 133 mg/dL (14 Aug 2019 06:06)  POCT Blood Glucose.: 144 mg/dL (14 Aug 2019 00:00)  POCT Blood Glucose.: 145 mg/dL (13 Aug 2019 17:46)    CAPILLARY BLOOD GLUCOSE      POCT Blood Glucose.: 143 mg/dL (14 Aug 2019 12:13)  POCT Blood Glucose.: 133 mg/dL (14 Aug 2019 06:06)  POCT Blood Glucose.: 144 mg/dL (14 Aug 2019 00:00)  POCT Blood Glucose.: 145 mg/dL (13 Aug 2019 17:46)       @ 07:  -   @ 07:00  --------------------------------------------------------  IN:    Enteral Tube Flush: 60 mL    IV PiggyBack: 550 mL    ns in tub fed vital1: 80 mL    Solution: 100 mL    Solution: 100 mL  Total IN: 890 mL    OUT:    Incontinent per Collection Ba mL    Indwelling Catheter - Urethral: 510 mL  Total OUT: 910 mL    Total NET: -20 mL       @ 07:01  -   @ 16:34  --------------------------------------------------------  IN:    IV PiggyBack: 150 mL  Total IN: 150 mL    OUT:  Total OUT: 0 mL    Total NET: 150 mL        Height (cm): 172.7 ( @ 21:30)  Weight (kg): 104.7 ( 21:30)  BMI (kg/m2): 35.1 ( 21:30)  BSA (m2): 2.17 ( 21:30)    PHYSICAL EXAM:  General: NAD, Lying in bed comfortably.  Patient follows commands, but is not coherent when speaking.  Neuro: A+Ox 0  HEENT: NC/AT  Cardio: Regular rate  Resp: Good effort, no accessory muscle use  GI/Abd: Soft, with an old scar in the RLQ.  Patient is tender to palpation in the RUQ.  --------------------------------------------------------------------------------------------    LABS  CBC ( @ 06:43)                              9.7<L>                         14.7<H>  )----------------(  367        87.0<H>% Neutrophils, 3.0<L>% Lymphocytes, ANC: 13.3<H>                              31.2<L>    BMP ( @ 06:40)             144     |  99      |  26<H> 		Ca++ --      Ca 9.7                ---------------------------------( 150<H>		Mg 2.5                4.3     |  29      |  0.63  			Ph 2.2<L>    LFTs ( @ 06:40)      TPro 7.3 / Alb 3.1<L> / TBili 0.5 / DBili -- / AST 37 / ALT 72<H> / AlkPhos 80    Coags ( @ 06:41)  aPTT 30.3 / INR 1.24<H> / PT 14.2<H>      ABG ( @ 05:18)     7.49<H> / 44 / 77 / 33<H> / 9.3<H> / 96%     Lactate:        --------------------------------------------------------------------------------------------    MICROBIOLOGY    -> .Urine Culture ( @ 18:23)     NG    NG    No growth    -> .Blood Culture ( @ 18:12)       Growth in aerobic bottle: Gram Positive Cocci in Clusters    Blood Culture PCR    No growth to date.      --------------------------------------------------------------------------------------------    IMAGING  ***    ASSESSMENT: Patient is a 69y old f with acute cholecystitis.    PLAN:  - Patient has acute cholecystitis based on HIDA and she is tender in the RUQ.  - Given patients current ongoing medical issues with sepsis and possible meningitis, would recommend a Percutaneous cholecystostomy tube placed by IR.  - Continue abx coverage as patient has acute cholecystitis  - Plan discussed with Attending, Dr. VERENICE Villalobos    Red surgery:  #[id number]

## 2019-08-14 NOTE — PROGRESS NOTE ADULT - PROBLEM SELECTOR PLAN 5
Holding DVT ppx in setting of possible LP  FULL CODE -Holding DVT ppx in setting of LP  -PT/OT  FULL CODE

## 2019-08-14 NOTE — PHYSICAL THERAPY INITIAL EVALUATION ADULT - DIAGNOSIS, PT EVAL
Pt with decreased strength, endurance and balance leading to significant impairment in functional mobility.

## 2019-08-14 NOTE — PHYSICAL THERAPY INITIAL EVALUATION ADULT - TRANSFER TRAINING, PT EVAL
GOAL: Pt will perform ALL transfers with modA, w/use of appropriate assistive device as needed, in 2 weeks.

## 2019-08-14 NOTE — PROGRESS NOTE ADULT - PROBLEM SELECTOR PLAN 1
-Altered, but responsive to verbal and pain  -CT head showing no acute changes, however increase in size of CSF density fluid collection representing CSF leak vs meningocele, neurosurgery following, appreciate recs  -LP unsuccessful, will c/s IR to r/o meningitis -Altered, but responsive to verbal commands and pain  -CT head showing no acute changes, however increase in size of CSF density fluid collection representing CSF leak vs meningocele,   -NSG: No surgical intervention at this time  -LP today w/ IR  -MRI pending

## 2019-08-14 NOTE — PHYSICAL THERAPY INITIAL EVALUATION ADULT - PERTINENT HX OF CURRENT PROBLEM, REHAB EVAL
68 y/o F, a/w Sepsis, s/p a week of gradually worsening mental status along with fever x3 days found to be septic 2/2 meningitis vs pneumonia. 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 NM Hepatobilary 8/13: Abnormal hepatobiliary scan. Finding is compatible with acute cholecystitis. US abdomen R quadrant 8/12: Cholelithiasis with findings suspicious for acute cholecystitis.

## 2019-08-14 NOTE — PHYSICAL THERAPY INITIAL EVALUATION ADULT - ACTIVE RANGE OF MOTION EXAMINATION, REHAB EVAL
bilateral  lower extremity Active ROM was WFL (within functional limits)/LUE grossly 100 degrees flexion, RUE grossly 70 degrees flexion

## 2019-08-14 NOTE — PROGRESS NOTE ADULT - ASSESSMENT
Readmitted for sepsis s/p suboccipital crani  - Blood/Urine Cx: Gram positive cocci. On Vancomycin and meropenum   - Respiratory viral panel negative  - ESR/CRP elevated, continue to trend  - Attempted LP but unable to obtain sample - May consult IR

## 2019-08-14 NOTE — PROGRESS NOTE ADULT - ASSESSMENT
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 Em on 6/24, recurrent aspiration s/p PEG tube, who presents from [facility] s/p a week of gradually worsening mental status along with fever x3 days found to be septic 2/2 meningitis vs pneumonia

## 2019-08-14 NOTE — PHYSICAL THERAPY INITIAL EVALUATION ADULT - IMPAIRMENTS FOUND, PT EVAL
aerobic capacity/endurance/muscle strength/arousal, attention, and cognition/gait, locomotion, and balance

## 2019-08-14 NOTE — PHYSICAL THERAPY INITIAL EVALUATION ADULT - ADDITIONAL COMMENTS
Patient admitted from [facility], where she has been briefly for [facility]. Patient's spouse requests that patient does not return to [facility] if she requires DEMARCUS placement at time of discharge. Prior to DEMARCUS, patient was at [facility] for Acute rehab for about 3 weeks. Per spouse, patient was able to take a few steps with assist prior to this admission. Prior to recent admissions, patient was living independently with her spouse, daughter/Salina ([phone]), and grandchildren in a private home in [place]. 6 steps to enter home.

## 2019-08-14 NOTE — PROGRESS NOTE ADULT - SUBJECTIVE AND OBJECTIVE BOX
Status post flouroscopically guided lumbar puncture at the _L3-L4__  level utilizing a _20 G__ spinal needle.      _18__cc of _clear__ cerebral spinal fluid was obtained.    No immediate complications.

## 2019-08-14 NOTE — PROGRESS NOTE ADULT - PROBLEM SELECTOR PLAN 3
-CTA showing thickened gallbladder, fluid collection, possibly 2/2 acute cholecystitis, but LFTS wnl  -HIDA pending -CTA showing thickened gallbladder, fluid collection, possibly 2/2 acute cholecystitis, but LFTS wnl  -HIDA w/ no visualization of gallbladder  -Surgery consulted. Recs aprreciated -CTA showing thickened gallbladder, fluid collection, possibly 2/2 acute cholecystitis, but LFTS wnl  -HIDA w/ no visualization of gallbladder  -Surgery consulted. Recs appreciated

## 2019-08-15 ENCOUNTER — APPOINTMENT (OUTPATIENT)
Dept: PHYSICAL MEDICINE AND REHAB | Facility: CLINIC | Age: 69
End: 2019-08-15

## 2019-08-15 DIAGNOSIS — K81.0 ACUTE CHOLECYSTITIS: ICD-10-CM

## 2019-08-15 LAB
ALBUMIN SERPL ELPH-MCNC: 3.2 G/DL — LOW (ref 3.3–5)
ALP SERPL-CCNC: 81 U/L — SIGNIFICANT CHANGE UP (ref 40–120)
ALT FLD-CCNC: 134 U/L — HIGH (ref 10–45)
ANION GAP SERPL CALC-SCNC: 10 MMOL/L — SIGNIFICANT CHANGE UP (ref 5–17)
APTT BLD: 29.4 SEC — SIGNIFICANT CHANGE UP (ref 27.5–36.3)
AST SERPL-CCNC: 58 U/L — HIGH (ref 10–40)
BILIRUB SERPL-MCNC: 0.4 MG/DL — SIGNIFICANT CHANGE UP (ref 0.2–1.2)
BUN SERPL-MCNC: 26 MG/DL — HIGH (ref 7–23)
CALCIUM SERPL-MCNC: 9.3 MG/DL — SIGNIFICANT CHANGE UP (ref 8.4–10.5)
CHLORIDE SERPL-SCNC: 96 MMOL/L — SIGNIFICANT CHANGE UP (ref 96–108)
CO2 SERPL-SCNC: 31 MMOL/L — SIGNIFICANT CHANGE UP (ref 22–31)
CREAT SERPL-MCNC: 0.6 MG/DL — SIGNIFICANT CHANGE UP (ref 0.5–1.3)
CSF COMMENTS: SIGNIFICANT CHANGE UP
GLUCOSE BLDC GLUCOMTR-MCNC: 127 MG/DL — HIGH (ref 70–99)
GLUCOSE BLDC GLUCOMTR-MCNC: 157 MG/DL — HIGH (ref 70–99)
GLUCOSE BLDC GLUCOMTR-MCNC: 160 MG/DL — HIGH (ref 70–99)
GLUCOSE SERPL-MCNC: 170 MG/DL — HIGH (ref 70–99)
HCT VFR BLD CALC: 32.5 % — LOW (ref 34.5–45)
HGB BLD-MCNC: 9.8 G/DL — LOW (ref 11.5–15.5)
INR BLD: 1.21 RATIO — HIGH (ref 0.88–1.16)
MAGNESIUM SERPL-MCNC: 2.4 MG/DL — SIGNIFICANT CHANGE UP (ref 1.6–2.6)
MCHC RBC-ENTMCNC: 26.6 PG — LOW (ref 27–34)
MCHC RBC-ENTMCNC: 30.2 GM/DL — LOW (ref 32–36)
MCV RBC AUTO: 88.1 FL — SIGNIFICANT CHANGE UP (ref 80–100)
PHOSPHATE SERPL-MCNC: 3.1 MG/DL — SIGNIFICANT CHANGE UP (ref 2.5–4.5)
PLATELET # BLD AUTO: 370 K/UL — SIGNIFICANT CHANGE UP (ref 150–400)
POTASSIUM SERPL-MCNC: 3.8 MMOL/L — SIGNIFICANT CHANGE UP (ref 3.5–5.3)
POTASSIUM SERPL-SCNC: 3.8 MMOL/L — SIGNIFICANT CHANGE UP (ref 3.5–5.3)
PROT SERPL-MCNC: 7.1 G/DL — SIGNIFICANT CHANGE UP (ref 6–8.3)
PROTHROM AB SERPL-ACNC: 14 SEC — HIGH (ref 10–12.9)
RBC # BLD: 3.69 M/UL — LOW (ref 3.8–5.2)
RBC # FLD: 14.5 % — SIGNIFICANT CHANGE UP (ref 10.3–14.5)
SODIUM SERPL-SCNC: 137 MMOL/L — SIGNIFICANT CHANGE UP (ref 135–145)
VANCOMYCIN TROUGH SERPL-MCNC: 20.5 UG/ML — HIGH (ref 10–20)
WBC # BLD: 14.1 K/UL — HIGH (ref 3.8–10.5)
WBC # FLD AUTO: 14.1 K/UL — HIGH (ref 3.8–10.5)

## 2019-08-15 PROCEDURE — 99233 SBSQ HOSP IP/OBS HIGH 50: CPT

## 2019-08-15 PROCEDURE — 99233 SBSQ HOSP IP/OBS HIGH 50: CPT | Mod: GC

## 2019-08-15 PROCEDURE — 47490 INCISION OF GALLBLADDER: CPT

## 2019-08-15 PROCEDURE — 93010 ELECTROCARDIOGRAM REPORT: CPT

## 2019-08-15 RX ORDER — ACETAMINOPHEN 500 MG
650 TABLET ORAL EVERY 6 HOURS
Refills: 0 | Status: DISCONTINUED | OUTPATIENT
Start: 2019-08-15 | End: 2019-08-27

## 2019-08-15 RX ORDER — MIDODRINE HYDROCHLORIDE 2.5 MG/1
5 TABLET ORAL THREE TIMES A DAY
Refills: 0 | Status: DISCONTINUED | OUTPATIENT
Start: 2019-08-15 | End: 2019-08-15

## 2019-08-15 RX ORDER — SODIUM CHLORIDE 9 MG/ML
500 INJECTION, SOLUTION INTRAVENOUS ONCE
Refills: 0 | Status: COMPLETED | OUTPATIENT
Start: 2019-08-15 | End: 2019-08-15

## 2019-08-15 RX ORDER — VANCOMYCIN HCL 1 G
750 VIAL (EA) INTRAVENOUS EVERY 12 HOURS
Refills: 0 | Status: DISCONTINUED | OUTPATIENT
Start: 2019-08-15 | End: 2019-08-18

## 2019-08-15 RX ADMIN — MEROPENEM 200 MILLIGRAM(S): 1 INJECTION INTRAVENOUS at 22:52

## 2019-08-15 RX ADMIN — Medication 650 MILLIGRAM(S): at 16:20

## 2019-08-15 RX ADMIN — MIDODRINE HYDROCHLORIDE 10 MILLIGRAM(S): 2.5 TABLET ORAL at 05:21

## 2019-08-15 RX ADMIN — Medication 102 MILLIGRAM(S): at 05:15

## 2019-08-15 RX ADMIN — Medication 1 DROP(S): at 23:52

## 2019-08-15 RX ADMIN — Medication 0.5 MILLIGRAM(S): at 06:10

## 2019-08-15 RX ADMIN — LATANOPROST 1 DROP(S): 0.05 SOLUTION/ DROPS OPHTHALMIC; TOPICAL at 21:39

## 2019-08-15 RX ADMIN — Medication 102 MILLIGRAM(S): at 00:04

## 2019-08-15 RX ADMIN — Medication 1: at 06:27

## 2019-08-15 RX ADMIN — Medication 1 APPLICATION(S): at 22:54

## 2019-08-15 RX ADMIN — MEROPENEM 200 MILLIGRAM(S): 1 INJECTION INTRAVENOUS at 05:58

## 2019-08-15 RX ADMIN — SODIUM CHLORIDE 166.67 MILLILITER(S): 9 INJECTION, SOLUTION INTRAVENOUS at 23:53

## 2019-08-15 RX ADMIN — Medication 1 DROP(S): at 23:51

## 2019-08-15 RX ADMIN — Medication 1 DROP(S): at 16:20

## 2019-08-15 RX ADMIN — Medication 1 DROP(S): at 00:05

## 2019-08-15 RX ADMIN — Medication 0.5 MILLIGRAM(S): at 18:32

## 2019-08-15 RX ADMIN — Medication 1 DROP(S): at 18:28

## 2019-08-15 RX ADMIN — Medication 1 DROP(S): at 17:50

## 2019-08-15 RX ADMIN — Medication 1: at 23:51

## 2019-08-15 RX ADMIN — CHLORHEXIDINE GLUCONATE 1 APPLICATION(S): 213 SOLUTION TOPICAL at 06:02

## 2019-08-15 RX ADMIN — Medication 250 MILLIGRAM(S): at 06:57

## 2019-08-15 RX ADMIN — Medication 500000 UNIT(S): at 05:16

## 2019-08-15 RX ADMIN — Medication 102 MILLIGRAM(S): at 16:19

## 2019-08-15 RX ADMIN — MEROPENEM 200 MILLIGRAM(S): 1 INJECTION INTRAVENOUS at 17:49

## 2019-08-15 RX ADMIN — Medication 102 MILLIGRAM(S): at 21:39

## 2019-08-15 RX ADMIN — Medication 500000 UNIT(S): at 17:50

## 2019-08-15 RX ADMIN — Medication 1 DROP(S): at 05:16

## 2019-08-15 NOTE — PROGRESS NOTE ADULT - SUBJECTIVE AND OBJECTIVE BOX
Joie Augustine MD  Beeper: [phone] ([facility])/ [phone] ([facility])     Subjective:    Patient is a 69y old Female who presents with a chief complaint of Hypotension (14 Aug 2019 05:54)    Overnight events:    ***    MEDICATIONS  (STANDING):  artificial tears (preservative free) Ophthalmic Solution 1 Drop(s) Both EYES daily  buDESOnide    Inhalation Suspension 0.5 milliGRAM(s) Inhalation two times a day  chlorhexidine 4% Liquid 1 Application(s) Topical <User Schedule>  dexamethasone  IVPB 10 milliGRAM(s) IV Intermittent every 6 hours  insulin lispro (HumaLOG) corrective regimen sliding scale   SubCutaneous every 6 hours  latanoprost 0.005% Ophthalmic Solution 1 Drop(s) Both EYES at bedtime  meropenem  IVPB 2000 milliGRAM(s) IV Intermittent every 8 hours  meropenem  IVPB      midodrine 10 milliGRAM(s) Oral three times a day  nystatin    Suspension 534386 Unit(s) Oral two times a day  petrolatum Ophthalmic Ointment 1 Application(s) Both EYES at bedtime  prednisoLONE acetate 1% Suspension 1 Drop(s) Both EYES four times a day  vancomycin  IVPB 1000 milliGRAM(s) IV Intermittent every 12 hours    Objective:  Vital Signs Last 24 Hrs  T(C): 36.4 (15 Aug 2019 04:28), Max: 36.8 (14 Aug 2019 17:52)  T(F): 97.5 (15 Aug 2019 04:28), Max: 98.2 (14 Aug 2019 17:52)  HR: 88 (15 Aug 2019 06:10) (88 - 119)  BP: 126/65 (15 Aug 2019 04:28) (113/54 - 170/80)  BP(mean): --  RR: 18 (15 Aug 2019 05:00) (17 - 18)  SpO2: 96% (15 Aug 2019 06:10) (88% - 100%)             I&O's Summary    13 Aug 2019 07:01  -  14 Aug 2019 07:00  --------------------------------------------------------  IN: 890 mL / OUT: 910 mL / NET: -20 mL    PHYSICAL EXAM:  GENERAL: awake, alert, but altered and moaning in pain  HEENT: NC/AT, Moist mucous membranes, PERRL  LUNGS: CTAB, no wheezes or crackles.   CARDIAC: tachycardic, no r/m/g  ABDOMEN: Did not appear to be ttp in any quadrants, no rebound or guarding. PEG tube C/D/I without erythema or drainage  EXT: No edema. No calf tenderness. CV 2+DP/PT bilaterally   MSK: 2+ pulses all extremities  NEURO: Moving all extremities, hand  and ROM normal w/ 4/5 strength  SKIN: Warm and dry. No rash.                                          LABS:                     9.7    14.7  )-----------( 367      ( 14 Aug 2019 06:43 )             31.2     144  |  99  |  26<H>  ----------------------------<  150<H>  4.3   |  29  |  0.63    Ca    9.7      14 Aug 2019 06:40  Phos  2.2     08-14  Mg     2.5     08-14    TPro  7.3  /  Alb  3.1<L>  /  TBili  0.5  /  DBili  x   /  AST  37  /  ALT  72<H>  /  AlkPhos  80  08-14    ABG - ( 13 Aug 2019 05:18 )  pH, Arterial: 7.49  pH, Blood: x     /  pCO2: 44    /  pO2: 77    / HCO3: 33    / Base Excess: 9.3   /  SaO2: 96        LIVER FUNCTIONS - ( 14 Aug 2019 06:40 )  Alb: 3.1 g/dL / Pro: 7.3 g/dL / ALK PHOS: 80 U/L / ALT: 72 U/L / AST: 37 U/L / GGT: x           PT/INR - ( 14 Aug 2019 06:41 )   PT: 14.2 sec;   INR: 1.24 ratio    PTT - ( 14 Aug 2019 06:41 )  PTT:30.3 sec    Culture - Blood (08.11.19 @ 18:12)    Gram Stain:   Growth in aerobic bottle: Gram Positive Cocci in Clusters    -  Coagulase negative Staphylococcus: Detec    Specimen Source: .Blood    Organism: Blood Culture PCR    Culture Results:   Growth in aerobic bottle: Gram Positive Cocci in Clusters  "Due to technical problems, Proteus sp. will Not be reported as part of  the BCID panel until further notice"  ***Blood Panel PCR results on this specimen are available  approximately 3 hours after the Gram stain result.***  Gram stain, PCR, and/or culture results may not always  correspond due to difference in methodologies.    CT Abdomen and Pelvis w/ IV Cont (08.11.19 @ 21:08)  IMPRESSION:   Cholelithiasis, with small amount of pericholecystic fluid and suspicion   of gallbladder wall thickening, concerning for acute cholecystitis.     Correlation with ultrasound or DISIDA is recommended for further   evaluation.    Interval placement of PEG tube    Partially imaged right lower lobe parenchymal consolidation, which may   represent pneumonia in the correct clinical scenario.     Persistent right adnexal cystic mass, which may represent a neoplasm.    This could be further evaluated with pelvic ultrasound as indicated. Joie Augustine MD  Beeper: [phone] ([facility])/ [phone] (J)     Subjective:    Patient is a 69y old Female who presents with a chief complaint of Hypotension (14 Aug 2019 05:54)    Overnight events:    No acute events overnight per nursing. Pt continues to be alert but not oriented, without much response to commands.    MEDICATIONS  (STANDING):  artificial tears (preservative free) Ophthalmic Solution 1 Drop(s) Both EYES daily  buDESOnide    Inhalation Suspension 0.5 milliGRAM(s) Inhalation two times a day  chlorhexidine 4% Liquid 1 Application(s) Topical <User Schedule>  dexamethasone  IVPB 10 milliGRAM(s) IV Intermittent every 6 hours  insulin lispro (HumaLOG) corrective regimen sliding scale   SubCutaneous every 6 hours  latanoprost 0.005% Ophthalmic Solution 1 Drop(s) Both EYES at bedtime  meropenem  IVPB 2000 milliGRAM(s) IV Intermittent every 8 hours  meropenem  IVPB      midodrine 10 milliGRAM(s) Oral three times a day  nystatin    Suspension 123690 Unit(s) Oral two times a day  petrolatum Ophthalmic Ointment 1 Application(s) Both EYES at bedtime  prednisoLONE acetate 1% Suspension 1 Drop(s) Both EYES four times a day  vancomycin  IVPB 1000 milliGRAM(s) IV Intermittent every 12 hours    Objective:  Vital Signs Last 24 Hrs  T(C): 36.4 (15 Aug 2019 04:28), Max: 36.8 (14 Aug 2019 17:52)  T(F): 97.5 (15 Aug 2019 04:28), Max: 98.2 (14 Aug 2019 17:52)  HR: 88 (15 Aug 2019 06:10) (88 - 119)  BP: 126/65 (15 Aug 2019 04:28) (113/54 - 170/80)  BP(mean): --  RR: 18 (15 Aug 2019 05:00) (17 - 18)  SpO2: 96% (15 Aug 2019 06:10) (88% - 100%)             I&O's Summary    13 Aug 2019 07:01  -  14 Aug 2019 07:00  --------------------------------------------------------  IN: 890 mL / OUT: 910 mL / NET: -20 mL    PHYSICAL EXAM:  GENERAL: awake, alert, but altered and moaning in pain  HEENT: NC/AT, Moist mucous membranes, PERRL  LUNGS: CTAB, no wheezes or crackles.   CARDIAC: tachycardic, no r/m/g  ABDOMEN: Did not appear to be ttp in any quadrants, no rebound or guarding. PEG tube C/D/I without erythema or drainage  EXT: No edema. No calf tenderness. CV 2+DP/PT bilaterally   MSK: 2+ pulses all extremities  NEURO: Moving all extremities, hand  and ROM normal w/ 4/5 strength  SKIN: Warm and dry. No rash.                                          LABS:                     9.8    14.1  )-----------( 370      ( 15 Aug 2019 07:12 )             32.5     137  |  96  |  26<H>  ----------------------------<  170<H>  3.8   |  31  |  0.60    Ca    9.3      15 Aug 2019 07:12  Phos  3.1     08-15  Mg     2.4     08-15    TPro  7.1  /  Alb  3.2<L>  /  TBili  0.4  /  DBili  x   /  AST  58<H>  /  ALT  134<H>  /  AlkPhos  81  08-15    LIVER FUNCTIONS - ( 15 Aug 2019 07:12 )  Alb: 3.2 g/dL / Pro: 7.1 g/dL / ALK PHOS: 81 U/L / ALT: 134 U/L / AST: 58 U/L / GGT: x           PT/INR - ( 15 Aug 2019 07:15 )   PT: 14.0 sec;   INR: 1.21 ratio    PTT - ( 15 Aug 2019 07:15 )  PTT:29.4 sec    Culture - Blood (08.11.19 @ 18:12)    Gram Stain:   Growth in aerobic bottle: Gram Positive Cocci in Clusters    -  Coagulase negative Staphylococcus: Detec    Specimen Source: .Blood    Organism: Blood Culture PCR    Culture Results:   Growth in aerobic bottle: Gram Positive Cocci in Clusters  "Due to technical problems, Proteus sp. will Not be reported as part of  the BCID panel until further notice"  ***Blood Panel PCR results on this specimen are available  approximately 3 hours after the Gram stain result.***  Gram stain, PCR, and/or culture results may not always  correspond due to difference in methodologies.    CT Abdomen and Pelvis w/ IV Cont (08.11.19 @ 21:08)  IMPRESSION:   Cholelithiasis, with small amount of pericholecystic fluid and suspicion   of gallbladder wall thickening, concerning for acute cholecystitis.     Correlation with ultrasound or DISIDA is recommended for further   evaluation.    Interval placement of PEG tube    Partially imaged right lower lobe parenchymal consolidation, which may   represent pneumonia in the correct clinical scenario.     Persistent right adnexal cystic mass, which may represent a neoplasm.    This could be further evaluated with pelvic ultrasound as indicated.

## 2019-08-15 NOTE — OCCUPATIONAL THERAPY INITIAL EVALUATION ADULT - LIGHT TOUCH SENSATION, LLE, REHAB EVAL
based on observation pt appears to have minimal impairments however unable to determine if due to sensation impairment or command following impairments/mild impairment

## 2019-08-15 NOTE — PROGRESS NOTE ADULT - SUBJECTIVE AND OBJECTIVE BOX
Surgery Red Team Daily Progress Note     68yo Female    --------------------------------------------------------------------------------------------------------------------  SUBJECTIVE / 24H EVENTS  - Patient seen and examined on morning rounds.   - No acute events overnight.  - Pt is nonverbal    OBJECTIVE:    VITAL SIGNS:  T(C): 36.4 (08-15-19 @ 04:28), Max: 36.8 (19 @ 17:52)  HR: 89 (08-15-19 @ 11:37) (88 - 119)  BP: 139/88 (08-15-19 @ 11:37) (113/54 - 170/80)  RR: 18 (08-15-19 @ 05:00) (17 - 18)  SpO2: 96% (08-15-19 @ 11:37) (88% - 100%)  Daily Height in cm: 172.72 (15 Aug 2019 11:37)    Daily Weight in k.2 (14 Aug 2019 13:00)  POCT Blood Glucose.: 160 mg/dL (08-15-19 @ 06:05)  POCT Blood Glucose.: 150 mg/dL (19 @ 23:45)  POCT Blood Glucose.: 152 mg/dL (19 @ 17:45)      PHYSICAL EXAM:  Gen: NAD  Resp: Respirations unlabored.  GI: Obese abdomen. Nondistended. RUQ TTP. difficult to elicit De León's sign 2/2 habitus.  Ext: Warm, well perfused      19 @ 07:01  -  08-15-19 @ 07:00  --------------------------------------------------------  IN:    Enteral Tube Flush: 80 mL    IV PiggyBack: 750 mL    ns in tub fed vital1: 350 mL    Solution: 200 mL  Total IN: 1380 mL    OUT:    Incontinent per Collection Ba mL  Total OUT: 600 mL    Total NET: 780 mL          LAB VALUES:  08-15    137  |  96  |  26<H>  ----------------------------<  170<H>  3.8   |  31  |  0.60    Ca    9.3      15 Aug 2019 07:12  Phos  3.1     08-15  Mg     2.4     08-15    TPro  7.1  /  Alb  3.2<L>  /  TBili  0.4  /  DBili  x   /  AST  58<H>  /  ALT  134<H>  /  AlkPhos  81  08-15                               9.8    14.1  )-----------( 370      ( 15 Aug 2019 07:12 )             32.5     LIVER FUNCTIONS - ( 15 Aug 2019 07:12 )  Alb: 3.2 g/dL / Pro: 7.1 g/dL / ALK PHOS: 81 U/L / ALT: 134 U/L / AST: 58 U/L / GGT: x           PT/INR - ( 15 Aug 2019 07:15 )   PT: 14.0 sec;   INR: 1.21 ratio         PTT - ( 15 Aug 2019 07:15 )  PTT:29.4 sec            MICROBIOLOGY:    Culture - CSF with Gram Stain (collected 14 Aug 2019 19:04)  Source: .CSF  Gram Stain (14 Aug 2019 19:39):    No polymorphonuclear cells seen    No organisms seen    by cytocentrifuge    Culture - Fungal, CSF (collected 14 Aug 2019 19:04)  Source: .CSF  Preliminary Report (15 Aug 2019 09:17):    Testing in progress        RADIOLOGY:  PACS Image: Image(s) Available (19 @ 15:22)        MEDICATIONS  (STANDING):  artificial tears (preservative free) Ophthalmic Solution 1 Drop(s) Both EYES daily  buDESOnide    Inhalation Suspension 0.5 milliGRAM(s) Inhalation two times a day  chlorhexidine 4% Liquid 1 Application(s) Topical <User Schedule>  dexamethasone  IVPB 10 milliGRAM(s) IV Intermittent every 6 hours  insulin lispro (HumaLOG) corrective regimen sliding scale   SubCutaneous every 6 hours  latanoprost 0.005% Ophthalmic Solution 1 Drop(s) Both EYES at bedtime  meropenem  IVPB 2000 milliGRAM(s) IV Intermittent every 8 hours  meropenem  IVPB      midodrine 10 milliGRAM(s) Oral three times a day  nystatin    Suspension [id number] Unit(s) Oral two times a day  petrolatum Ophthalmic Ointment 1 Application(s) Both EYES at bedtime  prednisoLONE acetate 1% Suspension 1 Drop(s) Both EYES four times a day  vancomycin  IVPB 1000 milliGRAM(s) IV Intermittent every 12 hours    MEDICATIONS  (PRN):

## 2019-08-15 NOTE — PROGRESS NOTE ADULT - SUBJECTIVE AND OBJECTIVE BOX
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 CT also showed thickened gallbladder with positive HIDA scan.     Allergies:   Medications (Abx/Cardiac/Anticoagulation/Blood Products)    meropenem  IVPB: 200 mL/Hr IV Intermittent (08-15 @ 05:58)  midodrine: 10 milliGRAM(s) Oral (08-15 @ 05:21)  nystatin    Suspension: 620767 Unit(s) Oral (08-15 @ 05:16)  vancomycin  IVPB: 250 mL/Hr IV Intermittent (08-15 @ 06:57)    Data:  172.72  104.7  T(C): 36.4  HR: 89  BP: 139/88  RR: 18  SpO2: 96%    -WBC 14.1 / HgB 9.8 / Hct 32.5 / Plt 370  -Na 137 / Cl 96 / BUN 26 / Glucose 170  -K 3.8 / CO2 31 / Cr 0.60  - / Alk Phos 81 / T.Bili 0.4  -INR1.21    Imaging:     Plan:   -69y Female presents for cholecystostomy tube  -Risks/Benefits/alternatives explained with the patient's family. Informed consent obtained.

## 2019-08-15 NOTE — OCCUPATIONAL THERAPY INITIAL EVALUATION ADULT - ADDITIONAL COMMENTS
PLOF as per baseline status at home prior to [facility] admission in June.    CHEST XRAY: 1. Left lung remains clear. 2. New right lung opacities - due to unilateral pulmonary edema or to inflammation.

## 2019-08-15 NOTE — OCCUPATIONAL THERAPY INITIAL EVALUATION ADULT - DIAGNOSIS, OT EVAL
pt presents with impaired cognition/command following, strength, AROM, balance, and vision - all of which impacts her ability to complete ADL and functional transfers/mobility

## 2019-08-15 NOTE — OCCUPATIONAL THERAPY INITIAL EVALUATION ADULT - FINE MOTOR COORDINATION, LEFT HAND THUMB/FINGER OPPOSITION SKILLS, OT EVAL
unable to perform unable to perform/attempted however due to cognition/command following pt unable to complete

## 2019-08-15 NOTE — PROGRESS NOTE ADULT - ASSESSMENT
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   Workup thus far found patient to be febrile to 100.4 on admission and tachycardic/hypotensive   Leukocytosis trending down   ESR and CRP elevated  CT head: Increased size of midline suboccipital extracalvarial CSF density fluid collection, likely representing a CSF leak/meningocele  CT A/P: Cholelithiasis, with small amount of pericholecystic fluid and suspicion of gallbladder wall thickening, concerning for acute cholecystitis  US Abdomen: Cholelithiasis with findings suspicious for acute cholecystitis.   LP as above  CSF with WBC but glucose and PCR not s/o bacterial process-? post op vs secondary to CSF leak  HIDA with cholecystitis  For perc kylee  Stable overall  Rec:  1) Perc Kylee per IR  2) CNS imaging with MRI  3) Follow CSF ,other Cx  4) CNS in 1/4 blood cx likley contaminant-in any case on vanco  5) Continue vanco,merem for now  Will de-escalate coverage depending on CNS imaging  6) monitor vanco trough, creatinine    Will tailor plan for ID issues  per course,results.Will defer to primary team on management of other issues.  Case d/w primary team  Will Follow.  Beeper [phone]-vski/afterhours/No response-[phone]

## 2019-08-15 NOTE — OCCUPATIONAL THERAPY INITIAL EVALUATION ADULT - FINE MOTOR COORDINATION, RIGHT HAND THUMB/FINGER OPPOSITION SKILLS, OT EVAL
unable to perform attempted however due to cognition/command following pt unable to complete/unable to perform

## 2019-08-15 NOTE — PROGRESS NOTE ADULT - ASSESSMENT
Readmitted for sepsis s/p suboccipital crani  - Blood/Urine Cx: Gram positive cocci. On Vancomycin and meropenum   - Respiratory viral panel negative  - ESR/CRP elevated, continue to trend  - Attempted LP but unable to obtain sample - May consult IR  -No acute neurosurgical intervention

## 2019-08-15 NOTE — OCCUPATIONAL THERAPY INITIAL EVALUATION ADULT - PLANNED THERAPY INTERVENTIONS, OT EVAL
bed mobility training/fine motor coordination training/parent/caregiver training.../ROM/cognitive, visual perceptual/transfer training/strengthening/ADL retraining/balance training

## 2019-08-15 NOTE — PROGRESS NOTE ADULT - SUBJECTIVE AND OBJECTIVE BOX
Patient is a 69y old  Female who presents with a chief complaint of Hypotension (15 Aug 2019 11:45)    Being followed by ID for fevers ? cholecystitis v meningitis     Interval history:s/p LP  for perc kylee today  awake-responds to soem queries  No other acute events      ROS:No reliable ROS available       Antimicrobials:    meropenem  IVPB 2000 milliGRAM(s) IV Intermittent every 8 hours  meropenem  IVPB      nystatin    Suspension [id number] Unit(s) Oral two times a day  vancomycin  IVPB 1000 milliGRAM(s) IV Intermittent every 12 hours      other medications reviewed    Vital Signs Last 24 Hrs  T(C): 36.4 (08-15-19 @ 04:28), Max: 36.8 (08-14-19 @ 17:52)  T(F): 97.5 (08-15-19 @ 04:28), Max: 98.2 (08-14-19 @ 17:52)  HR: 89 (08-15-19 @ 11:37) (88 - 119)  BP: 139/88 (08-15-19 @ 11:37) (113/54 - 170/80)  BP(mean): --  RR: 18 (08-15-19 @ 05:00) (17 - 18)  SpO2: 96% (08-15-19 @ 11:37) (88% - 100%)    Physical Exam:  Constitutionalcomfortable,      Cranial incisions CDI no tenderness   HEENT PERRLA EOMI,No pallor or icterus    No oral exudate or erythema    Neck supple no JVD or LN    Chest Good AE,CTA    CVS RRR S1 S2 WNl No murmur or rub or gallop    Abd soft BS normal No tenderness no masses  Obese NO RUQ tenderness     Ext No cyanosis clubbing or edema    IV site no erythema tenderness or discharge    Joints no swelling or LOM    CNS as above   Lab Data:                          9.8    14.1  )-----------( 370      ( 15 Aug 2019 07:12 )             32.5     WBC Count: 14.1 (08-15-19 @ 07:12)  WBC Count: 14.7 (08-14-19 @ 06:43)  WBC Count: 24.9 (08-13-19 @ 00:20)  WBC Count: 26.0 (08-11-19 @ 23:17)  WBC Count: 29.9 (08-11-19 @ 14:47)    08-15    137  |  96  |  26<H>  ----------------------------<  170<H>  3.8   |  31  |  0.60    Ca    9.3      15 Aug 2019 07:12  Phos  3.1     08-15  Mg     2.4     08-15    TPro  7.1  /  Alb  3.2<L>  /  TBili  0.4  /  DBili  x   /  AST  58<H>  /  ALT  134<H>  /  AlkPhos  81  08-15        Culture - CSF with Gram Stain (collected 14 Aug 2019 19:04)  Source: .CSF  Gram Stain (14 Aug 2019 19:39):    No polymorphonuclear cells seen    No organisms seen    by cytocentrifuge    Culture - Fungal, CSF (collected 14 Aug 2019 19:04)  Source: .CSF  Preliminary Report (15 Aug 2019 09:17):    Testing in progress    Culture - Urine (collected 11 Aug 2019 18:23)  Source: .Urine  Final Report (12 Aug 2019 15:29):    No growth    Culture - Blood (collected 11 Aug 2019 18:12)  Source: .Blood  Gram Stain (13 Aug 2019 15:06):    Growth in aerobic bottle: Gram Positive Cocci in Clusters  Final Report (14 Aug 2019 15:05):    Growth in aerobic bottle: Coag Negative Staphylococcus    Single set isolate, possible contaminant. Contact    Microbiology if susceptibility testing clinically    indicated.    "Due to technical problems, Proteus sp. will Not be reported as part of    the BCID panel until further notice"    ***Blood Panel PCR results on this specimen are available    approximately 3 hours after the Gram stain result.***    Gram stain, PCR, and/or culture results may not always    correspond due to difference in methodologies.    ************************************************************    This PCR assay was performed using [employer].    The following targets are tested for: Enterococcus,    vancomycin resistant enterococci, Listeria monocytogenes,    coagulase negative staphylococci, S. aureus,    methicillin resistant S. aureus, Streptococcus agalactiae    (Group B), S. pneumoniae, S. pyogenes (Group A),    Acinetobacter baumannii, Enterobacter cloacae, E. coli,    Klebsiella oxytoca, K. pneumoniae, Proteus sp.,    Serratia marcescens, Haemophilus influenzae,    Neisseria meningitidis, Pseudomonas aeruginosa, Candida    albicans, C. glabrata, C krusei, C parapsilosis,    C. tropicalis and the KPC resistance gene.  Organism: Blood Culture PCR (14 Aug 2019 15:05)  Organism: Blood Culture PCR (14 Aug 2019 15:05)      -  Coagulase negative Staphylococcus: Detec      Method Type: PCR    Culture - Blood (collected 11 Aug 2019 18:12)  Source: .Blood  Preliminary Report (12 Aug 2019 19:01):    No growth to date.            CSF PCR Panel (08.14.19 @ 18:54)    CSF PCR Result: NotDetec: The meningitis/encephalitis (ME) panel (CSFPCR) is a PCR based assay that  screens for: Escherichia coli; Haemophilus influenzae; Listeria  monocytogenes; Neisseria meningitidis; Streptococcus agalactiae;  Streptococcus pneumoniae; CMV; Enterovirus; HSV-1; HSV-2; Human  herpesvirus 6; Parechovirus; VZV and Cryptococcus. Result should be  interpreted in context of clinical presentation, imaging and other lab  tests. Positive predictive value may be lower in patients with normal CSF  chemistry and cell count.                Protein, CSF (08.14.19 @ 15:24)    Protein, CSF: 135: Color: COLORLESS  Appearance (pre-centrifugation) : CLEAR  Appearance (post-centrifugation): CLEAR mg/dL    Glucose, CSF (08.14.19 @ 15:24)    Glucose, CSF: 65 mg/dL      Cerebrospinal Fluid Cell Count-1 (08.14.19 @ 15:24)    Total Nucleated Cell Count, CSF: 108 /uL    CSF Color: No Color    CSF Appearance: Clear    CSF Comments: laboratory findings.  DEVONTE Maradiaga    CSF Lymphocytes: 76 %    CSF Monocytes/Macrophages: 16 %    CSF Segmented Neutrophils: 5 %          < from: NM Hepatobiliary Imaging (08.14.19 @ 09:45) >  IMPRESSION: Abnormal hepatobiliary scan.    Nonvisualization of the gallbladder in the entire 2 hours of imaging. In   the proper clinical setting,this finding is compatible with acute   cholecystitis.    Dr. Souza was notified of these results by telephone with read back at 9:57   AM on 8/14/2019..               < end of copied text >

## 2019-08-15 NOTE — PROGRESS NOTE ADULT - PROBLEM SELECTOR PLAN 1
-Altered, but responsive to verbal commands and pain  -CT head showing no acute changes, however increase in size of CSF density fluid collection representing CSF leak vs meningocele,   -NSG: No surgical intervention at this time  -LP w/ gram stain negative, 108 nucleated cells and PCR negative  -MRI pending

## 2019-08-15 NOTE — OCCUPATIONAL THERAPY INITIAL EVALUATION ADULT - LIGHT TOUCH SENSATION, LUE, REHAB EVAL
mild impairment/based on observation pt appears to have minimal impairments however unable to determine if due to sensation impairment or command following impairments

## 2019-08-15 NOTE — OCCUPATIONAL THERAPY INITIAL EVALUATION ADULT - LIVES WITH, PROFILE
children/spouse/per CM note: prior to [facility] pt was living at home with spouse, daughter, and grandchildren and was independent with all IADLs an ADLs. there were  6STE. in [facility] pt was "able to take a few steps assisted".

## 2019-08-15 NOTE — OCCUPATIONAL THERAPY INITIAL EVALUATION ADULT - VISUAL ASSESSMENT: DEPTH PERCEPTION
unable to formally assess, pt was unable to accurately reach for targets may have depth perception impairments however further assessment required/not tested

## 2019-08-15 NOTE — PROGRESS NOTE ADULT - PROBLEM SELECTOR PLAN 3
-CTA showing thickened gallbladder, fluid collection, possibly 2/2 acute cholecystitis, but LFTS wnl  -HIDA w/ no visualization of gallbladder  -Surgery consulted- rec perc cholecystostomy  -IR consulted -CTA showing thickened gallbladder, fluid collection, possibly 2/2 acute cholecystitis, but LFTS wnl  -HIDA w/ no visualization of gallbladder  -Surgery consulted. No surgical intervention at this time 2/2 medical status of pt  -IR consulted for percutaneous cholecystostomy tube placement

## 2019-08-15 NOTE — PROGRESS NOTE ADULT - PROBLEM SELECTOR PLAN 2
-Likely source meningitis vs PNA vs intraabdominal source  -c/w vanc and meropenem  -BP maintaining, holding home anti-hypertensives  -Plan as above -Likely source meningitis vs PNA vs intraabdominal source  -c/w vanc and meropenem. If MRI showing no signs of empyema, can d/c vanc  -BP maintaining, holding home anti-hypertensives  -Plan as above

## 2019-08-15 NOTE — PROGRESS NOTE ADULT - PROBLEM SELECTOR PLAN 6
pt requires full assistance with ADLs  care per nursing protocol pt requires full assistance with ADLs  care per nursing protocol  per  pt was able to walk with walker two weeks prior to this admission.

## 2019-08-15 NOTE — PROGRESS NOTE ADULT - ASSESSMENT
Patient is a 69y old f with acute cholecystitis.    - percutaneous cholecystostomy with IR today  - continue abx  - mIVF while NPO      -Red surgery [id number] Patient is a 69y old f with acute cholecystitis.    - percutaneous cholecystostomy with IR today  - continue abx  - mIVF while NPO  - recommend DVT ppx      -Red surgery p9002

## 2019-08-15 NOTE — PROGRESS NOTE ADULT - SUBJECTIVE AND OBJECTIVE BOX
Patient seen and examined at bedside.    --Anticoagulation--    T(C): 36.7 (08-14-19 @ 01:33), Max: 36.9 (08-13-19 @ 20:00)  HR: 106 (08-14-19 @ 02:32) (70 - 117)  BP: 151/84 (08-14-19 @ 01:33) (102/51 - 167/69)  RR: 18 (08-14-19 @ 02:32) (14 - 24)  SpO2: 95% (08-14-19 @ 02:32) (92% - 100%)  Wt(kg): --    Exam:  AAOx0, not FC, mumbling nonsensical words and moaning, CONTRERAS strongly. Pseudomeningoceal not tense, wound c/d/i.

## 2019-08-15 NOTE — PROGRESS NOTE ADULT - SUBJECTIVE AND OBJECTIVE BOX
Vascular & Interventional Radiology Post-Procedure Note    Pre-Procedure Diagnosis: acute cholecystitis  Post-Procedure Diagnosis: Same as pre.  Indications for Procedure: acute cholecystitis, not surgical candidate.     Attending: Dr. Matthews.   Resident: Dr. Adkins    Procedure Details/Findings: 8.5 F cholecystostomy drain placed.   Access (if applicable): RUQ    Complications: none  Estimated Blood Loss: Minimal  Specimen: sent for culture  Contrast: 10cc Omnipaque   Sedation: Y	  Patient Condition/Disposition: Stable, recovery x 1 hour then floor.     Plan:   - F/u culture.   - Flush with 10cc NS daily.   - Recommend surgical follow up to evaluate surgical candidacy for definitive management.

## 2019-08-15 NOTE — OCCUPATIONAL THERAPY INITIAL EVALUATION ADULT - PERTINENT HX OF CURRENT PROBLEM, REHAB EVAL
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  concern for meningitis due to recent operation, CSF leak on CT and history/exam most consistent with meningitis.

## 2019-08-15 NOTE — PROGRESS NOTE ADULT - ASSESSMENT
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

## 2019-08-16 DIAGNOSIS — Z51.5 ENCOUNTER FOR PALLIATIVE CARE: ICD-10-CM

## 2019-08-16 LAB
ALBUMIN SERPL ELPH-MCNC: 3.1 G/DL — LOW (ref 3.3–5)
ALP SERPL-CCNC: 88 U/L — SIGNIFICANT CHANGE UP (ref 40–120)
ALT FLD-CCNC: 372 U/L — HIGH (ref 10–45)
ANION GAP SERPL CALC-SCNC: 10 MMOL/L — SIGNIFICANT CHANGE UP (ref 5–17)
AST SERPL-CCNC: 170 U/L — HIGH (ref 10–40)
BASOPHILS # BLD AUTO: 0 K/UL — SIGNIFICANT CHANGE UP (ref 0–0.2)
BILIRUB SERPL-MCNC: 0.4 MG/DL — SIGNIFICANT CHANGE UP (ref 0.2–1.2)
BUN SERPL-MCNC: 29 MG/DL — HIGH (ref 7–23)
CALCIUM SERPL-MCNC: 9.1 MG/DL — SIGNIFICANT CHANGE UP (ref 8.4–10.5)
CHLORIDE SERPL-SCNC: 97 MMOL/L — SIGNIFICANT CHANGE UP (ref 96–108)
CO2 SERPL-SCNC: 29 MMOL/L — SIGNIFICANT CHANGE UP (ref 22–31)
CREAT SERPL-MCNC: 0.55 MG/DL — SIGNIFICANT CHANGE UP (ref 0.5–1.3)
CULTURE RESULTS: SIGNIFICANT CHANGE UP
EOSINOPHIL # BLD AUTO: 0 K/UL — SIGNIFICANT CHANGE UP (ref 0–0.5)
GLUCOSE BLDC GLUCOMTR-MCNC: 138 MG/DL — HIGH (ref 70–99)
GLUCOSE BLDC GLUCOMTR-MCNC: 149 MG/DL — HIGH (ref 70–99)
GLUCOSE BLDC GLUCOMTR-MCNC: 150 MG/DL — HIGH (ref 70–99)
GLUCOSE BLDC GLUCOMTR-MCNC: 176 MG/DL — HIGH (ref 70–99)
GLUCOSE SERPL-MCNC: 169 MG/DL — HIGH (ref 70–99)
GRAM STN FLD: SIGNIFICANT CHANGE UP
HCT VFR BLD CALC: 32.2 % — LOW (ref 34.5–45)
HGB BLD-MCNC: 9.7 G/DL — LOW (ref 11.5–15.5)
LYMPHOCYTES # BLD AUTO: 0.6 K/UL — LOW (ref 1–3.3)
LYMPHOCYTES # BLD AUTO: 10 % — LOW (ref 13–44)
MAGNESIUM SERPL-MCNC: 2.4 MG/DL — SIGNIFICANT CHANGE UP (ref 1.6–2.6)
MCHC RBC-ENTMCNC: 26.8 PG — LOW (ref 27–34)
MCHC RBC-ENTMCNC: 30.2 GM/DL — LOW (ref 32–36)
MCV RBC AUTO: 88.7 FL — SIGNIFICANT CHANGE UP (ref 80–100)
MONOCYTES # BLD AUTO: 1 K/UL — HIGH (ref 0–0.9)
MONOCYTES NFR BLD AUTO: 8 % — SIGNIFICANT CHANGE UP (ref 2–14)
NEUTROPHILS # BLD AUTO: 16 K/UL — HIGH (ref 1.8–7.4)
NEUTROPHILS NFR BLD AUTO: 81 % — HIGH (ref 43–77)
PHOSPHATE SERPL-MCNC: 2.7 MG/DL — SIGNIFICANT CHANGE UP (ref 2.5–4.5)
PLATELET # BLD AUTO: 382 K/UL — SIGNIFICANT CHANGE UP (ref 150–400)
POTASSIUM SERPL-MCNC: 4.4 MMOL/L — SIGNIFICANT CHANGE UP (ref 3.5–5.3)
POTASSIUM SERPL-SCNC: 4.4 MMOL/L — SIGNIFICANT CHANGE UP (ref 3.5–5.3)
PROT SERPL-MCNC: 6.5 G/DL — SIGNIFICANT CHANGE UP (ref 6–8.3)
RBC # BLD: 3.63 M/UL — LOW (ref 3.8–5.2)
RBC # FLD: 14.8 % — HIGH (ref 10.3–14.5)
SODIUM SERPL-SCNC: 136 MMOL/L — SIGNIFICANT CHANGE UP (ref 135–145)
SPECIMEN SOURCE: SIGNIFICANT CHANGE UP
SPECIMEN SOURCE: SIGNIFICANT CHANGE UP
WBC # BLD: 17.7 K/UL — HIGH (ref 3.8–10.5)
WBC # FLD AUTO: 17.7 K/UL — HIGH (ref 3.8–10.5)

## 2019-08-16 PROCEDURE — 99231 SBSQ HOSP IP/OBS SF/LOW 25: CPT

## 2019-08-16 PROCEDURE — 70553 MRI BRAIN STEM W/O & W/DYE: CPT | Mod: 26

## 2019-08-16 PROCEDURE — 99223 1ST HOSP IP/OBS HIGH 75: CPT

## 2019-08-16 PROCEDURE — 99233 SBSQ HOSP IP/OBS HIGH 50: CPT | Mod: GC

## 2019-08-16 PROCEDURE — 99233 SBSQ HOSP IP/OBS HIGH 50: CPT

## 2019-08-16 RX ORDER — ENOXAPARIN SODIUM 100 MG/ML
40 INJECTION SUBCUTANEOUS DAILY
Refills: 0 | Status: DISCONTINUED | OUTPATIENT
Start: 2019-08-16 | End: 2019-08-27

## 2019-08-16 RX ORDER — SODIUM CHLORIDE 9 MG/ML
1000 INJECTION, SOLUTION INTRAVENOUS
Refills: 0 | Status: DISCONTINUED | OUTPATIENT
Start: 2019-08-16 | End: 2019-08-19

## 2019-08-16 RX ADMIN — Medication 102 MILLIGRAM(S): at 03:17

## 2019-08-16 RX ADMIN — Medication 500000 UNIT(S): at 17:08

## 2019-08-16 RX ADMIN — Medication 102 MILLIGRAM(S): at 11:51

## 2019-08-16 RX ADMIN — Medication 102 MILLIGRAM(S): at 19:23

## 2019-08-16 RX ADMIN — Medication 1 DROP(S): at 11:56

## 2019-08-16 RX ADMIN — LATANOPROST 1 DROP(S): 0.05 SOLUTION/ DROPS OPHTHALMIC; TOPICAL at 23:10

## 2019-08-16 RX ADMIN — MEROPENEM 200 MILLIGRAM(S): 1 INJECTION INTRAVENOUS at 06:03

## 2019-08-16 RX ADMIN — SODIUM CHLORIDE 50 MILLILITER(S): 9 INJECTION, SOLUTION INTRAVENOUS at 19:33

## 2019-08-16 RX ADMIN — MEROPENEM 200 MILLIGRAM(S): 1 INJECTION INTRAVENOUS at 23:11

## 2019-08-16 RX ADMIN — CHLORHEXIDINE GLUCONATE 1 APPLICATION(S): 213 SOLUTION TOPICAL at 12:03

## 2019-08-16 RX ADMIN — Medication 250 MILLIGRAM(S): at 19:23

## 2019-08-16 RX ADMIN — Medication 1 DROP(S): at 23:10

## 2019-08-16 RX ADMIN — Medication 1 APPLICATION(S): at 23:10

## 2019-08-16 RX ADMIN — Medication 1: at 06:03

## 2019-08-16 RX ADMIN — Medication 1 DROP(S): at 05:46

## 2019-08-16 RX ADMIN — Medication 1 DROP(S): at 17:09

## 2019-08-16 RX ADMIN — Medication 250 MILLIGRAM(S): at 10:51

## 2019-08-16 RX ADMIN — Medication 0.5 MILLIGRAM(S): at 05:46

## 2019-08-16 RX ADMIN — Medication 1 DROP(S): at 17:08

## 2019-08-16 RX ADMIN — Medication 102 MILLIGRAM(S): at 23:47

## 2019-08-16 RX ADMIN — MEROPENEM 200 MILLIGRAM(S): 1 INJECTION INTRAVENOUS at 15:35

## 2019-08-16 RX ADMIN — Medication 1 DROP(S): at 12:02

## 2019-08-16 NOTE — CONSULT NOTE ADULT - PROBLEM SELECTOR RECOMMENDATION 9
Full consult to follow shortly. Please page [phone] with any acute concerns. - continue to monitor mental status  - c/w abx  - MRI pending

## 2019-08-16 NOTE — PROGRESS NOTE ADULT - ASSESSMENT
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

## 2019-08-16 NOTE — CONSULT NOTE ADULT - PROBLEM SELECTOR RECOMMENDATION 4
- d/w patient and   - rapport building  -  states HCP is daughter - d/w patient and   - rapport building  -  states HCP is daughter, no HCP noted in alpha tab, will need family to bring in  - further GOC at subsequent visits

## 2019-08-16 NOTE — PROGRESS NOTE ADULT - ASSESSMENT
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   Workup thus far found patient to be febrile to 100.4 on admission and tachycardic/hypotensive   Leukocytosis trending down   ESR and CRP elevated  CT head: Increased size of midline suboccipital extracalvarial CSF density fluid collection, likely representing a CSF leak/meningocele  CT A/P: Cholelithiasis, with small amount of pericholecystic fluid and suspicion of gallbladder wall thickening, concerning for acute cholecystitis  US Abdomen: Cholelithiasis with findings suspicious for acute cholecystitis.   LP not s/o bacterial process-? post op vs secondary to CSF leak  HIDA with cholecystitis  s/p perc kylee  Stable overall  Rec:  1) follow biliary fluid cx from perc kylee  2) CNS imaging with MRI  3) Follow CSF ,other Cx  4) CNS in 1/4 blood cx likley contaminant-in any case on vanco  5) Continue vanco,merem for now  Will de-escalate coverage depending on CNS imaging,biliary fluid cx  6) monitor vanco trough, creatinine  dose decreased yesterday to 750 q 12    Will tailor plan for ID issues  per course,results.Will defer to primary team on management of other issues.  Case d/w primary team  Infectious Diseases Service will cover over weekend.  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   Workup thus far found patient to be febrile to 100.4 on admission and tachycardic/hypotensive   Leukocytosis trending down   ESR and CRP elevated  CT head: Increased size of midline suboccipital extracalvarial CSF density fluid collection, likely representing a CSF leak/meningocele  CT A/P: Cholelithiasis, with small amount of pericholecystic fluid and suspicion of gallbladder wall thickening, concerning for acute cholecystitis  US Abdomen: Cholelithiasis with findings suspicious for acute cholecystitis.   LP not s/o bacterial process-? post op vs secondary to CSF leak  HIDA with cholecystitis  s/p perc kylee  Stable overall  Rec:  1) follow biliary fluid cx from perc kylee  2) CNS imaging with MRI  3) Follow CSF ,other Cx  4) CNS in 1/4 blood cx likley contaminant-in any case on vanco,repeat cx are negative   5) Continue vanco,merem for now  Will de-escalate coverage depending on CNS imaging,biliary fluid cx  6) monitor vanco trough, creatinine  dose decreased yesterday to 750 q 12    Will tailor plan for ID issues  per course,results.Will defer to primary team on management of other issues.  Case d/w primary team  Infectious Diseases Service will cover over weekend.  Please call [phone] if issues

## 2019-08-16 NOTE — PROGRESS NOTE ADULT - SUBJECTIVE AND OBJECTIVE BOX
Gaurav Souza  PGY-1  Pager: [phone]    Patient is a 69y old  Female who presents with a chief complaint of Hypotension (15 Aug 2019 17:48)      SUBJECTIVE / OVERNIGHT EVENTS:    MEDICATIONS  (STANDING):  artificial tears (preservative free) Ophthalmic Solution 1 Drop(s) Both EYES every 6 hours  buDESOnide    Inhalation Suspension 0.5 milliGRAM(s) Inhalation two times a day  chlorhexidine 4% Liquid 1 Application(s) Topical <User Schedule>  dexamethasone  IVPB 10 milliGRAM(s) IV Intermittent every 6 hours  insulin lispro (HumaLOG) corrective regimen sliding scale   SubCutaneous every 6 hours  latanoprost 0.005% Ophthalmic Solution 1 Drop(s) Both EYES at bedtime  meropenem  IVPB 2000 milliGRAM(s) IV Intermittent every 8 hours  meropenem  IVPB      nystatin    Suspension 202703 Unit(s) Oral two times a day  petrolatum Ophthalmic Ointment 1 Application(s) Both EYES at bedtime  prednisoLONE acetate 1% Suspension 1 Drop(s) Both EYES four times a day  vancomycin  IVPB 750 milliGRAM(s) IV Intermittent every 12 hours    MEDICATIONS  (PRN):  acetaminophen    Suspension .. 650 milliGRAM(s) Enteral Tube every 6 hours PRN Moderate Pain (4 - 6)      T(C): 36.7 (08-16-19 @ 05:17), Max: 36.8 (08-15-19 @ 14:51)  HR: 77 (08-16-19 @ 05:48) (77 - 132)  BP: 167/89 (08-16-19 @ 05:17) (122/72 - 167/89)  RR: 18 (08-16-19 @ 05:17) (17 - 18)  SpO2: 100% (08-16-19 @ 05:48) (95% - 100%)  CAPILLARY BLOOD GLUCOSE      POCT Blood Glucose.: 176 mg/dL (16 Aug 2019 05:42)  POCT Blood Glucose.: 157 mg/dL (15 Aug 2019 23:42)  POCT Blood Glucose.: 127 mg/dL (15 Aug 2019 18:02)    I&O's Summary    15 Aug 2019 07:01  -  16 Aug 2019 07:00  --------------------------------------------------------  IN: 0 mL / OUT: 140 mL / NET: -140 mL        PHYSICAL EXAM:  GENERAL: NAD, well-developed  HEAD:  Atraumatic, Normocephalic  EYES: EOMI, PERRLA, conjunctiva and sclera clear  NECK: Supple, No JVD  CHEST/LUNG: Clear to auscultation bilaterally; No wheeze  HEART: Regular rate and rhythm; No murmurs, rubs, or gallops  ABDOMEN: Soft, Nontender, Nondistended; Bowel sounds present  EXTREMITIES:  2+ Peripheral Pulses, No clubbing, cyanosis, or edema  PSYCH: AAOx3  NEUROLOGY: non-focal  SKIN: No rashes or lesions    LABS:                        9.8    14.1  )-----------( 370      ( 15 Aug 2019 07:12 )             32.5     08-15    137  |  96  |  26<H>  ----------------------------<  170<H>  3.8   |  31  |  0.60    Ca    9.3      15 Aug 2019 07:12  Phos  3.1     08-15  Mg     2.4     08-15    TPro  7.1  /  Alb  3.2<L>  /  TBili  0.4  /  DBili  x   /  AST  58<H>  /  ALT  134<H>  /  AlkPhos  81  08-15    PT/INR - ( 15 Aug 2019 07:15 )   PT: 14.0 sec;   INR: 1.21 ratio         PTT - ( 15 Aug 2019 07:15 )  PTT:29.4 sec          RADIOLOGY & ADDITIONAL TESTS:    Imaging Personally Reviewed:    Consultant(s) Notes Reviewed:      Care Discussed with Consultants/Other Providers: Gaurav Souza  PGY-1  Pager: [phone]    Patient is a 69y old  Female who presents with a chief complaint of Hypotension (15 Aug 2019 17:48)      SUBJECTIVE / OVERNIGHT EVENTS:  No acute events overnight.  Patient awake this morning and responds to name calling. She answers some questions with short answers appropriately.  Reports having pain at the per kylee sites.  Otherwise, denied CP, palpitation, SOB.        MEDICATIONS  (STANDING):  artificial tears (preservative free) Ophthalmic Solution 1 Drop(s) Both EYES every 6 hours  buDESOnide    Inhalation Suspension 0.5 milliGRAM(s) Inhalation two times a day  chlorhexidine 4% Liquid 1 Application(s) Topical <User Schedule>  dexamethasone  IVPB 10 milliGRAM(s) IV Intermittent every 6 hours  insulin lispro (HumaLOG) corrective regimen sliding scale   SubCutaneous every 6 hours  latanoprost 0.005% Ophthalmic Solution 1 Drop(s) Both EYES at bedtime  meropenem  IVPB 2000 milliGRAM(s) IV Intermittent every 8 hours  meropenem  IVPB      nystatin    Suspension 021874 Unit(s) Oral two times a day  petrolatum Ophthalmic Ointment 1 Application(s) Both EYES at bedtime  prednisoLONE acetate 1% Suspension 1 Drop(s) Both EYES four times a day  vancomycin  IVPB 750 milliGRAM(s) IV Intermittent every 12 hours    MEDICATIONS  (PRN):  acetaminophen    Suspension .. 650 milliGRAM(s) Enteral Tube every 6 hours PRN Moderate Pain (4 - 6)      T(C): 36.7 (08-16-19 @ 05:17), Max: 36.8 (08-15-19 @ 14:51)  HR: 77 (08-16-19 @ 05:48) (77 - 132)  BP: 167/89 (08-16-19 @ 05:17) (122/72 - 167/89)  RR: 18 (08-16-19 @ 05:17) (17 - 18)  SpO2: 100% (08-16-19 @ 05:48) (95% - 100%)  CAPILLARY BLOOD GLUCOSE      POCT Blood Glucose.: 176 mg/dL (16 Aug 2019 05:42)  POCT Blood Glucose.: 157 mg/dL (15 Aug 2019 23:42)  POCT Blood Glucose.: 127 mg/dL (15 Aug 2019 18:02)    I&O's Summary    15 Aug 2019 07:01  -  16 Aug 2019 07:00  --------------------------------------------------------  IN: 0 mL / OUT: 140 mL / NET: -140 mL        PHYSICAL EXAM:  GENERAL: awake, alert, but altered and moaning in pain  HEENT: NC/AT, MMM, PERRL  LUNGS: Mild right lower lobe basilar crackle. No wheezing. .   CARDIAC: regular and tachycardic. no M/R/G. Cap refill ~ 3 seconds. 2+ distal pulses. JVP difficult to assess due to body habitus.  ABDOMEN: Soft, ND, TTP on perc kylee site, yet site c/d/i. PEG tube C/D/I without erythema or drainage  EXT: Trace of pedal edema. No calf tenderness.    NEURO: Alert and follows simple commands. Answers some yes or no questions appropriately. Moving all extremities, hand  and ROM normal w/ 4/5 strength    LABS:                        9.8    14.1  )-----------( 370      ( 15 Aug 2019 07:12 )             32.5     08-15    137  |  96  |  26<H>  ----------------------------<  170<H>  3.8   |  31  |  0.60    Ca    9.3      15 Aug 2019 07:12  Phos  3.1     08-15  Mg     2.4     08-15    TPro  7.1  /  Alb  3.2<L>  /  TBili  0.4  /  DBili  x   /  AST  58<H>  /  ALT  134<H>  /  AlkPhos  81  08-15    PT/INR - ( 15 Aug 2019 07:15 )   PT: 14.0 sec;   INR: 1.21 ratio         PTT - ( 15 Aug 2019 07:15 )  PTT:29.4 sec          RADIOLOGY & ADDITIONAL TESTS:    Imaging Personally Reviewed: NA    Consultant(s) Notes Reviewed:  Id, IR, Surg    Care Discussed with Consultants/Other Providers: ID

## 2019-08-16 NOTE — PROGRESS NOTE ADULT - SUBJECTIVE AND OBJECTIVE BOX
Patient is a 69y old  Female who presents with a chief complaint of Hypotension (16 Aug 2019 07:25)    Being followed by ID for ? meningitis, cholecystitis     Interval history:s/p perc kylee  awake -responds to soem queries  denies any pain   No other acute events      ROS:pt denies complaints but unreliable historian   No cough,SOB,CP  No N/V/D  No abd pain  No urinary complaints  No HA  No joint or limb pain  No other complaints      Antimicrobials:    meropenem  IVPB 2000 milliGRAM(s) IV Intermittent every 8 hours  meropenem  IVPB      nystatin    Suspension [id number] Unit(s) Oral two times a day  vancomycin  IVPB 750 milliGRAM(s) IV Intermittent every 12 hours      other medications reviewed    Vital Signs Last 24 Hrs  T(C): 36.7 (08-16-19 @ 05:17), Max: 36.8 (08-15-19 @ 14:51)  T(F): 98 (08-16-19 @ 05:17), Max: 98.2 (08-15-19 @ 14:51)  HR: 77 (08-16-19 @ 05:48) (77 - 132)  BP: 167/89 (08-16-19 @ 05:17) (122/72 - 167/89)  BP(mean): --  RR: 18 (08-16-19 @ 05:17) (17 - 18)  SpO2: 100% (08-16-19 @ 05:48) (95% - 100%)    Physical Exam:    Cranial incisions CDI no tenderness   HEENT PERRLA EOMI,No pallor or icterus    No oral exudate or erythema    Neck supple no JVD or LN    Chest Good AE,CTA    CVS RRR S1 S2 WNl No murmur or rub or gallop    Abd soft BS normal No tendernessfeeding tube  RUQ cholecystostomy-mild tenderness at site   Obese   Ext No cyanosis clubbing or edema    IV site no erythema tenderness or discharge    Joints no swelling or LOM    CNS as above   Lab Data:                          9.7    17.7  )-----------( 382      ( 16 Aug 2019 07:22 )             32.2   WBC Count: 17.7 (08-16-19 @ 07:22)  WBC Count: 14.1 (08-15-19 @ 07:12)  WBC Count: 14.7 (08-14-19 @ 06:43)  WBC Count: 24.9 (08-13-19 @ 00:20)  WBC Count: 26.0 (08-11-19 @ 23:17)  WBC Count: 29.9 (08-11-19 @ 14:47)      08-16    136  |  97  |  29<H>  ----------------------------<  169<H>  4.4   |  29  |  0.55    Ca    9.1      16 Aug 2019 07:22  Phos  2.7     08-16  Mg     2.4     08-16    TPro  6.5  /  Alb  3.1<L>  /  TBili  0.4  /  DBili  x   /  AST  170<H>  /  ALT  372<H>  /  AlkPhos  88  08-16        Culture - Body Fluid with Gram Stain (collected 15 Aug 2019 18:57)  Source: .Body Fluid  Gram Stain (16 Aug 2019 00:56):    Rare polymorphonuclear leukocytes seen per low power field    No organisms seen per oil power field    Culture - Blood (collected 15 Aug 2019 09:52)  Source: .Blood  Preliminary Report (16 Aug 2019 10:01):    No growth to date.    Culture - Blood (collected 15 Aug 2019 09:52)  Source: .Blood  Preliminary Report (16 Aug 2019 10:01):    No growth to date.    Culture - Acid Fast (collected 14 Aug 2019 23:03)    Culture - CSF with Gram Stain (collected 14 Aug 2019 19:04)  Source: .CSF  Gram Stain (14 Aug 2019 19:39):    No polymorphonuclear cells seen    No organisms seen    by cytocentrifuge  Preliminary Report (15 Aug 2019 18:01):    No growth    Culture - Fungal, CSF (collected 14 Aug 2019 19:04)  Source: .CSF  Preliminary Report (15 Aug 2019 09:17):    Testing in progress    Culture - Urine (collected 11 Aug 2019 18:23)  Source: .Urine  Final Report (12 Aug 2019 15:29):    No growth    Culture - Blood (collected 11 Aug 2019 18:12)  Source: .Blood  Gram Stain (13 Aug 2019 15:06):    Growth in aerobic bottle: Gram Positive Cocci in Clusters  Final Report (14 Aug 2019 15:05):    Growth in aerobic bottle: Coag Negative Staphylococcus    Single set isolate, possible contaminant. Contact    Microbiology if susceptibility testing clinically    indicated.    "Due to technical problems, Proteus sp. will Not be reported as part of    the BCID panel until further notice"    ***Blood Panel PCR results on this specimen are available    approximately 3 hours after the Gram stain result.***    Gram stain, PCR, and/or culture results may not always    correspond due to difference in methodologies.    ************************************************************    This PCR assay was performed using [employer].    The following targets are tested for: Enterococcus,    vancomycin resistant enterococci, Listeria monocytogenes,    coagulase negative staphylococci, S. aureus,    methicillin resistant S. aureus, Streptococcus agalactiae    (Group B), S. pneumoniae, S. pyogenes (Group A),    Acinetobacter baumannii, Enterobacter cloacae, E. coli,    Klebsiella oxytoca, K. pneumoniae, Proteus sp.,    Serratia marcescens, Haemophilus influenzae,    Neisseria meningitidis, Pseudomonas aeruginosa, Candida    albicans, C. glabrata, C krusei, C parapsilosis,    C. tropicalis and the KPC resistance gene.  Organism: Blood Culture PCR (14 Aug 2019 15:05)  Organism: Blood Culture PCR (14 Aug 2019 15:05)      -  Coagulase negative Staphylococcus: Detec      Method Type: PCR    Culture - Blood (collected 11 Aug 2019 18:12)  Source: .Blood  Preliminary Report (12 Aug 2019 19:01):    No growth to date.              Vancomycin Level, Trough: 20.5 ug/mL (08-15-19 @ 17:50)  ((dose decreased afterwards)        < from: [facility] Hepatobiliary Imaging (08.14.19 @ 09:45) >  IMPRESSION: Abnormal hepatobiliary scan.    Nonvisualization of the gallbladder in the entire 2 hours of imaging. In   the proper clinical setting,this finding is compatible with acute   cholecystitis.    < end of copied text >

## 2019-08-16 NOTE — PROGRESS NOTE ADULT - PROBLEM SELECTOR PLAN 2
-Likely source meningitis vs PNA vs intraabdominal source  -c/w vanc and meropenem. If MRI showing no signs of empyema, can d/c vanc  -BP maintaining, holding home anti-hypertensives  -Plan as above Septic shock in setting of acute kylee and possible CNS infection. Now resolved and patient off pressors.  - Acute cholecystitis workup as below  - s/p LP pending labs. Negative for infection as of today.  - MRI head pending.   - c/w vanc and meropenem. If MRI showing no signs of empyema, can d/c vanc.  - Vanco trough q4th dose. Dose adjustment for goal of 10-20.

## 2019-08-16 NOTE — PROGRESS NOTE ADULT - PROBLEM SELECTOR PLAN 6
pt requires full assistance with ADLs  care per nursing protocol  per  pt was able to walk with walker two weeks prior to this admission.

## 2019-08-16 NOTE — PROGRESS NOTE ADULT - PROBLEM SELECTOR PLAN 1
-Altered, but responsive to verbal commands and pain  -CT head showing no acute changes, however increase in size of CSF density fluid collection representing CSF leak vs meningocele,   -NSG: No surgical intervention at this time  -LP w/ gram stain negative, 108 nucleated cells and PCR negative  -MRI pending Metabolic encephalopathy likely in setting of sepsis. Alert and responsive to verbal commands. Almost back to baseline per   - CT head showing no acute changes, however increase in size of CSF density fluid collection representing CSF leak vs meningocele,   - Neurosx: No surgical intervention at this time  - MRI of head today.   - infectious workup and management as below.

## 2019-08-16 NOTE — CONSULT NOTE ADULT - ASSESSMENT
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 No acute changes on imaging, and no neurosurgical intervention recommended at this time. MRI pending. Is on vanco and meropenem currently. Also has concern for cholecystitis; surgery deferring intervention currently due to her clinical state, and IR is planing for percutaneous cholecystostomy tube placement. 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 No acute changes on imaging, and no neurosurgical intervention recommended at this time. MRI pending. Is on vanco and meropenem currently. Also has concern for cholecystitis; surgery deferring intervention currently due to her clinical state, and IR is planing for percutaneous cholecystostomy tube placement. Per , was very active and in good health until her resection, and has had a decline since then. Palliative care called to discuss GOC.

## 2019-08-16 NOTE — PROGRESS NOTE ADULT - SUBJECTIVE AND OBJECTIVE BOX
Interventional Radiology Follow- Up Note      This is a 69y Female s/p percutaneous cholecystostomy on 8/15/19 in Interventional Radiology with Dr. Torres.     Patient seen and examined @ bedside.    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  Light-headedness  Glaucoma  Asthma: controlled with meds  HTN (hypertension)  History of ankle fracture: h/o repair  Fractured elbow: left elbow fracture repair,2007  H/O appendicitis: h/o appendicectomy      Allergies: No Known Drug Allergies  shellfish (Angioedema; Rash)    LABS:                        9.7    17.7  )-----------( 382      ( 16 Aug 2019 07:22 )             32.2     08-16    136  |  97  |  29<H>  ----------------------------<  169<H>  4.4   |  29  |  0.55    Ca    9.1      16 Aug 2019 07:22  Phos  2.7     08-16  Mg     2.4     08-16    TPro  6.5  /  Alb  3.1<L>  /  TBili  0.4  /  DBili  x   /  AST  170<H>  /  ALT  372<H>  /  AlkPhos  88  08-16    PT/INR - ( 15 Aug 2019 07:15 )   PT: 14.0 sec;   INR: 1.21 ratio         PTT - ( 15 Aug 2019 07:15 )  PTT:29.4 sec    Bile Cultures 8/15: no organisms seen, cultures pending     Vitals: T(F): 98 (08-16-19 @ 05:17), Max: 98.2 (08-15-19 @ 14:51)  HR: 77 (08-16-19 @ 05:48) (77 - 132)  BP: 167/89 (08-16-19 @ 05:17) (122/72 - 167/89)  RR: 18 (08-16-19 @ 05:17) (17 - 18)  SpO2: 100% (08-16-19 @ 05:48) (95% - 100%)    PHYSICAL EXAM:  Gen: NAD, A&Ox0  Abd: Obese, soft, cholecystostomy tube intact to drainage bag with dark brown bile     Impression: 69y Female with acute cholecystitis now s/p percutaneous cholecystostomy on 8/15/19 with Dr. Matthews  -Follow up bile cultures   -continue to monitor output    -flush drain per doctor orders  -trend wbc and LFTs  -Case d/w Dr. Matthews      Please call IR at extension [phone] with any questions, concerns, or issues regarding above.

## 2019-08-16 NOTE — CONSULT NOTE ADULT - SUBJECTIVE AND OBJECTIVE BOX
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 No acute changes on imaging, and no neurosurgical intervention recommended at this time. MRI pending. Is on vanco and meropenem currently. Also has concern for cholecystitis; surgery deferring intervention currently due to her clinical state, and IR is planing for percutaneous cholecystostomy tube placement.       PERTINENT PM/SXH:   Intracranial mass  Light-headedness  Glaucoma  Asthma  HTN (hypertension)    History of ankle fracture  Fractured elbow  H/O appendicitis    FAMILY HISTORY:    ITEMS NOT CHECKED ARE NOT PRESENT    SOCIAL HISTORY:   Significant other/partner:  [X ]    Children:  [X ]    Mandaen/Spirituality:   Substance hx:  [ ]   Tobacco hx:  [ ]   Alcohol hx: [ ] Drug use hx: [ ]  Home Opioid hx:  [ ] (I-Stop Reference No: [id number])  Living Situation: [X ]Home  [ ]Long term care  [ ]Rehab [ ]Other  Occupation:    ADVANCE DIRECTIVES:    DNR [ ]  MOLST  [ ]    Living Will  [ ]   DECISION MAKER(s):  [ ] Health Care Proxy(s)  [ ] Surrogate(s)  [ ] Guardian           Name(s) and Contact(s):   Rio Aparicio ([phone]) -spouse  Salina ([phone]) - daughter    BASELINE (I)ADL(s) (prior to admission):  Green: [ ]Total  [ ] Moderate [ ]Dependent    Allergies    No Known Drug Allergies  shellfish (Angioedema; Rash)    Intolerances    MEDICATIONS  (STANDING):  artificial tears (preservative free) Ophthalmic Solution 1 Drop(s) Both EYES every 6 hours  buDESOnide    Inhalation Suspension 0.5 milliGRAM(s) Inhalation two times a day  chlorhexidine 4% Liquid 1 Application(s) Topical <User Schedule>  dexamethasone  IVPB 10 milliGRAM(s) IV Intermittent every 6 hours  insulin lispro (HumaLOG) corrective regimen sliding scale   SubCutaneous every 6 hours  latanoprost 0.005% Ophthalmic Solution 1 Drop(s) Both EYES at bedtime  meropenem  IVPB 2000 milliGRAM(s) IV Intermittent every 8 hours  meropenem  IVPB      nystatin    Suspension 985956 Unit(s) Oral two times a day  petrolatum Ophthalmic Ointment 1 Application(s) Both EYES at bedtime  prednisoLONE acetate 1% Suspension 1 Drop(s) Both EYES four times a day  vancomycin  IVPB 750 milliGRAM(s) IV Intermittent every 12 hours    MEDICATIONS  (PRN):  acetaminophen    Suspension .. 650 milliGRAM(s) Enteral Tube every 6 hours PRN Moderate Pain (4 - 6)    PRESENT SYMPTOMS:   Source if other than patient:  [ ]Family   [ ]Team     Pain (Impact on QOL):    Location -         Minimal acceptable level (0-10 scale):                    Aggravating factors -  Quality -  Radiation -  Severity (0-10 scale) -    Timing -    PAIN AD Score:     [email or web address] (press ctrl +  left click to view)    Dyspnea:                           [ ]Mild [ ]Moderate [ ]Severe  Anxiety:                             [ ]Mild [ ]Moderate [ ]Severe  Fatigue:                             [ ]Mild [ ]Moderate [ ]Severe  Nausea:                             [ ]Mild [ ]Moderate [ ]Severe  Loss of appetite:              [ ]Mild [ ]Moderate [ ]Severe  Constipation:                    [ ]Mild [ ]Moderate [ ]Severe    Other Symptoms:  [ ]All other review of systems negative   [ ]Unable to obtain due to poor mentation     Karnofsky Performance Score/Palliative Performance Status Version 2:         %    PHYSICAL EXAM:  Vital Signs Last 24 Hrs  T(C): 36.7 (16 Aug 2019 05:17), Max: 36.8 (15 Aug 2019 14:51)  T(F): 98 (16 Aug 2019 05:17), Max: 98.2 (15 Aug 2019 14:51)  HR: 77 (16 Aug 2019 05:48) (77 - 132)  BP: 167/89 (16 Aug 2019 05:17) (122/72 - 167/89)  BP(mean): --  RR: 18 (16 Aug 2019 05:17) (17 - 18)  SpO2: 100% (16 Aug 2019 05:48) (95% - 100%) I&O's Summary    15 Aug 2019 07:01  -  16 Aug 2019 07:00  --------------------------------------------------------  IN: 800 mL / OUT: 500 mL / NET: 300 mL        GENERAL:  [ ]Alert  [ ]Oriented x   [ ]Lethargic  [ ]Cachexia  [ ]Unarousable  [ ]Verbal  [ ]Non-Verbal    Behavioral:   [ ] Anxiety  [ ] Delirium [ ] Agitation [ ] Other    HEENT:  [ ]Normal   [ ]Dry mouth   [ ]ET Tube/Trach  [ ]Oral lesions    PULMONARY:   [ ]Clear [ ]Tachypnea  [ ]Audible excessive secretions   [ ]Rhonchi        [ ]Right [ ]Left [ ]Bilateral  [ ]Crackles        [ ]Right [ ]Left [ ]Bilateral  [ ]Wheezing     [ ]Right [ ]Left [ ]Bilateral    CARDIOVASCULAR:    [ ]Regular [ ]Irregular [ ]Tachy  [ ]Jhonathan [ ]Murmur [ ]Other    GASTROINTESTINAL:  [ ]Soft  [ ]Distended   [ ]+BS  [ ]Non tender [ ]Tender  [ ]PEG [ ]OGT/ NGT  Last BM:     GENITOURINARY:  [ ]Normal [ ] Incontinent   [ ]Oliguria/Anuria   [ ]Marcos    MUSCULOSKELETAL:   [ ]Normal   [ ]Weakness  [ ]Bed/Wheelchair bound [ ]Edema    NEUROLOGIC:   [ ]No focal deficits  [ ] Cognitive impairment  [ ] Dysphagia [ ]Dysarthria [ ] Paresis [ ]Other     SKIN:   [ ]Normal   [ ]Pressure ulcer(s)  [ ]Rash    CRITICAL CARE:  [ ] Shock Present  [ ]Septic [ ]Cardiogenic [ ]Neurologic [ ]Hypovolemic  [ ]  Vasopressors [ ]  Inotropes   [ ] Respiratory failure present  [ ] Acute  [ ] Chronic [ ] Hypoxic  [ ] Hypercarbic [ ] Other  [ ] Other organ failure     LABS: reviewed                        9.7    17.7  )-----------( 382      ( 16 Aug 2019 07:22 )             32.2   08-16    136  |  97  |  29<H>  ----------------------------<  169<H>  4.4   |  29  |  0.55    Ca    9.1      16 Aug 2019 07:22  Phos  2.7     08-16  Mg     2.4     08-16    TPro  6.5  /  Alb  3.1<L>  /  TBili  0.4  /  DBili  x   /  AST  170<H>  /  ALT  372<H>  /  AlkPhos  88  08-16  PT/INR - ( 15 Aug 2019 07:15 )   PT: 14.0 sec;   INR: 1.21 ratio         PTT - ( 15 Aug 2019 07:15 )  PTT:29.4 sec      RADIOLOGY & ADDITIONAL STUDIES:    CT A/P 8/11/19  Cholelithiasis, with small amount of pericholecystic fluid and suspicion   of gallbladder wall thickening, concerning for acute cholecystitis.     Correlation with ultrasound or DISIDA is recommended for further   evaluation.    Interval placement of PEG tube    Partially imaged right lower lobe parenchymal consolidation, which may   represent pneumonia in the correct clinical scenario.     Persistent right adnexal cystic mass, which may represent a neoplasm.    This could be further evaluated with pelvic ultrasound as indicated.     PROTEIN CALORIE MALNUTRITION:   [ ] PPSV2 < or = to 30% [ ] significant weight loss  [ ] poor nutritional intake [ ] catabolic state [ ] anasarca     Albumin, Serum: 3.1 g/dL (08-16-19 @ 07:22)  Artificial Nutrition [ ]     REFERRALS:   [ ]Chaplaincy  [ ] Hospice  [ ]Child Life  [ ]Social Work  [ ]Case management [ ]Holistic Therapy     Goals of Care Discussion Document:     ........ ....... ...... ..... .... ... .. .  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 No acute changes on imaging, and no neurosurgical intervention recommended at this time. MRI pending. Is on vanco and meropenem currently. Also has concern for cholecystitis; surgery deferring intervention currently due to her clinical state, and IR is planing for percutaneous cholecystostomy tube placement. Per , was very active and in good health until her resection, and has had a decline since then. Palliative care called to discuss GOC.      PERTINENT PM/SXH:   Intracranial mass  Light-headedness  Glaucoma  Asthma  HTN (hypertension)    History of ankle fracture  Fractured elbow  H/O appendicitis    FAMILY HISTORY:    ITEMS NOT CHECKED ARE NOT PRESENT    SOCIAL HISTORY:   Significant other/partner:  [X ]    Children:  [X ]    Nondenominational/Spirituality:   Substance hx:  [ ]   Tobacco hx:  [ ]   Alcohol hx: [ ] Drug use hx: [ ]  Home Opioid hx:  [ ] (I-Stop Reference No: [id number])  Living Situation: [X ]Home  [ ]Long term care  [ ]Rehab [ ]Other  Occupation:    ADVANCE DIRECTIVES:    DNR [ ]  MOLST  [ ]    Living Will  [ ]   DECISION MAKER(s):  [ ] Health Care Proxy(s)  [ ] Surrogate(s)  [ ] Guardian           Name(s) and Contact(s):   Rio Aparicio ([phone]) -spouse  Salina ([phone]) - daughter    BASELINE (I)ADL(s) (prior to admission):  [place]: [ ]Total  [ ] Moderate [ ]Dependent    Allergies    No Known Drug Allergies  shellfish (Angioedema; Rash)    Intolerances    MEDICATIONS  (STANDING):  artificial tears (preservative free) Ophthalmic Solution 1 Drop(s) Both EYES every 6 hours  buDESOnide    Inhalation Suspension 0.5 milliGRAM(s) Inhalation two times a day  chlorhexidine 4% Liquid 1 Application(s) Topical <User Schedule>  dexamethasone  IVPB 10 milliGRAM(s) IV Intermittent every 6 hours  insulin lispro (HumaLOG) corrective regimen sliding scale   SubCutaneous every 6 hours  latanoprost 0.005% Ophthalmic Solution 1 Drop(s) Both EYES at bedtime  meropenem  IVPB 2000 milliGRAM(s) IV Intermittent every 8 hours  meropenem  IVPB      nystatin    Suspension [id number] Unit(s) Oral two times a day  petrolatum Ophthalmic Ointment 1 Application(s) Both EYES at bedtime  prednisoLONE acetate 1% Suspension 1 Drop(s) Both EYES four times a day  vancomycin  IVPB 750 milliGRAM(s) IV Intermittent every 12 hours    MEDICATIONS  (PRN):  acetaminophen    Suspension .. 650 milliGRAM(s) Enteral Tube every 6 hours PRN Moderate Pain (4 - 6)    PRESENT SYMPTOMS:   Source if other than patient:  [ ]Family   [ ]Team     Pain (Impact on QOL):    Location -         Minimal acceptable level (0-10 scale):                    Aggravating factors -  Quality -  Radiation -  Severity (0-10 scale) -    Timing -    PAIN AD Score:     [email or web address] (press ctrl +  left click to view)    Dyspnea:                           [ ]Mild [ ]Moderate [ ]Severe  Anxiety:                             [ ]Mild [ ]Moderate [ ]Severe  Fatigue:                             [ ]Mild [ ]Moderate [ ]Severe  Nausea:                             [ ]Mild [ ]Moderate [ ]Severe  Loss of appetite:              [ ]Mild [ ]Moderate [ ]Severe  Constipation:                    [ ]Mild [ ]Moderate [ ]Severe    Other Symptoms:  [X ]All other review of systems negative   [ ]Unable to obtain due to poor mentation     Karnofsky Performance Score/Palliative Performance Status Version 2:         %    PHYSICAL EXAM:  Vital Signs Last 24 Hrs  T(C): 36.7 (16 Aug 2019 05:17), Max: 36.8 (15 Aug 2019 14:51)  T(F): 98 (16 Aug 2019 05:17), Max: 98.2 (15 Aug 2019 14:51)  HR: 77 (16 Aug 2019 05:48) (77 - 132)  BP: 167/89 (16 Aug 2019 05:17) (122/72 - 167/89)  BP(mean): --  RR: 18 (16 Aug 2019 05:17) (17 - 18)  SpO2: 100% (16 Aug 2019 05:48) (95% - 100%) I&O's Summary    15 Aug 2019 07:01  -  16 Aug 2019 07:00  --------------------------------------------------------  IN: 800 mL / OUT: 500 mL / NET: 300 mL        GENERAL:  [ ]Alert  [ ]Oriented x   [X ]Lethargic  [ ]Cachexia  [ ]Unarousable  [X ]Verbal  [ ]Non-Verbal    Behavioral:   [ ] Anxiety  [ ] Delirium [ ] Agitation [ ] Other    HEENT:  [ ]Normal   [ ]Dry mouth   [ ]ET Tube/Trach  [ ]Oral lesions    PULMONARY:   [X ]Clear [ ]Tachypnea  [ ]Audible excessive secretions   [ ]Rhonchi        [ ]Right [ ]Left [ ]Bilateral  [ ]Crackles        [ ]Right [ ]Left [ ]Bilateral  [ ]Wheezing     [ ]Right [ ]Left [ ]Bilateral    CARDIOVASCULAR:    [X ]Regular [ ]Irregular [ ]Tachy  [ ]Jhonathan [ ]Murmur [ ]Other    GASTROINTESTINAL:  [ X]Soft  [ ]Distended   [X ]+BS  [X ]Non tender [ ]Tender  [ ]PEG [ ]OGT/ NGT  Last BM:     GENITOURINARY:  [ ]Normal [ ] Incontinent   [ ]Oliguria/Anuria   [X ]Marcos not present    MUSCULOSKELETAL:   [ ]Normal   [ ]Weakness  [ ]Bed/Wheelchair bound [ ]Edema    NEUROLOGIC:   [ ]No focal deficits  [ ] Cognitive impairment  [ ] Dysphagia [ ]Dysarthria [ ] Paresis [ ]Other     SKIN:   [ ]Normal   [ ]Pressure ulcer(s)  [X ]Skin tear (see RN documentation)    CRITICAL CARE:  [ ] Shock Present  [ ]Septic [ ]Cardiogenic [ ]Neurologic [ ]Hypovolemic  [ ]  Vasopressors [ ]  Inotropes   [ ] Respiratory failure present  [ ] Acute  [ ] Chronic [ ] Hypoxic  [ ] Hypercarbic [ ] Other  [ ] Other organ failure     LABS: reviewed                        9.7    17.7  )-----------( 382      ( 16 Aug 2019 07:22 )             32.2   08-16    136  |  97  |  29<H>  ----------------------------<  169<H>  4.4   |  29  |  0.55    Ca    9.1      16 Aug 2019 07:22  Phos  2.7     08-16  Mg     2.4     08-16    TPro  6.5  /  Alb  3.1<L>  /  TBili  0.4  /  DBili  x   /  AST  170<H>  /  ALT  372<H>  /  AlkPhos  88  08-16  PT/INR - ( 15 Aug 2019 07:15 )   PT: 14.0 sec;   INR: 1.21 ratio         PTT - ( 15 Aug 2019 07:15 )  PTT:29.4 sec      RADIOLOGY & ADDITIONAL STUDIES:    CT A/P 8/11/19  Cholelithiasis, with small amount of pericholecystic fluid and suspicion   of gallbladder wall thickening, concerning for acute cholecystitis.     Correlation with ultrasound or DISIDA is recommended for further   evaluation.    Interval placement of PEG tube    Partially imaged right lower lobe parenchymal consolidation, which may   represent pneumonia in the correct clinical scenario.     Persistent right adnexal cystic mass, which may represent a neoplasm.    This could be further evaluated with pelvic ultrasound as indicated.     PROTEIN CALORIE MALNUTRITION:   [ ] PPSV2 < or = to 30% [ ] significant weight loss  [ ] poor nutritional intake [ ] catabolic state [ ] anasarca     Albumin, Serum: 3.1 g/dL (08-16-19 @ 07:22)  Artificial Nutrition [ ]     REFERRALS:   [ ]Chaplaincy  [ ] Hospice  [ ]Child Life  [ ]Social Work  [ ]Case management [ ]Holistic Therapy     Goals of Care Discussion Document:     ........ ....... ...... ..... .... ... .. .  Reji Whitney MD  Palliative Medicine  office: [phone] | [phone]  pager: [phone]

## 2019-08-16 NOTE — PROGRESS NOTE ADULT - PROBLEM SELECTOR PLAN 3
-CTA showing thickened gallbladder, fluid collection, possibly 2/2 acute cholecystitis, but LFTS wnl  -HIDA w/ no visualization of gallbladder  -Surgery consulted. No surgical intervention at this time 2/2 medical status of pt  -IR consulted for percutaneous cholecystostomy tube placement Acute cholecystitis shown on CTA and HIDA.   -Surgery consulted. No surgical intervention at this time 2/2 medical status of pt  -IR consulted for percutaneous cholecystostomy tube placement Acute cholecystitis shown on CTA and HIDA s/p perc kylee placement by IR.  - Surgery consulted: no acute surgical intervention.   - continuing w/ ABx as above.   - biliary culture pending.

## 2019-08-16 NOTE — PROGRESS NOTE ADULT - PROBLEM SELECTOR PLAN 4
-c/w feeds via PEG tube  -c/s nutrition- recs appreciated - c/w feeds via PEG tube  - c/s nutrition- recs appreciated

## 2019-08-17 LAB
ALBUMIN SERPL ELPH-MCNC: 3.1 G/DL — LOW (ref 3.3–5)
ALP SERPL-CCNC: 93 U/L — SIGNIFICANT CHANGE UP (ref 40–120)
ALT FLD-CCNC: 288 U/L — HIGH (ref 10–45)
ANION GAP SERPL CALC-SCNC: 11 MMOL/L — SIGNIFICANT CHANGE UP (ref 5–17)
AST SERPL-CCNC: 60 U/L — HIGH (ref 10–40)
BILIRUB SERPL-MCNC: 0.4 MG/DL — SIGNIFICANT CHANGE UP (ref 0.2–1.2)
BUN SERPL-MCNC: 25 MG/DL — HIGH (ref 7–23)
CALCIUM SERPL-MCNC: 9.3 MG/DL — SIGNIFICANT CHANGE UP (ref 8.4–10.5)
CHLORIDE SERPL-SCNC: 97 MMOL/L — SIGNIFICANT CHANGE UP (ref 96–108)
CO2 SERPL-SCNC: 28 MMOL/L — SIGNIFICANT CHANGE UP (ref 22–31)
CREAT SERPL-MCNC: 0.49 MG/DL — LOW (ref 0.5–1.3)
CULTURE RESULTS: NO GROWTH — SIGNIFICANT CHANGE UP
GLUCOSE BLDC GLUCOMTR-MCNC: 137 MG/DL — HIGH (ref 70–99)
GLUCOSE BLDC GLUCOMTR-MCNC: 142 MG/DL — HIGH (ref 70–99)
GLUCOSE BLDC GLUCOMTR-MCNC: 148 MG/DL — HIGH (ref 70–99)
GLUCOSE BLDC GLUCOMTR-MCNC: 152 MG/DL — HIGH (ref 70–99)
GLUCOSE BLDC GLUCOMTR-MCNC: 165 MG/DL — HIGH (ref 70–99)
GLUCOSE SERPL-MCNC: 163 MG/DL — HIGH (ref 70–99)
HCT VFR BLD CALC: 33.9 % — LOW (ref 34.5–45)
HGB BLD-MCNC: 10.2 G/DL — LOW (ref 11.5–15.5)
MAGNESIUM SERPL-MCNC: 2.2 MG/DL — SIGNIFICANT CHANGE UP (ref 1.6–2.6)
MCHC RBC-ENTMCNC: 27 PG — SIGNIFICANT CHANGE UP (ref 27–34)
MCHC RBC-ENTMCNC: 30.2 GM/DL — LOW (ref 32–36)
MCV RBC AUTO: 89.4 FL — SIGNIFICANT CHANGE UP (ref 80–100)
PHOSPHATE SERPL-MCNC: 2.3 MG/DL — LOW (ref 2.5–4.5)
PLATELET # BLD AUTO: 326 K/UL — SIGNIFICANT CHANGE UP (ref 150–400)
POTASSIUM SERPL-MCNC: 4.4 MMOL/L — SIGNIFICANT CHANGE UP (ref 3.5–5.3)
POTASSIUM SERPL-SCNC: 4.4 MMOL/L — SIGNIFICANT CHANGE UP (ref 3.5–5.3)
PROT SERPL-MCNC: 6.1 G/DL — SIGNIFICANT CHANGE UP (ref 6–8.3)
RBC # BLD: 3.79 M/UL — LOW (ref 3.8–5.2)
RBC # FLD: 15 % — HIGH (ref 10.3–14.5)
SODIUM SERPL-SCNC: 136 MMOL/L — SIGNIFICANT CHANGE UP (ref 135–145)
SPECIMEN SOURCE: SIGNIFICANT CHANGE UP
VANCOMYCIN TROUGH SERPL-MCNC: 13.6 UG/ML — SIGNIFICANT CHANGE UP (ref 10–20)
WBC # BLD: 21.5 K/UL — HIGH (ref 3.8–10.5)
WBC # FLD AUTO: 21.5 K/UL — HIGH (ref 3.8–10.5)

## 2019-08-17 PROCEDURE — 99233 SBSQ HOSP IP/OBS HIGH 50: CPT | Mod: GC

## 2019-08-17 RX ADMIN — Medication 1 DROP(S): at 17:19

## 2019-08-17 RX ADMIN — Medication 500000 UNIT(S): at 06:17

## 2019-08-17 RX ADMIN — Medication 250 MILLIGRAM(S): at 08:50

## 2019-08-17 RX ADMIN — MEROPENEM 200 MILLIGRAM(S): 1 INJECTION INTRAVENOUS at 15:28

## 2019-08-17 RX ADMIN — ENOXAPARIN SODIUM 40 MILLIGRAM(S): 100 INJECTION SUBCUTANEOUS at 12:07

## 2019-08-17 RX ADMIN — Medication 1 DROP(S): at 12:07

## 2019-08-17 RX ADMIN — SODIUM CHLORIDE 50 MILLILITER(S): 9 INJECTION, SOLUTION INTRAVENOUS at 08:50

## 2019-08-17 RX ADMIN — Medication 102 MILLIGRAM(S): at 13:59

## 2019-08-17 RX ADMIN — Medication 1 DROP(S): at 06:16

## 2019-08-17 RX ADMIN — LATANOPROST 1 DROP(S): 0.05 SOLUTION/ DROPS OPHTHALMIC; TOPICAL at 21:35

## 2019-08-17 RX ADMIN — SODIUM CHLORIDE 50 MILLILITER(S): 9 INJECTION, SOLUTION INTRAVENOUS at 12:11

## 2019-08-17 RX ADMIN — Medication 500000 UNIT(S): at 17:19

## 2019-08-17 RX ADMIN — SODIUM CHLORIDE 50 MILLILITER(S): 9 INJECTION, SOLUTION INTRAVENOUS at 15:28

## 2019-08-17 RX ADMIN — Medication 102 MILLIGRAM(S): at 20:18

## 2019-08-17 RX ADMIN — CHLORHEXIDINE GLUCONATE 1 APPLICATION(S): 213 SOLUTION TOPICAL at 05:25

## 2019-08-17 RX ADMIN — Medication 1 DROP(S): at 06:17

## 2019-08-17 RX ADMIN — Medication 1 APPLICATION(S): at 21:35

## 2019-08-17 RX ADMIN — Medication 1: at 06:16

## 2019-08-17 RX ADMIN — MEROPENEM 200 MILLIGRAM(S): 1 INJECTION INTRAVENOUS at 22:34

## 2019-08-17 RX ADMIN — Medication 102 MILLIGRAM(S): at 05:23

## 2019-08-17 RX ADMIN — Medication 1 DROP(S): at 12:08

## 2019-08-17 RX ADMIN — Medication 1: at 17:23

## 2019-08-17 RX ADMIN — Medication 250 MILLIGRAM(S): at 17:35

## 2019-08-17 RX ADMIN — MEROPENEM 200 MILLIGRAM(S): 1 INJECTION INTRAVENOUS at 06:16

## 2019-08-17 NOTE — PROGRESS NOTE ADULT - PROBLEM SELECTOR PLAN 6
-Pt requires full assistance with ADLs  -Care per nursing protocol  -Per  pt was able to walk with walker two weeks prior to this admission

## 2019-08-17 NOTE — PROGRESS NOTE ADULT - PROBLEM SELECTOR PLAN 2
-In setting of acute kylee and possible CNS infection. Now resolved and patient off pressors  -Acute cholecystitis workup as below  -s/p LP negative for infection as of today.  - MRI head pending   -c/w vanc and meropenem. If MRI showing no signs of empyema, can d/c vanc.  -Vanco trough q4th dose. Dose adjustment for goal of 10-20. -In setting of acute kylee and possible CNS infection. Now resolved and patient off pressors  -Acute cholecystitis workup as below  -s/p LP negative for infection as of today.  -c/w meropenem. Since no signs of empyema on MRI, will discuss d/c vanc w/ ID  -Vanco trough q4th dose. Dose adjustment for goal of 10-20 for now

## 2019-08-17 NOTE — PROGRESS NOTE ADULT - PROBLEM SELECTOR PLAN 1
-Likely in setting of sepsis. Alert and responsive to verbal commands. Almost back to baseline per   -CTH showing no acute changes, however increase in size of CSF density fluid collection representing CSF leak vs meningocele   -Neurosx: No surgical intervention at this time  -MRI Head pending   -Infectious workup and management as below -Likely in setting of sepsis. Alert and responsive to verbal commands. Almost back to baseline per   -MRI showing the suboccipital extracranial fluid collection has increased in size   compared to previous MRI most consistent with pseudomeningocele  -Neurosx: No surgical intervention at this time, f/u outpt  -Infectious workup and management as below

## 2019-08-17 NOTE — PROGRESS NOTE ADULT - PROBLEM SELECTOR PLAN 3
-Acute cholecystitis shown on CTA and HIDA s/p perc kylee placement by IR  -Surgery consulted: no acute surgical intervention  -Continuing w/ ABx as above   -Biliary culture pending

## 2019-08-17 NOTE — PROGRESS NOTE ADULT - PROBLEM SELECTOR PLAN 5
-DVT ppx: SQH   -PT/OT  -Palliative following, will have GOC discussion at their subsequent encounter  FULL CODE

## 2019-08-17 NOTE — PROGRESS NOTE ADULT - ASSESSMENT
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 Em on 6/24, recurrent aspiration s/p PEG tube, who presents from [facility] s/p a week of gradually worsening mental status along with fever x3 days found to be septic 2/2 acute kylee and possible CNS infection.

## 2019-08-17 NOTE — PROGRESS NOTE ADULT - SUBJECTIVE AND OBJECTIVE BOX
Joie Augustine MD  Beeper: [phone] ([facility])/ [id number] ([facility])     Subjective:    Patient is a 69y old  Female who presents with a chief complaint of Hypotension (16 Aug 2019 12:24)      Overnight events:    MEDICATIONS  (STANDING):  artificial tears (preservative free) Ophthalmic Solution 1 Drop(s) Both EYES every 6 hours  buDESOnide    Inhalation Suspension 0.5 milliGRAM(s) Inhalation two times a day  chlorhexidine 4% Liquid 1 Application(s) Topical <User Schedule>  dexamethasone  IVPB 10 milliGRAM(s) IV Intermittent every 6 hours  enoxaparin Injectable 40 milliGRAM(s) SubCutaneous daily  insulin lispro (HumaLOG) corrective regimen sliding scale   SubCutaneous every 6 hours  lactated ringers. 1000 milliLiter(s) (50 mL/Hr) IV Continuous <Continuous>  latanoprost 0.005% Ophthalmic Solution 1 Drop(s) Both EYES at bedtime  meropenem  IVPB 2000 milliGRAM(s) IV Intermittent every 8 hours  meropenem  IVPB      nystatin    Suspension 888003 Unit(s) Oral two times a day  petrolatum Ophthalmic Ointment 1 Application(s) Both EYES at bedtime  prednisoLONE acetate 1% Suspension 1 Drop(s) Both EYES four times a day  vancomycin  IVPB 750 milliGRAM(s) IV Intermittent every 12 hours    MEDICATIONS  (PRN):  acetaminophen    Suspension .. 650 milliGRAM(s) Enteral Tube every 6 hours PRN Moderate Pain (4 - 6)      Objective:    Vitals: Vital Signs Last 24 Hrs  T(C): 36.6 (08-17-19 @ 05:36), Max: 36.6 (08-17-19 @ 01:00)  T(F): 97.8 (08-17-19 @ 05:36), Max: 97.9 (08-17-19 @ 01:00)  HR: 106 (08-17-19 @ 05:36) (106 - 116)  BP: 145/84 (08-17-19 @ 05:36) (139/88 - 169/96)  BP(mean): --  RR: 20 (08-17-19 @ 05:36) (19 - 20)  SpO2: 93% (08-17-19 @ 05:36) (93% - 97%)              I&O's Summary    16 Aug 2019 07:01  -  17 Aug 2019 07:00  --------------------------------------------------------  IN: 950 mL / OUT: 800 mL / NET: 150 mL        PHYSICAL EXAM:  GENERAL: NAD, well-groomed, well-developed  HEAD:  Atraumatic, Normocephalic  EYES: EOMI, PERRLA, conjunctiva and sclera clear  ENMT: No tonsillar erythema, exudates, or enlargement; Moist mucous membranes, Good dentition, No lesions  NECK: Supple, No JVD, Normal thyroid  CHEST/LUNG: Clear to percussion bilaterally; No rales, rhonchi, wheezing, or rubs  HEART: Regular rate and rhythm; No murmurs, rubs, or gallops  ABDOMEN: Soft, Nontender, Nondistended; Bowel sounds present  EXTREMITIES:  2+ Peripheral Pulses, No clubbing, cyanosis, or edema  LYMPH: No lymphadenopathy noted  SKIN: No rashes or lesions  NERVOUS SYSTEM:  Alert & Oriented X3, Good concentration; Motor Strength 5/5 B/L upper and lower extremities; DTRs 2+ intact and symmetric                                             LABS:             10.2   21.5  )-----------( 326      ( 17 Aug 2019 06:50 )             33.9     136  |  97  |  25<H>  ----------------------------<  163<H>  4.4   |  28  |  0.49<L>    Ca    9.3      17 Aug 2019 06:48  Phos  2.3     08-17  Mg     2.2     08-17    TPro  6.1  /  Alb  3.1<L>  /  TBili  0.4  /  DBili  x   /  AST  60<H>  /  ALT  288<H>  /  AlkPhos  93  08-17    LIVER FUNCTIONS - ( 17 Aug 2019 06:48 )  Alb: 3.1 g/dL / Pro: 6.1 g/dL / ALK PHOS: 93 U/L / ALT: 288 U/L / AST: 60 U/L / GGT: x           Culture - Body Fluid with Gram Stain (08.15.19 @ 18:57)    Gram Stain:   Rare polymorphonuclear leukocytes seen per low power field  No organisms seen per oil power field    Specimen Source: .Body Fluid    Culture Results:   No growth    Culture - Blood in AM (08.15.19 @ 09:52)    Specimen Source: .Blood    Culture Results:   No growth to date.    Culture - CSF with Gram Stain . (08.14.19 @ 19:04)    Gram Stain:   No polymorphonuclear cells seen  No organisms seen  by cytocentrifuge    Specimen Source: .CSF    Culture Results:   No growth Joie Augustine MD  Beeper: [phone] ([facility])/ [id number] ([facility])     Subjective:    Patient is a 69y old  Female who presents with a chief complaint of Hypotension (16 Aug 2019 12:24)    Overnight events:    ***    MEDICATIONS  (STANDING):  artificial tears (preservative free) Ophthalmic Solution 1 Drop(s) Both EYES every 6 hours  buDESOnide    Inhalation Suspension 0.5 milliGRAM(s) Inhalation two times a day  chlorhexidine 4% Liquid 1 Application(s) Topical <User Schedule>  dexamethasone  IVPB 10 milliGRAM(s) IV Intermittent every 6 hours  enoxaparin Injectable 40 milliGRAM(s) SubCutaneous daily  insulin lispro (HumaLOG) corrective regimen sliding scale   SubCutaneous every 6 hours  lactated ringers. 1000 milliLiter(s) (50 mL/Hr) IV Continuous <Continuous>  latanoprost 0.005% Ophthalmic Solution 1 Drop(s) Both EYES at bedtime  meropenem  IVPB 2000 milliGRAM(s) IV Intermittent every 8 hours  meropenem  IVPB      nystatin    Suspension 903316 Unit(s) Oral two times a day  petrolatum Ophthalmic Ointment 1 Application(s) Both EYES at bedtime  prednisoLONE acetate 1% Suspension 1 Drop(s) Both EYES four times a day  vancomycin  IVPB 750 milliGRAM(s) IV Intermittent every 12 hours    MEDICATIONS  (PRN):  acetaminophen    Suspension .. 650 milliGRAM(s) Enteral Tube every 6 hours PRN Moderate Pain (4 - 6)      Objective:    Vitals: Vital Signs Last 24 Hrs  T(C): 36.6 (08-17-19 @ 05:36), Max: 36.6 (08-17-19 @ 01:00)  T(F): 97.8 (08-17-19 @ 05:36), Max: 97.9 (08-17-19 @ 01:00)  HR: 106 (08-17-19 @ 05:36) (106 - 116)  BP: 145/84 (08-17-19 @ 05:36) (139/88 - 169/96)  BP(mean): --  RR: 20 (08-17-19 @ 05:36) (19 - 20)  SpO2: 93% (08-17-19 @ 05:36) (93% - 97%)              I&O's Summary    16 Aug 2019 07:01  -  17 Aug 2019 07:00  --------------------------------------------------------  IN: 950 mL / OUT: 800 mL / NET: 150 mL        PHYSICAL EXAM:  GENERAL: NAD, well-groomed, well-developed  HEAD:  Atraumatic, Normocephalic  EYES: EOMI, PERRLA, conjunctiva and sclera clear  ENMT: No tonsillar erythema, exudates, or enlargement; Moist mucous membranes, Good dentition, No lesions  NECK: Supple, No JVD, Normal thyroid  CHEST/LUNG: Clear to percussion bilaterally; No rales, rhonchi, wheezing, or rubs  HEART: Regular rate and rhythm; No murmurs, rubs, or gallops  ABDOMEN: Soft, Nontender, Nondistended; Bowel sounds present  EXTREMITIES:  2+ Peripheral Pulses, No clubbing, cyanosis, or edema  LYMPH: No lymphadenopathy noted  SKIN: No rashes or lesions  NERVOUS SYSTEM:  Alert & Oriented X3, Good concentration; Motor Strength 5/5 B/L upper and lower extremities; DTRs 2+ intact and symmetric                                             LABS:             10.2   21.5  )-----------( 326      ( 17 Aug 2019 06:50 )             33.9     136  |  97  |  25<H>  ----------------------------<  163<H>  4.4   |  28  |  0.49<L>    Ca    9.3      17 Aug 2019 06:48  Phos  2.3     08-17  Mg     2.2     08-17    TPro  6.1  /  Alb  3.1<L>  /  TBili  0.4  /  DBili  x   /  AST  60<H>  /  ALT  288<H>  /  AlkPhos  93  08-17    LIVER FUNCTIONS - ( 17 Aug 2019 06:48 )  Alb: 3.1 g/dL / Pro: 6.1 g/dL / ALK PHOS: 93 U/L / ALT: 288 U/L / AST: 60 U/L / GGT: x           Culture - Body Fluid with Gram Stain (08.15.19 @ 18:57)    Gram Stain:   Rare polymorphonuclear leukocytes seen per low power field  No organisms seen per oil power field    Specimen Source: .Body Fluid    Culture Results:   No growth    Culture - Blood in AM (08.15.19 @ 09:52)    Specimen Source: .Blood    Culture Results:   No growth to date.    Culture - CSF with Gram Stain . (08.14.19 @ 19:04)    Gram Stain:   No polymorphonuclear cells seen  No organisms seen  by cytocentrifuge    Specimen Source: .CSF    Culture Results:   No growth Joie Augustine MD  Beeper: [phone] ([facility])/ [id number] ([facility])     Subjective:    Patient is a 69y old  Female who presents with a chief complaint of Hypotension (16 Aug 2019 12:24)    Overnight events:    No acute events overnight.     Unable to assess ROS 2/2 pt mental status    MEDICATIONS  (STANDING):  artificial tears (preservative free) Ophthalmic Solution 1 Drop(s) Both EYES every 6 hours  buDESOnide    Inhalation Suspension 0.5 milliGRAM(s) Inhalation two times a day  chlorhexidine 4% Liquid 1 Application(s) Topical <User Schedule>  dexamethasone  IVPB 10 milliGRAM(s) IV Intermittent every 6 hours  enoxaparin Injectable 40 milliGRAM(s) SubCutaneous daily  insulin lispro (HumaLOG) corrective regimen sliding scale   SubCutaneous every 6 hours  lactated ringers. 1000 milliLiter(s) (50 mL/Hr) IV Continuous <Continuous>  latanoprost 0.005% Ophthalmic Solution 1 Drop(s) Both EYES at bedtime  meropenem  IVPB 2000 milliGRAM(s) IV Intermittent every 8 hours  meropenem  IVPB      nystatin    Suspension 854078 Unit(s) Oral two times a day  petrolatum Ophthalmic Ointment 1 Application(s) Both EYES at bedtime  prednisoLONE acetate 1% Suspension 1 Drop(s) Both EYES four times a day  vancomycin  IVPB 750 milliGRAM(s) IV Intermittent every 12 hours    MEDICATIONS  (PRN):  acetaminophen    Suspension .. 650 milliGRAM(s) Enteral Tube every 6 hours PRN Moderate Pain (4 - 6)      Objective:  Vitals: Vital Signs Last 24 Hrs  T(C): 36.6 (08-17-19 @ 05:36), Max: 36.6 (08-17-19 @ 01:00)  T(F): 97.8 (08-17-19 @ 05:36), Max: 97.9 (08-17-19 @ 01:00)  HR: 106 (08-17-19 @ 05:36) (106 - 116)  BP: 145/84 (08-17-19 @ 05:36) (139/88 - 169/96)  BP(mean): --  RR: 20 (08-17-19 @ 05:36) (19 - 20)  SpO2: 93% (08-17-19 @ 05:36) (93% - 97%)              I&O's Summary    16 Aug 2019 07:01  -  17 Aug 2019 07:00  --------------------------------------------------------  IN: 950 mL / OUT: 800 mL / NET: 150 mL    PHYSICAL EXAM:  GENERAL: awake, alert, but altered and moaning in pain  HEENT: NC/AT, MMM, PERRL  LUNGS: Mild right lower lobe basilar crackle. No wheezing. .   CARDIAC: regular and tachycardic. no M/R/G. Cap refill ~ 3 seconds. 2+ distal pulses. JVP difficult to assess due to body habitus.  ABDOMEN: Soft, ND, TTP on perc kylee site, yet site c/d/i. PEG tube C/D/I without erythema or drainage  EXT: Trace of pedal edema. No calf tenderness.    NEURO: Disoriented upon awakening this AM. Not following commands. Answers some yes or no questions appropriately. Moving all extremities, hand  and ROM normal w/ 4/5 strength                                          LABS:             10.2   21.5  )-----------( 326      ( 17 Aug 2019 06:50 )             33.9     136  |  97  |  25<H>  ----------------------------<  163<H>  4.4   |  28  |  0.49<L>    Ca    9.3      17 Aug 2019 06:48  Phos  2.3     08-17  Mg     2.2     08-17    TPro  6.1  /  Alb  3.1<L>  /  TBili  0.4  /  DBili  x   /  AST  60<H>  /  ALT  288<H>  /  AlkPhos  93  08-17    LIVER FUNCTIONS - ( 17 Aug 2019 06:48 )  Alb: 3.1 g/dL / Pro: 6.1 g/dL / ALK PHOS: 93 U/L / ALT: 288 U/L / AST: 60 U/L / GGT: x           Culture - Body Fluid with Gram Stain (08.15.19 @ 18:57)    Gram Stain:   Rare polymorphonuclear leukocytes seen per low power field  No organisms seen per oil power field    Specimen Source: .Body Fluid    Culture Results:   No growth    Culture - Blood in AM (08.15.19 @ 09:52)    Specimen Source: .Blood    Culture Results:   No growth to date.    Culture - CSF with Gram Stain . (08.14.19 @ 19:04)    Gram Stain:   No polymorphonuclear cells seen  No organisms seen  by cytocentrifuge    Specimen Source: .CSF    Culture Results:   No growth Joie Augustine MD  Beeper: [phone] ([facility])/ [id number] ([facility])     Subjective:    Patient is a 69y old  Female who presents with a chief complaint of Hypotension (16 Aug 2019 12:24)    Overnight events:    No acute events overnight.     Unable to assess ROS 2/2 pt mental status    MEDICATIONS  (STANDING):  artificial tears (preservative free) Ophthalmic Solution 1 Drop(s) Both EYES every 6 hours  buDESOnide    Inhalation Suspension 0.5 milliGRAM(s) Inhalation two times a day  chlorhexidine 4% Liquid 1 Application(s) Topical <User Schedule>  dexamethasone  IVPB 10 milliGRAM(s) IV Intermittent every 6 hours  enoxaparin Injectable 40 milliGRAM(s) SubCutaneous daily  insulin lispro (HumaLOG) corrective regimen sliding scale   SubCutaneous every 6 hours  lactated ringers. 1000 milliLiter(s) (50 mL/Hr) IV Continuous <Continuous>  latanoprost 0.005% Ophthalmic Solution 1 Drop(s) Both EYES at bedtime  meropenem  IVPB 2000 milliGRAM(s) IV Intermittent every 8 hours  meropenem  IVPB      nystatin    Suspension 989027 Unit(s) Oral two times a day  petrolatum Ophthalmic Ointment 1 Application(s) Both EYES at bedtime  prednisoLONE acetate 1% Suspension 1 Drop(s) Both EYES four times a day  vancomycin  IVPB 750 milliGRAM(s) IV Intermittent every 12 hours    MEDICATIONS  (PRN):  acetaminophen    Suspension .. 650 milliGRAM(s) Enteral Tube every 6 hours PRN Moderate Pain (4 - 6)      Objective:  Vitals: Vital Signs Last 24 Hrs  T(C): 36.6 (08-17-19 @ 05:36), Max: 36.6 (08-17-19 @ 01:00)  T(F): 97.8 (08-17-19 @ 05:36), Max: 97.9 (08-17-19 @ 01:00)  HR: 106 (08-17-19 @ 05:36) (106 - 116)  BP: 145/84 (08-17-19 @ 05:36) (139/88 - 169/96)  BP(mean): --  RR: 20 (08-17-19 @ 05:36) (19 - 20)  SpO2: 93% (08-17-19 @ 05:36) (93% - 97%)              I&O's Summary    16 Aug 2019 07:01  -  17 Aug 2019 07:00  --------------------------------------------------------  IN: 950 mL / OUT: 800 mL / NET: 150 mL    PHYSICAL EXAM:  GENERAL: awake, alert, but altered and moaning in pain  HEENT: NC/AT, MMM, PERRL  LUNGS: Mild right lower lobe basilar crackle. No wheezing. .   CARDIAC: regular and tachycardic. no M/R/G. Cap refill ~ 3 seconds. 2+ distal pulses. JVP difficult to assess due to body habitus.  ABDOMEN: Soft, ND, TTP on perc kylee site, yet site c/d/i. PEG tube C/D/I without erythema or drainage  EXT: Trace of pedal edema. No calf tenderness.    NEURO: Disoriented upon awakening this AM. Not following commands. Answers some yes or no questions appropriately. Moving all extremities, hand  and ROM normal w/ 4/5 strength                                          LABS:             10.2   21.5  )-----------( 326      ( 17 Aug 2019 06:50 )             33.9     136  |  97  |  25<H>  ----------------------------<  163<H>  4.4   |  28  |  0.49<L>    Ca    9.3      17 Aug 2019 06:48  Phos  2.3     08-17  Mg     2.2     08-17    TPro  6.1  /  Alb  3.1<L>  /  TBili  0.4  /  DBili  x   /  AST  60<H>  /  ALT  288<H>  /  AlkPhos  93  08-17    LIVER FUNCTIONS - ( 17 Aug 2019 06:48 )  Alb: 3.1 g/dL / Pro: 6.1 g/dL / ALK PHOS: 93 U/L / ALT: 288 U/L / AST: 60 U/L / GGT: x           Culture - Body Fluid with Gram Stain (08.15.19 @ 18:57)    Gram Stain:   Rare polymorphonuclear leukocytes seen per low power field  No organisms seen per oil power field    Specimen Source: .Body Fluid    Culture Results:   No growth    Culture - Blood in AM (08.15.19 @ 09:52)    Specimen Source: .Blood    Culture Results:   No growth to date.    Culture - CSF with Gram Stain . (08.14.19 @ 19:04)    Gram Stain:   No polymorphonuclear cells seen  No organisms seen  by cytocentrifuge    Specimen Source: .CSF    Culture Results:   No growth    MR Head w/wo IV Cont (08.16.19 @ 19:40)  IMPRESSION:     The suboccipital extracranial fluid collection has increased in size   compared to previous MRI most consistent with pseudomeningocele. There is   no evidence for underlying abscess.    Evolving postoperative changes are again noted status post resection of   fourth ventricular mass, without evidence for residual or recurrent tumor.    Slight interval increase in size of the lateral ventricles compared to   the prior MRI study. Serial imaging follow-up of the ventricular size   over time is recommended.

## 2019-08-18 ENCOUNTER — TRANSCRIPTION ENCOUNTER (OUTPATIENT)
Age: 69
End: 2019-08-18

## 2019-08-18 LAB
ALBUMIN SERPL ELPH-MCNC: 3 G/DL — LOW (ref 3.3–5)
ALP SERPL-CCNC: 97 U/L — SIGNIFICANT CHANGE UP (ref 40–120)
ALT FLD-CCNC: 290 U/L — HIGH (ref 10–45)
ANION GAP SERPL CALC-SCNC: 11 MMOL/L — SIGNIFICANT CHANGE UP (ref 5–17)
AST SERPL-CCNC: 61 U/L — HIGH (ref 10–40)
BILIRUB SERPL-MCNC: 0.4 MG/DL — SIGNIFICANT CHANGE UP (ref 0.2–1.2)
BUN SERPL-MCNC: 26 MG/DL — HIGH (ref 7–23)
CALCIUM SERPL-MCNC: 9.3 MG/DL — SIGNIFICANT CHANGE UP (ref 8.4–10.5)
CHLORIDE SERPL-SCNC: 95 MMOL/L — LOW (ref 96–108)
CO2 SERPL-SCNC: 28 MMOL/L — SIGNIFICANT CHANGE UP (ref 22–31)
CREAT SERPL-MCNC: 0.47 MG/DL — LOW (ref 0.5–1.3)
GLUCOSE BLDC GLUCOMTR-MCNC: 117 MG/DL — HIGH (ref 70–99)
GLUCOSE BLDC GLUCOMTR-MCNC: 131 MG/DL — HIGH (ref 70–99)
GLUCOSE BLDC GLUCOMTR-MCNC: 135 MG/DL — HIGH (ref 70–99)
GLUCOSE BLDC GLUCOMTR-MCNC: 137 MG/DL — HIGH (ref 70–99)
GLUCOSE BLDC GLUCOMTR-MCNC: 138 MG/DL — HIGH (ref 70–99)
GLUCOSE BLDC GLUCOMTR-MCNC: 162 MG/DL — HIGH (ref 70–99)
GLUCOSE SERPL-MCNC: 156 MG/DL — HIGH (ref 70–99)
HCT VFR BLD CALC: 33.3 % — LOW (ref 34.5–45)
HGB BLD-MCNC: 10.4 G/DL — LOW (ref 11.5–15.5)
MAGNESIUM SERPL-MCNC: 2.1 MG/DL — SIGNIFICANT CHANGE UP (ref 1.6–2.6)
MCHC RBC-ENTMCNC: 27.7 PG — SIGNIFICANT CHANGE UP (ref 27–34)
MCHC RBC-ENTMCNC: 31.3 GM/DL — LOW (ref 32–36)
MCV RBC AUTO: 88.6 FL — SIGNIFICANT CHANGE UP (ref 80–100)
PHOSPHATE SERPL-MCNC: 2.5 MG/DL — SIGNIFICANT CHANGE UP (ref 2.5–4.5)
PLATELET # BLD AUTO: 431 K/UL — HIGH (ref 150–400)
POTASSIUM SERPL-MCNC: 4.6 MMOL/L — SIGNIFICANT CHANGE UP (ref 3.5–5.3)
POTASSIUM SERPL-SCNC: 4.6 MMOL/L — SIGNIFICANT CHANGE UP (ref 3.5–5.3)
PROT SERPL-MCNC: 6.3 G/DL — SIGNIFICANT CHANGE UP (ref 6–8.3)
RBC # BLD: 3.76 M/UL — LOW (ref 3.8–5.2)
RBC # FLD: 16.2 % — HIGH (ref 10.3–14.5)
SODIUM SERPL-SCNC: 134 MMOL/L — LOW (ref 135–145)
WBC # BLD: 21.3 K/UL — HIGH (ref 3.8–10.5)
WBC # FLD AUTO: 21.3 K/UL — HIGH (ref 3.8–10.5)

## 2019-08-18 PROCEDURE — 99233 SBSQ HOSP IP/OBS HIGH 50: CPT | Mod: GC

## 2019-08-18 PROCEDURE — 99232 SBSQ HOSP IP/OBS MODERATE 35: CPT

## 2019-08-18 RX ORDER — PIPERACILLIN AND TAZOBACTAM 4; .5 G/20ML; G/20ML
3.38 INJECTION, POWDER, LYOPHILIZED, FOR SOLUTION INTRAVENOUS ONCE
Refills: 0 | Status: COMPLETED | OUTPATIENT
Start: 2019-08-18 | End: 2019-08-18

## 2019-08-18 RX ORDER — PIPERACILLIN AND TAZOBACTAM 4; .5 G/20ML; G/20ML
3.38 INJECTION, POWDER, LYOPHILIZED, FOR SOLUTION INTRAVENOUS EVERY 8 HOURS
Refills: 0 | Status: COMPLETED | OUTPATIENT
Start: 2019-08-18 | End: 2019-08-25

## 2019-08-18 RX ADMIN — Medication 102 MILLIGRAM(S): at 00:39

## 2019-08-18 RX ADMIN — LATANOPROST 1 DROP(S): 0.05 SOLUTION/ DROPS OPHTHALMIC; TOPICAL at 22:00

## 2019-08-18 RX ADMIN — Medication 1 DROP(S): at 11:46

## 2019-08-18 RX ADMIN — ENOXAPARIN SODIUM 40 MILLIGRAM(S): 100 INJECTION SUBCUTANEOUS at 11:47

## 2019-08-18 RX ADMIN — Medication 650 MILLIGRAM(S): at 23:43

## 2019-08-18 RX ADMIN — Medication 650 MILLIGRAM(S): at 17:35

## 2019-08-18 RX ADMIN — Medication 250 MILLIGRAM(S): at 06:38

## 2019-08-18 RX ADMIN — Medication 500000 UNIT(S): at 05:13

## 2019-08-18 RX ADMIN — PIPERACILLIN AND TAZOBACTAM 25 GRAM(S): 4; .5 INJECTION, POWDER, LYOPHILIZED, FOR SOLUTION INTRAVENOUS at 22:00

## 2019-08-18 RX ADMIN — Medication 1 DROP(S): at 17:35

## 2019-08-18 RX ADMIN — Medication 1 DROP(S): at 23:44

## 2019-08-18 RX ADMIN — Medication 1 APPLICATION(S): at 22:01

## 2019-08-18 RX ADMIN — Medication 1 DROP(S): at 00:40

## 2019-08-18 RX ADMIN — Medication 102 MILLIGRAM(S): at 05:11

## 2019-08-18 RX ADMIN — Medication 102 MILLIGRAM(S): at 11:45

## 2019-08-18 RX ADMIN — Medication 1: at 07:17

## 2019-08-18 RX ADMIN — PIPERACILLIN AND TAZOBACTAM 200 GRAM(S): 4; .5 INJECTION, POWDER, LYOPHILIZED, FOR SOLUTION INTRAVENOUS at 14:52

## 2019-08-18 RX ADMIN — Medication 1 DROP(S): at 05:12

## 2019-08-18 RX ADMIN — SODIUM CHLORIDE 50 MILLILITER(S): 9 INJECTION, SOLUTION INTRAVENOUS at 20:22

## 2019-08-18 RX ADMIN — MEROPENEM 200 MILLIGRAM(S): 1 INJECTION INTRAVENOUS at 05:11

## 2019-08-18 RX ADMIN — Medication 500000 UNIT(S): at 17:35

## 2019-08-18 RX ADMIN — Medication 1 DROP(S): at 05:13

## 2019-08-18 RX ADMIN — CHLORHEXIDINE GLUCONATE 1 APPLICATION(S): 213 SOLUTION TOPICAL at 07:18

## 2019-08-18 NOTE — PROGRESS NOTE ADULT - PROBLEM SELECTOR PLAN 2
-In setting of acute kylee vs PNA. Now resolved and patient off pressors  -Acute cholecystitis workup as below  -s/p LP negative for infection  -c/w meropenem. Since no signs of empyema on MRI, will discuss d/c vanc w/ ID  -Vanco trough q4th dose. Dose adjustment for goal of 10-20 for now -In setting of acute kylee vs PNA. Now resolved and patient off pressors  -Acute cholecystitis workup as below  -s/p LP negative for infection  -Switched to Zosyn 3.375 q8hrs

## 2019-08-18 NOTE — DISCHARGE NOTE PROVIDER - CARE PROVIDER_API CALL
Joelle Monroy)  Neurosurgery  [address], [address] [place] [address]  Phone: [phone]  Fax: [phone]  Follow Up Time: 1 week Joelle Monroy)  Neurosurgery  [address], [address] [place] [address]  Phone: [phone]  Fax: [phone]  Follow Up Time: 1 week    Alexander Villalobos)  Surgery  [address] [facility], [address]  [place] [address]  Phone: [phone]  Fax: [phone]  Follow Up Time:

## 2019-08-18 NOTE — DISCHARGE NOTE PROVIDER - PROVIDER TOKENS
PROVIDER:[TOKEN:[[id number]],FOLLOWUP:[1 week]] PROVIDER:[TOKEN:[[id number]],FOLLOWUP:[1 week]],PROVIDER:[TOKEN:[[id number]]] independent

## 2019-08-18 NOTE — DISCHARGE NOTE PROVIDER - NSDCFUADDAPPT_GEN_ALL_CORE_FT
Please schedule follow-up appointments with Dr. Em and Dr. Villalobos. Please schedule follow-up appointments with Dr. Em, Dr. Villalobos, and your PCP.

## 2019-08-18 NOTE — DISCHARGE NOTE PROVIDER - HOSPITAL COURSE
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 Pt's BP was non-responsive to fluids and she was transferred to the MICU for pressor support. Pt was noted to have a R lung opacity on CXR, acute kylee on CTAP and HIDA, and suboccipital extracranial fluid on MRI. MRI findings were followed up by neurosurgery who recommended no acute intervention in the hospital. Acute kylee was treated by IR w/ placement of a percutaneous cholecystostomy tube. CXR fiundings and acute kylee were treated w/ vanc/meropenem which were then switched to Zosyn. Pt mental status continued to improve and pt was able to follow commands and respond to conversation w/ mumbled responses, which is baseline according to . 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 mE on 6/24, recurrent aspiration s/p PEG tube, who presented from [facility] s/p a week of gradually worsening mental status along with fever for 3 days. Pt's BP was non-responsive to fluids and she was transferred to the MICU for pressor support. Pt was noted to have a R lung opacity on CXR, acute kylee on CTAP and HIDA, and suboccipital extracranial fluid on MRI. MRI findings were followed up by neurosurgery who recommended no acute intervention in the hospital. Acute kylee was treated by IR w/ placement of a percutaneous cholecystostomy tube. CXR findings and acute kylee were treated initially w/ vanc/meropenem, which were then switched to Zosyn. Pt mental status is improved, but waxes and wanes. However, pt was able to follow commands and respond to conversation w/ mumbled responses, which is baseline according to .         Patient's condition has plateaued and has been stable. Blood Cx and bile Cx are negative. Per ID, will require antibiotics for 2 weeks, starting from the day of percutaneous cholecystostomy tube placement; should be continued until 8/29. Currently on zosyn, but may be switched to ertapenem on discharge as there is low suspicion for Pseudomonas infection, per ID; midline catheter placed. Patient should f/u with PCP, neurosurgery, and general surgery. Ms. 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 Pt's BP was non-responsive to fluids and she was transferred to the MICU for pressor support. Pt was noted to have a R lung opacity on CXR, acute kylee on CTAP and HIDA, and suboccipital extracranial fluid on MRI. MRI findings were followed up by neurosurgery who recommended no acute intervention in the hospital. Acute kylee was treated by IR w/ placement of a percutaneous cholecystostomy tube. CXR findings and acute kylee were treated initially w/ vanc/meropenem, which were then switched to Zosyn and will be transitioned to ertapenem on discharge.  Pt's mental status is improved, but waxes and wanes. Pt was able to follow commands and respond to conversation w/ mumbled responses, which is baseline according to .         Patient's condition has plateaued and has been stable. Blood Cx and bile Cx are negative. Per infectious disease, pt will require antibiotics for 2 weeks, starting from the day of percutaneous cholecystostomy tube placement; should be continued until 8/29.       Patient is currently stable for discharge to rehab as per PT recs with with PCP, neurosurgery, and general surgery followup. Ms. 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 Pt's BP was non-responsive to fluids and she was transferred to the MICU for pressor support. Pt was noted to have a R lung opacity on CXR, acute kylee on CTAP and HIDA, and suboccipital extracranial fluid on MRI. MRI findings were followed up by neurosurgery who recommended no acute intervention in the hospital. Acute kylee was treated by IR w/ placement of a percutaneous cholecystostomy tube. CXR findings and acute kylee were treated initially w/ vanc/meropenem, which were then switched to Zosyn and will be transitioned to ertapenem on discharge.  Pt's mental status is improved, but waxes and wanes. Pt was able to follow commands and respond to conversation w/ mumbled responses, which is baseline according to .         Patient's condition has plateaued and has been stable. Blood Cx and bile Cx are negative. Per infectious disease, pt will require antibiotics for 2 weeks, starting from the day of percutaneous cholecystostomy tube placement, with the last dose to be given on 8/29.        Patient is currently stable for discharge to subacute rehab as per PT and PM&R recs with PCP, neurosurgery, and general surgery followup.

## 2019-08-18 NOTE — PROGRESS NOTE ADULT - SUBJECTIVE AND OBJECTIVE BOX
69y old  Female who presents with a chief complaint of Hypotension (18 Aug 2019 07:54)      Interval history:  Afebrile, not verbal to me, just grunted in response, loose BM today per RN, not much cough.       No Known Drug Allergies  shellfish (Angioedema; Rash)      Antimicrobials:  nystatin    Suspension 863389 Unit(s) Oral two times a day  piperacillin/tazobactam IVPB.. 3.375 Gram(s) IV Intermittent every 8 hours      REVIEW OF SYSTEMS:  Unable to obtain due to pt's underlying mental status.       Vital Signs Last 24 Hrs  T(C): 36.7 (08-18-19 @ 05:33), Max: 36.7 (08-18-19 @ 05:33)  T(F): 98.1 (08-18-19 @ 05:33), Max: 98.1 (08-18-19 @ 05:33)  HR: 104 (08-18-19 @ 05:33) (104 - 105)  BP: 127/74 (08-18-19 @ 05:33) (127/74 - 159/95)  RR: 18 (08-18-19 @ 05:33) (18 - 18)  SpO2: 95% (08-18-19 @ 05:33) (95% - 95%)      PHYSICAL EXAM:  Patient in no acute distress. Sleepy but arousable.   Cardiovascular: S1S2 normal, tachy   Lungs: + air entry B/L lung fields, limited auscultation.   Gastrointestinal: soft, nondistended, + rt perc kylee, + peg   Extremities: no edema.  IV sites not inflamed.                           10.4   21.3  )-----------( 431      ( 18 Aug 2019 07:12 )             33.3   08-18    134<L>  |  95<L>  |  26<H>  ----------------------------<  156<H>  4.6   |  28  |  0.47<L>    Ca    9.3      18 Aug 2019 07:12  Phos  2.5     08-18  Mg     2.1     08-18    TPro  6.3  /  Alb  3.0<L>  /  TBili  0.4  /  DBili  x   /  AST  61<H>  /  ALT  290<H>  /  AlkPhos  97  08-18      LIVER FUNCTIONS - ( 18 Aug 2019 07:12 )  Alb: 3.0 g/dL / Pro: 6.3 g/dL / ALK PHOS: 97 U/L / ALT: 290 U/L / AST: 61 U/L / GGT: x             Culture - Body Fluid with Gram Stain (collected 15 Aug 2019 18:57)  Source: .Body Fluid  Gram Stain (16 Aug 2019 00:56):    Rare polymorphonuclear leukocytes seen per low power field    No organisms seen per oil power field  Preliminary Report (16 Aug 2019 16:46):    No growth      Radiology:  < from: MR Head w/wo IV Cont (08.16.19 @ 19:40) >  IMPRESSION:     The suboccipital extracranial fluid collection has increased in size   compared to previous MRI most consistent with pseudomeningocele. There is   no evidence for underlying abscess.    Evolving postoperative changes are again noted status post resection of   fourth ventricular mass, without evidence for residual or recurrent tumor.    Slight interval increase in size of the lateral ventricles compared to   the prior MRI study. Serial imaging follow-up of the ventricular size   over time is recommended.

## 2019-08-18 NOTE — DISCHARGE NOTE PROVIDER - NSDCCPCAREPLAN_GEN_ALL_CORE_FT
PRINCIPAL DISCHARGE DIAGNOSIS  Diagnosis: Septic shock  Assessment and Plan of Treatment: You presented to the hospital w/ fevers, increased heart rate and hypotrension which was unresponsive to IV fluids. You were treated w/ medications to maintain your BP in the MICU. Once stable you were transferred to the floors. While there you were treated for an acute cholecytitis w/ the placement of a percutaneous cholecystostomy tube. You were also treated w/ antibiotics (Vancomycin and Meropenem and subsequently Zosyn) for these infections. Because of your altered mental status, a lumbar puncture, and a head MRI were done to rule out an infection in the central nervous system. LP was negative and MRI only showed a benign fluid collection which did not relate to symptoms on presentation. You are to follow up with your PMD, neurosurgeon, and general surgery for resolution of your percutaneous cholecystostomy tube. PRINCIPAL DISCHARGE DIAGNOSIS  Diagnosis: Septic shock  Assessment and Plan of Treatment: You presented to the hospital w/ fevers, increased heart rate and hypotrension which was unresponsive to IV fluids. You were treated w/ medications to maintain your BP in the MICU. Once stable you were transferred to the floors. While there you were treated for an acute cholecytitis w/ the placement of a percutaneous cholecystostomy tube. You were also treated w/ antibiotics (Vancomycin and Meropenem, then Zosyn, and subsequently ertapenem) for these infections. Because of your altered mental status, a lumbar puncture, and a head MRI were done to rule out an infection in the central nervous system. LP was negative and MRI only showed a benign fluid collection which did not relate to symptoms on presentation. You are to follow up with your PMD, neurosurgeon, and general surgery for resolution of your percutaneous cholecystostomy tube. PRINCIPAL DISCHARGE DIAGNOSIS  Diagnosis: Septic shock  Assessment and Plan of Treatment: You presented to the hospital with fevers, increased heart rate and hypotrension which was unresponsive to IV fluids. You were treated with medications to maintain your BP in the Medical ICU. Once stable you were transferred to a regular hospital room. While there you were treated for an acute gallbladder infection with a drainage tube. You were also treated with IV antibiotics for these infections. Because of your altered mental status, a lumbar puncture, and a head MRI were done to rule out an infection in the central nervous system. Spinal tap was negative for causes in mental status and MRI scan only showed a benign fluid collection which did not relate to her symptoms. You are to follow up with your primary doctor, neurosurgeon, and general surgery for resolution of your percutaneous cholecystostomy tube.

## 2019-08-18 NOTE — PROGRESS NOTE ADULT - ASSESSMENT
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   Workup thus far found patient to be febrile to 100.4 on admission and tachycardic/hypotensive   Leukocytosis persistent.   CT head: Increased size of midline suboccipital extracalvarial CSF density fluid collection, likely representing a CSF leak/meningocele  Acute cholecystitis s/p perc kylee  Stable   Overall Acute cholecystitis, Leukocytosis, Abnormal CT head.       Rec:  1) follow biliary fluid cx from perc kylee, NTD.   2) MRI WITH NO ABSCESS  3) Follow CSF ,other Cx, NTD.   4) CNS in 1/4 blood cx likley contaminant- repeat cx are negative   5) Can switch vanco and merem to zosyn, given no evidence of CNS infection.   6) Leucocytosis multifactorial, underlying infection and steroids. Dexamethasone dose per Neurosurgery.

## 2019-08-18 NOTE — PROGRESS NOTE ADULT - SUBJECTIVE AND OBJECTIVE BOX
Joie Augustine MD  Beeper: [phone] ([facility])/ [id number] ([facility])     Subjective:    Patient is a 69y old  Female who presents with a chief complaint of Hypotension (16 Aug 2019 12:24)    Overnight events:    No acute events overnight. Pt much more responsive and able to follow conversation and commands.    Unable to assess ROS 2/2 pt mental status    MEDICATIONS  (STANDING):  artificial tears (preservative free) Ophthalmic Solution 1 Drop(s) Both EYES every 6 hours  buDESOnide    Inhalation Suspension 0.5 milliGRAM(s) Inhalation two times a day  chlorhexidine 4% Liquid 1 Application(s) Topical <User Schedule>  dexamethasone  IVPB 10 milliGRAM(s) IV Intermittent every 6 hours  enoxaparin Injectable 40 milliGRAM(s) SubCutaneous daily  insulin lispro (HumaLOG) corrective regimen sliding scale   SubCutaneous every 6 hours  lactated ringers. 1000 milliLiter(s) (50 mL/Hr) IV Continuous <Continuous>  latanoprost 0.005% Ophthalmic Solution 1 Drop(s) Both EYES at bedtime  meropenem  IVPB 2000 milliGRAM(s) IV Intermittent every 8 hours  meropenem  IVPB      nystatin    Suspension 192647 Unit(s) Oral two times a day  petrolatum Ophthalmic Ointment 1 Application(s) Both EYES at bedtime  prednisoLONE acetate 1% Suspension 1 Drop(s) Both EYES four times a day  vancomycin  IVPB 750 milliGRAM(s) IV Intermittent every 12 hours    MEDICATIONS  (PRN):  acetaminophen    Suspension .. 650 milliGRAM(s) Enteral Tube every 6 hours PRN Moderate Pain (4 - 6)    Objective:  Vital Signs Last 24 Hrs  T(C): 36.7 (18 Aug 2019 05:33), Max: 36.7 (18 Aug 2019 05:33)  T(F): 98.1 (18 Aug 2019 05:33), Max: 98.1 (18 Aug 2019 05:33)  HR: 104 (18 Aug 2019 05:33) (102 - 105)  BP: 127/74 (18 Aug 2019 05:33) (127/74 - 159/95)  BP(mean): --  RR: 18 (18 Aug 2019 05:33) (18 - 18)  SpO2: 95% (18 Aug 2019 05:33) (94% - 95%)            I&O's Summary    16 Aug 2019 07:01  -  17 Aug 2019 07:00  --------------------------------------------------------  IN: 950 mL / OUT: 800 mL / NET: 150 mL    PHYSICAL EXAM:  GENERAL: awake, alert. responding to questions and stimuli but mumbled speech (per baseline)  HEENT: NC/AT, MMM, PERRL  LUNGS: Mild right lower lobe basilar crackle. No wheezing. .   CARDIAC: regular and tachycardic. no M/R/G. Cap refill ~ 3 seconds. 2+ distal pulses. JVP difficult to assess due to body habitus.  ABDOMEN: Soft, non-tender, non-distended. Non-tender at perc kylee site, site c/d/i. PEG tube C/D/I without erythema or drainage  EXT: Trace of pedal edema. No calf tenderness.    NEURO: Disoriented upon awakening this AM. Able to follow commands. Answers some yes or no questions appropriately. Moving all extremities, hand  and ROM normal w/ 4/5 strength                                          LABS:      Culture - Body Fluid with Gram Stain (08.15.19 @ 18:57)    Gram Stain:   Rare polymorphonuclear leukocytes seen per low power field  No organisms seen per oil power field    Specimen Source: .Body Fluid    Culture Results:   No growth    Culture - Blood in AM (08.15.19 @ 09:52)    Specimen Source: .Blood    Culture Results:   No growth to date.    Culture - CSF with Gram Stain . (08.14.19 @ 19:04)    Gram Stain:   No polymorphonuclear cells seen  No organisms seen  by cytocentrifuge    Specimen Source: .CSF    Culture Results:   No growth    MR Head w/wo IV Cont (08.16.19 @ 19:40)  IMPRESSION:     The suboccipital extracranial fluid collection has increased in size   compared to previous MRI most consistent with pseudomeningocele. There is   no evidence for underlying abscess.    Evolving postoperative changes are again noted status post resection of   fourth ventricular mass, without evidence for residual or recurrent tumor.    Slight interval increase in size of the lateral ventricles compared to   the prior MRI study. Serial imaging follow-up of the ventricular size   over time is recommended. Joie Augustine MD  Beeper: [phone] ([facility])/ [id number] ([facility])     Subjective:    Patient is a 69y old  Female who presents with a chief complaint of Hypotension (16 Aug 2019 12:24)    Overnight events:    No acute events overnight. Pt much more responsive and able to follow conversation and commands.    Unable to assess ROS 2/2 pt mental status    MEDICATIONS  (STANDING):  artificial tears (preservative free) Ophthalmic Solution 1 Drop(s) Both EYES every 6 hours  buDESOnide    Inhalation Suspension 0.5 milliGRAM(s) Inhalation two times a day  chlorhexidine 4% Liquid 1 Application(s) Topical <User Schedule>  dexamethasone  IVPB 10 milliGRAM(s) IV Intermittent every 6 hours  enoxaparin Injectable 40 milliGRAM(s) SubCutaneous daily  insulin lispro (HumaLOG) corrective regimen sliding scale   SubCutaneous every 6 hours  lactated ringers. 1000 milliLiter(s) (50 mL/Hr) IV Continuous <Continuous>  latanoprost 0.005% Ophthalmic Solution 1 Drop(s) Both EYES at bedtime  meropenem  IVPB 2000 milliGRAM(s) IV Intermittent every 8 hours  meropenem  IVPB      nystatin    Suspension 074735 Unit(s) Oral two times a day  petrolatum Ophthalmic Ointment 1 Application(s) Both EYES at bedtime  prednisoLONE acetate 1% Suspension 1 Drop(s) Both EYES four times a day  vancomycin  IVPB 750 milliGRAM(s) IV Intermittent every 12 hours    MEDICATIONS  (PRN):  acetaminophen    Suspension .. 650 milliGRAM(s) Enteral Tube every 6 hours PRN Moderate Pain (4 - 6)    Objective:  Vital Signs Last 24 Hrs  T(C): 36.7 (18 Aug 2019 05:33), Max: 36.7 (18 Aug 2019 05:33)  T(F): 98.1 (18 Aug 2019 05:33), Max: 98.1 (18 Aug 2019 05:33)  HR: 104 (18 Aug 2019 05:33) (102 - 105)  BP: 127/74 (18 Aug 2019 05:33) (127/74 - 159/95)  BP(mean): --  RR: 18 (18 Aug 2019 05:33) (18 - 18)  SpO2: 95% (18 Aug 2019 05:33) (94% - 95%)            I&O's Summary    16 Aug 2019 07:01  -  17 Aug 2019 07:00  --------------------------------------------------------  IN: 950 mL / OUT: 800 mL / NET: 150 mL    PHYSICAL EXAM:  GENERAL: awake, alert. responding to questions and stimuli but mumbled speech (per baseline)  HEENT: NC/AT, MMM, PERRL  LUNGS: Mild right lower lobe basilar crackle. No wheezing. .   CARDIAC: regular and tachycardic. no M/R/G. Cap refill ~ 3 seconds. 2+ distal pulses. JVP difficult to assess due to body habitus.  ABDOMEN: Soft, non-tender, non-distended. Non-tender at perc kylee site, site c/d/i. PEG tube C/D/I without erythema or drainage  EXT: Trace of pedal edema. No calf tenderness.    NEURO: Disoriented upon awakening this AM. Able to follow commands. Answers some yes or no questions appropriately. Moving all extremities, hand  and ROM normal w/ 4/5 strength                                          LABS:             10.4   21.3  )-----------( 431      ( 18 Aug 2019 07:12 )             33.3     134<L>  |  95<L>  |  26<H>  ----------------------------<  156<H>  4.6   |  28  |  0.47<L>    Ca    9.3      18 Aug 2019 07:12  Phos  2.5     08-18  Mg     2.1     08-18    TPro  6.3  /  Alb  3.0<L>  /  TBili  0.4  /  DBili  x   /  AST  61<H>  /  ALT  290<H>  /  AlkPhos  97  08-18    LIVER FUNCTIONS - ( 18 Aug 2019 07:12 )  Alb: 3.0 g/dL / Pro: 6.3 g/dL / ALK PHOS: 97 U/L / ALT: 290 U/L / AST: 61 U/L / GGT: x           Culture - Body Fluid with Gram Stain (08.15.19 @ 18:57)    Gram Stain:   Rare polymorphonuclear leukocytes seen per low power field  No organisms seen per oil power field    Specimen Source: .Body Fluid    Culture Results:   No growth    Culture - Blood in AM (08.15.19 @ 09:52)    Specimen Source: .Blood    Culture Results:   No growth to date.    Culture - CSF with Gram Stain . (08.14.19 @ 19:04)    Gram Stain:   No polymorphonuclear cells seen  No organisms seen  by cytocentrifuge    Specimen Source: .CSF    Culture Results:   No growth    MR Head w/wo IV Cont (08.16.19 @ 19:40)  IMPRESSION:     The suboccipital extracranial fluid collection has increased in size   compared to previous MRI most consistent with pseudomeningocele. There is   no evidence for underlying abscess.    Evolving postoperative changes are again noted status post resection of   fourth ventricular mass, without evidence for residual or recurrent tumor.    Slight interval increase in size of the lateral ventricles compared to   the prior MRI study. Serial imaging follow-up of the ventricular size   over time is recommended. Joie Augustine MD  Beeper: [phone] ([facility])/ [id number] ([facility])     Subjective:    Patient is a 69y old  Female who presents with a chief complaint of Hypotension (16 Aug 2019 12:24)    Overnight events:    No acute events overnight. Pt much more responsive and able to follow conversation and commands in the afternoon.    Unable to assess ROS 2/2 pt mental status    MEDICATIONS  (STANDING):  artificial tears (preservative free) Ophthalmic Solution 1 Drop(s) Both EYES every 6 hours  buDESOnide    Inhalation Suspension 0.5 milliGRAM(s) Inhalation two times a day  chlorhexidine 4% Liquid 1 Application(s) Topical <User Schedule>  dexamethasone  IVPB 10 milliGRAM(s) IV Intermittent every 6 hours  enoxaparin Injectable 40 milliGRAM(s) SubCutaneous daily  insulin lispro (HumaLOG) corrective regimen sliding scale   SubCutaneous every 6 hours  lactated ringers. 1000 milliLiter(s) (50 mL/Hr) IV Continuous <Continuous>  latanoprost 0.005% Ophthalmic Solution 1 Drop(s) Both EYES at bedtime  meropenem  IVPB 2000 milliGRAM(s) IV Intermittent every 8 hours  meropenem  IVPB      nystatin    Suspension 037075 Unit(s) Oral two times a day  petrolatum Ophthalmic Ointment 1 Application(s) Both EYES at bedtime  prednisoLONE acetate 1% Suspension 1 Drop(s) Both EYES four times a day  vancomycin  IVPB 750 milliGRAM(s) IV Intermittent every 12 hours    MEDICATIONS  (PRN):  acetaminophen    Suspension .. 650 milliGRAM(s) Enteral Tube every 6 hours PRN Moderate Pain (4 - 6)    Objective:  Vital Signs Last 24 Hrs  T(C): 36.7 (18 Aug 2019 05:33), Max: 36.7 (18 Aug 2019 05:33)  T(F): 98.1 (18 Aug 2019 05:33), Max: 98.1 (18 Aug 2019 05:33)  HR: 104 (18 Aug 2019 05:33) (102 - 105)  BP: 127/74 (18 Aug 2019 05:33) (127/74 - 159/95)  BP(mean): --  RR: 18 (18 Aug 2019 05:33) (18 - 18)  SpO2: 95% (18 Aug 2019 05:33) (94% - 95%)            I&O's Summary    16 Aug 2019 07:01  -  17 Aug 2019 07:00  --------------------------------------------------------  IN: 950 mL / OUT: 800 mL / NET: 150 mL    PHYSICAL EXAM:  GENERAL: awake, alert. responding to questions and stimuli but mumbled speech (per baseline)  HEENT: NC/AT, MMM, PERRL  LUNGS: Mild right lower lobe basilar crackle. No wheezing. .   CARDIAC: regular and tachycardic. no M/R/G. Cap refill ~ 3 seconds. 2+ distal pulses. JVP difficult to assess due to body habitus.  ABDOMEN: Soft, non-tender, non-distended. Non-tender at perc kylee site, site c/d/i. PEG tube C/D/I without erythema or drainage  EXT: Trace of pedal edema. No calf tenderness.    NEURO: Disoriented upon awakening this AM. Able to follow commands. Answers some yes or no questions appropriately. Moving all extremities, hand  and ROM normal w/ 4/5 strength                                          LABS:             10.4   21.3  )-----------( 431      ( 18 Aug 2019 07:12 )             33.3     134<L>  |  95<L>  |  26<H>  ----------------------------<  156<H>  4.6   |  28  |  0.47<L>    Ca    9.3      18 Aug 2019 07:12  Phos  2.5     08-18  Mg     2.1     08-18    TPro  6.3  /  Alb  3.0<L>  /  TBili  0.4  /  DBili  x   /  AST  61<H>  /  ALT  290<H>  /  AlkPhos  97  08-18    LIVER FUNCTIONS - ( 18 Aug 2019 07:12 )  Alb: 3.0 g/dL / Pro: 6.3 g/dL / ALK PHOS: 97 U/L / ALT: 290 U/L / AST: 61 U/L / GGT: x           Culture - Body Fluid with Gram Stain (08.15.19 @ 18:57)    Gram Stain:   Rare polymorphonuclear leukocytes seen per low power field  No organisms seen per oil power field    Specimen Source: .Body Fluid    Culture Results:   No growth    Culture - Blood in AM (08.15.19 @ 09:52)    Specimen Source: .Blood    Culture Results:   No growth to date.    Culture - CSF with Gram Stain . (08.14.19 @ 19:04)    Gram Stain:   No polymorphonuclear cells seen  No organisms seen  by cytocentrifuge    Specimen Source: .CSF    Culture Results:   No growth    MR Head w/wo IV Cont (08.16.19 @ 19:40)  IMPRESSION:     The suboccipital extracranial fluid collection has increased in size   compared to previous MRI most consistent with pseudomeningocele. There is   no evidence for underlying abscess.    Evolving postoperative changes are again noted status post resection of   fourth ventricular mass, without evidence for residual or recurrent tumor.    Slight interval increase in size of the lateral ventricles compared to   the prior MRI study. Serial imaging follow-up of the ventricular size   over time is recommended.

## 2019-08-18 NOTE — DISCHARGE NOTE PROVIDER - NSDCFUSCHEDAPPT_GEN_ALL_CORE_FT
ISIS LEES ; 09/13/2019 ; [facility] [address] [place] [address] ISIS LEES ; 09/13/2019 ; [facility] [address] [place] [address] ISIS LEES ; 09/13/2019 ; [facility] [address] [place] [address] ISIS LEES ; 09/13/2019 ; [facility] [address] [place] [address] ISIS LEES ; 09/13/2019 ; [facility] [address] [place] [address] ISIS LEES ; 09/13/2019 ; [facility] [address] [place] [address] ISIS LEES ; 09/13/2019 ; [facility] [address] [place] [address] ISIS LEES ; 09/13/2019 ; [facility] [address] [place] [address] ISIS LEES ; 09/13/2019 ; [facility] [address] [place] [address] ISIS LEES ; 09/13/2019 ; [facility] [address] [place] [address]

## 2019-08-18 NOTE — PROGRESS NOTE ADULT - PROBLEM SELECTOR PLAN 3
-Acute cholecystitis shown on CTA and HIDA s/p perc kylee placement by IR  -Surgery consulted: no acute surgical intervention  -Continuing w/ ABx as above   -Biliary culture NGTD

## 2019-08-18 NOTE — DISCHARGE NOTE PROVIDER - CARE PROVIDERS DIRECT ADDRESSES
[email or web address]

## 2019-08-18 NOTE — PROGRESS NOTE ADULT - PROBLEM SELECTOR PLAN 1
-Likely in setting of sepsis. Alert and responsive to verbal commands, appears back at baseline, per   -MRI showing the suboccipital extracranial fluid collection has increased in size   compared to previous MRI most consistent with pseudomeningocele  -Neurosx: No surgical intervention at this time, f/u outpt  -Infectious workup and management as below

## 2019-08-18 NOTE — DISCHARGE NOTE PROVIDER - NSDCQMPCI_CARD_ALL_CORE
Cedrick Car presents today for   Chief Complaint   Patient presents with    Other     ED f/u related to kidney infection. Is someone accompanying this pt? No    Is the patient using any DME equipment during OV? No    Depression Screening:  3 most recent PHQ Screens 1/28/[facility] )7/18/No

## 2019-08-18 NOTE — PROGRESS NOTE ADULT - ASSESSMENT
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

## 2019-08-19 LAB
ALBUMIN SERPL ELPH-MCNC: 2.8 G/DL — LOW (ref 3.3–5)
ALP SERPL-CCNC: 102 U/L — SIGNIFICANT CHANGE UP (ref 40–120)
ALT FLD-CCNC: 269 U/L — HIGH (ref 10–45)
ANION GAP SERPL CALC-SCNC: 10 MMOL/L — SIGNIFICANT CHANGE UP (ref 5–17)
AST SERPL-CCNC: 58 U/L — HIGH (ref 10–40)
BASOPHILS # BLD AUTO: 0 K/UL — SIGNIFICANT CHANGE UP (ref 0–0.2)
BILIRUB SERPL-MCNC: 0.4 MG/DL — SIGNIFICANT CHANGE UP (ref 0.2–1.2)
BUN SERPL-MCNC: 25 MG/DL — HIGH (ref 7–23)
CALCIUM SERPL-MCNC: 9.1 MG/DL — SIGNIFICANT CHANGE UP (ref 8.4–10.5)
CHLORIDE SERPL-SCNC: 97 MMOL/L — SIGNIFICANT CHANGE UP (ref 96–108)
CO2 SERPL-SCNC: 28 MMOL/L — SIGNIFICANT CHANGE UP (ref 22–31)
CREAT SERPL-MCNC: 0.46 MG/DL — LOW (ref 0.5–1.3)
EOSINOPHIL # BLD AUTO: 0.1 K/UL — SIGNIFICANT CHANGE UP (ref 0–0.5)
GLUCOSE BLDC GLUCOMTR-MCNC: 106 MG/DL — HIGH (ref 70–99)
GLUCOSE BLDC GLUCOMTR-MCNC: 111 MG/DL — HIGH (ref 70–99)
GLUCOSE BLDC GLUCOMTR-MCNC: 114 MG/DL — HIGH (ref 70–99)
GLUCOSE BLDC GLUCOMTR-MCNC: 96 MG/DL — SIGNIFICANT CHANGE UP (ref 70–99)
GLUCOSE SERPL-MCNC: 112 MG/DL — HIGH (ref 70–99)
HCT VFR BLD CALC: 32.2 % — LOW (ref 34.5–45)
HGB BLD-MCNC: 10.5 G/DL — LOW (ref 11.5–15.5)
LG PLATELETS BLD QL AUTO: SLIGHT — SIGNIFICANT CHANGE UP
LYMPHOCYTES # BLD AUTO: 2.4 K/UL — SIGNIFICANT CHANGE UP (ref 1–3.3)
LYMPHOCYTES # BLD AUTO: 9 % — LOW (ref 13–44)
MAGNESIUM SERPL-MCNC: 2.1 MG/DL — SIGNIFICANT CHANGE UP (ref 1.6–2.6)
MCHC RBC-ENTMCNC: 28.9 PG — SIGNIFICANT CHANGE UP (ref 27–34)
MCHC RBC-ENTMCNC: 32.5 GM/DL — SIGNIFICANT CHANGE UP (ref 32–36)
MCV RBC AUTO: 88.9 FL — SIGNIFICANT CHANGE UP (ref 80–100)
MONOCYTES # BLD AUTO: 1.6 K/UL — HIGH (ref 0–0.9)
MONOCYTES NFR BLD AUTO: 4 % — SIGNIFICANT CHANGE UP (ref 2–14)
MYELOCYTES NFR BLD: 3 % — HIGH (ref 0–0)
NEUTROPHILS # BLD AUTO: 17.7 K/UL — HIGH (ref 1.8–7.4)
NEUTROPHILS NFR BLD AUTO: 82 % — HIGH (ref 43–77)
NEUTS BAND # BLD: 2 % — SIGNIFICANT CHANGE UP (ref 0–8)
PHOSPHATE SERPL-MCNC: 2.8 MG/DL — SIGNIFICANT CHANGE UP (ref 2.5–4.5)
PLAT MORPH BLD: NORMAL — SIGNIFICANT CHANGE UP
PLATELET # BLD AUTO: 404 K/UL — HIGH (ref 150–400)
POTASSIUM SERPL-MCNC: 4.2 MMOL/L — SIGNIFICANT CHANGE UP (ref 3.5–5.3)
POTASSIUM SERPL-SCNC: 4.2 MMOL/L — SIGNIFICANT CHANGE UP (ref 3.5–5.3)
PROT SERPL-MCNC: 5.5 G/DL — LOW (ref 6–8.3)
RBC # BLD: 3.62 M/UL — LOW (ref 3.8–5.2)
RBC # FLD: 16.4 % — HIGH (ref 10.3–14.5)
RBC BLD AUTO: SIGNIFICANT CHANGE UP
SODIUM SERPL-SCNC: 135 MMOL/L — SIGNIFICANT CHANGE UP (ref 135–145)
WBC # BLD: 21.8 K/UL — HIGH (ref 3.8–10.5)
WBC # FLD AUTO: 21.8 K/UL — HIGH (ref 3.8–10.5)

## 2019-08-19 PROCEDURE — 99233 SBSQ HOSP IP/OBS HIGH 50: CPT

## 2019-08-19 RX ORDER — SODIUM CHLORIDE 9 MG/ML
1000 INJECTION, SOLUTION INTRAVENOUS
Refills: 0 | Status: DISCONTINUED | OUTPATIENT
Start: 2019-08-19 | End: 2019-08-19

## 2019-08-19 RX ORDER — SODIUM CHLORIDE 9 MG/ML
500 INJECTION, SOLUTION INTRAVENOUS ONCE
Refills: 0 | Status: COMPLETED | OUTPATIENT
Start: 2019-08-19 | End: 2019-08-19

## 2019-08-19 RX ADMIN — Medication 1 DROP(S): at 06:10

## 2019-08-19 RX ADMIN — Medication 650 MILLIGRAM(S): at 00:15

## 2019-08-19 RX ADMIN — Medication 1 DROP(S): at 23:01

## 2019-08-19 RX ADMIN — Medication 1 DROP(S): at 23:02

## 2019-08-19 RX ADMIN — Medication 1 DROP(S): at 12:30

## 2019-08-19 RX ADMIN — Medication 1 DROP(S): at 17:34

## 2019-08-19 RX ADMIN — Medication 500000 UNIT(S): at 17:34

## 2019-08-19 RX ADMIN — Medication 0.5 MILLIGRAM(S): at 18:47

## 2019-08-19 RX ADMIN — LATANOPROST 1 DROP(S): 0.05 SOLUTION/ DROPS OPHTHALMIC; TOPICAL at 23:00

## 2019-08-19 RX ADMIN — SODIUM CHLORIDE 500 MILLILITER(S): 9 INJECTION, SOLUTION INTRAVENOUS at 18:26

## 2019-08-19 RX ADMIN — PIPERACILLIN AND TAZOBACTAM 25 GRAM(S): 4; .5 INJECTION, POWDER, LYOPHILIZED, FOR SOLUTION INTRAVENOUS at 23:08

## 2019-08-19 RX ADMIN — Medication 1 APPLICATION(S): at 23:00

## 2019-08-19 RX ADMIN — Medication 1 DROP(S): at 09:08

## 2019-08-19 RX ADMIN — Medication 500000 UNIT(S): at 06:11

## 2019-08-19 RX ADMIN — Medication 1 DROP(S): at 11:57

## 2019-08-19 RX ADMIN — PIPERACILLIN AND TAZOBACTAM 25 GRAM(S): 4; .5 INJECTION, POWDER, LYOPHILIZED, FOR SOLUTION INTRAVENOUS at 13:20

## 2019-08-19 RX ADMIN — PIPERACILLIN AND TAZOBACTAM 25 GRAM(S): 4; .5 INJECTION, POWDER, LYOPHILIZED, FOR SOLUTION INTRAVENOUS at 06:09

## 2019-08-19 RX ADMIN — ENOXAPARIN SODIUM 40 MILLIGRAM(S): 100 INJECTION SUBCUTANEOUS at 11:58

## 2019-08-19 RX ADMIN — CHLORHEXIDINE GLUCONATE 1 APPLICATION(S): 213 SOLUTION TOPICAL at 06:13

## 2019-08-19 NOTE — CHART NOTE - NSCHARTNOTEFT_GEN_A_CORE
Nutrition Follow Up Note    Patient seen for follow up     Spoke to RN who reported is tolerating TF at goal rate, Vital 1.2 50ml/hr x 24 hrs. Noted with 3 BMs last night and 1 the night before.     Diet : Vital 1.2 @ 50 ml/hr x 24 hrs, totaling 1200 ml formula; 1440 kcal, 90 gm protein, 973 ml water       Daily Weight in k.2 (08-14)  % Weight Change - no new weights     Pertinent Medications: MEDICATIONS  (STANDING):  artificial tears (preservative free) Ophthalmic Solution 1 Drop(s) Both EYES every 6 hours  buDESOnide    Inhalation Suspension 0.5 milliGRAM(s) Inhalation two times a day  chlorhexidine 4% Liquid 1 Application(s) Topical <User Schedule>  enoxaparin Injectable 40 milliGRAM(s) SubCutaneous daily  insulin lispro (HumaLOG) corrective regimen sliding scale   SubCutaneous every 6 hours  lactated ringers. 1000 milliLiter(s) (50 mL/Hr) IV Continuous <Continuous>  latanoprost 0.005% Ophthalmic Solution 1 Drop(s) Both EYES at bedtime  nystatin    Suspension 678395 Unit(s) Oral two times a day  petrolatum Ophthalmic Ointment 1 Application(s) Both EYES at bedtime  piperacillin/tazobactam IVPB.. 3.375 Gram(s) IV Intermittent every 8 hours  prednisoLONE acetate 1% Suspension 1 Drop(s) Both EYES four times a day    MEDICATIONS  (PRN):  acetaminophen    Suspension .. 650 milliGRAM(s) Enteral Tube every 6 hours PRN Moderate Pain (4 - 6)    Pertinent Labs:  @ 06:42: Na 135, BUN 25<H>, Cr 0.46<L>, <H>, K+ 4.2, Phos 2.8, Mg 2.1, Alk Phos 102, ALT/SGPT 269<H>, AST/SGOT 58<H>, HbA1c --    Finger Sticks:  POCT Blood Glucose.: 114 mg/dL ( @ 11:43)  POCT Blood Glucose.: 111 mg/dL ( @ 06:12)  POCT Blood Glucose.: 117 mg/dL ( @ 23:47)  POCT Blood Glucose.: 131 mg/dL ( @ 22:14)  POCT Blood Glucose.: 137 mg/dL ( @ 18:35)      Skin: no pressure ulcers noted    Edema:  - 2+ generalized    Estimated Needs:   [X] no change since previous assessment  [ ] recalculated:     Previous Nutrition Diagnosis: overweight/obesity  Nutrition Diagnosis is: on-going    New Nutrition Diagnosis:      Recommend  1) continue current EN    Monitoring and Evaluation:     Continue to monitor tolerance to EN, weights, labs, skin integrity    RD remains available upon request and will follow up per protocol

## 2019-08-19 NOTE — PROGRESS NOTE ADULT - SUBJECTIVE AND OBJECTIVE BOX
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 No acute changes on imaging, and no neurosurgical intervention recommended at this time. MRI pending. Is on vanco and meropenem currently. Also has concern for cholecystitis; surgery deferring intervention currently due to her clinical state, and IR is planing for percutaneous cholecystostomy tube placement. Per , was very active and in good health until her resection, and has had a decline since then. Palliative care called to discuss GOC.    ADVANCE DIRECTIVES:    DNR [ ]  MOLST  [ ]    Living Will  [ ]   DECISION MAKER(s):  [ ] Health Care Proxy(s)  [ ] Surrogate(s)  [ ] Guardian           Name(s) and Contact(s):   R[place] Aparicio ([phone]) -spouse  [place] ([phone]) - daughter    BASELINE (I)ADL(s) (prior to admission):  [place]: [ ]Total  [ ] Moderate [ ]Dependent    Allergies    No Known Drug Allergies  shellfish (Angioedema; Rash)    Intolerances    MEDICATIONS  (STANDING):  artificial tears (preservative free) Ophthalmic Solution 1 Drop(s) Both EYES every 6 hours  buDESOnide    Inhalation Suspension 0.5 milliGRAM(s) Inhalation two times a day  chlorhexidine 4% Liquid 1 Application(s) Topical <User Schedule>  enoxaparin Injectable 40 milliGRAM(s) SubCutaneous daily  insulin lispro (HumaLOG) corrective regimen sliding scale   SubCutaneous every 6 hours  lactated ringers. 1000 milliLiter(s) (50 mL/Hr) IV Continuous <Continuous>  latanoprost 0.005% Ophthalmic Solution 1 Drop(s) Both EYES at bedtime  nystatin    Suspension 339355 Unit(s) Oral two times a day  petrolatum Ophthalmic Ointment 1 Application(s) Both EYES at bedtime  piperacillin/tazobactam IVPB.. 3.375 Gram(s) IV Intermittent every 8 hours  prednisoLONE acetate 1% Suspension 1 Drop(s) Both EYES four times a day    MEDICATIONS  (PRN):  acetaminophen    Suspension .. 650 milliGRAM(s) Enteral Tube every 6 hours PRN Moderate Pain (4 - 6)      PRESENT SYMPTOMS:   Source if other than patient:  [ ]Family   [ ]Team     Pain (Impact on QOL):    Location -         Minimal acceptable level (0-10 scale):                    Aggravating factors -  Quality -  Radiation -  Severity (0-10 scale) -    Timing -    PAIN AD Score: 0    [email or web address] (press ctrl +  left click to view)    Dyspnea:                           [ ]Mild [ ]Moderate [ ]Severe  Anxiety:                             [ ]Mild [ ]Moderate [ ]Severe  Fatigue:                             [ ]Mild [ ]Moderate [ ]Severe  Nausea:                             [ ]Mild [ ]Moderate [ ]Severe  Loss of appetite:              [ ]Mild [ ]Moderate [ ]Severe  Constipation:                    [ ]Mild [ ]Moderate [ ]Severe    Other Symptoms:  [ ]All other review of systems negative   [ x]Unable to obtain due to patient sleeping    Karnofsky Performance Score/Palliative Performance Status Version 2:         %    PHYSICAL EXAM:  Vital Signs Last 24 Hrs  T(C): 36.5 (19 Aug 2019 05:00), Max: 37 (18 Aug 2019 21:58)  T(F): 97.7 (19 Aug 2019 05:00), Max: 98.6 (18 Aug 2019 21:58)  HR: 101 (19 Aug 2019 05:39) (92 - 114)  BP: 153/80 (19 Aug 2019 05:00) (138/87 - 167/81)  BP(mean): --  RR: 18 (19 Aug 2019 05:39) (18 - 94)  SpO2: 94% (19 Aug 2019 05:39) (94% - 98%)    Limited exam for patient comfort  GENERAL:  [ ]Alert  [ ]Oriented x   [X ]Lethargic  [ ]Cachexia  [ ]Unarousable  [X ]Verbal  [ ]Non-Verbal    Behavioral:   [ ] Anxiety  [ ] Delirium [ ] Agitation [ ] Other    HEENT:  [ ]Normal   [ ]Dry mouth   [ ]ET Tube/Trach  [ ]Oral lesions    PULMONARY:   [ ]Clear [ ]Tachypnea  [ ]Audible excessive secretions   [ ]Rhonchi        [ ]Right [ ]Left [ ]Bilateral  [ ]Crackles        [ ]Right [ ]Left [ ]Bilateral  [ ]Wheezing     [ ]Right [ ]Left [ ]Bilateral    CARDIOVASCULAR:    [ ]Regular [ ]Irregular [ ]Tachy  [ ]Jhonathan [ ]Murmur [ ]Other    GASTROINTESTINAL:  [ ]Soft  [ ]Distended   [X ]+BS  [ ]Non tender [ ]Tender  [ ]PEG [ ]OGT/ NGT  Last BM:     GENITOURINARY:  [ ]Normal [ ] Incontinent   [ ]Oliguria/Anuria   [X ]Marcos not present    MUSCULOSKELETAL:   [ ]Normal   [ ]Weakness  [ ]Bed/Wheelchair bound [ ]Edema    NEUROLOGIC:   [ ]No focal deficits  [ ] Cognitive impairment  [ ] Dysphagia [ ]Dysarthria [ ] Paresis [ ]Other     SKIN:   [ ]Normal   [ ]Pressure ulcer(s)  [X ]Skin tear (see RN documentation)    CRITICAL CARE:  [ ] Shock Present  [ ]Septic [ ]Cardiogenic [ ]Neurologic [ ]Hypovolemic  [ ]  Vasopressors [ ]  Inotropes   [ ] Respiratory failure present  [ ] Acute  [ ] Chronic [ ] Hypoxic  [ ] Hypercarbic [ ] Other  [ ] Other organ failure     LABS: reviewed                          10.5   21.8  )-----------( 404      ( 19 Aug 2019 06:43 )             32.2     08-19    135  |  97  |  25<H>  ----------------------------<  112<H>  4.2   |  28  |  0.46<L>    Ca    9.1      19 Aug 2019 06:42  Phos  2.8     08-19  Mg     2.1     08-19        RADIOLOGY & ADDITIONAL STUDIES:    CT A/P 8/11/19  Cholelithiasis, with small amount of pericholecystic fluid and suspicion   of gallbladder wall thickening, concerning for acute cholecystitis.     Correlation with ultrasound or DISIDA is recommended for further   evaluation.    Interval placement of PEG tube    Partially imaged right lower lobe parenchymal consolidation, which may   represent pneumonia in the correct clinical scenario.     Persistent right adnexal cystic mass, which may represent a neoplasm.    This could be further evaluated with pelvic ultrasound as indicated.     PROTEIN CALORIE MALNUTRITION:   [ ] PPSV2 < or = to 30% [ ] significant weight loss  [ ] poor nutritional intake [ ] catabolic state [ ] anasarca     Albumin, Serum: 3.1 g/dL (08-16-19 @ 07:22)  Artificial Nutrition [ ]     REFERRALS:   [ ]Chaplaincy  [ ] Hospice  [ ]Child Life  [ ]Social Work  [ ]Case management [ ]Holistic Therapy     Goals of Care Discussion Document:     ........ ....... ...... ..... .... ... .. .  Reji Whitney MD  Palliative Medicine  office: [phone] | [phone]  pager: [phone]

## 2019-08-19 NOTE — PROGRESS NOTE ADULT - ASSESSMENT
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 Em on 6/24, recurrent aspiration s/p PEG tube, who presents from [facility] s/p a week of gradually worsening mental status along with fever x3 days found to be septic 2/2 acute kylee and possible CNS infection.

## 2019-08-19 NOTE — PROGRESS NOTE ADULT - ASSESSMENT
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 Em on 6/24, recurrent aspiration s/p PEG tube, who presents from [facility] s/p a week of gradually worsening mental status along with fever x 3 days found to be septic.   Workup thus far found patient to be febrile to 100.4 on admission and tachycardic/hypotensive   Leukocytosis trending down   ESR and CRP elevated  CT head: Increased size of midline suboccipital extracalvarial CSF density fluid collection, likely representing a CSF leak/meningocele  CT A/P: Cholelithiasis, with small amount of pericholecystic fluid and suspicion of gallbladder wall thickening, concerning for acute cholecystitis  US Abdomen: Cholelithiasis with findings suspicious for acute cholecystitis.   LP not s/o bacterial process-? 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  Case d/w primary team  Will Follow.  Beeper [phone]-opmj/afterhours/No response-[phone]

## 2019-08-19 NOTE — PROGRESS NOTE ADULT - PROBLEM SELECTOR PLAN 4
-  will try to procure HCP form, listing daughters as primary and secondary HCP  -  states goals have been clear with wife wanting trial of intubation and resuscitation  - will sign off, please reconsult PRN

## 2019-08-19 NOTE — PROGRESS NOTE ADULT - PROBLEM SELECTOR PLAN 2
-In setting of acute kylee vs PNA. Now resolved and patient off pressors  -Acute cholecystitis workup as below  -s/p LP negative for infection  -Switched to Zosyn 3.375 q8hrs -In setting of acute cholecystitis. Now resolved and patient off pressors  -Acute cholecystitis workup as below  -s/p LP negative for infection  -Switched to Zosyn 3.375 q8hrs. Per ID, will continue abx for total of 14 days from the day she received her percutaneous cholecystostomy, which was 8/15. To continue abx until 8/29  - will continue to f/u bile Cx. If organism identified with susceptibility report, will adjust abx regimen accordingly.

## 2019-08-19 NOTE — PROGRESS NOTE ADULT - ASSESSMENT
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 No acute changes on imaging, and no neurosurgical intervention recommended at this time. MRI pending. Is on vanco and meropenem currently. Also has concern for cholecystitis; surgery deferring intervention currently due to her clinical state, and IR is planing for percutaneous cholecystostomy tube placement. Per , was very active and in good health until her resection, and has had a decline since then. Palliative care called to discuss GOC.

## 2019-08-19 NOTE — PROGRESS NOTE ADULT - PROBLEM SELECTOR PLAN 1
-Likely in setting of sepsis. Alert and responsive to verbal commands, appears back at baseline, per   -MRI showing the suboccipital extracranial fluid collection has increased in size   compared to previous MRI most consistent with pseudomeningocele  -Neurosx: No surgical intervention at this time, f/u outpt  -Infectious workup and management as below -Likely in setting of sepsis. Alert and responsive to verbal commands, but somnolent in the AM  -MRI showing the suboccipital extracranial fluid collection has increased in size   compared to previous MRI most consistent with pseudomeningocele  -Neurosx: No surgical intervention at this time, f/u outpt  -Infectious workup and management as below

## 2019-08-19 NOTE — PROGRESS NOTE ADULT - SUBJECTIVE AND OBJECTIVE BOX
Patient is a 69y old  Female who presents with a chief complaint of Hypotension (19 Aug 2019 09:13)    Being followed by ID for cholecystitis     Interval history:awake-answers some queries-though unreliable historian   No other acute events      ROS:  No N/V/D  No abd pain  No urinary complaints  No HA  No joint or limb pain  No other complaints      Antimicrobials:    nystatin    Suspension 044156 Unit(s) Oral two times a day  piperacillin/tazobactam IVPB.. 3.375 Gram(s) IV Intermittent every 8 hours      other medications reviewed    MEDICATIONS  (STANDING):  artificial tears (preservative free) Ophthalmic Solution 1 Drop(s) Both EYES every 6 hours  buDESOnide    Inhalation Suspension 0.5 milliGRAM(s) Inhalation two times a day  chlorhexidine 4% Liquid 1 Application(s) Topical <User Schedule>  enoxaparin Injectable 40 milliGRAM(s) SubCutaneous daily  insulin lispro (HumaLOG) corrective regimen sliding scale   SubCutaneous every 6 hours  lactated ringers. 1000 milliLiter(s) (50 mL/Hr) IV Continuous <Continuous>  latanoprost 0.005% Ophthalmic Solution 1 Drop(s) Both EYES at bedtime  nystatin    Suspension 288175 Unit(s) Oral two times a day  petrolatum Ophthalmic Ointment 1 Application(s) Both EYES at bedtime  piperacillin/tazobactam IVPB.. 3.375 Gram(s) IV Intermittent every 8 hours  prednisoLONE acetate 1% Suspension 1 Drop(s) Both EYES four times a day      Vital Signs Last 24 Hrs  T(C): 36.5 (08-19-19 @ 05:00), Max: 37 (08-18-19 @ 21:58)  T(F): 97.7 (08-19-19 @ 05:00), Max: 98.6 (08-18-19 @ 21:58)  HR: 101 (08-19-19 @ 05:39) (92 - 114)  BP: 153/80 (08-19-19 @ 05:00) (138/87 - 167/81)  BP(mean): --  RR: 18 (08-19-19 @ 05:39) (18 - 94)  SpO2: 94% (08-19-19 @ 05:39) (93% - 98%)    Physical Exam:    Cranial incisions CDI no tenderness   HEENT PERRLA EOMI,No pallor or icterus    No oral exudate or erythema    Neck supple no JVD or LN    Chest Good AE,CTA    CVS RRR S1 S2 WNl No murmur or rub or gallop    Abd soft BS normal No tendernessfeeding tube  RUQ cholecystostomy-mild tenderness at site   Obese   Ext No cyanosis clubbing or edema    IV site no erythema tenderness or discharge    Joints no swelling or LOM    CNS as above   Lab Data:                          10.5   21.8  )-----------( 404      ( 19 Aug 2019 06:43 )             32.2   WBC Count: 21.8 (08-19-19 @ 06:43)  WBC Count: 21.3 (08-18-19 @ 07:12)  WBC Count: 21.5 (08-17-19 @ 06:50)  WBC Count: 17.7 (08-16-19 @ 07:22)  WBC Count: 14.1 (08-15-19 @ 07:12)  WBC Count: 14.7 (08-14-19 @ 06:43)  WBC Count: 24.9 (08-13-19 @ 00:20)      08-19    135  |  97  |  25<H>  ----------------------------<  112<H>  4.2   |  28  |  0.46<L>    Ca    9.1      19 Aug 2019 06:42  Phos  2.8     08-19  Mg     2.1     08-19    TPro  5.5<L>  /  Alb  2.8<L>  /  TBili  0.4  /  DBili  x   /  AST  58<H>  /  ALT  269<H>  /  AlkPhos  102  08-19        Culture - Body Fluid with Gram Stain (collected 15 Aug 2019 18:57)  Source: .Body Fluid  Gram Stain (16 Aug 2019 00:56):    Rare polymorphonuclear leukocytes seen per low power field    No organisms seen per oil power field  Preliminary Report (16 Aug 2019 16:46):    No growth    Culture - Blood (collected 15 Aug 2019 09:52)  Source: .Blood  Preliminary Report (16 Aug 2019 10:01):    No growth to date.    Culture - Blood (collected 15 Aug 2019 09:52)  Source: .Blood  Preliminary Report (16 Aug 2019 10:01):    No growth to date.    Culture - Acid Fast (collected 14 Aug 2019 23:03)    Culture - CSF with Gram Stain (collected 14 Aug 2019 19:04)  Source: .CSF  Gram Stain (14 Aug 2019 19:39):    No polymorphonuclear cells seen    No organisms seen    by cytocentrifuge  Final Report (17 Aug 2019 15:37):    No growth    Culture - Fungal, CSF (collected 14 Aug 2019 19:04)  Source: .CSF  Preliminary Report (15 Aug 2019 09:17):    Testing in progress            < from: MR Head w/wo IV Cont (08.16.19 @ 19:40) >    IMPRESSION:     The suboccipital extracranial fluid collection has increased in size   compared to previous MRI most consistent with pseudomeningocele. There is   no evidence for underlying abscess.    Evolving postoperative changes are again noted status post resection of   fourth ventricular mass, without evidence for residual or recurrent tumor.    Slight interval increase in size of the lateral ventricles compared to   the prior MRI study. Serial imaging follow-up of the ventricular size   over time is recommended.    < end of copied text >

## 2019-08-19 NOTE — PROGRESS NOTE ADULT - SUBJECTIVE AND OBJECTIVE BOX
Parmjit Garcia MD  Beeper: [phone] ([facility])/ [id number] (J)     Subjective:    Patient is a 69y old  Female who presents with a chief complaint of Hypotension (18 Aug 2019 15:06)      Overnight events:    MEDICATIONS  (STANDING):  artificial tears (preservative free) Ophthalmic Solution 1 Drop(s) Both EYES every 6 hours  buDESOnide    Inhalation Suspension 0.5 milliGRAM(s) Inhalation two times a day  chlorhexidine 4% Liquid 1 Application(s) Topical <User Schedule>  enoxaparin Injectable 40 milliGRAM(s) SubCutaneous daily  insulin lispro (HumaLOG) corrective regimen sliding scale   SubCutaneous every 6 hours  lactated ringers. 1000 milliLiter(s) (50 mL/Hr) IV Continuous <Continuous>  latanoprost 0.005% Ophthalmic Solution 1 Drop(s) Both EYES at bedtime  nystatin    Suspension 509010 Unit(s) Oral two times a day  petrolatum Ophthalmic Ointment 1 Application(s) Both EYES at bedtime  piperacillin/tazobactam IVPB.. 3.375 Gram(s) IV Intermittent every 8 hours  prednisoLONE acetate 1% Suspension 1 Drop(s) Both EYES four times a day    MEDICATIONS  (PRN):  acetaminophen    Suspension .. 650 milliGRAM(s) Enteral Tube every 6 hours PRN Moderate Pain (4 - 6)      Objective:    Vitals: Vital Signs Last 24 Hrs  T(C): 36.5 (08-19-19 @ 05:00), Max: 37 (08-18-19 @ 21:58)  T(F): 97.7 (08-19-19 @ 05:00), Max: 98.6 (08-18-19 @ 21:58)  HR: 101 (08-19-19 @ 05:39) (92 - 114)  BP: 153/80 (08-19-19 @ 05:00) (138/87 - 167/81)  BP(mean): --  RR: 18 (08-19-19 @ 05:39) (18 - 94)  SpO2: 94% (08-19-19 @ 05:39) (93% - 98%)              I&O's Summary    18 Aug 2019 07:01  -  19 Aug 2019 07:00  --------------------------------------------------------  IN: 600 mL / OUT: 2300 mL / NET: -1700 mL        PHYSICAL EXAM:  GENERAL: NAD, well-groomed, well-developed  HEAD:  Atraumatic, Normocephalic  EYES: EOMI, PERRLA, conjunctiva and sclera clear  ENMT: No tonsillar erythema, exudates, or enlargement; Moist mucous membranes, Good dentition, No lesions  NECK: Supple, No JVD, Normal thyroid  CHEST/LUNG: Clear to percussion bilaterally; No rales, rhonchi, wheezing, or rubs  HEART: Regular rate and rhythm; No murmurs, rubs, or gallops  ABDOMEN: Soft, Nontender, Nondistended; Bowel sounds present  EXTREMITIES:  2+ Peripheral Pulses, No clubbing, cyanosis, or edema  LYMPH: No lymphadenopathy noted  SKIN: No rashes or lesions  NERVOUS SYSTEM:  Alert & Oriented X3, Good concentration; Motor Strength 5/5 B/L upper and lower extremities; DTRs 2+ intact and symmetric                                             LABS:  08-19    135  |  97  |  25<H>  ----------------------------<  112<H>  4.2   |  28  |  0.46<L>  08-18    134<L>  |  95<L>  |  26<H>  ----------------------------<  156<H>  4.6   |  28  |  0.47<L>  08-17    136  |  97  |  25<H>  ----------------------------<  163<H>  4.4   |  28  |  0.49<L>    Ca    9.1      19 Aug 2019 06:42  Ca    9.3      18 Aug 2019 07:12  Ca    9.3      17 Aug 2019 06:48  Phos  2.8     08-19  Mg     2.1     08-19    TPro  5.5<L>  /  Alb  2.8<L>  /  TBili  0.4  /  DBili  x   /  AST  58<H>  /  ALT  269<H>  /  AlkPhos  102  08-19  TPro  6.3  /  Alb  3.0<L>  /  TBili  0.4  /  DBili  x   /  AST  61<H>  /  ALT  290<H>  /  AlkPhos  97  08-18  TPro  6.1  /  Alb  3.1<L>  /  TBili  0.4  /  DBili  x   /  AST  60<H>  /  ALT  288<H>  /  AlkPhos  93  08-17                                                    10.5   21.8  )-----------( 404      ( 19 Aug 2019 06:43 )             32.2                         10.4   21.3  )-----------( 431      ( 18 Aug 2019 07:12 )             33.3                         10.2   21.5  )-----------( 326      ( 17 Aug 2019 06:50 )             33.9     CAPILLARY BLOOD GLUCOSE      POCT Blood Glucose.: 111 mg/dL (19 Aug 2019 06:12)  POCT Blood Glucose.: 117 mg/dL (18 Aug 2019 23:47)  POCT Blood Glucose.: 131 mg/dL (18 Aug 2019 22:14)  POCT Blood Glucose.: 137 mg/dL (18 Aug 2019 18:35)  POCT Blood Glucose.: 138 mg/dL (18 Aug 2019 13:18)        RECENT CULTURES:  08-15 @ 18:57  .Body Fluid  --  --  --    No growth  --  08-15 @ 09:52  .Blood  --  --  --    No growth to date.  --  08-14 @ 19:04  .CSF  --  --  --    Testing in progress  --      TELEMETRY:    ECG:    TTE:    RADIOLOGY & ADDITIONAL TESTS:    Imaging Personally Reviewed:  [ ] YES  [ ] NO    Consultants involved in case:   Consultant(s) Notes Reviewed:  [ ] YES  [ ] NO:   Care Discussed with Consultants/Other Providers [ ] YES  [ ] NO Parmjit Garcia MD  Beeper: [phone] ([facility])/ [phone] ([facility])     Subjective:    Patient is a 69y old  Female who presents with a chief complaint of Hypotension (18 Aug 2019 15:06)    Patient was seen and examined at bedside this AM. No acute overnight events.     MEDICATIONS  (STANDING):  artificial tears (preservative free) Ophthalmic Solution 1 Drop(s) Both EYES every 6 hours  buDESOnide    Inhalation Suspension 0.5 milliGRAM(s) Inhalation two times a day  chlorhexidine 4% Liquid 1 Application(s) Topical <User Schedule>  enoxaparin Injectable 40 milliGRAM(s) SubCutaneous daily  insulin lispro (HumaLOG) corrective regimen sliding scale   SubCutaneous every 6 hours  lactated ringers. 1000 milliLiter(s) (50 mL/Hr) IV Continuous <Continuous>  latanoprost 0.005% Ophthalmic Solution 1 Drop(s) Both EYES at bedtime  nystatin    Suspension 834374 Unit(s) Oral two times a day  petrolatum Ophthalmic Ointment 1 Application(s) Both EYES at bedtime  piperacillin/tazobactam IVPB.. 3.375 Gram(s) IV Intermittent every 8 hours  prednisoLONE acetate 1% Suspension 1 Drop(s) Both EYES four times a day    MEDICATIONS  (PRN):  acetaminophen    Suspension .. 650 milliGRAM(s) Enteral Tube every 6 hours PRN Moderate Pain (4 - 6)      Objective:    Vitals: Vital Signs Last 24 Hrs  T(C): 36.5 (08-19-19 @ 05:00), Max: 37 (08-18-19 @ 21:58)  T(F): 97.7 (08-19-19 @ 05:00), Max: 98.6 (08-18-19 @ 21:58)  HR: 101 (08-19-19 @ 05:39) (92 - 114)  BP: 153/80 (08-19-19 @ 05:00) (138/87 - 167/81)  BP(mean): --  RR: 18 (08-19-19 @ 05:39) (18 - 94)  SpO2: 94% (08-19-19 @ 05:39) (93% - 98%)              I&O's Summary    18 Aug 2019 07:01  -  19 Aug 2019 07:00  --------------------------------------------------------  IN: 600 mL / OUT: 2300 mL / NET: -1700 mL        PHYSICAL EXAM:  GENERAL: NAD, well-groomed, well-developed  HEAD:  Atraumatic, Normocephalic  EYES: EOMI, PERRLA, conjunctiva and sclera clear  ENMT: No tonsillar erythema, exudates, or enlargement; Moist mucous membranes, Good dentition, No lesions  NECK: Supple, No JVD, Normal thyroid  CHEST/LUNG: Clear to percussion bilaterally; No rales, rhonchi, wheezing, or rubs  HEART: Regular rate and rhythm; No murmurs, rubs, or gallops  ABDOMEN: Soft, Nontender, Nondistended; Bowel sounds present  EXTREMITIES:  2+ Peripheral Pulses, No clubbing, cyanosis, or edema  LYMPH: No lymphadenopathy noted  SKIN: No rashes or lesions  NERVOUS SYSTEM:  Alert & Oriented X3, Good concentration; Motor Strength 5/5 B/L upper and lower extremities; DTRs 2+ intact and symmetric                                             LABS:  08-19    135  |  97  |  25<H>  ----------------------------<  112<H>  4.2   |  28  |  0.46<L>  08-18    134<L>  |  95<L>  |  26<H>  ----------------------------<  156<H>  4.6   |  28  |  0.47<L>  08-17    136  |  97  |  25<H>  ----------------------------<  163<H>  4.4   |  28  |  0.49<L>    Ca    9.1      19 Aug 2019 06:42  Ca    9.3      18 Aug 2019 07:12  Ca    9.3      17 Aug 2019 06:48  Phos  2.8     08-19  Mg     2.1     08-19    TPro  5.5<L>  /  Alb  2.8<L>  /  TBili  0.4  /  DBili  x   /  AST  58<H>  /  ALT  269<H>  /  AlkPhos  102  08-19  TPro  6.3  /  Alb  3.0<L>  /  TBili  0.4  /  DBili  x   /  AST  61<H>  /  ALT  290<H>  /  AlkPhos  97  08-18  TPro  6.1  /  Alb  3.1<L>  /  TBili  0.4  /  DBili  x   /  AST  60<H>  /  ALT  288<H>  /  AlkPhos  93  08-17                                                    10.5   21.8  )-----------( 404      ( 19 Aug 2019 06:43 )             32.2                         10.4   21.3  )-----------( 431      ( 18 Aug 2019 07:12 )             33.3                         10.2   21.5  )-----------( 326      ( 17 Aug 2019 06:50 )             33.9     CAPILLARY BLOOD GLUCOSE      POCT Blood Glucose.: 111 mg/dL (19 Aug 2019 06:12)  POCT Blood Glucose.: 117 mg/dL (18 Aug 2019 23:47)  POCT Blood Glucose.: 131 mg/dL (18 Aug 2019 22:14)  POCT Blood Glucose.: 137 mg/dL (18 Aug 2019 18:35)  POCT Blood Glucose.: 138 mg/dL (18 Aug 2019 13:18)        RECENT CULTURES:  08-15 @ 18:57  .Body Fluid  --  --  --    No growth  --  08-15 @ 09:52  .Blood  --  --  --    No growth to date.  --  08-14 @ 19:04  .CSF  --  --  --    Testing in progress  --      TELEMETRY:    ECG:    TTE:    RADIOLOGY & ADDITIONAL TESTS:    Imaging Personally Reviewed:  [ ] YES  [ ] NO    Consultants involved in case:   Consultant(s) Notes Reviewed:  [ ] YES  [ ] NO:   Care Discussed with Consultants/Other Providers [ ] YES  [ ] NO Parmjit Garcia MD  Beeper: [phone] ([facility])/ [phone] ([facility])     Subjective:    Patient is a 69y old  Female who presents with a chief complaint of Hypotension (18 Aug 2019 15:06)    Patient was seen and examined at bedside this AM. No acute overnight events. Patient was somnolent this AM, but responsive and answering questions appropriately. She denied fever, chills, nausea, vomiting, CP, palpitations, coughing, wheezing, SOB, and abdominal pain.      MEDICATIONS  (STANDING):  artificial tears (preservative free) Ophthalmic Solution 1 Drop(s) Both EYES every 6 hours  buDESOnide    Inhalation Suspension 0.5 milliGRAM(s) Inhalation two times a day  chlorhexidine 4% Liquid 1 Application(s) Topical <User Schedule>  enoxaparin Injectable 40 milliGRAM(s) SubCutaneous daily  insulin lispro (HumaLOG) corrective regimen sliding scale   SubCutaneous every 6 hours  lactated ringers. 1000 milliLiter(s) (50 mL/Hr) IV Continuous <Continuous>  latanoprost 0.005% Ophthalmic Solution 1 Drop(s) Both EYES at bedtime  nystatin    Suspension 196217 Unit(s) Oral two times a day  petrolatum Ophthalmic Ointment 1 Application(s) Both EYES at bedtime  piperacillin/tazobactam IVPB.. 3.375 Gram(s) IV Intermittent every 8 hours  prednisoLONE acetate 1% Suspension 1 Drop(s) Both EYES four times a day    MEDICATIONS  (PRN):  acetaminophen    Suspension .. 650 milliGRAM(s) Enteral Tube every 6 hours PRN Moderate Pain (4 - 6)      Objective:    Vitals: Vital Signs Last 24 Hrs  T(C): 36.5 (08-19-19 @ 05:00), Max: 37 (08-18-19 @ 21:58)  T(F): 97.7 (08-19-19 @ 05:00), Max: 98.6 (08-18-19 @ 21:58)  HR: 101 (08-19-19 @ 05:39) (92 - 114)  BP: 153/80 (08-19-19 @ 05:00) (138/87 - 167/81)  BP(mean): --  RR: 18 (08-19-19 @ 05:39) (18 - 94)  SpO2: 94% (08-19-19 @ 05:39) (93% - 98%)              I&O's Summary    18 Aug 2019 07:01  -  19 Aug 2019 07:00  --------------------------------------------------------  IN: 600 mL / OUT: 2300 mL / NET: -1700 mL        PHYSICAL EXAM:  GENERAL: NAD and somnolent  HEAD:  Atraumatic, Normocephalic  EYES: EOMI, conjunctiva and sclera clear  CHEST/LUNG: Clear to auscultation bilaterally; No rales, rhonchi, wheezing, or rubs  HEART: Regular rate and rhythm; No murmurs, rubs, or gallops  ABDOMEN: Soft, Nontender, Nondistended; Bowel sounds present  EXTREMITIES:  No LE edema  NERVOUS SYSTEM:  Somnolent, but arousable and responsive.                                            LABS:  08-19    135  |  97  |  25<H>  ----------------------------<  112<H>  4.2   |  28  |  0.46<L>      Ca    9.1      19 Aug 2019 06:42  Phos  2.8     08-19  Mg     2.1     08-19    TPro  5.5<L>  /  Alb  2.8<L>  /  TBili  0.4  /  DBili  x   /  AST  58<H>  /  ALT  269<H>  /  AlkPhos  102  08-19                          10.5   21.8  )-----------( 404      ( 19 Aug 2019 06:43 )             32.2                CAPILLARY BLOOD GLUCOSE    POCT Blood Glucose.: 111 mg/dL (19 Aug 2019 06:12)  POCT Blood Glucose.: 117 mg/dL (18 Aug 2019 23:47)  POCT Blood Glucose.: 131 mg/dL (18 Aug 2019 22:14)  POCT Blood Glucose.: 137 mg/dL (18 Aug 2019 18:35)  POCT Blood Glucose.: 138 mg/dL (18 Aug 2019 13:18)

## 2019-08-20 LAB
ALBUMIN SERPL ELPH-MCNC: 2.7 G/DL — LOW (ref 3.3–5)
ALP SERPL-CCNC: 84 U/L — SIGNIFICANT CHANGE UP (ref 40–120)
ALT FLD-CCNC: 168 U/L — HIGH (ref 10–45)
ANION GAP SERPL CALC-SCNC: 9 MMOL/L — SIGNIFICANT CHANGE UP (ref 5–17)
AST SERPL-CCNC: 22 U/L — SIGNIFICANT CHANGE UP (ref 10–40)
BILIRUB SERPL-MCNC: 0.6 MG/DL — SIGNIFICANT CHANGE UP (ref 0.2–1.2)
BUN SERPL-MCNC: 21 MG/DL — SIGNIFICANT CHANGE UP (ref 7–23)
CALCIUM SERPL-MCNC: 9.1 MG/DL — SIGNIFICANT CHANGE UP (ref 8.4–10.5)
CHLORIDE SERPL-SCNC: 96 MMOL/L — SIGNIFICANT CHANGE UP (ref 96–108)
CO2 SERPL-SCNC: 30 MMOL/L — SIGNIFICANT CHANGE UP (ref 22–31)
CREAT SERPL-MCNC: 0.54 MG/DL — SIGNIFICANT CHANGE UP (ref 0.5–1.3)
CULTURE RESULTS: SIGNIFICANT CHANGE UP
GLUCOSE BLDC GLUCOMTR-MCNC: 110 MG/DL — HIGH (ref 70–99)
GLUCOSE BLDC GLUCOMTR-MCNC: 128 MG/DL — HIGH (ref 70–99)
GLUCOSE BLDC GLUCOMTR-MCNC: 132 MG/DL — HIGH (ref 70–99)
GLUCOSE SERPL-MCNC: 135 MG/DL — HIGH (ref 70–99)
HCT VFR BLD CALC: 31.8 % — LOW (ref 34.5–45)
HGB BLD-MCNC: 9.9 G/DL — LOW (ref 11.5–15.5)
MCHC RBC-ENTMCNC: 28.4 PG — SIGNIFICANT CHANGE UP (ref 27–34)
MCHC RBC-ENTMCNC: 31.1 GM/DL — LOW (ref 32–36)
MCV RBC AUTO: 91.3 FL — SIGNIFICANT CHANGE UP (ref 80–100)
PLATELET # BLD AUTO: 355 K/UL — SIGNIFICANT CHANGE UP (ref 150–400)
POTASSIUM SERPL-MCNC: 3.9 MMOL/L — SIGNIFICANT CHANGE UP (ref 3.5–5.3)
POTASSIUM SERPL-SCNC: 3.9 MMOL/L — SIGNIFICANT CHANGE UP (ref 3.5–5.3)
PROT SERPL-MCNC: 5.4 G/DL — LOW (ref 6–8.3)
RBC # BLD: 3.48 M/UL — LOW (ref 3.8–5.2)
RBC # FLD: 16.6 % — HIGH (ref 10.3–14.5)
SODIUM SERPL-SCNC: 135 MMOL/L — SIGNIFICANT CHANGE UP (ref 135–145)
SPECIMEN SOURCE: SIGNIFICANT CHANGE UP
WBC # BLD: 14.3 K/UL — HIGH (ref 3.8–10.5)
WBC # FLD AUTO: 14.3 K/UL — HIGH (ref 3.8–10.5)

## 2019-08-20 PROCEDURE — 99232 SBSQ HOSP IP/OBS MODERATE 35: CPT

## 2019-08-20 PROCEDURE — 70450 CT HEAD/BRAIN W/O DYE: CPT | Mod: 26

## 2019-08-20 PROCEDURE — 99233 SBSQ HOSP IP/OBS HIGH 50: CPT

## 2019-08-20 RX ORDER — SODIUM CHLORIDE 9 MG/ML
500 INJECTION, SOLUTION INTRAVENOUS ONCE
Refills: 0 | Status: COMPLETED | OUTPATIENT
Start: 2019-08-20 | End: 2019-08-20

## 2019-08-20 RX ADMIN — Medication 500000 UNIT(S): at 05:49

## 2019-08-20 RX ADMIN — Medication 1 DROP(S): at 05:50

## 2019-08-20 RX ADMIN — Medication 1 DROP(S): at 18:06

## 2019-08-20 RX ADMIN — PIPERACILLIN AND TAZOBACTAM 25 GRAM(S): 4; .5 INJECTION, POWDER, LYOPHILIZED, FOR SOLUTION INTRAVENOUS at 22:30

## 2019-08-20 RX ADMIN — LATANOPROST 1 DROP(S): 0.05 SOLUTION/ DROPS OPHTHALMIC; TOPICAL at 22:29

## 2019-08-20 RX ADMIN — Medication 1 DROP(S): at 05:49

## 2019-08-20 RX ADMIN — Medication 1 APPLICATION(S): at 22:29

## 2019-08-20 RX ADMIN — Medication 1 DROP(S): at 13:05

## 2019-08-20 RX ADMIN — PIPERACILLIN AND TAZOBACTAM 25 GRAM(S): 4; .5 INJECTION, POWDER, LYOPHILIZED, FOR SOLUTION INTRAVENOUS at 05:49

## 2019-08-20 RX ADMIN — Medication 0.5 MILLIGRAM(S): at 18:14

## 2019-08-20 RX ADMIN — Medication 500000 UNIT(S): at 18:06

## 2019-08-20 RX ADMIN — PIPERACILLIN AND TAZOBACTAM 25 GRAM(S): 4; .5 INJECTION, POWDER, LYOPHILIZED, FOR SOLUTION INTRAVENOUS at 13:06

## 2019-08-20 RX ADMIN — ENOXAPARIN SODIUM 40 MILLIGRAM(S): 100 INJECTION SUBCUTANEOUS at 13:05

## 2019-08-20 RX ADMIN — SODIUM CHLORIDE 1000 MILLILITER(S): 9 INJECTION, SOLUTION INTRAVENOUS at 13:05

## 2019-08-20 RX ADMIN — CHLORHEXIDINE GLUCONATE 1 APPLICATION(S): 213 SOLUTION TOPICAL at 06:09

## 2019-08-20 NOTE — PROGRESS NOTE ADULT - PROBLEM SELECTOR PLAN 1
-Likely in setting of sepsis. 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

## 2019-08-20 NOTE — PROGRESS NOTE ADULT - SUBJECTIVE AND OBJECTIVE BOX
Patient is a 69y old  Female who presents with a chief complaint of Hypotension (20 Aug 2019 06:44)    Being followed by ID for cholecytistis    Interval history:lethargic though answers some questions  No reliable ROS available from pt   Family deciding GOC   No acute events      ROS:  Not obtainable    Antimicrobials:    nystatin    Suspension 844484 Unit(s) Oral two times a day  piperacillin/tazobactam IVPB.. 3.375 Gram(s) IV Intermittent every 8 hours    Other medications reviewed    Vital Signs Last 24 Hrs  T(C): 36.6 (08-20-19 @ 08:53), Max: 36.9 (08-20-19 @ 04:45)  T(F): 97.9 (08-20-19 @ 08:53), Max: 98.5 (08-20-19 @ 04:45)  HR: 111 (08-20-19 @ 08:53) (100 - 111)  BP: 121/74 (08-20-19 @ 08:53) (121/74 - 165/88)  BP(mean): --  RR: 18 (08-20-19 @ 08:53) (18 - 18)  SpO2: 99% (08-20-19 @ 08:53) (97% - 100%)    Physical Exam:    Cranial incisions CDI no tenderness   HEENT PERRLA EOMI,No pallor or icterus    No oral exudate or erythema    Neck supple no JVD or LN    Chest Good AE,CTA    CVS RRR S1 S2 WNl No murmur or rub or gallop    Abd soft BS normal No tendernessfeeding tube  RUQ cholecystostomy-mild tenderness at site   Obese   Ext No cyanosis clubbing or edema    IV site no erythema tenderness or discharge    Joints no swelling or LOM    CNS as above   Lab Data:                          9.9    14.3  )-----------( 355      ( 20 Aug 2019 10:31 )             31.8     WBC Count: 14.3 (08-20-19 @ 10:31)  WBC Count: 21.8 (08-19-19 @ 06:43)  WBC Count: 21.3 (08-18-19 @ 07:12)  WBC Count: 21.5 (08-17-19 @ 06:50)  WBC Count: 17.7 (08-16-19 @ 07:22)  WBC Count: 14.1 (08-15-19 @ 07:12)  WBC Count: 14.7 (08-14-19 @ 06:43)    08-20    135  |  96  |  21  ----------------------------<  135<H>  3.9   |  30  |  0.54    Ca    9.1      20 Aug 2019 10:31  Phos  2.8     08-19  Mg     2.1     08-19    TPro  5.4<L>  /  Alb  2.7<L>  /  TBili  0.6  /  DBili  x   /  AST  22  /  ALT  168<H>  /  AlkPhos  84  08-20        Culture - Body Fluid with Gram Stain (collected 15 Aug 2019 18:57)  Source: .Body Fluid  Gram Stain (16 Aug 2019 00:56):    Rare polymorphonuclear leukocytes seen per low power field    No organisms seen per oil power field  Preliminary Report (16 Aug 2019 16:46):    No growth

## 2019-08-20 NOTE — PROGRESS NOTE ADULT - ASSESSMENT
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

## 2019-08-20 NOTE — PROGRESS NOTE ADULT - ASSESSMENT
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   Workup thus far found patient to be febrile to 100.4 on admission and tachycardic/hypotensive   Leukocytosis trending down   ESR and CRP elevated  LP not s/o bacterial process-? post op vs secondary to CSF leak  MRI no abscess,pseudomeningocele  HIDA with cholecystitis  s/p perc kylee  Stable overall  Rec:  1) follow biliary fluid cx from perc kylee  2) Continue zosyn  3) Monitor clinically  mental status may be due to dementia,CNS surgery  4) If stable 14 day course from perc kylee though if any growth can tailor antimicrobials per results    Will tailor plan for ID issues  per course,results and goals of care .Will defer to primary team on management of other issues.  Case d/w primary team  Will Follow.  Beeper [phone]-nusb/afterhours/No response-[phone]

## 2019-08-20 NOTE — PROGRESS NOTE ADULT - PROBLEM SELECTOR PLAN 2
-In setting of acute cholecystitis. Now resolved and patient off pressors  -Acute cholecystitis workup as below  -s/p LP negative for infection  -Switched to Zosyn 3.375 q8hrs. Per ID, will continue abx for total of 14 days from the day she received her percutaneous cholecystostomy, which was 8/15. To continue abx until 8/29  - will continue to f/u bile Cx. If organism identified with susceptibility report, will adjust abx regimen accordingly. -In setting of acute cholecystitis. Now resolved and patient off pressors  -Acute cholecystitis workup as below  -s/p LP negative for infection  -Switched to Zosyn 3.375 q8hrs. Per ID, will continue abx for total of 14 days from the day she received her percutaneous cholecystostomy, which was 8/15. To continue abx until 8/29  - will continue to f/u bile Cx. If organism identified with susceptibility report, will adjust abx regimen accordingly.  - in anticipation for discharge to rehab facility, midline catheter ordered

## 2019-08-20 NOTE — PROGRESS NOTE ADULT - SUBJECTIVE AND OBJECTIVE BOX
Parmjit Garcia MD  Beeper: [phone] ([facility])/ [phone] ([facility])     Subjective:    Patient is a 69y old  Female who presents with a chief complaint of Hypotension (19 Aug 2019 16:43)      Patient was seen and examined at bedside this AM. Denied fever, chills, nausea, vomiting, CP, palpitations, coughing, wheezing, SOB, and abdominal pain.    Overnight events:    MEDICATIONS  (STANDING):  artificial tears (preservative free) Ophthalmic Solution 1 Drop(s) Both EYES every 6 hours  buDESOnide    Inhalation Suspension 0.5 milliGRAM(s) Inhalation two times a day  chlorhexidine 4% Liquid 1 Application(s) Topical <User Schedule>  enoxaparin Injectable 40 milliGRAM(s) SubCutaneous daily  insulin lispro (HumaLOG) corrective regimen sliding scale   SubCutaneous every 6 hours  latanoprost 0.005% Ophthalmic Solution 1 Drop(s) Both EYES at bedtime  nystatin    Suspension 674407 Unit(s) Oral two times a day  petrolatum Ophthalmic Ointment 1 Application(s) Both EYES at bedtime  piperacillin/tazobactam IVPB.. 3.375 Gram(s) IV Intermittent every 8 hours  prednisoLONE acetate 1% Suspension 1 Drop(s) Both EYES four times a day    MEDICATIONS  (PRN):  acetaminophen    Suspension .. 650 milliGRAM(s) Enteral Tube every 6 hours PRN Moderate Pain (4 - 6)      Objective:    Vitals: Vital Signs Last 24 Hrs  T(C): 36.9 (08-20-19 @ 04:45), Max: 36.9 (08-20-19 @ 04:45)  T(F): 98.5 (08-20-19 @ 04:45), Max: 98.5 (08-20-19 @ 04:45)  HR: 111 (08-20-19 @ 04:45) (100 - 111)  BP: 133/84 (08-20-19 @ 04:45) (133/84 - 165/88)  BP(mean): --  RR: 18 (08-20-19 @ 04:45) (18 - 18)  SpO2: 99% (08-20-19 @ 04:45) (97% - 100%)              I&O's Summary    18 Aug 2019 07:01  -  19 Aug 2019 07:00  --------------------------------------------------------  IN: 600 mL / OUT: 2300 mL / NET: -1700 mL        PHYSICAL EXAM:  GENERAL: NAD, well-groomed, well-developed  HEAD:  Atraumatic, Normocephalic  EYES: EOMI, PERRLA, conjunctiva and sclera clear  ENMT: No tonsillar erythema, exudates, or enlargement; Moist mucous membranes, Good dentition, No lesions  NECK: Supple, No JVD, Normal thyroid  CHEST/LUNG: Clear to auscultation bilaterally; No rales, rhonchi, wheezing, or rubs  HEART: Regular rate and rhythm; No murmurs, rubs, or gallops  ABDOMEN: Soft, Nontender, Nondistended; Bowel sounds present  EXTREMITIES:  2+ Peripheral Pulses, No clubbing, cyanosis, or edema  LYMPH: No lymphadenopathy noted  SKIN: No rashes or lesions  NERVOUS SYSTEM:  Alert & Oriented X3, Good concentration; Motor Strength 5/5 B/L upper and lower extremities; DTRs 2+ intact and symmetric                                             LABS:  08-19    135  |  97  |  25<H>  ----------------------------<  112<H>  4.2   |  28  |  0.46<L>  08-18    134<L>  |  95<L>  |  26<H>  ----------------------------<  156<H>  4.6   |  28  |  0.47<L>  08-17    136  |  97  |  25<H>  ----------------------------<  163<H>  4.4   |  28  |  0.49<L>    Ca    9.1      19 Aug 2019 06:42  Ca    9.3      18 Aug 2019 07:12  Ca    9.3      17 Aug 2019 06:48  Phos  2.8     08-19  Mg     2.1     08-19    TPro  5.5<L>  /  Alb  2.8<L>  /  TBili  0.4  /  DBili  x   /  AST  58<H>  /  ALT  269<H>  /  AlkPhos  102  08-19  TPro  6.3  /  Alb  3.0<L>  /  TBili  0.4  /  DBili  x   /  AST  61<H>  /  ALT  290<H>  /  AlkPhos  97  08-18  TPro  6.1  /  Alb  3.1<L>  /  TBili  0.4  /  DBili  x   /  AST  60<H>  /  ALT  288<H>  /  AlkPhos  93  08-17                                                    10.5   21.8  )-----------( 404      ( 19 Aug 2019 06:43 )             32.2                         10.4   21.3  )-----------( 431      ( 18 Aug 2019 07:12 )             33.3                         10.2   21.5  )-----------( 326      ( 17 Aug 2019 06:50 )             33.9     CAPILLARY BLOOD GLUCOSE      POCT Blood Glucose.: 110 mg/dL (20 Aug 2019 06:36)  POCT Blood Glucose.: 106 mg/dL (19 Aug 2019 23:45)  POCT Blood Glucose.: 96 mg/dL (19 Aug 2019 19:26)  POCT Blood Glucose.: 114 mg/dL (19 Aug 2019 11:43)        RECENT CULTURES:  08-15 @ 18:57  .Body Fluid  --  --  --    No growth  --  08-15 @ 09:52  .Blood  --  --  --    No growth to date.  --  08-14 @ 19:04  .CSF  --  --  --    Testing in progress  --      TELEMETRY:    ECG:    TTE:    RADIOLOGY & ADDITIONAL TESTS:    Imaging Personally Reviewed:  [ ] YES  [ ] NO    Consultants involved in case:   Consultant(s) Notes Reviewed:  [ ] YES  [ ] NO:   Care Discussed with Consultants/Other Providers [ ] YES  [ ] NO Parmjit Garcia MD  Beeper: [phone] ([facility])/ [phone] ([facility])     Subjective:    Patient is a 69y old  Female who presents with a chief complaint of Hypotension (19 Aug 2019 16:43)    Patient was seen and examined at bedside this AM. No acute overnight events. Patient was somnolent, but arousable during initial encounter. She c/o right-sided pain in the region of her percutaneous cholecystostomy tube. Denied fever, chills, nausea, vomiting, CP, palpitations, coughing, wheezing, and SOB.    Patient was seen again during rounds with Dr. Phelps, but patient's mental status had worsened, as she became more somnolent than earlier. In speaking with nursing staff, it appears that her mentation would wax and wane.       MEDICATIONS  (STANDING):  artificial tears (preservative free) Ophthalmic Solution 1 Drop(s) Both EYES every 6 hours  buDESOnide    Inhalation Suspension 0.5 milliGRAM(s) Inhalation two times a day  chlorhexidine 4% Liquid 1 Application(s) Topical <User Schedule>  enoxaparin Injectable 40 milliGRAM(s) SubCutaneous daily  insulin lispro (HumaLOG) corrective regimen sliding scale   SubCutaneous every 6 hours  latanoprost 0.005% Ophthalmic Solution 1 Drop(s) Both EYES at bedtime  nystatin    Suspension 765724 Unit(s) Oral two times a day  petrolatum Ophthalmic Ointment 1 Application(s) Both EYES at bedtime  piperacillin/tazobactam IVPB.. 3.375 Gram(s) IV Intermittent every 8 hours  prednisoLONE acetate 1% Suspension 1 Drop(s) Both EYES four times a day    MEDICATIONS  (PRN):  acetaminophen    Suspension .. 650 milliGRAM(s) Enteral Tube every 6 hours PRN Moderate Pain (4 - 6)      Objective:    Vitals: Vital Signs Last 24 Hrs  T(C): 36.9 (08-20-19 @ 04:45), Max: 36.9 (08-20-19 @ 04:45)  T(F): 98.5 (08-20-19 @ 04:45), Max: 98.5 (08-20-19 @ 04:45)  HR: 111 (08-20-19 @ 04:45) (100 - 111)  BP: 133/84 (08-20-19 @ 04:45) (133/84 - 165/88)  BP(mean): --  RR: 18 (08-20-19 @ 04:45) (18 - 18)  SpO2: 99% (08-20-19 @ 04:45) (97% - 100%)              I&O's Summary    18 Aug 2019 07:01  -  19 Aug 2019 07:00  --------------------------------------------------------  IN: 600 mL / OUT: 2300 mL / NET: -1700 mL        PHYSICAL EXAM:  GENERAL: NAD, well-groomed, well-developed  HEAD:  Atraumatic, Normocephalic  EYES: EOMI, conjunctiva and sclera clear  CHEST/LUNG: Clear to auscultation bilaterally anteriorly. Unable to assess breath sounds posteriorly.  HEART: Regular rate and rhythm; No murmurs, rubs, or gallops  ABDOMEN: Soft, Nontender, Nondistended; Bowel sounds present  EXTREMITIES:  No LE edema  NERVOUS SYSTEM:  Somnolent, but arousable                                              LABS:  08-19    135  |  97  |  25<H>  ----------------------------<  112<H>  4.2   |  28  |  0.46<L>    Ca    9.1      19 Aug 2019 06:42  Phos  2.8     08-19  Mg     2.1     08-19    TPro  5.5<L>  /  Alb  2.8<L>  /  TBili  0.4  /  DBili  x   /  AST  58<H>  /  ALT  269<H>  /  AlkPhos  102  08-19                          10.5   21.8  )-----------( 404      ( 19 Aug 2019 06:43 )             32.2                CAPILLARY BLOOD GLUCOSE    POCT Blood Glucose.: 110 mg/dL (20 Aug 2019 06:36)  POCT Blood Glucose.: 106 mg/dL (19 Aug 2019 23:45) Parmjit Garcia MD  Beeper: [phone] ([facility])/ [phone] ([facility])     Subjective:    Patient is a 69y old  Female who presents with a chief complaint of Hypotension (19 Aug 2019 16:43)    Patient was seen and examined at bedside this AM. No acute overnight events. Patient was somnolent, but arousable during initial encounter. She c/o right-sided pain in the region of her percutaneous cholecystostomy tube. Denied fever, chills, nausea, vomiting, CP, palpitations, coughing, wheezing, and SOB.    Patient was seen again during rounds with Dr. Phelps, but patient's mental status had worsened, as she became more somnolent than earlier. In speaking with nursing staff, it appears that her mentation would wax and wane.       MEDICATIONS  (STANDING):  artificial tears (preservative free) Ophthalmic Solution 1 Drop(s) Both EYES every 6 hours  buDESOnide    Inhalation Suspension 0.5 milliGRAM(s) Inhalation two times a day  chlorhexidine 4% Liquid 1 Application(s) Topical <User Schedule>  enoxaparin Injectable 40 milliGRAM(s) SubCutaneous daily  insulin lispro (HumaLOG) corrective regimen sliding scale   SubCutaneous every 6 hours  latanoprost 0.005% Ophthalmic Solution 1 Drop(s) Both EYES at bedtime  nystatin    Suspension 289072 Unit(s) Oral two times a day  petrolatum Ophthalmic Ointment 1 Application(s) Both EYES at bedtime  piperacillin/tazobactam IVPB.. 3.375 Gram(s) IV Intermittent every 8 hours  prednisoLONE acetate 1% Suspension 1 Drop(s) Both EYES four times a day    MEDICATIONS  (PRN):  acetaminophen    Suspension .. 650 milliGRAM(s) Enteral Tube every 6 hours PRN Moderate Pain (4 - 6)      Objective:    Vitals: Vital Signs Last 24 Hrs  T(C): 36.9 (08-20-19 @ 04:45), Max: 36.9 (08-20-19 @ 04:45)  T(F): 98.5 (08-20-19 @ 04:45), Max: 98.5 (08-20-19 @ 04:45)  HR: 111 (08-20-19 @ 04:45) (100 - 111)  BP: 133/84 (08-20-19 @ 04:45) (133/84 - 165/88)  BP(mean): --  RR: 18 (08-20-19 @ 04:45) (18 - 18)  SpO2: 99% (08-20-19 @ 04:45) (97% - 100%)              I&O's Summary    18 Aug 2019 07:01  -  19 Aug 2019 07:00  --------------------------------------------------------  IN: 600 mL / OUT: 2300 mL / NET: -1700 mL        PHYSICAL EXAM:  GENERAL: NAD, well-groomed, well-developed  HEAD:  Atraumatic, Normocephalic  EYES: EOMI, conjunctiva and sclera clear  CHEST/LUNG: Clear to auscultation bilaterally anteriorly. Unable to assess breath sounds posteriorly.  HEART: Regular rate and rhythm; No murmurs, rubs, or gallops  ABDOMEN: Soft, Nontender, Nondistended; Bowel sounds present  EXTREMITIES:  No LE edema  NERVOUS SYSTEM:  A&Ox2; somnolent, but arousable. During rounds, became A&Ox1. Moving all extremities spontaneously                                               LABS:  08-19    135  |  97  |  25<H>  ----------------------------<  112<H>  4.2   |  28  |  0.46<L>    Ca    9.1      19 Aug 2019 06:42  Phos  2.8     08-19  Mg     2.1     08-19    TPro  5.5<L>  /  Alb  2.8<L>  /  TBili  0.4  /  DBili  x   /  AST  58<H>  /  ALT  269<H>  /  AlkPhos  102  08-19                          10.5   21.8  )-----------( 404      ( 19 Aug 2019 06:43 )             32.2                CAPILLARY BLOOD GLUCOSE    POCT Blood Glucose.: 110 mg/dL (20 Aug 2019 06:36)  POCT Blood Glucose.: 106 mg/dL (19 Aug 2019 23:45)

## 2019-08-21 LAB
ALBUMIN SERPL ELPH-MCNC: 2.7 G/DL — LOW (ref 3.3–5)
ALP SERPL-CCNC: 83 U/L — SIGNIFICANT CHANGE UP (ref 40–120)
ALT FLD-CCNC: 129 U/L — HIGH (ref 10–45)
ANION GAP SERPL CALC-SCNC: 9 MMOL/L — SIGNIFICANT CHANGE UP (ref 5–17)
AST SERPL-CCNC: 15 U/L — SIGNIFICANT CHANGE UP (ref 10–40)
BILIRUB SERPL-MCNC: 0.6 MG/DL — SIGNIFICANT CHANGE UP (ref 0.2–1.2)
BUN SERPL-MCNC: 19 MG/DL — SIGNIFICANT CHANGE UP (ref 7–23)
CALCIUM SERPL-MCNC: 9.1 MG/DL — SIGNIFICANT CHANGE UP (ref 8.4–10.5)
CHLORIDE SERPL-SCNC: 96 MMOL/L — SIGNIFICANT CHANGE UP (ref 96–108)
CO2 SERPL-SCNC: 30 MMOL/L — SIGNIFICANT CHANGE UP (ref 22–31)
CREAT SERPL-MCNC: 0.53 MG/DL — SIGNIFICANT CHANGE UP (ref 0.5–1.3)
GLUCOSE BLDC GLUCOMTR-MCNC: 114 MG/DL — HIGH (ref 70–99)
GLUCOSE BLDC GLUCOMTR-MCNC: 116 MG/DL — HIGH (ref 70–99)
GLUCOSE BLDC GLUCOMTR-MCNC: 117 MG/DL — HIGH (ref 70–99)
GLUCOSE BLDC GLUCOMTR-MCNC: 123 MG/DL — HIGH (ref 70–99)
GLUCOSE SERPL-MCNC: 108 MG/DL — HIGH (ref 70–99)
HCT VFR BLD CALC: 29.9 % — LOW (ref 34.5–45)
HGB BLD-MCNC: 9.4 G/DL — LOW (ref 11.5–15.5)
MCHC RBC-ENTMCNC: 28.2 PG — SIGNIFICANT CHANGE UP (ref 27–34)
MCHC RBC-ENTMCNC: 31.4 GM/DL — LOW (ref 32–36)
MCV RBC AUTO: 89.7 FL — SIGNIFICANT CHANGE UP (ref 80–100)
PLATELET # BLD AUTO: 307 K/UL — SIGNIFICANT CHANGE UP (ref 150–400)
POTASSIUM SERPL-MCNC: 3.7 MMOL/L — SIGNIFICANT CHANGE UP (ref 3.5–5.3)
POTASSIUM SERPL-SCNC: 3.7 MMOL/L — SIGNIFICANT CHANGE UP (ref 3.5–5.3)
PROT SERPL-MCNC: 5.4 G/DL — LOW (ref 6–8.3)
RBC # BLD: 3.33 M/UL — LOW (ref 3.8–5.2)
RBC # FLD: 16.7 % — HIGH (ref 10.3–14.5)
SODIUM SERPL-SCNC: 135 MMOL/L — SIGNIFICANT CHANGE UP (ref 135–145)
WBC # BLD: 12.6 K/UL — HIGH (ref 3.8–10.5)
WBC # FLD AUTO: 12.6 K/UL — HIGH (ref 3.8–10.5)

## 2019-08-21 PROCEDURE — 99233 SBSQ HOSP IP/OBS HIGH 50: CPT

## 2019-08-21 PROCEDURE — 99232 SBSQ HOSP IP/OBS MODERATE 35: CPT

## 2019-08-21 RX ORDER — ERTAPENEM SODIUM 1 G/1
1 INJECTION, POWDER, LYOPHILIZED, FOR SOLUTION INTRAMUSCULAR; INTRAVENOUS
Qty: 8 | Refills: 0
Start: 2019-08-21 | End: 2019-08-28

## 2019-08-21 RX ADMIN — CHLORHEXIDINE GLUCONATE 1 APPLICATION(S): 213 SOLUTION TOPICAL at 06:13

## 2019-08-21 RX ADMIN — Medication 1 DROP(S): at 02:04

## 2019-08-21 RX ADMIN — Medication 1 DROP(S): at 05:55

## 2019-08-21 RX ADMIN — Medication 1 DROP(S): at 17:53

## 2019-08-21 RX ADMIN — Medication 500000 UNIT(S): at 05:55

## 2019-08-21 RX ADMIN — Medication 1 DROP(S): at 12:19

## 2019-08-21 RX ADMIN — ENOXAPARIN SODIUM 40 MILLIGRAM(S): 100 INJECTION SUBCUTANEOUS at 12:19

## 2019-08-21 RX ADMIN — Medication 1 DROP(S): at 05:56

## 2019-08-21 RX ADMIN — Medication 1 APPLICATION(S): at 21:50

## 2019-08-21 RX ADMIN — PIPERACILLIN AND TAZOBACTAM 25 GRAM(S): 4; .5 INJECTION, POWDER, LYOPHILIZED, FOR SOLUTION INTRAVENOUS at 13:34

## 2019-08-21 RX ADMIN — Medication 0.5 MILLIGRAM(S): at 05:56

## 2019-08-21 RX ADMIN — PIPERACILLIN AND TAZOBACTAM 25 GRAM(S): 4; .5 INJECTION, POWDER, LYOPHILIZED, FOR SOLUTION INTRAVENOUS at 05:55

## 2019-08-21 RX ADMIN — Medication 0.5 MILLIGRAM(S): at 18:08

## 2019-08-21 RX ADMIN — Medication 1 DROP(S): at 02:02

## 2019-08-21 RX ADMIN — LATANOPROST 1 DROP(S): 0.05 SOLUTION/ DROPS OPHTHALMIC; TOPICAL at 21:50

## 2019-08-21 RX ADMIN — Medication 500000 UNIT(S): at 17:53

## 2019-08-21 RX ADMIN — PIPERACILLIN AND TAZOBACTAM 25 GRAM(S): 4; .5 INJECTION, POWDER, LYOPHILIZED, FOR SOLUTION INTRAVENOUS at 21:48

## 2019-08-21 NOTE — PROGRESS NOTE ADULT - PROBLEM SELECTOR PLAN 2
-In setting of acute cholecystitis. Now resolved and patient off pressors  -Acute cholecystitis workup as below  -s/p LP negative for infection  -Switched to Zosyn 3.375 q8hrs. Per ID, will continue abx for total of 14 days from the day she received her percutaneous cholecystostomy, which was 8/15. To continue abx until 8/29  - will continue to f/u bile Cx. If organism identified with susceptibility report, will adjust abx regimen accordingly.  - in anticipation for discharge to rehab facility, midline catheter ordered -In setting of acute cholecystitis. Now resolved and patient off pressors  -Acute cholecystitis workup as below  -s/p LP negative for infection  -Switched to Zosyn 3.375 q8hrs. Per ID, will continue abx for total of 14 days from the day she received her percutaneous cholecystostomy, which was 8/15. To continue abx until 8/29  - will continue to f/u bile Cx. If organism identified with susceptibility report, will adjust abx regimen accordingly. All Cx have been NTD  - in anticipation for discharge to rehab facility, midline catheter ordered

## 2019-08-21 NOTE — PROGRESS NOTE ADULT - ASSESSMENT
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

## 2019-08-21 NOTE — PROGRESS NOTE ADULT - SUBJECTIVE AND OBJECTIVE BOX
Parmjit Garcia MD  Beeper: [phone] ([facility])/ [id number] ([facility])     Subjective:    Patient is a 69y old  Female who presents with a chief complaint of Hypotension (20 Aug 2019 11:23)      Patient was seen and examined at bedside this AM. Denied fever, chills, nausea, vomiting, CP, palpitations, coughing, wheezing, SOB, and abdominal pain.    Overnight events:    MEDICATIONS  (STANDING):  artificial tears (preservative free) Ophthalmic Solution 1 Drop(s) Both EYES every 6 hours  buDESOnide    Inhalation Suspension 0.5 milliGRAM(s) Inhalation two times a day  chlorhexidine 4% Liquid 1 Application(s) Topical <User Schedule>  enoxaparin Injectable 40 milliGRAM(s) SubCutaneous daily  insulin lispro (HumaLOG) corrective regimen sliding scale   SubCutaneous every 6 hours  latanoprost 0.005% Ophthalmic Solution 1 Drop(s) Both EYES at bedtime  nystatin    Suspension 449934 Unit(s) Oral two times a day  petrolatum Ophthalmic Ointment 1 Application(s) Both EYES at bedtime  piperacillin/tazobactam IVPB.. 3.375 Gram(s) IV Intermittent every 8 hours  prednisoLONE acetate 1% Suspension 1 Drop(s) Both EYES four times a day    MEDICATIONS  (PRN):  acetaminophen    Suspension .. 650 milliGRAM(s) Enteral Tube every 6 hours PRN Moderate Pain (4 - 6)      Objective:    Vitals: Vital Signs Last 24 Hrs  T(C): 36.4 (08-21-19 @ 05:00), Max: 36.9 (08-20-19 @ 17:47)  T(F): 97.6 (08-21-19 @ 05:00), Max: 98.4 (08-20-19 @ 17:47)  HR: 85 (08-21-19 @ 05:58) (85 - 116)  BP: 122/76 (08-21-19 @ 05:00) (107/62 - 122/76)  BP(mean): --  RR: 18 (08-21-19 @ 05:00) (18 - 18)  SpO2: 99% (08-21-19 @ 05:58) (97% - 100%)              I&O's Summary    19 Aug 2019 07:01  -  20 Aug 2019 07:00  --------------------------------------------------------  IN: 920 mL / OUT: 370 mL / NET: 550 mL    20 Aug 2019 07:01  -  21 Aug 2019 06:39  --------------------------------------------------------  IN: 1260 mL / OUT: 320 mL / NET: 940 mL        PHYSICAL EXAM:  GENERAL: NAD, well-groomed, well-developed  HEAD:  Atraumatic, Normocephalic  EYES: EOMI, PERRLA, conjunctiva and sclera clear  ENMT: No tonsillar erythema, exudates, or enlargement; Moist mucous membranes, Good dentition, No lesions  NECK: Supple, No JVD, Normal thyroid  CHEST/LUNG: Clear to auscultation bilaterally; No rales, rhonchi, wheezing, or rubs  HEART: Regular rate and rhythm; No murmurs, rubs, or gallops  ABDOMEN: Soft, Nontender, Nondistended; Bowel sounds present  EXTREMITIES:  2+ Peripheral Pulses, No clubbing, cyanosis, or edema  LYMPH: No lymphadenopathy noted  SKIN: No rashes or lesions  NERVOUS SYSTEM:  Alert & Oriented X3, Good concentration; Motor Strength 5/5 B/L upper and lower extremities; DTRs 2+ intact and symmetric                                             LABS:  08-20    135  |  96  |  21  ----------------------------<  135<H>  3.9   |  30  |  0.54  08-19    135  |  97  |  25<H>  ----------------------------<  112<H>  4.2   |  28  |  0.46<L>  08-18    134<L>  |  95<L>  |  26<H>  ----------------------------<  156<H>  4.6   |  28  |  0.47<L>    Ca    9.1      20 Aug 2019 10:31  Ca    9.1      19 Aug 2019 06:42  Ca    9.3      18 Aug 2019 07:12  Phos  2.8     08-19  Mg     2.1     08-19    TPro  5.4<L>  /  Alb  2.7<L>  /  TBili  0.6  /  DBili  x   /  AST  22  /  ALT  168<H>  /  AlkPhos  84  08-20  TPro  5.5<L>  /  Alb  2.8<L>  /  TBili  0.4  /  DBili  x   /  AST  58<H>  /  ALT  269<H>  /  AlkPhos  102  08-19  TPro  6.3  /  Alb  3.0<L>  /  TBili  0.4  /  DBili  x   /  AST  61<H>  /  ALT  290<H>  /  AlkPhos  97  08-18                                                    9.9    14.3  )-----------( 355      ( 20 Aug 2019 10:31 )             31.8                         10.5   21.8  )-----------( 404      ( 19 Aug 2019 06:43 )             32.2                         10.4   21.3  )-----------( 431      ( 18 Aug 2019 07:12 )             33.3     CAPILLARY BLOOD GLUCOSE      POCT Blood Glucose.: 117 mg/dL (21 Aug 2019 06:09)  POCT Blood Glucose.: 116 mg/dL (21 Aug 2019 01:19)  POCT Blood Glucose.: 128 mg/dL (20 Aug 2019 17:34)  POCT Blood Glucose.: 132 mg/dL (20 Aug 2019 12:14)        RECENT CULTURES:  08-15 @ 18:57  .Body Fluid  --  --  --    No growth at 5 days  --  08-15 @ 09:52  .Blood  --  --  --    No growth at 5 days.  --  08-14 @ 19:04  .CSF  --  --  --    Testing in progress  --      TELEMETRY:    ECG:    TTE:    RADIOLOGY & ADDITIONAL TESTS:    Imaging Personally Reviewed:  [ ] YES  [ ] NO    Consultants involved in case:   Consultant(s) Notes Reviewed:  [ ] YES  [ ] NO:   Care Discussed with Consultants/Other Providers [ ] YES  [ ] NO Parmjit Garcia MD  Beeper: [phone] ([facility])/ [phone] (J)     Subjective:    Patient is a 69y old  Female who presents with a chief complaint of Hypotension (20 Aug 2019 11:23)    Patient was seen and examined at bedside this AM. No acute overnight events. Mentation waxes and wanes. During the encounter this AM, patient was somnolent, but arousable. Not cooperating with questioning. ROS limited.      Overnight events:    MEDICATIONS  (STANDING):  artificial tears (preservative free) Ophthalmic Solution 1 Drop(s) Both EYES every 6 hours  buDESOnide    Inhalation Suspension 0.5 milliGRAM(s) Inhalation two times a day  chlorhexidine 4% Liquid 1 Application(s) Topical <User Schedule>  enoxaparin Injectable 40 milliGRAM(s) SubCutaneous daily  insulin lispro (HumaLOG) corrective regimen sliding scale   SubCutaneous every 6 hours  latanoprost 0.005% Ophthalmic Solution 1 Drop(s) Both EYES at bedtime  nystatin    Suspension 273048 Unit(s) Oral two times a day  petrolatum Ophthalmic Ointment 1 Application(s) Both EYES at bedtime  piperacillin/tazobactam IVPB.. 3.375 Gram(s) IV Intermittent every 8 hours  prednisoLONE acetate 1% Suspension 1 Drop(s) Both EYES four times a day    MEDICATIONS  (PRN):  acetaminophen    Suspension .. 650 milliGRAM(s) Enteral Tube every 6 hours PRN Moderate Pain (4 - 6)      Objective:    Vitals: Vital Signs Last 24 Hrs  T(C): 36.4 (08-21-19 @ 05:00), Max: 36.9 (08-20-19 @ 17:47)  T(F): 97.6 (08-21-19 @ 05:00), Max: 98.4 (08-20-19 @ 17:47)  HR: 85 (08-21-19 @ 05:58) (85 - 116)  BP: 122/76 (08-21-19 @ 05:00) (107/62 - 122/76)  BP(mean): --  RR: 18 (08-21-19 @ 05:00) (18 - 18)  SpO2: 99% (08-21-19 @ 05:58) (97% - 100%)                 PHYSICAL EXAM:  GENERAL: NAD, well-groomed, well-developed  HEAD:  Atraumatic, Normocephalic  EYES: EOMI, conjunctiva and sclera clear  CHEST/LUNG: Clear to auscultation bilaterally anteriorly; unable to assess posteriorly. No rales, rhonchi, wheezing, or rubs  HEART: Regular rate and rhythm; No murmurs, rubs, or gallops  ABDOMEN: Soft, Nontender, Nondistended; Bowel sounds present  EXTREMITIES:  No LE edema  NERVOUS SYSTEM:  Somnolent, but arousable. Moving all extremities spontaneously                                             LABS:  08-20    135  |  96  |  21  ----------------------------<  135<H>  3.9   |  30  |  0.54  08-19    135  |  97  |  25<H>  ----------------------------<  112<H>  4.2   |  28  |  0.46<L>  08-18    134<L>  |  95<L>  |  26<H>  ----------------------------<  156<H>  4.6   |  28  |  0.47<L>    Ca    9.1      20 Aug 2019 10:31  Ca    9.1      19 Aug 2019 06:42  Ca    9.3      18 Aug 2019 07:12  Phos  2.8     08-19  Mg     2.1     08-19    TPro  5.4<L>  /  Alb  2.7<L>  /  TBili  0.6  /  DBili  x   /  AST  22  /  ALT  168<H>  /  AlkPhos  84  08-20  TPro  5.5<L>  /  Alb  2.8<L>  /  TBili  0.4  /  DBili  x   /  AST  58<H>  /  ALT  269<H>  /  AlkPhos  102  08-19  TPro  6.3  /  Alb  3.0<L>  /  TBili  0.4  /  DBili  x   /  AST  61<H>  /  ALT  290<H>  /  AlkPhos  97  08-18                                                    9.9    14.3  )-----------( 355      ( 20 Aug 2019 10:31 )             31.8                         10.5   21.8  )-----------( 404      ( 19 Aug 2019 06:43 )             32.2                         10.4   21.3  )-----------( 431      ( 18 Aug 2019 07:12 )             33.3     CAPILLARY BLOOD GLUCOSE      POCT Blood Glucose.: 117 mg/dL (21 Aug 2019 06:09)  POCT Blood Glucose.: 116 mg/dL (21 Aug 2019 01:19)  POCT Blood Glucose.: 128 mg/dL (20 Aug 2019 17:34)  POCT Blood Glucose.: 132 mg/dL (20 Aug 2019 12:14)        RECENT CULTURES:  08-15 @ 18:57  .Body Fluid  --  --  --    No growth at 5 days  --  08-15 @ 09:52  .Blood  --  --  --    No growth at 5 days.  --  08-14 @ 19:04  .CSF  --  --  --    Testing in progress  --      TELEMETRY:    ECG:    TTE:    RADIOLOGY & ADDITIONAL TESTS:    Imaging Personally Reviewed:  [ ] YES  [ ] NO    Consultants involved in case:   Consultant(s) Notes Reviewed:  [ ] YES  [ ] NO:   Care Discussed with Consultants/Other Providers [ ] YES  [ ] NO Parmjit Garcia MD  Beeper: [phone] ([facility])/ [phone] (J)     Subjective:    Patient is a 69y old  Female who presents with a chief complaint of Hypotension (20 Aug 2019 11:23)    Patient was seen and examined at bedside this AM. No acute overnight events. Mentation waxes and wanes. During the encounter this AM, patient was somnolent, but arousable. Not cooperating with questioning. ROS limited.      Overnight events:    MEDICATIONS  (STANDING):  artificial tears (preservative free) Ophthalmic Solution 1 Drop(s) Both EYES every 6 hours  buDESOnide    Inhalation Suspension 0.5 milliGRAM(s) Inhalation two times a day  chlorhexidine 4% Liquid 1 Application(s) Topical <User Schedule>  enoxaparin Injectable 40 milliGRAM(s) SubCutaneous daily  insulin lispro (HumaLOG) corrective regimen sliding scale   SubCutaneous every 6 hours  latanoprost 0.005% Ophthalmic Solution 1 Drop(s) Both EYES at bedtime  nystatin    Suspension 694707 Unit(s) Oral two times a day  petrolatum Ophthalmic Ointment 1 Application(s) Both EYES at bedtime  piperacillin/tazobactam IVPB.. 3.375 Gram(s) IV Intermittent every 8 hours  prednisoLONE acetate 1% Suspension 1 Drop(s) Both EYES four times a day    MEDICATIONS  (PRN):  acetaminophen    Suspension .. 650 milliGRAM(s) Enteral Tube every 6 hours PRN Moderate Pain (4 - 6)      Objective:    Vitals: Vital Signs Last 24 Hrs  T(C): 36.4 (08-21-19 @ 05:00), Max: 36.9 (08-20-19 @ 17:47)  T(F): 97.6 (08-21-19 @ 05:00), Max: 98.4 (08-20-19 @ 17:47)  HR: 85 (08-21-19 @ 05:58) (85 - 116)  BP: 122/76 (08-21-19 @ 05:00) (107/62 - 122/76)  BP(mean): --  RR: 18 (08-21-19 @ 05:00) (18 - 18)  SpO2: 99% (08-21-19 @ 05:58) (97% - 100%)                 PHYSICAL EXAM:  GENERAL: NAD, well-groomed, well-developed  HEAD:  Atraumatic, Normocephalic  EYES: EOMI, conjunctiva and sclera clear  CHEST/LUNG: Clear to auscultation bilaterally anteriorly; unable to assess posteriorly. No rales, rhonchi, wheezing, or rubs  HEART: Regular rate and rhythm; No murmurs, rubs, or gallops  ABDOMEN: Soft, Nontender, Nondistended; Bowel sounds present  EXTREMITIES:  No LE edema  NERVOUS SYSTEM:  Somnolent, but arousable. Moving all extremities spontaneously                                             LABS:  08-21    135  |  96  |  19  ----------------------------<  108<H>  3.7   |  30  |  0.53    Ca    9.1      21 Aug 2019 07:27    TPro  5.4<L>  /  Alb  2.7<L>  /  TBili  0.6  /  DBili  x   /  AST  15  /  ALT  129<H>  /  AlkPhos  83  08-21  TPro  5.4<L>  /  Alb  2.7<L>  /  TBili  0.6  /  DBili  x   /  AST  22  /  ALT  168<H>  /  AlkPhos  84  08-20  TPro  5.5<L>  /  Alb  2.8<L>  /  TBili  0.4  /  DBili  x   /  AST  58<H>  /  ALT  269<H>  /  AlkPhos  102  08-19                        9.4    12.6  )-----------( 307      ( 21 Aug 2019 07:27 )             29.9     CAPILLARY BLOOD GLUCOSE      POCT Blood Glucose.: 114 mg/dL (21 Aug 2019 12:09)  POCT Blood Glucose.: 117 mg/dL (21 Aug 2019 06:09)  POCT Blood Glucose.: 116 mg/dL (21 Aug 2019 01:19)      IMAGING:    < from: CT Head No Cont (08.20.19 @ 16:34) >  EXAM:  CT BRAIN                            PROCEDURE DATE:  08/20/2019            INTERPRETATION:  CLINICAL INDICATION: Altered mental status, history of   meningocele suboccipital craniotomy.    Technique: CT of the head was performed without contrast.    Multiple contiguous axial images were acquired from the skullbase to the   vertex without the administration of intravenous contrast.  Coronal and   sagittal reformations were made.    COMPARISON:  prior head CT dated 8/11/2019    FINDINGS:    Status post left suboccipital craniotomy and resection of a fourth   ventricular mass with surgical clips noted at the inferior aspect of the   cerebellum,. There is no evidence of new hemorrhage or midline shift.   There is unchanged appearing low density collection underlying the   suboccipital craniotomy defect, partially visualized, consistent with   large pseudomeningocele.    There is mild cortical volume loss with commensurate ventricular   dilation. There is circumferential periventricular and subcortical white   matter hypodensity consistent with moderate microvascular ischemic   changes.    There is no intraparenchymal hematoma, mass effect or midline shift. No   abnormal extra-axial fluid collections are present. There is no evidence   of large acute territorial infarct.    . The visualized intraorbital compartments, paranasal sinuses and mastoid   complexes appear free of acute disease.    IMPRESSION: Large pseudomeningocele unchanged since 8/11/2019.   Suboccipital craniotomy. Mildly enlarged ventricles. Small vessel white   matter ischemic changes No evidence of acute intracranial pathology. No   significant change since prior examination.    < end of copied text >

## 2019-08-21 NOTE — PROGRESS NOTE ADULT - PROBLEM SELECTOR PLAN 3
-Acute cholecystitis shown on CTA and HIDA s/p perc kylee placement by IR  -Surgery consulted: no acute surgical intervention  -Continuing w/ ABx as above   -Biliary culture NGTD -Acute cholecystitis shown on CTA and HIDA s/p perc kylee placement by IR  -Surgery consulted: no acute surgical intervention  -Continuing w/ ABx as above   -Biliary culture NGTD  - Midline catheter ordered. May switch to ertapenem if patient discharged to [facility].

## 2019-08-21 NOTE — PROGRESS NOTE ADULT - ASSESSMENT
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   Workup thus far found patient to be febrile to 100.4 on admission and tachycardic/hypotensive   Leukocytosis trending down   LP not s/o bacterial process-? post op vs secondary to CSF leak  MRI no abscess,pseudomeningocele  HIDA with cholecystitis  s/p perc kylee  Stable overall  Lethargy doubt infection related   Would recommend workup for alternate etiology  Continue zosyn for 8 more adys to finish 2 week course from cholecystostomy.  Will tailor plan for ID issues  per course,results.Will defer to primary team on management of other issues.  Case d./w med team  Will Follow.  Beeper [phone]-tfaf/afterhours/No response-[phone]

## 2019-08-21 NOTE — PROGRESS NOTE ADULT - SUBJECTIVE AND OBJECTIVE BOX
Patient is a 69y old  Female who presents with a chief complaint of Hypotension (21 Aug 2019 06:39)    Being followed by ID for cholecystitis     Interval history:lethargic though responds to some stimuli '  had waxing waning mental status per housestaff  No acute events      ROS:  Not obtainable     Antimicrobials:    nystatin    Suspension 824083 Unit(s) Oral two times a day  piperacillin/tazobactam IVPB.. 3.375 Gram(s) IV Intermittent every 8 hours    Other medications reviewed    Vital Signs Last 24 Hrs  T(C): 37 (08-21-19 @ 08:59), Max: 37 (08-21-19 @ 08:59)  T(F): 98.6 (08-21-19 @ 08:59), Max: 98.6 (08-21-19 @ 08:59)  HR: 99 (08-21-19 @ 08:59) (85 - 116)  BP: 121/78 (08-21-19 @ 08:59) (107/62 - 122/76)  BP(mean): --  RR: 18 (08-21-19 @ 08:59) (18 - 18)  SpO2: 97% (08-21-19 @ 08:59) (97% - 100%)    Physical Exam:      Cranial incisions CDI no tenderness   HEENT PERRLA EOMI,No pallor or icterus    No oral exudate or erythema    Neck supple no JVD or LN    Chest Good AE,CTA    CVS RRR S1 S2 WNl No murmur or rub or gallop    Abd soft BS normal No tendernessfeeding tube  RUQ cholecystostomy-mild tenderness at site   Obese   Ext No cyanosis clubbing or edema    IV site no erythema tenderness or discharge    Joints no swelling or LOM    CNS as above   Lab Data:                          9.4    12.6  )-----------( 307      ( 21 Aug 2019 07:27 )             29.9       08-21    135  |  96  |  19  ----------------------------<  108<H>  3.7   |  30  |  0.53    Ca    9.1      21 Aug 2019 07:27    TPro  5.4<L>  /  Alb  2.7<L>  /  TBili  0.6  /  DBili  x   /  AST  15  /  ALT  129<H>  /  AlkPhos  83  08-21        Culture - Body Fluid with Gram Stain (08.15.19 @ 18:57)    Gram Stain:   Rare polymorphonuclear leukocytes seen per low power field  No organisms seen per oil power field    Specimen Source: .Body Fluid    Culture Results:   No growth at 5 days

## 2019-08-21 NOTE — PROGRESS NOTE ADULT - PROBLEM SELECTOR PLAN 1
-Likely in setting of sepsis. Alert and responsive to verbal commands, but somnolent in the AM  -MRI showing the suboccipital extracranial fluid collection has increased in size   compared to previous MRI most consistent with pseudomeningocele  -Neurosx: No surgical intervention at this time, f/u outpt  - Repeat CT Head ordered  -Infectious workup and management as below -Likely in setting of sepsis. Alert and responsive to verbal commands, but somnolent in the AM  -MRI showing the suboccipital extracranial fluid collection has increased in size   compared to previous MRI most consistent with pseudomeningocele  -Neurosx: No surgical intervention at this time, f/u outpt  - Repeat CT Head showed no significant changes from previous exam  -Infectious workup and management as below

## 2019-08-22 LAB
ALBUMIN SERPL ELPH-MCNC: 2.8 G/DL — LOW (ref 3.3–5)
ALP SERPL-CCNC: 90 U/L — SIGNIFICANT CHANGE UP (ref 40–120)
ALT FLD-CCNC: 99 U/L — HIGH (ref 10–45)
ANION GAP SERPL CALC-SCNC: 6 MMOL/L — SIGNIFICANT CHANGE UP (ref 5–17)
AST SERPL-CCNC: 15 U/L — SIGNIFICANT CHANGE UP (ref 10–40)
BILIRUB SERPL-MCNC: 0.5 MG/DL — SIGNIFICANT CHANGE UP (ref 0.2–1.2)
BUN SERPL-MCNC: 14 MG/DL — SIGNIFICANT CHANGE UP (ref 7–23)
CALCIUM SERPL-MCNC: 9.1 MG/DL — SIGNIFICANT CHANGE UP (ref 8.4–10.5)
CHLORIDE SERPL-SCNC: 95 MMOL/L — LOW (ref 96–108)
CO2 SERPL-SCNC: 32 MMOL/L — HIGH (ref 22–31)
CREAT SERPL-MCNC: 0.45 MG/DL — LOW (ref 0.5–1.3)
GLUCOSE BLDC GLUCOMTR-MCNC: 116 MG/DL — HIGH (ref 70–99)
GLUCOSE BLDC GLUCOMTR-MCNC: 116 MG/DL — HIGH (ref 70–99)
GLUCOSE BLDC GLUCOMTR-MCNC: 118 MG/DL — HIGH (ref 70–99)
GLUCOSE BLDC GLUCOMTR-MCNC: 121 MG/DL — HIGH (ref 70–99)
GLUCOSE BLDC GLUCOMTR-MCNC: 126 MG/DL — HIGH (ref 70–99)
GLUCOSE SERPL-MCNC: 137 MG/DL — HIGH (ref 70–99)
HCT VFR BLD CALC: 29.6 % — LOW (ref 34.5–45)
HGB BLD-MCNC: 9.4 G/DL — LOW (ref 11.5–15.5)
MCHC RBC-ENTMCNC: 29 PG — SIGNIFICANT CHANGE UP (ref 27–34)
MCHC RBC-ENTMCNC: 31.8 GM/DL — LOW (ref 32–36)
MCV RBC AUTO: 91.2 FL — SIGNIFICANT CHANGE UP (ref 80–100)
PLATELET # BLD AUTO: 319 K/UL — SIGNIFICANT CHANGE UP (ref 150–400)
POTASSIUM SERPL-MCNC: 3.8 MMOL/L — SIGNIFICANT CHANGE UP (ref 3.5–5.3)
POTASSIUM SERPL-SCNC: 3.8 MMOL/L — SIGNIFICANT CHANGE UP (ref 3.5–5.3)
PROT SERPL-MCNC: 5.5 G/DL — LOW (ref 6–8.3)
RBC # BLD: 3.24 M/UL — LOW (ref 3.8–5.2)
RBC # FLD: 16.5 % — HIGH (ref 10.3–14.5)
SODIUM SERPL-SCNC: 133 MMOL/L — LOW (ref 135–145)
WBC # BLD: 12.8 K/UL — HIGH (ref 3.8–10.5)
WBC # FLD AUTO: 12.8 K/UL — HIGH (ref 3.8–10.5)

## 2019-08-22 PROCEDURE — 99232 SBSQ HOSP IP/OBS MODERATE 35: CPT

## 2019-08-22 PROCEDURE — 99233 SBSQ HOSP IP/OBS HIGH 50: CPT

## 2019-08-22 RX ADMIN — Medication 1 DROP(S): at 00:30

## 2019-08-22 RX ADMIN — Medication 1 DROP(S): at 11:18

## 2019-08-22 RX ADMIN — Medication 1 APPLICATION(S): at 22:41

## 2019-08-22 RX ADMIN — Medication 1 DROP(S): at 06:24

## 2019-08-22 RX ADMIN — PIPERACILLIN AND TAZOBACTAM 25 GRAM(S): 4; .5 INJECTION, POWDER, LYOPHILIZED, FOR SOLUTION INTRAVENOUS at 14:42

## 2019-08-22 RX ADMIN — ENOXAPARIN SODIUM 40 MILLIGRAM(S): 100 INJECTION SUBCUTANEOUS at 11:56

## 2019-08-22 RX ADMIN — Medication 0.5 MILLIGRAM(S): at 17:48

## 2019-08-22 RX ADMIN — Medication 1 DROP(S): at 16:55

## 2019-08-22 RX ADMIN — Medication 500000 UNIT(S): at 06:24

## 2019-08-22 RX ADMIN — LATANOPROST 1 DROP(S): 0.05 SOLUTION/ DROPS OPHTHALMIC; TOPICAL at 22:41

## 2019-08-22 RX ADMIN — PIPERACILLIN AND TAZOBACTAM 25 GRAM(S): 4; .5 INJECTION, POWDER, LYOPHILIZED, FOR SOLUTION INTRAVENOUS at 06:23

## 2019-08-22 RX ADMIN — Medication 1 DROP(S): at 11:17

## 2019-08-22 RX ADMIN — PIPERACILLIN AND TAZOBACTAM 25 GRAM(S): 4; .5 INJECTION, POWDER, LYOPHILIZED, FOR SOLUTION INTRAVENOUS at 22:43

## 2019-08-22 RX ADMIN — Medication 0.5 MILLIGRAM(S): at 06:20

## 2019-08-22 RX ADMIN — Medication 1 DROP(S): at 06:25

## 2019-08-22 RX ADMIN — CHLORHEXIDINE GLUCONATE 1 APPLICATION(S): 213 SOLUTION TOPICAL at 06:41

## 2019-08-22 RX ADMIN — Medication 1 DROP(S): at 16:56

## 2019-08-22 RX ADMIN — Medication 500000 UNIT(S): at 16:57

## 2019-08-22 NOTE — PROGRESS NOTE ADULT - ASSESSMENT
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

## 2019-08-22 NOTE — PROGRESS NOTE ADULT - PROBLEM SELECTOR PLAN 5
-DVT ppx: SQH   -PT/OT  -GOC discussion with palliative; daughters will be health care proxies and patient is FULL CODE

## 2019-08-22 NOTE — PROGRESS NOTE ADULT - PROBLEM SELECTOR PLAN 3
-Acute cholecystitis shown on CTA and HIDA s/p perc kylee placement by IR  -Surgery consulted: no acute surgical intervention  -Continuing w/ ABx as above   -Biliary culture NGTD  - Midline catheter ordered. May switch to ertapenem if patient discharged to [facility]. -Acute cholecystitis shown on CTA and HIDA s/p perc kylee placement by IR  -Surgery consulted: no acute surgical intervention  -Continuing w/ ABx as above   -Biliary culture NGTD  - Midline catheter placed, may switch to ertapenem if patient discharged to [facility].

## 2019-08-22 NOTE — PROGRESS NOTE ADULT - PROBLEM SELECTOR PLAN 4
-c/w feeds via PEG tube  -c/s nutrition- recs appreciated -c/w feeds via PEG tube  -nutrition recs appreciated

## 2019-08-22 NOTE — PROGRESS NOTE ADULT - PROBLEM SELECTOR PLAN 1
-Likely in setting of sepsis. Alert and responsive to verbal commands, but somnolent in the AM  -MRI showing the suboccipital extracranial fluid collection has increased in size   compared to previous MRI most consistent with pseudomeningocele  -Neurosx: No surgical intervention at this time, f/u outpt  - Repeat CT Head showed no significant changes from previous exam  -Infectious workup and management as below

## 2019-08-22 NOTE — PROGRESS NOTE ADULT - SUBJECTIVE AND OBJECTIVE BOX
Parmjit Garcia MD  Beeper: [phone] ([facility])/ [id number] ([facility])     Subjective:    Patient is a 69y old  Female who presents with a chief complaint of Hypotension (21 Aug 2019 11:20)      Patient was seen and examined at bedside this AM. Denied fever, chills, nausea, vomiting, CP, palpitations, coughing, wheezing, SOB, and abdominal pain.    Overnight events:    MEDICATIONS  (STANDING):  artificial tears (preservative free) Ophthalmic Solution 1 Drop(s) Both EYES every 6 hours  buDESOnide    Inhalation Suspension 0.5 milliGRAM(s) Inhalation two times a day  chlorhexidine 4% Liquid 1 Application(s) Topical <User Schedule>  enoxaparin Injectable 40 milliGRAM(s) SubCutaneous daily  insulin lispro (HumaLOG) corrective regimen sliding scale   SubCutaneous every 6 hours  latanoprost 0.005% Ophthalmic Solution 1 Drop(s) Both EYES at bedtime  nystatin    Suspension 337311 Unit(s) Oral two times a day  petrolatum Ophthalmic Ointment 1 Application(s) Both EYES at bedtime  piperacillin/tazobactam IVPB.. 3.375 Gram(s) IV Intermittent every 8 hours  prednisoLONE acetate 1% Suspension 1 Drop(s) Both EYES four times a day    MEDICATIONS  (PRN):  acetaminophen    Suspension .. 650 milliGRAM(s) Enteral Tube every 6 hours PRN Moderate Pain (4 - 6)      Objective:    Vitals: Vital Signs Last 24 Hrs  T(C): 36.8 (08-22-19 @ 05:18), Max: 36.9 (08-21-19 @ 13:29)  T(F): 98.2 (08-22-19 @ 05:18), Max: 98.5 (08-21-19 @ 13:29)  HR: 109 (08-22-19 @ 06:21) (92 - 112)  BP: 114/61 (08-22-19 @ 05:18) (114/61 - 131/70)  BP(mean): --  RR: 18 (08-22-19 @ 05:18) (18 - 18)  SpO2: 100% (08-22-19 @ 06:21) (98% - 100%)              I&O's Summary    21 Aug 2019 07:01  -  22 Aug 2019 07:00  --------------------------------------------------------  IN: 660 mL / OUT: 1250 mL / NET: -590 mL        PHYSICAL EXAM:  GENERAL: NAD, well-groomed, well-developed  HEAD:  Atraumatic, Normocephalic  EYES: EOMI, PERRLA, conjunctiva and sclera clear  ENMT: No tonsillar erythema, exudates, or enlargement; Moist mucous membranes, Good dentition, No lesions  NECK: Supple, No JVD, Normal thyroid  CHEST/LUNG: Clear to auscultation bilaterally; No rales, rhonchi, wheezing, or rubs  HEART: Regular rate and rhythm; No murmurs, rubs, or gallops  ABDOMEN: Soft, Nontender, Nondistended; Bowel sounds present  EXTREMITIES:  2+ Peripheral Pulses, No clubbing, cyanosis, or edema  LYMPH: No lymphadenopathy noted  SKIN: No rashes or lesions  NERVOUS SYSTEM:  Alert & Oriented X3, Good concentration; Motor Strength 5/5 B/L upper and lower extremities; DTRs 2+ intact and symmetric                                             LABS:  08-21    135  |  96  |  19  ----------------------------<  108<H>  3.7   |  30  |  0.53  08-20    135  |  96  |  21  ----------------------------<  135<H>  3.9   |  30  |  0.54    Ca    9.1      21 Aug 2019 07:27  Ca    9.1      20 Aug 2019 10:31    TPro  5.4<L>  /  Alb  2.7<L>  /  TBili  0.6  /  DBili  x   /  AST  15  /  ALT  129<H>  /  AlkPhos  83  08-21  TPro  5.4<L>  /  Alb  2.7<L>  /  TBili  0.6  /  DBili  x   /  AST  22  /  ALT  168<H>  /  AlkPhos  84  08-20                                                    9.4    12.6  )-----------( 307      ( 21 Aug 2019 07:27 )             29.9                         9.9    14.3  )-----------( 355      ( 20 Aug 2019 10:31 )             31.8     CAPILLARY BLOOD GLUCOSE      POCT Blood Glucose.: 126 mg/dL (22 Aug 2019 06:28)  POCT Blood Glucose.: 116 mg/dL (22 Aug 2019 00:34)  POCT Blood Glucose.: 123 mg/dL (21 Aug 2019 17:29)  POCT Blood Glucose.: 114 mg/dL (21 Aug 2019 12:09)        RECENT CULTURES:  08-15 @ 18:57  .Body Fluid  --  --  --    No growth at 5 days  --  08-15 @ 09:52  .Blood  --  --  --    No growth at 5 days.  --      TELEMETRY:    ECG:    TTE:    RADIOLOGY & ADDITIONAL TESTS:    Imaging Personally Reviewed:  [ ] YES  [ ] NO    Consultants involved in case:   Consultant(s) Notes Reviewed:  [ ] YES  [ ] NO:   Care Discussed with Consultants/Other Providers [ ] YES  [ ] NO Parmjit Garcia MD  Beeper: [phone] ([facility])/ [phone] ([facility])     Subjective:    Patient is a 69y old  Female who presents with a chief complaint of Hypotension (21 Aug 2019 11:20)    Patient was seen and examined at bedside this AM. No acute overnight events. Patient was somnolent this morning; ROS was limited.       MEDICATIONS  (STANDING):  artificial tears (preservative free) Ophthalmic Solution 1 Drop(s) Both EYES every 6 hours  buDESOnide    Inhalation Suspension 0.5 milliGRAM(s) Inhalation two times a day  chlorhexidine 4% Liquid 1 Application(s) Topical <User Schedule>  enoxaparin Injectable 40 milliGRAM(s) SubCutaneous daily  insulin lispro (HumaLOG) corrective regimen sliding scale   SubCutaneous every 6 hours  latanoprost 0.005% Ophthalmic Solution 1 Drop(s) Both EYES at bedtime  nystatin    Suspension 534505 Unit(s) Oral two times a day  petrolatum Ophthalmic Ointment 1 Application(s) Both EYES at bedtime  piperacillin/tazobactam IVPB.. 3.375 Gram(s) IV Intermittent every 8 hours  prednisoLONE acetate 1% Suspension 1 Drop(s) Both EYES four times a day    MEDICATIONS  (PRN):  acetaminophen    Suspension .. 650 milliGRAM(s) Enteral Tube every 6 hours PRN Moderate Pain (4 - 6)      Objective:    Vitals: Vital Signs Last 24 Hrs  T(C): 36.8 (08-22-19 @ 05:18), Max: 36.9 (08-21-19 @ 13:29)  T(F): 98.2 (08-22-19 @ 05:18), Max: 98.5 (08-21-19 @ 13:29)  HR: 109 (08-22-19 @ 06:21) (92 - 112)  BP: 114/61 (08-22-19 @ 05:18) (114/61 - 131/70)  BP(mean): --  RR: 18 (08-22-19 @ 05:18) (18 - 18)  SpO2: 100% (08-22-19 @ 06:21) (98% - 100%)              I&O's Summary    21 Aug 2019 07:01  -  22 Aug 2019 07:00  --------------------------------------------------------  IN: 660 mL / OUT: 1250 mL / NET: -590 mL        PHYSICAL EXAM:  GENERAL: NAD, somnolent  HEAD:  Atraumatic, Normocephalic  EYES: EOMI, conjunctiva and sclera clear  CHEST/LUNG: Clear to auscultation bilaterally anteriorly; No rales, rhonchi, wheezing, or rubs  HEART: Tachycardic, regular rhythm; No murmurs, rubs, or gallops  ABDOMEN: Soft, Nontender, Nondistended; Bowel sounds present  EXTREMITIES:  No LE edema  SKIN: warm  NERVOUS SYSTEM:  Somnolent, occasionally responds to questions, but inconsistent                                               LABS:  08-21    135  |  96  |  19  ----------------------------<  108<H>  3.7   |  30  |  0.53  08-20    135  |  96  |  21  ----------------------------<  135<H>  3.9   |  30  |  0.54    Ca    9.1      21 Aug 2019 07:27  Ca    9.1      20 Aug 2019 10:31    TPro  5.4<L>  /  Alb  2.7<L>  /  TBili  0.6  /  DBili  x   /  AST  15  /  ALT  129<H>  /  AlkPhos  83  08-21  TPro  5.4<L>  /  Alb  2.7<L>  /  TBili  0.6  /  DBili  x   /  AST  22  /  ALT  168<H>  /  AlkPhos  84  08-20                                                    9.4    12.6  )-----------( 307      ( 21 Aug 2019 07:27 )             29.9                         9.9    14.3  )-----------( 355      ( 20 Aug 2019 10:31 )             31.8     CAPILLARY BLOOD GLUCOSE      POCT Blood Glucose.: 126 mg/dL (22 Aug 2019 06:28)  POCT Blood Glucose.: 116 mg/dL (22 Aug 2019 00:34)  POCT Blood Glucose.: 123 mg/dL (21 Aug 2019 17:29)  POCT Blood Glucose.: 114 mg/dL (21 Aug 2019 12:09)        RECENT CULTURES:  08-15 @ 18:57  .Body Fluid  --  --  --    No growth at 5 days  --  08-15 @ 09:52  .Blood  --  --  --    No growth at 5 days.  --      TELEMETRY:    ECG:    TTE:    RADIOLOGY & ADDITIONAL TESTS:    Imaging Personally Reviewed:  [ ] YES  [ ] NO    Consultants involved in case:   Consultant(s) Notes Reviewed:  [ ] YES  [ ] NO:   Care Discussed with Consultants/Other Providers [ ] YES  [ ] NO Parmjit Garcia MD  Beeper: [phone] ([facility])/ [phone] ([facility])     Subjective:    Patient is a 69y old  Female who presents with a chief complaint of Hypotension (21 Aug 2019 11:20)    Patient was seen and examined at bedside this AM. No acute overnight events. Patient was somnolent this morning; ROS was limited.       MEDICATIONS  (STANDING):  artificial tears (preservative free) Ophthalmic Solution 1 Drop(s) Both EYES every 6 hours  buDESOnide    Inhalation Suspension 0.5 milliGRAM(s) Inhalation two times a day  chlorhexidine 4% Liquid 1 Application(s) Topical <User Schedule>  enoxaparin Injectable 40 milliGRAM(s) SubCutaneous daily  insulin lispro (HumaLOG) corrective regimen sliding scale   SubCutaneous every 6 hours  latanoprost 0.005% Ophthalmic Solution 1 Drop(s) Both EYES at bedtime  nystatin    Suspension 990646 Unit(s) Oral two times a day  petrolatum Ophthalmic Ointment 1 Application(s) Both EYES at bedtime  piperacillin/tazobactam IVPB.. 3.375 Gram(s) IV Intermittent every 8 hours  prednisoLONE acetate 1% Suspension 1 Drop(s) Both EYES four times a day    MEDICATIONS  (PRN):  acetaminophen    Suspension .. 650 milliGRAM(s) Enteral Tube every 6 hours PRN Moderate Pain (4 - 6)      Objective:    Vitals: Vital Signs Last 24 Hrs  T(C): 36.8 (08-22-19 @ 05:18), Max: 36.9 (08-21-19 @ 13:29)  T(F): 98.2 (08-22-19 @ 05:18), Max: 98.5 (08-21-19 @ 13:29)  HR: 109 (08-22-19 @ 06:21) (92 - 112)  BP: 114/61 (08-22-19 @ 05:18) (114/61 - 131/70)  BP(mean): --  RR: 18 (08-22-19 @ 05:18) (18 - 18)  SpO2: 100% (08-22-19 @ 06:21) (98% - 100%)              I&O's Summary    21 Aug 2019 07:01  -  22 Aug 2019 07:00  --------------------------------------------------------  IN: 660 mL / OUT: 1250 mL / NET: -590 mL        PHYSICAL EXAM:  GENERAL: NAD, somnolent  HEAD:  Atraumatic, Normocephalic  EYES: EOMI, conjunctiva and sclera clear  CHEST/LUNG: Clear to auscultation bilaterally anteriorly; No rales, rhonchi, wheezing, or rubs  HEART: Tachycardic, regular rhythm; No murmurs, rubs, or gallops  ABDOMEN: Soft, Nontender, Nondistended; Bowel sounds present  EXTREMITIES:  No LE edema  SKIN: warm  NERVOUS SYSTEM:  Somnolent, occasionally responds to questions, but inconsistent                                               LABS:  08-22    133<L>  |  95<L>  |  14  ----------------------------<  137<H>  3.8   |  32<H>  |  0.45<L>    Ca    9.1      22 Aug 2019 09:56    TPro  5.5<L>  /  Alb  2.8<L>  /  TBili  0.5  /  DBili  x   /  AST  15  /  ALT  99<H>  /  AlkPhos  90  08-22                          9.4    12.8  )-----------( 319      ( 22 Aug 2019 09:56 )             29.6       CAPILLARY BLOOD GLUCOSE    POCT Blood Glucose.: 116 mg/dL (22 Aug 2019 11:55)  POCT Blood Glucose.: 126 mg/dL (22 Aug 2019 06:28)  POCT Blood Glucose.: 116 mg/dL (22 Aug 2019 00:34)

## 2019-08-22 NOTE — PROGRESS NOTE ADULT - ASSESSMENT
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   Workup thus far found patient to be febrile to 100.4 on admission and tachycardic/hypotensive   Leukocytosis trending down   LP not s/o bacterial process-? post op vs secondary to CSF leak  MRI no abscess,pseudomeningocele  HIDA with cholecystitis  s/p perc kylee  Stable overall  Lethargy doubt infection related   Would recommend workup for alternate etiology  Continue zosyn for 7 more days  to finish 2 week course from cholecystostomy.  Will tailor plan for ID issues  per course,results.Will defer to primary team on management of other issues.  Case d./w med team  Will Follow.  Beeper [phone]-nhwu/afterhours/No response-[phone]

## 2019-08-22 NOTE — PROGRESS NOTE ADULT - PROBLEM SELECTOR PLAN 6
-Pt requires full assistance with ADLs  -Care per nursing protocol  -Per  pt was able to walk with walker two weeks prior to this admission -Pt requires full assistance with ADLs  -Care per nursing protocol

## 2019-08-22 NOTE — PROGRESS NOTE ADULT - PROBLEM SELECTOR PLAN 2
-In setting of acute cholecystitis. Now resolved and patient off pressors  -Acute cholecystitis workup as below  -s/p LP negative for infection  -Switched to Zosyn 3.375 q8hrs. Per ID, will continue abx for total of 14 days from the day she received her percutaneous cholecystostomy, which was 8/15. To continue abx until 8/29  - will continue to f/u bile Cx. If organism identified with susceptibility report, will adjust abx regimen accordingly. All Cx have been NTD  - in anticipation for discharge to rehab facility, midline catheter ordered -In setting of acute cholecystitis. Now resolved and patient off pressors  -Acute cholecystitis workup as below  -s/p LP negative for infection  - on Zosyn 3.375 q8hrs. Per ID, will continue abx for total of 14 days from the day she received her percutaneous cholecystostomy, which was 8/15. To continue abx until 8/29. On discharge, may switch to ertapenem  - will continue to f/u bile Cx. If organism identified with susceptibility report, will adjust abx regimen accordingly. All Cx have been NTD  - in anticipation for discharge to rehab facility, midline catheter ordered

## 2019-08-22 NOTE — PROGRESS NOTE ADULT - SUBJECTIVE AND OBJECTIVE BOX
Patient is a 69y old  Female who presents with a chief complaint of Hypotension (22 Aug 2019 09:30)    Being followed by ID for cholecystitis     Interval history:lethargic though arousable and answers queries   No acute events      ROS:denies any pain or complaints   No cough,SOB,CP  No N/V/D./abd pain  No other complaints      Antimicrobials:    nystatin    Suspension 168022 Unit(s) Oral two times a day  piperacillin/tazobactam IVPB.. 3.375 Gram(s) IV Intermittent every 8 hours    Other medications reviewed    Vital Signs Last 24 Hrs  T(C): 37.3 (08-22-19 @ 10:00), Max: 37.3 (08-22-19 @ 10:00)  T(F): 99.2 (08-22-19 @ 10:00), Max: 99.2 (08-22-19 @ 10:00)  HR: 100 (08-22-19 @ 10:00) (92 - 112)  BP: 135/76 (08-22-19 @ 10:00) (114/61 - 135/76)  BP(mean): --  RR: 17 (08-22-19 @ 10:00) (17 - 18)  SpO2: 100% (08-22-19 @ 10:00) (98% - 100%)    Physical Exam:      Cranial incisions CDI no tenderness   HEENT PERRLA EOMI,No pallor or icterus    No oral exudate or erythema    Neck supple no JVD or LN    Chest Good AE,CTA    CVS RRR S1 S2 WNl No murmur or rub or gallop    Abd soft BS normal No tendernessfeeding tube  RUQ cholecystostomy-mild tenderness at site   Obese   Ext No cyanosis clubbing or edema    left arm midline  site no erythema tenderness or discharge    Joints no swelling or LOM    CNS as above   Lab Data:                          9.4    12.8  )-----------( 319      ( 22 Aug 2019 09:56 )             29.6       08-22    133<L>  |  95<L>  |  14  ----------------------------<  137<H>  3.8   |  32<H>  |  0.45<L>    Ca    9.1      22 Aug 2019 09:56    TPro  5.5<L>  /  Alb  2.8<L>  /  TBili  0.5  /  DBili  x   /  AST  15  /  ALT  99<H>  /  AlkPhos  90  08-22

## 2019-08-23 DIAGNOSIS — E87.1 HYPO-OSMOLALITY AND HYPONATREMIA: ICD-10-CM

## 2019-08-23 LAB
ALBUMIN SERPL ELPH-MCNC: 2.6 G/DL — LOW (ref 3.3–5)
ALP SERPL-CCNC: 90 U/L — SIGNIFICANT CHANGE UP (ref 40–120)
ALT FLD-CCNC: 76 U/L — HIGH (ref 10–45)
ANION GAP SERPL CALC-SCNC: 7 MMOL/L — SIGNIFICANT CHANGE UP (ref 5–17)
AST SERPL-CCNC: 16 U/L — SIGNIFICANT CHANGE UP (ref 10–40)
BILIRUB SERPL-MCNC: 0.5 MG/DL — SIGNIFICANT CHANGE UP (ref 0.2–1.2)
BUN SERPL-MCNC: 14 MG/DL — SIGNIFICANT CHANGE UP (ref 7–23)
CALCIUM SERPL-MCNC: 8.5 MG/DL — SIGNIFICANT CHANGE UP (ref 8.4–10.5)
CHLORIDE SERPL-SCNC: 93 MMOL/L — LOW (ref 96–108)
CO2 SERPL-SCNC: 32 MMOL/L — HIGH (ref 22–31)
CREAT SERPL-MCNC: 0.42 MG/DL — LOW (ref 0.5–1.3)
GLUCOSE BLDC GLUCOMTR-MCNC: 123 MG/DL — HIGH (ref 70–99)
GLUCOSE BLDC GLUCOMTR-MCNC: 128 MG/DL — HIGH (ref 70–99)
GLUCOSE BLDC GLUCOMTR-MCNC: 130 MG/DL — HIGH (ref 70–99)
GLUCOSE BLDC GLUCOMTR-MCNC: 141 MG/DL — HIGH (ref 70–99)
GLUCOSE SERPL-MCNC: 141 MG/DL — HIGH (ref 70–99)
HCT VFR BLD CALC: 28.6 % — LOW (ref 34.5–45)
HGB BLD-MCNC: 8.8 G/DL — LOW (ref 11.5–15.5)
MCHC RBC-ENTMCNC: 28.1 PG — SIGNIFICANT CHANGE UP (ref 27–34)
MCHC RBC-ENTMCNC: 30.9 GM/DL — LOW (ref 32–36)
MCV RBC AUTO: 90.9 FL — SIGNIFICANT CHANGE UP (ref 80–100)
PLATELET # BLD AUTO: 305 K/UL — SIGNIFICANT CHANGE UP (ref 150–400)
POTASSIUM SERPL-MCNC: 3.7 MMOL/L — SIGNIFICANT CHANGE UP (ref 3.5–5.3)
POTASSIUM SERPL-SCNC: 3.7 MMOL/L — SIGNIFICANT CHANGE UP (ref 3.5–5.3)
PROT SERPL-MCNC: 5.4 G/DL — LOW (ref 6–8.3)
RBC # BLD: 3.15 M/UL — LOW (ref 3.8–5.2)
RBC # FLD: 16.2 % — HIGH (ref 10.3–14.5)
SODIUM SERPL-SCNC: 132 MMOL/L — LOW (ref 135–145)
WBC # BLD: 11.3 K/UL — HIGH (ref 3.8–10.5)
WBC # FLD AUTO: 11.3 K/UL — HIGH (ref 3.8–10.5)

## 2019-08-23 PROCEDURE — 99232 SBSQ HOSP IP/OBS MODERATE 35: CPT

## 2019-08-23 PROCEDURE — 99233 SBSQ HOSP IP/OBS HIGH 50: CPT

## 2019-08-23 RX ORDER — SODIUM CHLORIDE 9 MG/ML
500 INJECTION INTRAMUSCULAR; INTRAVENOUS; SUBCUTANEOUS ONCE
Refills: 0 | Status: COMPLETED | OUTPATIENT
Start: 2019-08-23 | End: 2019-08-23

## 2019-08-23 RX ADMIN — PIPERACILLIN AND TAZOBACTAM 25 GRAM(S): 4; .5 INJECTION, POWDER, LYOPHILIZED, FOR SOLUTION INTRAVENOUS at 13:47

## 2019-08-23 RX ADMIN — Medication 0.5 MILLIGRAM(S): at 06:33

## 2019-08-23 RX ADMIN — Medication 1 APPLICATION(S): at 22:31

## 2019-08-23 RX ADMIN — Medication 1 DROP(S): at 06:13

## 2019-08-23 RX ADMIN — Medication 1 DROP(S): at 17:24

## 2019-08-23 RX ADMIN — SODIUM CHLORIDE 500 MILLILITER(S): 9 INJECTION INTRAMUSCULAR; INTRAVENOUS; SUBCUTANEOUS at 12:21

## 2019-08-23 RX ADMIN — Medication 500000 UNIT(S): at 17:28

## 2019-08-23 RX ADMIN — ENOXAPARIN SODIUM 40 MILLIGRAM(S): 100 INJECTION SUBCUTANEOUS at 11:59

## 2019-08-23 RX ADMIN — LATANOPROST 1 DROP(S): 0.05 SOLUTION/ DROPS OPHTHALMIC; TOPICAL at 22:31

## 2019-08-23 RX ADMIN — PIPERACILLIN AND TAZOBACTAM 25 GRAM(S): 4; .5 INJECTION, POWDER, LYOPHILIZED, FOR SOLUTION INTRAVENOUS at 06:10

## 2019-08-23 RX ADMIN — PIPERACILLIN AND TAZOBACTAM 25 GRAM(S): 4; .5 INJECTION, POWDER, LYOPHILIZED, FOR SOLUTION INTRAVENOUS at 22:31

## 2019-08-23 RX ADMIN — Medication 1 DROP(S): at 12:00

## 2019-08-23 RX ADMIN — Medication 1 DROP(S): at 11:59

## 2019-08-23 RX ADMIN — Medication 1 DROP(S): at 01:15

## 2019-08-23 RX ADMIN — Medication 1 DROP(S): at 01:14

## 2019-08-23 RX ADMIN — Medication 1 DROP(S): at 06:12

## 2019-08-23 RX ADMIN — CHLORHEXIDINE GLUCONATE 1 APPLICATION(S): 213 SOLUTION TOPICAL at 06:13

## 2019-08-23 RX ADMIN — Medication 1 DROP(S): at 17:25

## 2019-08-23 RX ADMIN — Medication 0.5 MILLIGRAM(S): at 17:39

## 2019-08-23 RX ADMIN — Medication 500000 UNIT(S): at 06:13

## 2019-08-23 NOTE — PROGRESS NOTE ADULT - PROBLEM SELECTOR PLAN 4
-c/w feeds via PEG tube  -nutrition recs appreciated Sodium of 132 on 8/23 AM labs. In the setting of PEG tube feedings, may be due to hypovolemia  - patient given 500cc bolus of NS  - will continue to monitor

## 2019-08-23 NOTE — PROGRESS NOTE ADULT - ASSESSMENT
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   Workup thus far found patient to be febrile to 100.4 on admission and tachycardic/hypotensive   Leukocytosis trending down   low grade temp but no other s/s infection   LP not s/o bacterial process-? post op vs secondary to CSF leak  MRI no abscess,pseudomeningocele  HIDA with cholecystitis  s/p perc kylee  Stable overall  Lethargy doubt infection related   Continue zosyn for 6 more days  to finish 2 week course from cholecystostomy.  Monitor temp and for s/s any other nosocomial process  Will tailor plan for ID issues  per course,results.Will defer to primary team on management of other issues.  Case d./w med team  Infectious Diseases Service will cover over weekend.  Please call [phone] if issues

## 2019-08-23 NOTE — PROGRESS NOTE ADULT - ASSESSMENT
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

## 2019-08-23 NOTE — PROGRESS NOTE ADULT - PROBLEM SELECTOR PLAN 6
-Pt requires full assistance with ADLs  -Care per nursing protocol -DVT ppx: SQH   -PT/OT  -GOC discussion with palliative; daughters will be health care proxies and patient is FULL CODE

## 2019-08-23 NOTE — PROGRESS NOTE ADULT - SUBJECTIVE AND OBJECTIVE BOX
Patient is a 69y old  Female who presents with a chief complaint of Hypotension (23 Aug 2019 07:59)    Being followed by ID for cholecystitis     Interval history:lethargic but arousable   No acute events      ROS:  No reliable ROS avauilable  denies any complaints     Antimicrobials:    nystatin    Suspension 354620 Unit(s) Oral two times a day  piperacillin/tazobactam IVPB.. 3.375 Gram(s) IV Intermittent every 8 hours    Other medications reviewed    Vital Signs Last 24 Hrs  T(C): 36.7 (08-23-19 @ 11:45), Max: 37.9 (08-23-19 @ 05:19)  T(F): 98.1 (08-23-19 @ 11:45), Max: 100.3 (08-23-19 @ 05:19)  HR: 95 (08-23-19 @ 11:45) (91 - 108)  BP: 115/73 (08-23-19 @ 11:45) (115/73 - 143/81)  BP(mean): --  RR: 18 (08-23-19 @ 11:45) (18 - 18)  SpO2: 96% (08-23-19 @ 11:45) (95% - 100%)    Physical Exam:      Cranial incisions CDI no tenderness   HEENT PERRLA EOMI,No pallor or icterus    No oral exudate or erythema    Neck supple no JVD or LN    Chest Good AE,CTA    CVS RRR S1 S2 WNl No murmur or rub or gallop    Abd soft BS normal No tendernessfeeding tube  RUQ cholecystostomy-mild tenderness at site   Obese   Ext No cyanosis clubbing or edema    left arm midline  site no erythema tenderness or discharge    Joints no swelling or LOM    CNS as above     Lab Data:                          8.8    11.3  )-----------( 305      ( 23 Aug 2019 07:00 )             28.6       08-23    132<L>  |  93<L>  |  14  ----------------------------<  141<H>  3.7   |  32<H>  |  0.42<L>    Ca    8.5      23 Aug 2019 06:56    TPro  5.4<L>  /  Alb  2.6<L>  /  TBili  0.5  /  DBili  x   /  AST  16  /  ALT  76<H>  /  AlkPhos  90  08-23

## 2019-08-23 NOTE — PROGRESS NOTE ADULT - PROBLEM SELECTOR PLAN 2
-In setting of acute cholecystitis. Now resolved and patient off pressors  -Acute cholecystitis workup as below  -s/p LP negative for infection  - on Zosyn 3.375 q8hrs. Per ID, will continue abx for total of 14 days from the day she received her percutaneous cholecystostomy, which was 8/15. To continue abx until 8/29. On discharge, may switch to ertapenem  - will continue to f/u bile Cx. If organism identified with susceptibility report, will adjust abx regimen accordingly. All Cx have been NTD  - in anticipation for discharge to rehab facility, midline catheter ordered

## 2019-08-23 NOTE — PROGRESS NOTE ADULT - PROBLEM SELECTOR PLAN 3
-Acute cholecystitis shown on CTA and HIDA s/p perc kylee placement by IR  -Surgery consulted: no acute surgical intervention  -Continuing w/ ABx as above   -Biliary culture NGTD  - Midline catheter placed, may switch to ertapenem if patient discharged to [facility].

## 2019-08-23 NOTE — PROGRESS NOTE ADULT - PROBLEM SELECTOR PLAN 5
-DVT ppx: SQH   -PT/OT  -GOC discussion with palliative; daughters will be health care proxies and patient is FULL CODE -c/w feeds via PEG tube  -nutrition recs appreciated

## 2019-08-23 NOTE — PROGRESS NOTE ADULT - SUBJECTIVE AND OBJECTIVE BOX
Parmjit Garcia MD  Beeper: [phone] ([facility])/ [id number] ([facility])     Subjective:    Patient is a 69y old  Female who presents with a chief complaint of Hypotension (22 Aug 2019 11:56)      Patient was seen and examined at bedside this AM. Denied fever, chills, nausea, vomiting, CP, palpitations, coughing, wheezing, SOB, and abdominal pain.    Overnight events:    MEDICATIONS  (STANDING):  artificial tears (preservative free) Ophthalmic Solution 1 Drop(s) Both EYES every 6 hours  buDESOnide    Inhalation Suspension 0.5 milliGRAM(s) Inhalation two times a day  chlorhexidine 4% Liquid 1 Application(s) Topical <User Schedule>  enoxaparin Injectable 40 milliGRAM(s) SubCutaneous daily  insulin lispro (HumaLOG) corrective regimen sliding scale   SubCutaneous every 6 hours  latanoprost 0.005% Ophthalmic Solution 1 Drop(s) Both EYES at bedtime  nystatin    Suspension 658731 Unit(s) Oral two times a day  petrolatum Ophthalmic Ointment 1 Application(s) Both EYES at bedtime  piperacillin/tazobactam IVPB.. 3.375 Gram(s) IV Intermittent every 8 hours  prednisoLONE acetate 1% Suspension 1 Drop(s) Both EYES four times a day    MEDICATIONS  (PRN):  acetaminophen    Suspension .. 650 milliGRAM(s) Enteral Tube every 6 hours PRN Moderate Pain (4 - 6)      Objective:    Vitals: Vital Signs Last 24 Hrs  T(C): 36.6 (08-23-19 @ 07:02), Max: 37.9 (08-23-19 @ 05:19)  T(F): 97.9 (08-23-19 @ 07:02), Max: 100.3 (08-23-19 @ 05:19)  HR: 108 (08-23-19 @ 07:02) (91 - 108)  BP: 142/80 (08-23-19 @ 07:02) (124/70 - 143/81)  BP(mean): --  RR: 18 (08-23-19 @ 07:02) (17 - 18)  SpO2: 96% (08-23-19 @ 07:02) (95% - 100%)              I&O's Summary    22 Aug 2019 07:01  -  23 Aug 2019 07:00  --------------------------------------------------------  IN: 1000 mL / OUT: 400 mL / NET: 600 mL        PHYSICAL EXAM:  GENERAL: NAD, well-groomed, well-developed  HEAD:  Atraumatic, Normocephalic  EYES: EOMI, PERRLA, conjunctiva and sclera clear  ENMT: No tonsillar erythema, exudates, or enlargement; Moist mucous membranes, Good dentition, No lesions  NECK: Supple, No JVD, Normal thyroid  CHEST/LUNG: Clear to auscultation bilaterally; No rales, rhonchi, wheezing, or rubs  HEART: Regular rate and rhythm; No murmurs, rubs, or gallops  ABDOMEN: Soft, Nontender, Nondistended; Bowel sounds present  EXTREMITIES:  2+ Peripheral Pulses, No clubbing, cyanosis, or edema  LYMPH: No lymphadenopathy noted  SKIN: No rashes or lesions  NERVOUS SYSTEM:  Alert & Oriented X3, Good concentration; Motor Strength 5/5 B/L upper and lower extremities; DTRs 2+ intact and symmetric                                             LABS:  08-23    132<L>  |  93<L>  |  14  ----------------------------<  141<H>  3.7   |  32<H>  |  0.42<L>  08-22    133<L>  |  95<L>  |  14  ----------------------------<  137<H>  3.8   |  32<H>  |  0.45<L>  08-21    135  |  96  |  19  ----------------------------<  108<H>  3.7   |  30  |  0.53    Ca    8.5      23 Aug 2019 06:56  Ca    9.1      22 Aug 2019 09:56  Ca    9.1      21 Aug 2019 07:27    TPro  5.4<L>  /  Alb  2.6<L>  /  TBili  0.5  /  DBili  x   /  AST  16  /  ALT  76<H>  /  AlkPhos  90  08-23  TPro  5.5<L>  /  Alb  2.8<L>  /  TBili  0.5  /  DBili  x   /  AST  15  /  ALT  99<H>  /  AlkPhos  90  08-22  TPro  5.4<L>  /  Alb  2.7<L>  /  TBili  0.6  /  DBili  x   /  AST  15  /  ALT  129<H>  /  AlkPhos  83  08-21                                                    8.8    11.3  )-----------( 305      ( 23 Aug 2019 07:00 )             28.6                         9.4    12.8  )-----------( 319      ( 22 Aug 2019 09:56 )             29.6                         9.4    12.6  )-----------( 307      ( 21 Aug 2019 07:27 )             29.9     CAPILLARY BLOOD GLUCOSE      POCT Blood Glucose.: 141 mg/dL (23 Aug 2019 06:48)  POCT Blood Glucose.: 121 mg/dL (22 Aug 2019 23:41)  POCT Blood Glucose.: 118 mg/dL (22 Aug 2019 18:05)  POCT Blood Glucose.: 116 mg/dL (22 Aug 2019 11:55)        RECENT CULTURES:      TELEMETRY:    ECG:    TTE:    RADIOLOGY & ADDITIONAL TESTS:    Imaging Personally Reviewed:  [ ] YES  [ ] NO    Consultants involved in case:   Consultant(s) Notes Reviewed:  [ ] YES  [ ] NO:   Care Discussed with Consultants/Other Providers [ ] YES  [ ] NO Parmjit Garcia MD  Beeper: [phone] ([facility])/ [id number] ([facility])     Subjective:    Patient is a 69y old  Female who presents with a chief complaint of Hypotension (22 Aug 2019 11:56)    Patient was seen and examined at bedside this AM. No acute overnight events. Somnolent, but arousable. Limited ROS.      MEDICATIONS  (STANDING):  artificial tears (preservative free) Ophthalmic Solution 1 Drop(s) Both EYES every 6 hours  buDESOnide    Inhalation Suspension 0.5 milliGRAM(s) Inhalation two times a day  chlorhexidine 4% Liquid 1 Application(s) Topical <User Schedule>  enoxaparin Injectable 40 milliGRAM(s) SubCutaneous daily  insulin lispro (HumaLOG) corrective regimen sliding scale   SubCutaneous every 6 hours  latanoprost 0.005% Ophthalmic Solution 1 Drop(s) Both EYES at bedtime  nystatin    Suspension 241780 Unit(s) Oral two times a day  petrolatum Ophthalmic Ointment 1 Application(s) Both EYES at bedtime  piperacillin/tazobactam IVPB.. 3.375 Gram(s) IV Intermittent every 8 hours  prednisoLONE acetate 1% Suspension 1 Drop(s) Both EYES four times a day    MEDICATIONS  (PRN):  acetaminophen    Suspension .. 650 milliGRAM(s) Enteral Tube every 6 hours PRN Moderate Pain (4 - 6)      Objective:    Vitals: Vital Signs Last 24 Hrs  T(C): 36.6 (08-23-19 @ 07:02), Max: 37.9 (08-23-19 @ 05:19)  T(F): 97.9 (08-23-19 @ 07:02), Max: 100.3 (08-23-19 @ 05:19)  HR: 108 (08-23-19 @ 07:02) (91 - 108)  BP: 142/80 (08-23-19 @ 07:02) (124/70 - 143/81)  BP(mean): --  RR: 18 (08-23-19 @ 07:02) (17 - 18)  SpO2: 96% (08-23-19 @ 07:02) (95% - 100%)              I&O's Summary    22 Aug 2019 07:01  -  23 Aug 2019 07:00  --------------------------------------------------------  IN: 1000 mL / OUT: 400 mL / NET: 600 mL        PHYSICAL EXAM:  GENERAL: NAD, well-groomed, well-developed  HEAD:  Atraumatic, Normocephalic  EYES: EOMI, conjunctiva and sclera clear  CHEST/LUNG: Clear to auscultation bilaterally; No rales, rhonchi, wheezing, or rubs  HEART: Tachycardic; No murmurs, rubs, or gallops  ABDOMEN: Soft, Nontender, Nondistended; Bowel sounds present; percutaneous cholecystostomy tube draining  EXTREMITIES:  No LE edema  SKIN: warm  NERVOUS SYSTEM:  Somnolent, but arousable.                                           LABS:  08-23    132<L>  |  93<L>  |  14  ----------------------------<  141<H>  3.7   |  32<H>  |  0.42<L>  08-22    133<L>  |  95<L>  |  14  ----------------------------<  137<H>  3.8   |  32<H>  |  0.45<L>  08-21    135  |  96  |  19  ----------------------------<  108<H>  3.7   |  30  |  0.53    Ca    8.5      23 Aug 2019 06:56  Ca    9.1      22 Aug 2019 09:56  Ca    9.1      21 Aug 2019 07:27    TPro  5.4<L>  /  Alb  2.6<L>  /  TBili  0.5  /  DBili  x   /  AST  16  /  ALT  76<H>  /  AlkPhos  90  08-23  TPro  5.5<L>  /  Alb  2.8<L>  /  TBili  0.5  /  DBili  x   /  AST  15  /  ALT  99<H>  /  AlkPhos  90  08-22  TPro  5.4<L>  /  Alb  2.7<L>  /  TBili  0.6  /  DBili  x   /  AST  15  /  ALT  129<H>  /  AlkPhos  83  08-21                                                    8.8    11.3  )-----------( 305      ( 23 Aug 2019 07:00 )             28.6                         9.4    12.8  )-----------( 319      ( 22 Aug 2019 09:56 )             29.6                         9.4    12.6  )-----------( 307      ( 21 Aug 2019 07:27 )             29.9     CAPILLARY BLOOD GLUCOSE      POCT Blood Glucose.: 141 mg/dL (23 Aug 2019 06:48)  POCT Blood Glucose.: 121 mg/dL (22 Aug 2019 23:41)  POCT Blood Glucose.: 118 mg/dL (22 Aug 2019 18:05)  POCT Blood Glucose.: 116 mg/dL (22 Aug 2019 11:55)        RECENT CULTURES:      TELEMETRY:    ECG:    TTE:    RADIOLOGY & ADDITIONAL TESTS:    Imaging Personally Reviewed:  [ ] YES  [ ] NO    Consultants involved in case:   Consultant(s) Notes Reviewed:  [ ] YES  [ ] NO:   Care Discussed with Consultants/Other Providers [ ] YES  [ ] NO Parmjit Garcia MD  Beeper: [phone] ([facility])/ [id number] ([facility])     Subjective:    Patient is a 69y old  Female who presents with a chief complaint of Hypotension (22 Aug 2019 11:56)    Patient was seen and examined at bedside this AM. No acute overnight events. Somnolent, but arousable. Limited ROS.      MEDICATIONS  (STANDING):  artificial tears (preservative free) Ophthalmic Solution 1 Drop(s) Both EYES every 6 hours  buDESOnide    Inhalation Suspension 0.5 milliGRAM(s) Inhalation two times a day  chlorhexidine 4% Liquid 1 Application(s) Topical <User Schedule>  enoxaparin Injectable 40 milliGRAM(s) SubCutaneous daily  insulin lispro (HumaLOG) corrective regimen sliding scale   SubCutaneous every 6 hours  latanoprost 0.005% Ophthalmic Solution 1 Drop(s) Both EYES at bedtime  nystatin    Suspension 974956 Unit(s) Oral two times a day  petrolatum Ophthalmic Ointment 1 Application(s) Both EYES at bedtime  piperacillin/tazobactam IVPB.. 3.375 Gram(s) IV Intermittent every 8 hours  prednisoLONE acetate 1% Suspension 1 Drop(s) Both EYES four times a day    MEDICATIONS  (PRN):  acetaminophen    Suspension .. 650 milliGRAM(s) Enteral Tube every 6 hours PRN Moderate Pain (4 - 6)      Objective:    Vitals: Vital Signs Last 24 Hrs  T(C): 36.6 (08-23-19 @ 07:02), Max: 37.9 (08-23-19 @ 05:19)  T(F): 97.9 (08-23-19 @ 07:02), Max: 100.3 (08-23-19 @ 05:19)  HR: 108 (08-23-19 @ 07:02) (91 - 108)  BP: 142/80 (08-23-19 @ 07:02) (124/70 - 143/81)  BP(mean): --  RR: 18 (08-23-19 @ 07:02) (17 - 18)  SpO2: 96% (08-23-19 @ 07:02) (95% - 100%)              I&O's Summary    22 Aug 2019 07:01  -  23 Aug 2019 07:00  --------------------------------------------------------  IN: 1000 mL / OUT: 400 mL / NET: 600 mL        PHYSICAL EXAM:  GENERAL: NAD, well-groomed, well-developed  HEAD:  Atraumatic, Normocephalic  EYES: EOMI, conjunctiva and sclera clear  CHEST/LUNG: Clear to auscultation bilaterally; No rales, rhonchi, wheezing, or rubs  HEART: Tachycardic; No murmurs, rubs, or gallops  ABDOMEN: Soft, Nontender, Nondistended; Bowel sounds present; percutaneous cholecystostomy tube draining  EXTREMITIES:  No LE edema  SKIN: warm  NERVOUS SYSTEM:  Somnolent, but arousable.                                           LABS:  08-23    132<L>  |  93<L>  |  14  ----------------------------<  141<H>  3.7   |  32<H>  |  0.42<L>      Ca    8.5      23 Aug 2019 06:56      TPro  5.4<L>  /  Alb  2.6<L>  /  TBili  0.5  /  DBili  x   /  AST  16  /  ALT  76<H>  /  AlkPhos  90  08-23                        8.8    11.3  )-----------( 305      ( 23 Aug 2019 07:00 )             28.6                CAPILLARY BLOOD GLUCOSE    POCT Blood Glucose.: 128 mg/dL (23 Aug 2019 11:56)  POCT Blood Glucose.: 141 mg/dL (23 Aug 2019 06:48)  POCT Blood Glucose.: 121 mg/dL (22 Aug 2019 23:41)  POCT Blood Glucose.: 118 mg/dL (22 Aug 2019 18:05)

## 2019-08-24 LAB
ALBUMIN SERPL ELPH-MCNC: 2.5 G/DL — LOW (ref 3.3–5)
ALP SERPL-CCNC: 88 U/L — SIGNIFICANT CHANGE UP (ref 40–120)
ALT FLD-CCNC: 62 U/L — HIGH (ref 10–45)
ANION GAP SERPL CALC-SCNC: 10 MMOL/L — SIGNIFICANT CHANGE UP (ref 5–17)
AST SERPL-CCNC: 14 U/L — SIGNIFICANT CHANGE UP (ref 10–40)
BILIRUB SERPL-MCNC: 0.4 MG/DL — SIGNIFICANT CHANGE UP (ref 0.2–1.2)
BUN SERPL-MCNC: 13 MG/DL — SIGNIFICANT CHANGE UP (ref 7–23)
CALCIUM SERPL-MCNC: 8.3 MG/DL — LOW (ref 8.4–10.5)
CHLORIDE SERPL-SCNC: 92 MMOL/L — LOW (ref 96–108)
CO2 SERPL-SCNC: 29 MMOL/L — SIGNIFICANT CHANGE UP (ref 22–31)
CREAT SERPL-MCNC: 0.4 MG/DL — LOW (ref 0.5–1.3)
GLUCOSE BLDC GLUCOMTR-MCNC: 106 MG/DL — HIGH (ref 70–99)
GLUCOSE BLDC GLUCOMTR-MCNC: 118 MG/DL — HIGH (ref 70–99)
GLUCOSE BLDC GLUCOMTR-MCNC: 123 MG/DL — HIGH (ref 70–99)
GLUCOSE BLDC GLUCOMTR-MCNC: 126 MG/DL — HIGH (ref 70–99)
GLUCOSE SERPL-MCNC: 119 MG/DL — HIGH (ref 70–99)
HCT VFR BLD CALC: 27.7 % — LOW (ref 34.5–45)
HGB BLD-MCNC: 8.3 G/DL — LOW (ref 11.5–15.5)
MCHC RBC-ENTMCNC: 27.1 PG — SIGNIFICANT CHANGE UP (ref 27–34)
MCHC RBC-ENTMCNC: 29.9 GM/DL — LOW (ref 32–36)
MCV RBC AUTO: 90.6 FL — SIGNIFICANT CHANGE UP (ref 80–100)
PLATELET # BLD AUTO: 304 K/UL — SIGNIFICANT CHANGE UP (ref 150–400)
POTASSIUM SERPL-MCNC: 3.9 MMOL/L — SIGNIFICANT CHANGE UP (ref 3.5–5.3)
POTASSIUM SERPL-SCNC: 3.9 MMOL/L — SIGNIFICANT CHANGE UP (ref 3.5–5.3)
PROT SERPL-MCNC: 5.3 G/DL — LOW (ref 6–8.3)
RBC # BLD: 3.06 M/UL — LOW (ref 3.8–5.2)
RBC # FLD: 16.1 % — HIGH (ref 10.3–14.5)
SODIUM SERPL-SCNC: 131 MMOL/L — LOW (ref 135–145)
WBC # BLD: 10.6 K/UL — HIGH (ref 3.8–10.5)
WBC # FLD AUTO: 10.6 K/UL — HIGH (ref 3.8–10.5)

## 2019-08-24 PROCEDURE — 99233 SBSQ HOSP IP/OBS HIGH 50: CPT | Mod: GC

## 2019-08-24 RX ORDER — SODIUM CHLORIDE 9 MG/ML
1000 INJECTION INTRAMUSCULAR; INTRAVENOUS; SUBCUTANEOUS
Refills: 0 | Status: DISCONTINUED | OUTPATIENT
Start: 2019-08-24 | End: 2019-08-26

## 2019-08-24 RX ADMIN — LATANOPROST 1 DROP(S): 0.05 SOLUTION/ DROPS OPHTHALMIC; TOPICAL at 22:59

## 2019-08-24 RX ADMIN — SODIUM CHLORIDE 100 MILLILITER(S): 9 INJECTION INTRAMUSCULAR; INTRAVENOUS; SUBCUTANEOUS at 15:14

## 2019-08-24 RX ADMIN — SODIUM CHLORIDE 100 MILLILITER(S): 9 INJECTION INTRAMUSCULAR; INTRAVENOUS; SUBCUTANEOUS at 22:59

## 2019-08-24 RX ADMIN — Medication 0.5 MILLIGRAM(S): at 17:49

## 2019-08-24 RX ADMIN — Medication 1 DROP(S): at 11:41

## 2019-08-24 RX ADMIN — Medication 1 DROP(S): at 18:20

## 2019-08-24 RX ADMIN — Medication 500000 UNIT(S): at 18:20

## 2019-08-24 RX ADMIN — PIPERACILLIN AND TAZOBACTAM 25 GRAM(S): 4; .5 INJECTION, POWDER, LYOPHILIZED, FOR SOLUTION INTRAVENOUS at 05:35

## 2019-08-24 RX ADMIN — Medication 500000 UNIT(S): at 05:35

## 2019-08-24 RX ADMIN — ENOXAPARIN SODIUM 40 MILLIGRAM(S): 100 INJECTION SUBCUTANEOUS at 11:41

## 2019-08-24 RX ADMIN — Medication 1 DROP(S): at 00:10

## 2019-08-24 RX ADMIN — Medication 1 DROP(S): at 18:21

## 2019-08-24 RX ADMIN — Medication 1 DROP(S): at 05:35

## 2019-08-24 RX ADMIN — Medication 1 APPLICATION(S): at 22:59

## 2019-08-24 RX ADMIN — PIPERACILLIN AND TAZOBACTAM 25 GRAM(S): 4; .5 INJECTION, POWDER, LYOPHILIZED, FOR SOLUTION INTRAVENOUS at 22:59

## 2019-08-24 RX ADMIN — Medication 1 DROP(S): at 00:05

## 2019-08-24 RX ADMIN — CHLORHEXIDINE GLUCONATE 1 APPLICATION(S): 213 SOLUTION TOPICAL at 11:40

## 2019-08-24 RX ADMIN — PIPERACILLIN AND TAZOBACTAM 25 GRAM(S): 4; .5 INJECTION, POWDER, LYOPHILIZED, FOR SOLUTION INTRAVENOUS at 13:20

## 2019-08-24 RX ADMIN — Medication 1 DROP(S): at 23:16

## 2019-08-24 NOTE — PROGRESS NOTE ADULT - ASSESSMENT
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

## 2019-08-24 NOTE — PROGRESS NOTE ADULT - PROBLEM SELECTOR PLAN 3
-Acute cholecystitis shown on CTA and HIDA s/p perc kylee placement by IR  -Surgery consulted: no acute surgical intervention  -Continuing w/ ABx as above   -Biliary culture NGTD  - Midline catheter placed, may switch to ertapenem if patient discharged to [facility].

## 2019-08-24 NOTE — PROGRESS NOTE ADULT - PROBLEM SELECTOR PLAN 2
-In setting of acute cholecystitis. Now resolved and patient off pressors  -Acute cholecystitis workup as below  -s/p LP negative for infection  - on Zosyn 3.375 q8hrs. Per ID, will continue abx for total of 14 days from the day she received her percutaneous cholecystostomy, which was 8/15. To continue abx until 8/29. On discharge, may switch to ertapenem  - will continue to f/u bile Cx. If organism identified with susceptibility report, will adjust abx regimen accordingly. All Cx have been NTD  - in anticipation for discharge to rehab facility, midline catheter ordered -In setting of acute cholecystitis. Now resolved and patient off pressors  -Acute cholecystitis workup as below  -s/p LP negative for infection  - on Zosyn 3.375 q8hrs. Per ID, will continue abx for total of 14 days from the day she received her percutaneous cholecystostomy, which was 8/15. To continue abx until 8/29. On discharge, may switch to ertapenem  - will continue to f/u bile Cx. If organism identified with susceptibility report, will adjust abx regimen accordingly. All Cx have been NTD  - in anticipation for discharge to rehab facility, midline catheter placed

## 2019-08-24 NOTE — PROGRESS NOTE ADULT - PROBLEM SELECTOR PLAN 4
Sodium of 132 on 8/23 AM labs. In the setting of PEG tube feedings, may be due to hypovolemia  - patient given 500cc bolus of NS  - will continue to monitor Sodium of 132 on 8/23 AM labs. In the setting of PEG tube feedings, may be due to hypovolemia  - Na remains low, was 131 this AM  - will continue to monitor Sodium of 132 on 8/23 AM labs. In the setting of PEG tube feedings, may be due to hypovolemia  - Na remains low, was 131 this AM  - will start maintenance NS @ 100cc/hr  - will continue to monitor

## 2019-08-24 NOTE — PROGRESS NOTE ADULT - SUBJECTIVE AND OBJECTIVE BOX
Parmjit Garcia MD  Beeper: [phone] ([facility])/ [id number] ([facility])     Subjective:    Patient is a 69y old  Female who presents with a chief complaint of Hypotension (23 Aug 2019 13:19)    Patient was seen and examined at bedside this AM. Patient somnolent, but arousable and follows commands. However, ROS limited.       MEDICATIONS  (STANDING):  artificial tears (preservative free) Ophthalmic Solution 1 Drop(s) Both EYES every 6 hours  buDESOnide    Inhalation Suspension 0.5 milliGRAM(s) Inhalation two times a day  chlorhexidine 4% Liquid 1 Application(s) Topical <User Schedule>  enoxaparin Injectable 40 milliGRAM(s) SubCutaneous daily  insulin lispro (HumaLOG) corrective regimen sliding scale   SubCutaneous every 6 hours  latanoprost 0.005% Ophthalmic Solution 1 Drop(s) Both EYES at bedtime  nystatin    Suspension 451229 Unit(s) Oral two times a day  petrolatum Ophthalmic Ointment 1 Application(s) Both EYES at bedtime  piperacillin/tazobactam IVPB.. 3.375 Gram(s) IV Intermittent every 8 hours  prednisoLONE acetate 1% Suspension 1 Drop(s) Both EYES four times a day    MEDICATIONS  (PRN):  acetaminophen    Suspension .. 650 milliGRAM(s) Enteral Tube every 6 hours PRN Moderate Pain (4 - 6)      Objective:    Vitals: Vital Signs Last 24 Hrs  T(C): 37.2 (08-24-19 @ 05:50), Max: 37.2 (08-24-19 @ 05:50)  T(F): 98.9 (08-24-19 @ 05:50), Max: 98.9 (08-24-19 @ 05:50)  HR: 111 (08-24-19 @ 05:50) (95 - 111)  BP: 134/74 (08-24-19 @ 05:50) (115/73 - 134/74)  BP(mean): --  RR: 18 (08-24-19 @ 05:50) (18 - 18)  SpO2: 99% (08-24-19 @ 05:50) (96% - 99%)               I&O's Summary    23 Aug 2019 07:01  -  24 Aug 2019 07:00  --------------------------------------------------------  IN: 1500 mL / OUT: 1000 mL / NET: 500 mL      PHYSICAL EXAM:  GENERAL: On NC O2, appeared diaphoretic  HEAD:  Atraumatic, Normocephalic  EYES: EOMI, conjunctiva and sclera clear  CHEST/LUNG: Clear to auscultation bilaterally; No rales, rhonchi, wheezing, or rubs  HEART: Regular rate and rhythm; No murmurs, rubs, or gallops  ABDOMEN: Soft, Nontender, Nondistended; Bowel sounds present  EXTREMITIES:  No LE edema  NERVOUS SYSTEM:  Somnolent, but arousable and following commands.                                              LABS:  08-24    131<L>  |  92<L>  |  13  ----------------------------<  119<H>  3.9   |  29  |  0.40<L>    Ca    8.3<L>      24 Aug 2019 07:09    TPro  5.3<L>  /  Alb  2.5<L>  /  TBili  0.4  /  DBili  x   /  AST  14  /  ALT  62<H>  /  AlkPhos  88  08-24                          8.3    10.6  )-----------( 304      ( 24 Aug 2019 07:09 )             27.7       CAPILLARY BLOOD GLUCOSE    POCT Blood Glucose.: 123 mg/dL (24 Aug 2019 06:42)  POCT Blood Glucose.: 123 mg/dL (23 Aug 2019 23:34)

## 2019-08-25 LAB
ALBUMIN SERPL ELPH-MCNC: 2.7 G/DL — LOW (ref 3.3–5)
ALP SERPL-CCNC: 83 U/L — SIGNIFICANT CHANGE UP (ref 40–120)
ALT FLD-CCNC: 51 U/L — HIGH (ref 10–45)
ANION GAP SERPL CALC-SCNC: 9 MMOL/L — SIGNIFICANT CHANGE UP (ref 5–17)
AST SERPL-CCNC: 11 U/L — SIGNIFICANT CHANGE UP (ref 10–40)
BILIRUB SERPL-MCNC: 0.4 MG/DL — SIGNIFICANT CHANGE UP (ref 0.2–1.2)
BUN SERPL-MCNC: 13 MG/DL — SIGNIFICANT CHANGE UP (ref 7–23)
CALCIUM SERPL-MCNC: 9.1 MG/DL — SIGNIFICANT CHANGE UP (ref 8.4–10.5)
CHLORIDE SERPL-SCNC: 95 MMOL/L — LOW (ref 96–108)
CO2 SERPL-SCNC: 32 MMOL/L — HIGH (ref 22–31)
CREAT SERPL-MCNC: 0.42 MG/DL — LOW (ref 0.5–1.3)
GLUCOSE BLDC GLUCOMTR-MCNC: 122 MG/DL — HIGH (ref 70–99)
GLUCOSE BLDC GLUCOMTR-MCNC: 128 MG/DL — HIGH (ref 70–99)
GLUCOSE BLDC GLUCOMTR-MCNC: 131 MG/DL — HIGH (ref 70–99)
GLUCOSE BLDC GLUCOMTR-MCNC: 133 MG/DL — HIGH (ref 70–99)
GLUCOSE SERPL-MCNC: 127 MG/DL — HIGH (ref 70–99)
HCT VFR BLD CALC: 26.9 % — LOW (ref 34.5–45)
HGB BLD-MCNC: 8.3 G/DL — LOW (ref 11.5–15.5)
MCHC RBC-ENTMCNC: 28.2 PG — SIGNIFICANT CHANGE UP (ref 27–34)
MCHC RBC-ENTMCNC: 31.1 GM/DL — LOW (ref 32–36)
MCV RBC AUTO: 90.7 FL — SIGNIFICANT CHANGE UP (ref 80–100)
PLATELET # BLD AUTO: 255 K/UL — SIGNIFICANT CHANGE UP (ref 150–400)
POTASSIUM SERPL-MCNC: 4 MMOL/L — SIGNIFICANT CHANGE UP (ref 3.5–5.3)
POTASSIUM SERPL-SCNC: 4 MMOL/L — SIGNIFICANT CHANGE UP (ref 3.5–5.3)
PROT SERPL-MCNC: 5.6 G/DL — LOW (ref 6–8.3)
RBC # BLD: 2.96 M/UL — LOW (ref 3.8–5.2)
RBC # FLD: 16.1 % — HIGH (ref 10.3–14.5)
SODIUM SERPL-SCNC: 136 MMOL/L — SIGNIFICANT CHANGE UP (ref 135–145)
WBC # BLD: 9 K/UL — SIGNIFICANT CHANGE UP (ref 3.8–10.5)
WBC # FLD AUTO: 9 K/UL — SIGNIFICANT CHANGE UP (ref 3.8–10.5)

## 2019-08-25 PROCEDURE — 99233 SBSQ HOSP IP/OBS HIGH 50: CPT

## 2019-08-25 RX ADMIN — Medication 500000 UNIT(S): at 06:39

## 2019-08-25 RX ADMIN — Medication 1 DROP(S): at 06:38

## 2019-08-25 RX ADMIN — Medication 1 DROP(S): at 23:41

## 2019-08-25 RX ADMIN — PIPERACILLIN AND TAZOBACTAM 25 GRAM(S): 4; .5 INJECTION, POWDER, LYOPHILIZED, FOR SOLUTION INTRAVENOUS at 06:39

## 2019-08-25 RX ADMIN — CHLORHEXIDINE GLUCONATE 1 APPLICATION(S): 213 SOLUTION TOPICAL at 08:17

## 2019-08-25 RX ADMIN — Medication 1 APPLICATION(S): at 23:39

## 2019-08-25 RX ADMIN — ENOXAPARIN SODIUM 40 MILLIGRAM(S): 100 INJECTION SUBCUTANEOUS at 13:19

## 2019-08-25 RX ADMIN — Medication 1 DROP(S): at 18:49

## 2019-08-25 RX ADMIN — Medication 1 DROP(S): at 13:19

## 2019-08-25 RX ADMIN — Medication 1 DROP(S): at 06:40

## 2019-08-25 RX ADMIN — Medication 500000 UNIT(S): at 18:49

## 2019-08-25 RX ADMIN — LATANOPROST 1 DROP(S): 0.05 SOLUTION/ DROPS OPHTHALMIC; TOPICAL at 23:39

## 2019-08-25 RX ADMIN — Medication 1 DROP(S): at 23:39

## 2019-08-25 NOTE — PROGRESS NOTE ADULT - PROBLEM SELECTOR PLAN 4
Sodium of 132 on 8/23 AM labs. In the setting of PEG tube feedings, may be due to hypovolemia  - Na remains low, was 131 this AM  - will start maintenance NS @ 100cc/hr  - will continue to monitor

## 2019-08-25 NOTE — PROGRESS NOTE ADULT - SUBJECTIVE AND OBJECTIVE BOX
Authored by Dr Javad Marino [phone] [facility] / [id number] [facility]    Patient is a 69y old  Female who presents with a chief complaint of Hypotension (24 Aug 2019 09:03)    SUBJECTIVE / OVERNIGHT EVENTS: No acute events overnight   This morning pt is lethargic, unable to obtain ROS.     MEDICATIONS  (STANDING):  artificial tears (preservative free) Ophthalmic Solution 1 Drop(s) Both EYES every 6 hours  buDESOnide    Inhalation Suspension 0.5 milliGRAM(s) Inhalation two times a day  chlorhexidine 4% Liquid 1 Application(s) Topical <User Schedule>  enoxaparin Injectable 40 milliGRAM(s) SubCutaneous daily  insulin lispro (HumaLOG) corrective regimen sliding scale   SubCutaneous every 6 hours  latanoprost 0.005% Ophthalmic Solution 1 Drop(s) Both EYES at bedtime  nystatin    Suspension 685944 Unit(s) Oral two times a day  petrolatum Ophthalmic Ointment 1 Application(s) Both EYES at bedtime  prednisoLONE acetate 1% Suspension 1 Drop(s) Both EYES four times a day  sodium chloride 0.9%. 1000 milliLiter(s) (100 mL/Hr) IV Continuous <Continuous>    MEDICATIONS  (PRN):  acetaminophen    Suspension .. 650 milliGRAM(s) Enteral Tube every 6 hours PRN Moderate Pain (4 - 6)      Vital Signs Last 24 Hrs  T(C): 37.3 (25 Aug 2019 04:37), Max: 37.3 (25 Aug 2019 04:37)  T(F): 99.2 (25 Aug 2019 04:37), Max: 99.2 (25 Aug 2019 04:37)  HR: 112 (25 Aug 2019 04:37) (92 - 112)  BP: 125/66 (25 Aug 2019 04:37) (112/73 - 136/81)  BP(mean): --  RR: 18 (25 Aug 2019 04:37) (18 - 18)  SpO2: 97% (25 Aug 2019 04:37) (96% - 100%)  CAPILLARY BLOOD GLUCOSE      POCT Blood Glucose.: 131 mg/dL (25 Aug 2019 05:57)  POCT Blood Glucose.: 126 mg/dL (24 Aug 2019 23:50)  POCT Blood Glucose.: 106 mg/dL (24 Aug 2019 17:25)  POCT Blood Glucose.: 118 mg/dL (24 Aug 2019 11:21)    I&O's Summary    24 Aug 2019 07:01  -  25 Aug 2019 07:00  --------------------------------------------------------  IN: 2900 mL / OUT: 710 mL / NET: 2190 mL        PHYSICAL EXAM  GENERAL: NAD, resting comfortably in bed On NC O2   HEAD:  Atraumatic, Normocephalic  EYES: conjunctiva and sclera clear, PERRL  CHEST/LUNG: Anteriorly clear to auscultation; No rales, rhonchi, wheezing, or rubs. Distant lung sounds posteriorly   HEART: Regular rate and rhythm; No murmurs, rubs, or gallops  ABDOMEN: +billary drain. Soft, Nontender, Nondistended; Bowel sounds present  EXTREMITIES:  No LE edema  NERVOUS SYSTEM:  Somnolent, but arousable and following commands.  PSYCH: Calm                                              LABS:                        8.3    9.0   )-----------( 255      ( 25 Aug 2019 06:39 )             26.9     08-25    136  |  95<L>  |  13  ----------------------------<  127<H>  4.0   |  32<H>  |  0.42<L>    Ca    9.1      25 Aug 2019 06:36    TPro  5.6<L>  /  Alb  2.7<L>  /  TBili  0.4  /  DBili  x   /  AST  11  /  ALT  51<H>  /  AlkPhos  83  08-25                RADIOLOGY & ADDITIONAL TESTS:    Imaging Personally Reviewed: no new imaging  Consultant(s) Notes Reviewed:  no new consults  Care Discussed with Consultants/Other Providers:

## 2019-08-25 NOTE — PROGRESS NOTE ADULT - PROBLEM SELECTOR PLAN 1
Likely multifactorial 2/2 sepsis and know intracranial fluid collection. Alert and responsive to verbal commands, but somnolent in the AM  -MRI showing the suboccipital extracranial fluid collection has increased in size   compared to previous MRI most consistent with pseudomeningocele  -Neurosx: No surgical intervention at this time, f/u outpt  - Repeat CT Head showed no significant changes from previous exam  -Infectious workup and management as below

## 2019-08-25 NOTE — PROGRESS NOTE ADULT - PROBLEM SELECTOR PLAN 3
-Acute cholecystitis shown on CTA and HIDA s/p perc kylee placement by IR  -Surgery consulted: no acute surgical intervention  -Continuing w/ ABx as above   -Biliary culture NGTD  - Midline catheter placed, may switch to ertapenem if patient discharged to [facility].

## 2019-08-25 NOTE — PROGRESS NOTE ADULT - PROBLEM SELECTOR PLAN 2
Resolved: Due to acute cholecystitis   -Acute cholecystitis workup as below  -s/p LP negative for infection  - on Zosyn 3.375 q8hrs. Per ID, will continue abx for total of 14 days from the day she received her percutaneous cholecystostomy, which was 8/15. To continue abx until 8/29. On discharge, may switch to ertapenem  - will continue to f/u bile Cx. If organism identified with susceptibility report, will adjust abx regimen accordingly. All Cx have been NTD  - in anticipation for discharge to rehab facility, midline catheter placed

## 2019-08-26 ENCOUNTER — TRANSCRIPTION ENCOUNTER (OUTPATIENT)
Age: 69
End: 2019-08-26

## 2019-08-26 LAB
ANION GAP SERPL CALC-SCNC: 12 MMOL/L — SIGNIFICANT CHANGE UP (ref 5–17)
BUN SERPL-MCNC: 13 MG/DL — SIGNIFICANT CHANGE UP (ref 7–23)
CALCIUM SERPL-MCNC: 9.3 MG/DL — SIGNIFICANT CHANGE UP (ref 8.4–10.5)
CHLORIDE SERPL-SCNC: 93 MMOL/L — LOW (ref 96–108)
CO2 SERPL-SCNC: 31 MMOL/L — SIGNIFICANT CHANGE UP (ref 22–31)
CREAT SERPL-MCNC: 0.36 MG/DL — LOW (ref 0.5–1.3)
GLUCOSE BLDC GLUCOMTR-MCNC: 129 MG/DL — HIGH (ref 70–99)
GLUCOSE BLDC GLUCOMTR-MCNC: 137 MG/DL — HIGH (ref 70–99)
GLUCOSE BLDC GLUCOMTR-MCNC: 138 MG/DL — HIGH (ref 70–99)
GLUCOSE SERPL-MCNC: 131 MG/DL — HIGH (ref 70–99)
HCT VFR BLD CALC: 27.5 % — LOW (ref 34.5–45)
HGB BLD-MCNC: 8.5 G/DL — LOW (ref 11.5–15.5)
MAGNESIUM SERPL-MCNC: 1.8 MG/DL — SIGNIFICANT CHANGE UP (ref 1.6–2.6)
MCHC RBC-ENTMCNC: 27.9 PG — SIGNIFICANT CHANGE UP (ref 27–34)
MCHC RBC-ENTMCNC: 30.7 GM/DL — LOW (ref 32–36)
MCV RBC AUTO: 90.8 FL — SIGNIFICANT CHANGE UP (ref 80–100)
PHOSPHATE SERPL-MCNC: 2.6 MG/DL — SIGNIFICANT CHANGE UP (ref 2.5–4.5)
PLATELET # BLD AUTO: 226 K/UL — SIGNIFICANT CHANGE UP (ref 150–400)
POTASSIUM SERPL-MCNC: 3.9 MMOL/L — SIGNIFICANT CHANGE UP (ref 3.5–5.3)
POTASSIUM SERPL-SCNC: 3.9 MMOL/L — SIGNIFICANT CHANGE UP (ref 3.5–5.3)
RBC # BLD: 3.04 M/UL — LOW (ref 3.8–5.2)
RBC # FLD: 16.1 % — HIGH (ref 10.3–14.5)
SODIUM SERPL-SCNC: 136 MMOL/L — SIGNIFICANT CHANGE UP (ref 135–145)
WBC # BLD: 8 K/UL — SIGNIFICANT CHANGE UP (ref 3.8–10.5)
WBC # FLD AUTO: 8 K/UL — SIGNIFICANT CHANGE UP (ref 3.8–10.5)

## 2019-08-26 PROCEDURE — 99232 SBSQ HOSP IP/OBS MODERATE 35: CPT

## 2019-08-26 PROCEDURE — 99233 SBSQ HOSP IP/OBS HIGH 50: CPT | Mod: GC

## 2019-08-26 RX ORDER — CHLORHEXIDINE GLUCONATE 213 G/1000ML
1 SOLUTION TOPICAL
Qty: 0 | Refills: 0 | DISCHARGE
Start: 2019-08-26

## 2019-08-26 RX ORDER — PIPERACILLIN AND TAZOBACTAM 4; .5 G/20ML; G/20ML
3.38 INJECTION, POWDER, LYOPHILIZED, FOR SOLUTION INTRAVENOUS EVERY 8 HOURS
Refills: 0 | Status: DISCONTINUED | OUTPATIENT
Start: 2019-08-26 | End: 2019-08-27

## 2019-08-26 RX ORDER — CHLORHEXIDINE GLUCONATE 213 G/1000ML
1 SOLUTION TOPICAL DAILY
Refills: 0 | Status: DISCONTINUED | OUTPATIENT
Start: 2019-08-26 | End: 2019-08-27

## 2019-08-26 RX ORDER — NYSTATIN 500MM UNIT
5 POWDER (EA) MISCELLANEOUS
Qty: 0 | Refills: 0 | DISCHARGE
Start: 2019-08-26

## 2019-08-26 RX ORDER — BUDESONIDE, MICRONIZED 100 %
0.5 POWDER (GRAM) MISCELLANEOUS
Qty: 0 | Refills: 0 | DISCHARGE
Start: 2019-08-26

## 2019-08-26 RX ADMIN — Medication 1 DROP(S): at 23:12

## 2019-08-26 RX ADMIN — Medication 1 DROP(S): at 05:45

## 2019-08-26 RX ADMIN — LATANOPROST 1 DROP(S): 0.05 SOLUTION/ DROPS OPHTHALMIC; TOPICAL at 23:11

## 2019-08-26 RX ADMIN — Medication 500000 UNIT(S): at 18:46

## 2019-08-26 RX ADMIN — CHLORHEXIDINE GLUCONATE 1 APPLICATION(S): 213 SOLUTION TOPICAL at 09:00

## 2019-08-26 RX ADMIN — Medication 1 DROP(S): at 18:46

## 2019-08-26 RX ADMIN — Medication 650 MILLIGRAM(S): at 12:25

## 2019-08-26 RX ADMIN — Medication 1 DROP(S): at 12:22

## 2019-08-26 RX ADMIN — Medication 1 DROP(S): at 05:46

## 2019-08-26 RX ADMIN — ENOXAPARIN SODIUM 40 MILLIGRAM(S): 100 INJECTION SUBCUTANEOUS at 12:23

## 2019-08-26 RX ADMIN — Medication 500000 UNIT(S): at 05:45

## 2019-08-26 RX ADMIN — Medication 1 APPLICATION(S): at 23:11

## 2019-08-26 RX ADMIN — PIPERACILLIN AND TAZOBACTAM 25 GRAM(S): 4; .5 INJECTION, POWDER, LYOPHILIZED, FOR SOLUTION INTRAVENOUS at 23:11

## 2019-08-26 NOTE — PROGRESS NOTE ADULT - SUBJECTIVE AND OBJECTIVE BOX
Parmjit Garcia MD  Beeper: [phone] ([facility])/ [phone] ([facility])     Subjective:    Patient is a 69y old  Female who presents with a chief complaint of Hypotension (25 Aug 2019 08:10)      Patient was seen and examined at bedside this AM. Denied fever, chills, nausea, vomiting, CP, palpitations, coughing, wheezing, SOB, and abdominal pain.    Overnight events:    MEDICATIONS  (STANDING):  artificial tears (preservative free) Ophthalmic Solution 1 Drop(s) Both EYES every 6 hours  buDESOnide    Inhalation Suspension 0.5 milliGRAM(s) Inhalation two times a day  chlorhexidine 2% Cloths 1 Application(s) Topical daily  enoxaparin Injectable 40 milliGRAM(s) SubCutaneous daily  insulin lispro (HumaLOG) corrective regimen sliding scale   SubCutaneous every 6 hours  latanoprost 0.005% Ophthalmic Solution 1 Drop(s) Both EYES at bedtime  nystatin    Suspension 510574 Unit(s) Oral two times a day  petrolatum Ophthalmic Ointment 1 Application(s) Both EYES at bedtime  prednisoLONE acetate 1% Suspension 1 Drop(s) Both EYES four times a day    MEDICATIONS  (PRN):  acetaminophen    Suspension .. 650 milliGRAM(s) Enteral Tube every 6 hours PRN Moderate Pain (4 - 6)      Objective:    Vitals: Vital Signs Last 24 Hrs  T(C): 37.2 (08-26-19 @ 02:00), Max: 37.2 (08-26-19 @ 02:00)  T(F): 98.9 (08-26-19 @ 02:00), Max: 98.9 (08-26-19 @ 02:00)  HR: 113 (08-26-19 @ 02:00) (94 - 113)  BP: 152/79 (08-26-19 @ 02:00) (113/68 - 152/79)  BP(mean): --  RR: 18 (08-26-19 @ 02:00) (18 - 18)  SpO2: 100% (08-26-19 @ 02:00) (96% - 100%)              I&O's Summary    25 Aug 2019 07:01  -  26 Aug 2019 07:00  --------------------------------------------------------  IN: 1250 mL / OUT: 700 mL / NET: 550 mL        PHYSICAL EXAM:  GENERAL: NAD, well-groomed, well-developed  HEAD:  Atraumatic, Normocephalic  EYES: EOMI, PERRLA, conjunctiva and sclera clear  ENMT: No tonsillar erythema, exudates, or enlargement; Moist mucous membranes, Good dentition, No lesions  NECK: Supple, No JVD, Normal thyroid  CHEST/LUNG: Clear to auscultation bilaterally; No rales, rhonchi, wheezing, or rubs  HEART: Regular rate and rhythm; No murmurs, rubs, or gallops  ABDOMEN: Soft, Nontender, Nondistended; Bowel sounds present  EXTREMITIES:  2+ Peripheral Pulses, No clubbing, cyanosis, or edema  LYMPH: No lymphadenopathy noted  SKIN: No rashes or lesions  NERVOUS SYSTEM:  Alert & Oriented X3, Good concentration; Motor Strength 5/5 B/L upper and lower extremities; DTRs 2+ intact and symmetric                                             LABS:  08-26    136  |  93<L>  |  13  ----------------------------<  131<H>  3.9   |  31  |  0.36<L>  08-25    136  |  95<L>  |  13  ----------------------------<  127<H>  4.0   |  32<H>  |  0.42<L>  08-24    131<L>  |  92<L>  |  13  ----------------------------<  119<H>  3.9   |  29  |  0.40<L>    Ca    9.3      26 Aug 2019 07:11  Ca    9.1      25 Aug 2019 06:36  Ca    8.3<L>      24 Aug 2019 07:09  Phos  2.6     08-26  Mg     1.8     08-26    TPro  5.6<L>  /  Alb  2.7<L>  /  TBili  0.4  /  DBili  x   /  AST  11  /  ALT  51<H>  /  AlkPhos  83  08-25  TPro  5.3<L>  /  Alb  2.5<L>  /  TBili  0.4  /  DBili  x   /  AST  14  /  ALT  62<H>  /  AlkPhos  88  08-24                                                    8.5    8.0   )-----------( 226      ( 26 Aug 2019 07:13 )             27.5                         8.3    9.0   )-----------( 255      ( 25 Aug 2019 06:39 )             26.9                         8.3    10.6  )-----------( 304      ( 24 Aug 2019 07:09 )             27.7     CAPILLARY BLOOD GLUCOSE      POCT Blood Glucose.: 137 mg/dL (26 Aug 2019 05:41)  POCT Blood Glucose.: 122 mg/dL (25 Aug 2019 23:41)  POCT Blood Glucose.: 133 mg/dL (25 Aug 2019 17:21)  POCT Blood Glucose.: 128 mg/dL (25 Aug 2019 12:58)        RECENT CULTURES:      TELEMETRY:    ECG:    TTE:    RADIOLOGY & ADDITIONAL TESTS:    Imaging Personally Reviewed:  [ ] YES  [ ] NO    Consultants involved in case:   Consultant(s) Notes Reviewed:  [ ] YES  [ ] NO:   Care Discussed with Consultants/Other Providers [ ] YES  [ ] NO Parmjit Garcia MD  Beeper: [phone] ([facility])/ [phone] ([facility])     Subjective:    Patient is a 69y old  Female who presents with a chief complaint of Hypotension (25 Aug 2019 08:10)    Patient was seen and examined at bedside this AM. Mental status at baseline: somnolent, but arousable and following instructions; not very conversant in the AM. ROS limited      MEDICATIONS  (STANDING):  artificial tears (preservative free) Ophthalmic Solution 1 Drop(s) Both EYES every 6 hours  buDESOnide    Inhalation Suspension 0.5 milliGRAM(s) Inhalation two times a day  chlorhexidine 2% Cloths 1 Application(s) Topical daily  enoxaparin Injectable 40 milliGRAM(s) SubCutaneous daily  insulin lispro (HumaLOG) corrective regimen sliding scale   SubCutaneous every 6 hours  latanoprost 0.005% Ophthalmic Solution 1 Drop(s) Both EYES at bedtime  nystatin    Suspension 853912 Unit(s) Oral two times a day  petrolatum Ophthalmic Ointment 1 Application(s) Both EYES at bedtime  prednisoLONE acetate 1% Suspension 1 Drop(s) Both EYES four times a day    MEDICATIONS  (PRN):  acetaminophen    Suspension .. 650 milliGRAM(s) Enteral Tube every 6 hours PRN Moderate Pain (4 - 6)      Objective:    Vitals: Vital Signs Last 24 Hrs  T(C): 37.2 (08-26-19 @ 02:00), Max: 37.2 (08-26-19 @ 02:00)  T(F): 98.9 (08-26-19 @ 02:00), Max: 98.9 (08-26-19 @ 02:00)  HR: 113 (08-26-19 @ 02:00) (94 - 113)  BP: 152/79 (08-26-19 @ 02:00) (113/68 - 152/79)  BP(mean): --  RR: 18 (08-26-19 @ 02:00) (18 - 18)  SpO2: 100% (08-26-19 @ 02:00) (96% - 100%)                I&O's Summary    25 Aug 2019 07:01  -  26 Aug 2019 07:00  --------------------------------------------------------  IN: 1250 mL / OUT: 700 mL / NET: 550 mL      PHYSICAL EXAM:  GENERAL: NAD, well-groomed, well-developed  HEAD:  Atraumatic, Normocephalic  EYES: EOMI, conjunctiva and sclera clear  CHEST/LUNG: Clear to auscultation bilaterally anteriorly; No rales, rhonchi, wheezing, or rubs  HEART: Tachycardic; No murmurs, rubs, or gallops  ABDOMEN: Soft, Nontender, Nondistended; Bowel sounds diminished  EXTREMITIES:  No LE edema  NERVOUS SYSTEM:  Somnolent, but arousable. Usually not stevie conversant in the AM, but improves in the PM.                                               LABS:  08-26    136  |  93<L>  |  13  ----------------------------<  131<H>  3.9   |  31  |  0.36<L>    Ca    9.3      26 Aug 2019 07:11  Phos  2.6     08-26  Mg     1.8     08-26                        8.5    8.0   )-----------( 226      ( 26 Aug 2019 07:13 )             27.5

## 2019-08-26 NOTE — PROGRESS NOTE ADULT - ASSESSMENT
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   Workup thus far found patient to be febrile to 100.4 on admission and tachycardic/hypotensive   Leukocytosis trending down   low grade temp but no other s/s infection   LP not s/o bacterial process-? post op vs secondary to CSF leak  MRI no abscess,pseudomeningocele  HIDA with cholecystitis  s/p perc kylee  Stable overall  Lethargy doubt infection related   Continue zosyn for 4 more days  to finish 2 week course from cholecystostomy.  Monitor temp and for s/s any other nosocomial process  Will tailor plan for ID issues  per course,results.Will defer to primary team on management of other issues.  Will Follow.  Beeper [phone]-hzkf/afterhours/No response-[phone]

## 2019-08-26 NOTE — PROGRESS NOTE ADULT - PROBLEM SELECTOR PLAN 2
Resolved: Due to acute cholecystitis   -Acute cholecystitis workup as below  -s/p LP negative for infection  - on Zosyn 3.375 q8hrs. Per ID, will continue abx for total of 14 days from the day she received her percutaneous cholecystostomy, which was 8/15. To continue abx until 8/29. On discharge, may switch to ertapenem  - will continue to f/u bile Cx. If organism identified with susceptibility report, will adjust abx regimen accordingly. All Cx have been NTD  - in anticipation for discharge to rehab facility, midline catheter placed Resolved: Due to acute cholecystitis   -Acute cholecystitis workup as below  -s/p LP negative for infection  - on Zosyn 3.375 q8hrs. Per ID, will continue abx for total of 14 days from the day she received her percutaneous cholecystostomy, which was 8/15. To continue abx until 8/29. On discharge, will switch to ertapenem  - will continue to f/u bile Cx. If organism identified with susceptibility report, will adjust abx regimen accordingly. All Cx have been NTD  - in anticipation for discharge to rehab facility, midline catheter placed

## 2019-08-26 NOTE — PROGRESS NOTE ADULT - PROBLEM SELECTOR PLAN 4
Sodium of 132 on 8/23 AM labs. In the setting of PEG tube feedings, may be due to hypovolemia  - Na remains low, was 131 this AM  - will start maintenance NS @ 100cc/hr  - will continue to monitor Sodium of 132 on 8/23 AM labs. In the setting of PEG tube feedings, may be due to hypovolemia. Resolved.  - Na was 136 this AM  - will continue to monitor

## 2019-08-26 NOTE — DISCHARGE NOTE NURSING/CASE MANAGEMENT/SOCIAL WORK - NSDCDPATPORTLINK_GEN_ALL_CORE
You can access the [facility], offered by [facility], by registering with the following website: [email or web address]

## 2019-08-26 NOTE — PROGRESS NOTE ADULT - PROBLEM SELECTOR PLAN 3
-Acute cholecystitis shown on CTA and HIDA s/p perc kylee placement by IR  -Surgery consulted: no acute surgical intervention  -Continuing w/ ABx as above   -Biliary culture NGTD  - Midline catheter placed, may switch to ertapenem if patient discharged to [facility]. -Acute cholecystitis shown on CTA and HIDA s/p perc kylee placement by IR  -Surgery consulted: no acute surgical intervention  -Continuing w/ ABx as above   -Biliary culture NGTD  - Midline catheter placed, will switch to ertapenem if patient discharged to [facility].

## 2019-08-26 NOTE — PROGRESS NOTE ADULT - ASSESSMENT
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 s/p a week of gradually worsening mental status along with fever x3 days found to be septic 2/2 acute cholecystitis, now s/p percutaneous cholecystostomy tube placement on 8/15.

## 2019-08-26 NOTE — PROGRESS NOTE ADULT - SUBJECTIVE AND OBJECTIVE BOX
Patient is a 69y old  Female who presents with a chief complaint of Hypotension (26 Aug 2019 08:59)    Being followed by ID for cholecystitis     Interval history:lethargic  responds to some stimuli  No acute events      ROS:denies pain but unreliable   No cough,SOB,CP  No N/V/D./abd pain  No other complaints      Antimicrobials:    nystatin    Suspension 782906 Unit(s) Oral two times a day  piperacillin/tazobactam IVPB.. 3.375 Gram(s) IV Intermittent every 8 hours    Other medications reviewed    Vital Signs Last 24 Hrs  T(C): 36.9 (08-26-19 @ 13:45), Max: 37.2 (08-26-19 @ 02:00)  T(F): 98.5 (08-26-19 @ 13:45), Max: 98.9 (08-26-19 @ 02:00)  HR: 108 (08-26-19 @ 10:53) (108 - 113)  BP: 138/102 (08-26-19 @ 10:53) (138/78 - 152/79)  BP(mean): --  RR: 96 (08-26-19 @ 10:53) (18 - 96)  SpO2: 100% (08-26-19 @ 02:00) (96% - 100%)    Physical Exam:        Cranial incisions CDI no tenderness   HEENT PERRLA EOMI,No pallor or icterus    No oral exudate or erythema    Neck supple no JVD or LN    Chest Good AE,CTA    CVS RRR S1 S2 WNl No murmur or rub or gallop    Abd soft BS normal No tendernessfeeding tube  RUQ cholecystostomy-mild tenderness at site   Obese   Ext No cyanosis clubbing or edema    left arm midline  site no erythema tenderness or discharge    Joints no swelling or LOM    CNS as above     Lab Data:                          8.5    8.0   )-----------( 226      ( 26 Aug 2019 07:13 )             27.5       08-26    136  |  93<L>  |  13  ----------------------------<  131<H>  3.9   |  31  |  0.36<L>    Ca    9.3      26 Aug 2019 07:11  Phos  2.6     08-26  Mg     1.8     08-26    TPro  5.6<L>  /  Alb  2.7<L>  /  TBili  0.4  /  DBili  x   /  AST  11  /  ALT  51<H>  /  AlkPhos  83  08-25

## 2019-08-27 VITALS
RESPIRATION RATE: 18 BRPM | OXYGEN SATURATION: 99 % | DIASTOLIC BLOOD PRESSURE: 60 MMHG | HEART RATE: 100 BPM | TEMPERATURE: 99 F | SYSTOLIC BLOOD PRESSURE: 113 MMHG

## 2019-08-27 LAB
ANION GAP SERPL CALC-SCNC: 7 MMOL/L — SIGNIFICANT CHANGE UP (ref 5–17)
BUN SERPL-MCNC: 17 MG/DL — SIGNIFICANT CHANGE UP (ref 7–23)
CALCIUM SERPL-MCNC: 9.8 MG/DL — SIGNIFICANT CHANGE UP (ref 8.4–10.5)
CHLORIDE SERPL-SCNC: 94 MMOL/L — LOW (ref 96–108)
CO2 SERPL-SCNC: 35 MMOL/L — HIGH (ref 22–31)
CREAT SERPL-MCNC: 0.46 MG/DL — LOW (ref 0.5–1.3)
GLUCOSE BLDC GLUCOMTR-MCNC: 131 MG/DL — HIGH (ref 70–99)
GLUCOSE BLDC GLUCOMTR-MCNC: 135 MG/DL — HIGH (ref 70–99)
GLUCOSE BLDC GLUCOMTR-MCNC: 138 MG/DL — HIGH (ref 70–99)
GLUCOSE SERPL-MCNC: 131 MG/DL — HIGH (ref 70–99)
HCT VFR BLD CALC: 26.9 % — LOW (ref 34.5–45)
HGB BLD-MCNC: 8.3 G/DL — LOW (ref 11.5–15.5)
MCHC RBC-ENTMCNC: 28.5 PG — SIGNIFICANT CHANGE UP (ref 27–34)
MCHC RBC-ENTMCNC: 30.9 GM/DL — LOW (ref 32–36)
MCV RBC AUTO: 92.2 FL — SIGNIFICANT CHANGE UP (ref 80–100)
PLATELET # BLD AUTO: 228 K/UL — SIGNIFICANT CHANGE UP (ref 150–400)
POTASSIUM SERPL-MCNC: 3.9 MMOL/L — SIGNIFICANT CHANGE UP (ref 3.5–5.3)
POTASSIUM SERPL-SCNC: 3.9 MMOL/L — SIGNIFICANT CHANGE UP (ref 3.5–5.3)
RBC # BLD: 2.92 M/UL — LOW (ref 3.8–5.2)
RBC # FLD: 16.2 % — HIGH (ref 10.3–14.5)
SODIUM SERPL-SCNC: 136 MMOL/L — SIGNIFICANT CHANGE UP (ref 135–145)
WBC # BLD: 9.6 K/UL — SIGNIFICANT CHANGE UP (ref 3.8–10.5)
WBC # FLD AUTO: 9.6 K/UL — SIGNIFICANT CHANGE UP (ref 3.8–10.5)

## 2019-08-27 PROCEDURE — C1887: CPT

## 2019-08-27 PROCEDURE — A9537: CPT

## 2019-08-27 PROCEDURE — 87581 M.PNEUMON DNA AMP PROBE: CPT

## 2019-08-27 PROCEDURE — 71045 X-RAY EXAM CHEST 1 VIEW: CPT

## 2019-08-27 PROCEDURE — 78226 HEPATOBILIARY SYSTEM IMAGING: CPT

## 2019-08-27 PROCEDURE — 81001 URINALYSIS AUTO W/SCOPE: CPT

## 2019-08-27 PROCEDURE — 87040 BLOOD CULTURE FOR BACTERIA: CPT

## 2019-08-27 PROCEDURE — 99285 EMERGENCY DEPT VISIT HI MDM: CPT | Mod: 25

## 2019-08-27 PROCEDURE — 84157 ASSAY OF PROTEIN OTHER: CPT

## 2019-08-27 PROCEDURE — 82962 GLUCOSE BLOOD TEST: CPT

## 2019-08-27 PROCEDURE — 85027 COMPLETE CBC AUTOMATED: CPT

## 2019-08-27 PROCEDURE — 82565 ASSAY OF CREATININE: CPT

## 2019-08-27 PROCEDURE — 83690 ASSAY OF LIPASE: CPT

## 2019-08-27 PROCEDURE — 87486 CHLMYD PNEUM DNA AMP PROBE: CPT

## 2019-08-27 PROCEDURE — 51702 INSERT TEMP BLADDER CATH: CPT | Mod: XU

## 2019-08-27 PROCEDURE — 83605 ASSAY OF LACTIC ACID: CPT

## 2019-08-27 PROCEDURE — 76705 ECHO EXAM OF ABDOMEN: CPT

## 2019-08-27 PROCEDURE — 97163 PT EVAL HIGH COMPLEX 45 MIN: CPT

## 2019-08-27 PROCEDURE — 74177 CT ABD & PELVIS W/CONTRAST: CPT

## 2019-08-27 PROCEDURE — 85730 THROMBOPLASTIN TIME PARTIAL: CPT

## 2019-08-27 PROCEDURE — C1769: CPT

## 2019-08-27 PROCEDURE — 96374 THER/PROPH/DIAG INJ IV PUSH: CPT | Mod: XU

## 2019-08-27 PROCEDURE — 86850 RBC ANTIBODY SCREEN: CPT

## 2019-08-27 PROCEDURE — 86900 BLOOD TYPING SEROLOGIC ABO: CPT

## 2019-08-27 PROCEDURE — 94660 CPAP INITIATION&MGMT: CPT

## 2019-08-27 PROCEDURE — 36556 INSERT NON-TUNNEL CV CATH: CPT

## 2019-08-27 PROCEDURE — 86901 BLOOD TYPING SEROLOGIC RH(D): CPT

## 2019-08-27 PROCEDURE — 87633 RESP VIRUS 12-25 TARGETS: CPT

## 2019-08-27 PROCEDURE — 80048 BASIC METABOLIC PNL TOTAL CA: CPT

## 2019-08-27 PROCEDURE — 47490 INCISION OF GALLBLADDER: CPT

## 2019-08-27 PROCEDURE — A9585: CPT

## 2019-08-27 PROCEDURE — 99239 HOSP IP/OBS DSCHRG MGMT >30: CPT

## 2019-08-27 PROCEDURE — 87070 CULTURE OTHR SPECIMN AEROBIC: CPT

## 2019-08-27 PROCEDURE — C1729: CPT

## 2019-08-27 PROCEDURE — 87075 CULTR BACTERIA EXCEPT BLOOD: CPT

## 2019-08-27 PROCEDURE — 99222 1ST HOSP IP/OBS MODERATE 55: CPT | Mod: GC

## 2019-08-27 PROCEDURE — 87449 NOS EACH ORGANISM AG IA: CPT

## 2019-08-27 PROCEDURE — 36569 INSJ PICC 5 YR+ W/O IMAGING: CPT

## 2019-08-27 PROCEDURE — 70553 MRI BRAIN STEM W/O & W/DYE: CPT

## 2019-08-27 PROCEDURE — 82947 ASSAY GLUCOSE BLOOD QUANT: CPT

## 2019-08-27 PROCEDURE — 83735 ASSAY OF MAGNESIUM: CPT

## 2019-08-27 PROCEDURE — 84100 ASSAY OF PHOSPHORUS: CPT

## 2019-08-27 PROCEDURE — 85610 PROTHROMBIN TIME: CPT

## 2019-08-27 PROCEDURE — 85652 RBC SED RATE AUTOMATED: CPT

## 2019-08-27 PROCEDURE — 82945 GLUCOSE OTHER FLUID: CPT

## 2019-08-27 PROCEDURE — 87086 URINE CULTURE/COLONY COUNT: CPT

## 2019-08-27 PROCEDURE — 84295 ASSAY OF SERUM SODIUM: CPT

## 2019-08-27 PROCEDURE — 97167 OT EVAL HIGH COMPLEX 60 MIN: CPT

## 2019-08-27 PROCEDURE — 94640 AIRWAY INHALATION TREATMENT: CPT

## 2019-08-27 PROCEDURE — 80202 ASSAY OF VANCOMYCIN: CPT

## 2019-08-27 PROCEDURE — 87150 DNA/RNA AMPLIFIED PROBE: CPT

## 2019-08-27 PROCEDURE — 82330 ASSAY OF CALCIUM: CPT

## 2019-08-27 PROCEDURE — 84132 ASSAY OF SERUM POTASSIUM: CPT

## 2019-08-27 PROCEDURE — 85014 HEMATOCRIT: CPT

## 2019-08-27 PROCEDURE — 62270 DX LMBR SPI PNXR: CPT

## 2019-08-27 PROCEDURE — 87205 SMEAR GRAM STAIN: CPT

## 2019-08-27 PROCEDURE — G0515: CPT

## 2019-08-27 PROCEDURE — 87483 CNS DNA AMP PROBE TYPE 12-25: CPT

## 2019-08-27 PROCEDURE — 93005 ELECTROCARDIOGRAM TRACING: CPT

## 2019-08-27 PROCEDURE — 97530 THERAPEUTIC ACTIVITIES: CPT

## 2019-08-27 PROCEDURE — 96375 TX/PRO/DX INJ NEW DRUG ADDON: CPT | Mod: XU

## 2019-08-27 PROCEDURE — 77003 FLUOROGUIDE FOR SPINE INJECT: CPT

## 2019-08-27 PROCEDURE — C1751: CPT

## 2019-08-27 PROCEDURE — 82150 ASSAY OF AMYLASE: CPT

## 2019-08-27 PROCEDURE — 89051 BODY FLUID CELL COUNT: CPT

## 2019-08-27 PROCEDURE — 82435 ASSAY OF BLOOD CHLORIDE: CPT

## 2019-08-27 PROCEDURE — 97112 NEUROMUSCULAR REEDUCATION: CPT

## 2019-08-27 PROCEDURE — 87798 DETECT AGENT NOS DNA AMP: CPT

## 2019-08-27 PROCEDURE — 87102 FUNGUS ISOLATION CULTURE: CPT

## 2019-08-27 PROCEDURE — 82803 BLOOD GASES ANY COMBINATION: CPT

## 2019-08-27 PROCEDURE — 80053 COMPREHEN METABOLIC PANEL: CPT

## 2019-08-27 PROCEDURE — 99232 SBSQ HOSP IP/OBS MODERATE 35: CPT

## 2019-08-27 PROCEDURE — 70450 CT HEAD/BRAIN W/O DYE: CPT

## 2019-08-27 PROCEDURE — 86140 C-REACTIVE PROTEIN: CPT

## 2019-08-27 RX ADMIN — Medication 1 DROP(S): at 11:47

## 2019-08-27 RX ADMIN — Medication 500000 UNIT(S): at 05:12

## 2019-08-27 RX ADMIN — Medication 500000 UNIT(S): at 17:13

## 2019-08-27 RX ADMIN — Medication 650 MILLIGRAM(S): at 04:12

## 2019-08-27 RX ADMIN — Medication 1 DROP(S): at 05:12

## 2019-08-27 RX ADMIN — ENOXAPARIN SODIUM 40 MILLIGRAM(S): 100 INJECTION SUBCUTANEOUS at 11:52

## 2019-08-27 RX ADMIN — Medication 1 DROP(S): at 11:48

## 2019-08-27 RX ADMIN — CHLORHEXIDINE GLUCONATE 1 APPLICATION(S): 213 SOLUTION TOPICAL at 11:59

## 2019-08-27 RX ADMIN — PIPERACILLIN AND TAZOBACTAM 25 GRAM(S): 4; .5 INJECTION, POWDER, LYOPHILIZED, FOR SOLUTION INTRAVENOUS at 05:13

## 2019-08-27 RX ADMIN — Medication 1 DROP(S): at 17:12

## 2019-08-27 RX ADMIN — Medication 1 DROP(S): at 17:13

## 2019-08-27 RX ADMIN — PIPERACILLIN AND TAZOBACTAM 25 GRAM(S): 4; .5 INJECTION, POWDER, LYOPHILIZED, FOR SOLUTION INTRAVENOUS at 14:03

## 2019-08-27 RX ADMIN — Medication 650 MILLIGRAM(S): at 04:45

## 2019-08-27 NOTE — PROGRESS NOTE ADULT - PROBLEM SELECTOR PLAN 7
-Pt requires full assistance with ADLs  -Care per nursing protocol

## 2019-08-27 NOTE — PROGRESS NOTE ADULT - PROVIDER SPECIALTY LIST ADULT
Colorectal Surgery
Infectious Disease
Internal Medicine
Intervent Radiology
MICU
MICU
Neurosurgery
Palliative Care
Radiology
Infectious Disease
Infectious Disease
Neurosurgery
Infectious Disease
Internal Medicine

## 2019-08-27 NOTE — CONSULT NOTE ADULT - ATTENDING COMMENTS
69y Female with brain neoplasm resection in June 2019, presented from subacute rehab now being treated for toxic metabolic encephalopathy, who is now with gait, ADLs, and functional deficits.   patient seen and examined with resident. agree with note and plan above.  per patient's family, patient was independent at home until her brain ca dx in May 2019, found on CT after she went to her PMD with headaches and poor balance.  Patient had surgery in June and has been in inpatient rehab since that time. Per family patient was able to transfer from wheelchair to toilet after acute rehab, however has had gradual decline since then.  Family agrees ( and daughter), that patient would not be able to tolerate acute rehab.  Recommend subacute rehab when medically stable.
Patient seen and examined  Above verified  Agree with above unless as noted below.  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   Workup thus far found patient to be febrile to 100.4 on admission and tachycardic/hypotensive   Leukocytosis to 29.9k, with normal CMP   ESR and CRP elevated  CT head: Increased size of midline suboccipital extracalvarial CSF density fluid collection, likely representing a CSF leak/meningocele  CT A/P: Cholelithiasis, with small amount of pericholecystic fluid and suspicion of gallbladder wall thickening, concerning for acute cholecystitis  US Abdomen: Cholelithiasis with findings suspicious for acute cholecystitis.   LP attempted but unable to obtain CSF thus was started on emperic tx with Vanc, Corey, Acyclovir       ? pna, ? cholecystitis ? CNS infection/post op infection   No fevers or s/s encephalitis, viral(HSV superinfection) would be unlikley    Rec:  1) LP-send CSF for cell count, glucose,protein, CSF PCR, bacterial,fungal and mycobacterial Cx  2) CNS imaging with MRI or CT with IVC  3) neurosurgery follow up ? surgical intervention indicated  4) Follow blood,urine,sputum Cx  5) Continue empiric vanco + meropnem.  Risk of renal toxicity will be very high with acyclovir and given above would recommend hold acyclovir(unless LP or CSF PCR s/o HSV)  Monitor vanco trough and creatinine  6) Abd imaging ? HIDA scan-surgical eval if positive    Will tailor plan for ID issues  per course,results.Will defer to primary team on management of other issues.  Case d/w MICU team,neurosurgery housestaff    prognosis guarded  Will Follow.  Beeper [phone]-sfia/afterhours/No response-[phone]

## 2019-08-27 NOTE — PROGRESS NOTE ADULT - PROBLEM SELECTOR PLAN 2
Resolved: Due to acute cholecystitis   -Acute cholecystitis workup as below  -s/p LP negative for infection  - on Zosyn 3.375 q8hrs. Per ID, will continue abx for total of 14 days from the day she received her percutaneous cholecystostomy, which was 8/15. To continue abx until 8/29. On discharge, will switch to ertapenem  - will continue to f/u bile Cx. If organism identified with susceptibility report, will adjust abx regimen accordingly. All Cx have been NTD  - in anticipation for discharge to rehab facility, midline catheter placed Resolved: Due to acute cholecystitis   -Acute cholecystitis workup as below  -s/p LP negative for infection  - on Zosyn 3.375 q8hrs. Per ID, antibiotics to be continued until 8/29. On discharge, will switch to ertapenem  - bile Cx negative  - in anticipation for discharge to rehab facility, midline catheter placed

## 2019-08-27 NOTE — PROGRESS NOTE ADULT - SUBJECTIVE AND OBJECTIVE BOX
Parmjit Garcia MD  Beeper: [phone] ([facility])/ [phone] ([facility])     Subjective:    Patient is a 69y old  Female who presents with a chief complaint of Hypotension (26 Aug 2019 14:14)      Patient was seen and examined at bedside this AM. Denied fever, chills, nausea, vomiting, CP, palpitations, coughing, wheezing, SOB, and abdominal pain.    Overnight events:    MEDICATIONS  (STANDING):  artificial tears (preservative free) Ophthalmic Solution 1 Drop(s) Both EYES every 6 hours  buDESOnide    Inhalation Suspension 0.5 milliGRAM(s) Inhalation two times a day  chlorhexidine 2% Cloths 1 Application(s) Topical daily  enoxaparin Injectable 40 milliGRAM(s) SubCutaneous daily  insulin lispro (HumaLOG) corrective regimen sliding scale   SubCutaneous every 6 hours  latanoprost 0.005% Ophthalmic Solution 1 Drop(s) Both EYES at bedtime  nystatin    Suspension 268961 Unit(s) Oral two times a day  petrolatum Ophthalmic Ointment 1 Application(s) Both EYES at bedtime  piperacillin/tazobactam IVPB.. 3.375 Gram(s) IV Intermittent every 8 hours  prednisoLONE acetate 1% Suspension 1 Drop(s) Both EYES four times a day    MEDICATIONS  (PRN):  acetaminophen    Suspension .. 650 milliGRAM(s) Enteral Tube every 6 hours PRN Moderate Pain (4 - 6)      Objective:    Vitals: Vital Signs Last 24 Hrs  T(C): 36.8 (08-27-19 @ 06:00), Max: 37.1 (08-26-19 @ 22:00)  T(F): 98.3 (08-27-19 @ 06:00), Max: 98.7 (08-26-19 @ 22:00)  HR: 110 (08-27-19 @ 06:00) (108 - 111)  BP: 108/69 (08-27-19 @ 06:00) (108/69 - 138/102)  BP(mean): --  RR: 18 (08-27-19 @ 06:00) (18 - 96)  SpO2: 93% (08-27-19 @ 06:00) (93% - 98%)              I&O's Summary    26 Aug 2019 07:01  -  27 Aug 2019 07:00  --------------------------------------------------------  IN: 810 mL / OUT: 450 mL / NET: 360 mL        PHYSICAL EXAM:  GENERAL: NAD, well-groomed, well-developed  HEAD:  Atraumatic, Normocephalic  EYES: EOMI, PERRLA, conjunctiva and sclera clear  ENMT: No tonsillar erythema, exudates, or enlargement; Moist mucous membranes, Good dentition, No lesions  NECK: Supple, No JVD, Normal thyroid  CHEST/LUNG: Clear to auscultation bilaterally; No rales, rhonchi, wheezing, or rubs  HEART: Regular rate and rhythm; No murmurs, rubs, or gallops  ABDOMEN: Soft, Nontender, Nondistended; Bowel sounds present  EXTREMITIES:  2+ Peripheral Pulses, No clubbing, cyanosis, or edema  LYMPH: No lymphadenopathy noted  SKIN: No rashes or lesions  NERVOUS SYSTEM:  Alert & Oriented X3, Good concentration; Motor Strength 5/5 B/L upper and lower extremities; DTRs 2+ intact and symmetric                                             LABS:  08-27    136  |  94<L>  |  17  ----------------------------<  131<H>  3.9   |  35<H>  |  0.46<L>  08-26    136  |  93<L>  |  13  ----------------------------<  131<H>  3.9   |  31  |  0.36<L>  08-25    136  |  95<L>  |  13  ----------------------------<  127<H>  4.0   |  32<H>  |  0.42<L>    Ca    9.8      27 Aug 2019 07:40  Ca    9.3      26 Aug 2019 07:11  Ca    9.1      25 Aug 2019 06:36  Phos  2.6     08-26  Mg     1.8     08-26    TPro  5.6<L>  /  Alb  2.7<L>  /  TBili  0.4  /  DBili  x   /  AST  11  /  ALT  51<H>  /  AlkPhos  83  08-25                                                    8.3    9.6   )-----------( 228      ( 27 Aug 2019 07:40 )             26.9                         8.5    8.0   )-----------( 226      ( 26 Aug 2019 07:13 )             27.5                         8.3    9.0   )-----------( 255      ( 25 Aug 2019 06:39 )             26.9     CAPILLARY BLOOD GLUCOSE      POCT Blood Glucose.: 138 mg/dL (27 Aug 2019 06:24)  POCT Blood Glucose.: 135 mg/dL (27 Aug 2019 00:18)  POCT Blood Glucose.: 129 mg/dL (26 Aug 2019 18:38)  POCT Blood Glucose.: 138 mg/dL (26 Aug 2019 12:31)        RECENT CULTURES:      TELEMETRY:    ECG:    TTE:    RADIOLOGY & ADDITIONAL TESTS:    Imaging Personally Reviewed:  [ ] YES  [ ] NO    Consultants involved in case:   Consultant(s) Notes Reviewed:  [ ] YES  [ ] NO:   Care Discussed with Consultants/Other Providers [ ] YES  [ ] NO Parmjit Garcia MD  Beeper: [phone] ([facility])/ [phone] ([facility])     Subjective:    Patient is a 69y old  Female who presents with a chief complaint of Hypotension (26 Aug 2019 14:14)    Patient was seen and examined at bedside this AM. Denied fever, chills, nausea, vomiting, CP, palpitations, coughing, wheezing, SOB, and abdominal pain.      MEDICATIONS  (STANDING):  artificial tears (preservative free) Ophthalmic Solution 1 Drop(s) Both EYES every 6 hours  buDESOnide    Inhalation Suspension 0.5 milliGRAM(s) Inhalation two times a day  chlorhexidine 2% Cloths 1 Application(s) Topical daily  enoxaparin Injectable 40 milliGRAM(s) SubCutaneous daily  insulin lispro (HumaLOG) corrective regimen sliding scale   SubCutaneous every 6 hours  latanoprost 0.005% Ophthalmic Solution 1 Drop(s) Both EYES at bedtime  nystatin    Suspension 630883 Unit(s) Oral two times a day  petrolatum Ophthalmic Ointment 1 Application(s) Both EYES at bedtime  piperacillin/tazobactam IVPB.. 3.375 Gram(s) IV Intermittent every 8 hours  prednisoLONE acetate 1% Suspension 1 Drop(s) Both EYES four times a day    MEDICATIONS  (PRN):  acetaminophen    Suspension .. 650 milliGRAM(s) Enteral Tube every 6 hours PRN Moderate Pain (4 - 6)      Objective:    Vitals: Vital Signs Last 24 Hrs  T(C): 36.8 (08-27-19 @ 06:00), Max: 37.1 (08-26-19 @ 22:00)  T(F): 98.3 (08-27-19 @ 06:00), Max: 98.7 (08-26-19 @ 22:00)  HR: 110 (08-27-19 @ 06:00) (108 - 111)  BP: 108/69 (08-27-19 @ 06:00) (108/69 - 138/102)  BP(mean): --  RR: 18 (08-27-19 @ 06:00) (18 - 96)  SpO2: 93% (08-27-19 @ 06:00) (93% - 98%)              I&O's Summary    26 Aug 2019 07:01  -  27 Aug 2019 07:00  --------------------------------------------------------  IN: 810 mL / OUT: 450 mL / NET: 360 mL        PHYSICAL EXAM:  GENERAL: NAD, well-groomed, well-developed  HEAD:  Atraumatic, Normocephalic  EYES: EOMI, conjunctiva and sclera clear  CHEST/LUNG: Clear to auscultation bilaterally anteriorly; No rales, rhonchi, wheezing, or rubs  HEART: Regular rate and rhythm; No murmurs, rubs, or gallops  ABDOMEN: Soft, Nontender, Nondistended; Bowel sounds present  EXTREMITIES:  No LE edema  NERVOUS SYSTEM:  Somnolent, but arousable. Same as previously                                              LABS:  08-27    136  |  94<L>  |  17  ----------------------------<  131<H>  3.9   |  35<H>  |  0.46<L>    Ca    9.8      27 Aug 2019 07:40  Ca    9.3      26 Aug 2019 07:11  Ca    9.1      25 Aug 2019 06:36  Phos  2.6     08-26  Mg     1.8     08-26    TPro  5.6<L>  /  Alb  2.7<L>  /  TBili  0.4  /  DBili  x   /  AST  11  /  ALT  51<H>  /  AlkPhos  83  08-25                                                    8.3    9.6   )-----------( 228      ( 27 Aug 2019 07:40 )             26.9                         8.5    8.0   )-----------( 226      ( 26 Aug 2019 07:13 )             27.5                         8.3    9.0   )-----------( 255      ( 25 Aug 2019 06:39 )             26.9     CAPILLARY BLOOD GLUCOSE      POCT Blood Glucose.: 138 mg/dL (27 Aug 2019 06:24)  POCT Blood Glucose.: 135 mg/dL (27 Aug 2019 00:18)  POCT Blood Glucose.: 129 mg/dL (26 Aug 2019 18:38)  POCT Blood Glucose.: 138 mg/dL (26 Aug 2019 12:31)        RECENT CULTURES:      TELEMETRY:    ECG:    TTE:    RADIOLOGY & ADDITIONAL TESTS:    Imaging Personally Reviewed:  [ ] YES  [ ] NO    Consultants involved in case:   Consultant(s) Notes Reviewed:  [ ] YES  [ ] NO:   Care Discussed with Consultants/Other Providers [ ] YES  [ ] NO Parmjit Garcia MD  Beeper: [phone] ([facility])/ [phone] ([facility])     Subjective:    Patient is a 69y old  Female who presents with a chief complaint of Hypotension (26 Aug 2019 14:14)    Patient was seen and examined at bedside this AM. No acute overnight events. Mental status appears unchanged from previously. ROS limited.      MEDICATIONS  (STANDING):  artificial tears (preservative free) Ophthalmic Solution 1 Drop(s) Both EYES every 6 hours  buDESOnide    Inhalation Suspension 0.5 milliGRAM(s) Inhalation two times a day  chlorhexidine 2% Cloths 1 Application(s) Topical daily  enoxaparin Injectable 40 milliGRAM(s) SubCutaneous daily  insulin lispro (HumaLOG) corrective regimen sliding scale   SubCutaneous every 6 hours  latanoprost 0.005% Ophthalmic Solution 1 Drop(s) Both EYES at bedtime  nystatin    Suspension 431110 Unit(s) Oral two times a day  petrolatum Ophthalmic Ointment 1 Application(s) Both EYES at bedtime  piperacillin/tazobactam IVPB.. 3.375 Gram(s) IV Intermittent every 8 hours  prednisoLONE acetate 1% Suspension 1 Drop(s) Both EYES four times a day    MEDICATIONS  (PRN):  acetaminophen    Suspension .. 650 milliGRAM(s) Enteral Tube every 6 hours PRN Moderate Pain (4 - 6)      Objective:    Vitals: Vital Signs Last 24 Hrs  T(C): 36.8 (08-27-19 @ 06:00), Max: 37.1 (08-26-19 @ 22:00)  T(F): 98.3 (08-27-19 @ 06:00), Max: 98.7 (08-26-19 @ 22:00)  HR: 110 (08-27-19 @ 06:00) (108 - 111)  BP: 108/69 (08-27-19 @ 06:00) (108/69 - 138/102)  BP(mean): --  RR: 18 (08-27-19 @ 06:00) (18 - 96)  SpO2: 93% (08-27-19 @ 06:00) (93% - 98%)                I&O's Summary    26 Aug 2019 07:01  -  27 Aug 2019 07:00  --------------------------------------------------------  IN: 810 mL / OUT: 450 mL / NET: 360 mL        PHYSICAL EXAM:  GENERAL: NAD, well-groomed, well-developed  HEAD:  Atraumatic, Normocephalic  EYES: EOMI, conjunctiva and sclera clear  CHEST/LUNG: Clear to auscultation bilaterally anteriorly; No rales, rhonchi, wheezing, or rubs  HEART: Regular rate and rhythm; No murmurs, rubs, or gallops  ABDOMEN: Soft, Nontender, Nondistended; Bowel sounds present  EXTREMITIES:  No LE edema  NERVOUS SYSTEM:  Somnolent, but arousable. Same as previously                                              LABS:  08-27    136  |  94<L>  |  17  ----------------------------<  131<H>  3.9   |  35<H>  |  0.46<L>      Ca    9.8      27 Aug 2019 07:40                          8.3    9.6   )-----------( 228      ( 27 Aug 2019 07:40 )             26.9

## 2019-08-27 NOTE — PROGRESS NOTE ADULT - ASSESSMENT
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

## 2019-08-27 NOTE — PROGRESS NOTE ADULT - PROBLEM SELECTOR PROBLEM 3
Acute cholecystitis
Functional quadriplegia
Generalized abdominal pain
Acute cholecystitis

## 2019-08-27 NOTE — PROGRESS NOTE ADULT - REASON FOR ADMISSION
Hypotension

## 2019-08-27 NOTE — PROGRESS NOTE ADULT - ATTENDING COMMENTS
Mica Encarnacion  Pager: [phone]. If no response or past 5 pm call [phone].
Patient seen and examined. Laboratory data and imaging reviewed. 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 Septic Shock - unclear etiology but with concern for bacterial meningitis given acute symptoms and recent intervention  - NSx evaluation noted - no contraindication to LP and they do not believe CSF leak/meningocele requires intervention  - Plan for LP  - continue broad spectrum antibiotics  - continue decadron 10mg Q6 hrs for presumed bacterial meningitis  - Continue vasopressors to maintain MAP > 65  - LFT within normal limit - but will check RUQ sono. No RUQ tenderness on exam  2. Chronic Hypercapnic Respiratory Failure  - patient well compensated  - continue NIPPV QHS for now  - will need outpatient PSG  3. Oropharyngeal dysphagia  - PEG in place  - nutrition evaluation    Patient critically ill with septic shock on vasopressors requiring frequent bedside visits and therapy change.
pt seen and examined.  s/p IR perc cholecystostomy tube placement  cont abx per med/ID  no acute surgical issues  supportive care per med team  will sign off / reconsult prn.
Patient seen and examined. Laboratory data and imaging reviewed. 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 Patient has had significant improvement in mental status over the last 24 hours - now more interactive and talkative.    1. Septic Shock - unclear etiology but with concern for bacterial meningitis given acute symptoms and recent intervention  - NSx evaluation noted - no contraindication to LP and they do not believe CSF leak/meningocele requires intervention  - Plan for LP today with longer needle  - continue broad spectrum antibiotics - ID evaluation. Will plan for repeat imaging of brain/neck to evaluate for possible abscess  - continue decadron 10mg Q6 hrs for presumed bacterial meningitis x 4 days  - Continue to maintain MAP > 65 - currently off vasopressors  - LFT within normal limit - but CT and US concerning for cholelithiasis with possible acute kylee. No RUQ tenderness on exam but will plan for eventual HIDA scan for further imaging  2. Chronic Hypercapnic Respiratory Failure  - patient well compensated  - continue NIPPV QHS for now  - will need outpatient PSG  3. Oropharyngeal dysphagia  - PEG in place  - nutrition evaluation
Hospitalist- Tristin Phelps MD  Pager: [phone]  If no response or off-hours, page [phone]  -------------------------------------  I personally performed the E/M service provided and was physically present during key portions of the service furnished by the resident/fellow. I agree with the history, physical and assessment/plan as stated above, with the following additional remarks:  - Pt's encephalopathy reportedly waxes and wanes, this week her mental status appears worse. She is minimally arousable, not cooperative for adequately responsive or following commands. Moves all extremities, however. Per , her L facial droop and L eye mild ptosis are chronic. Nonetheless, will recheck CT head and give some additional IVF hydration and continue monitoring.  - acute kylee: continue zosyn for now, will plan for midline placement.
Hospitalist- Tristin Phelps MD  Pager: [phone]  If no response or off-hours, page [phone]  -------------------------------------  I personally performed the E/M service provided and was physically present during key portions of the service furnished by the resident/fellow. I agree with the history, physical and assessment/plan as stated above.
Imaging negative for a brain abscess, sepsis likely for acute kylee s/p percutaneous kylee tube -->  d/c vanco/jenny and start zosyn.  pt needs PT eval
Hospitalist- Tristin Phelps MD  Pager: [phone]  If no response or off-hours, page [phone]  -------------------------------------  I personally performed the E/M service provided and was physically present during key portions of the service furnished by the resident/fellow. I agree with the history, physical and assessment/plan as stated above.
Sepsis - presumed to be from acute kylee given +HIDA scan. Surgical eval appreciated, no surgical intervention and will plan for IR percutaneous tube placement.  CSF not consistent with meningitis.    CSF density fluid collection --> will get an MRI brain for better evaluation. Presumed CSF leak vs meningocele.
Sepsis - presumed to be from acute kylee given +HIDA scan. c/w current antibiotics. s/p IR guided percutaneous cholecystomy tube placement.  CSF not consistent with bacterial meningitis, CSF pcr is negative. CSF cultures are pending.    CSF density fluid collection --> awaiting MRI brain for better evaluation. Presumed CSF leak vs meningocele.
Hospitalist- Tristin Phelps MD  Pager: [phone]  If no response or off-hours, page [phone]  -------------------------------------  I personally performed the E/M service provided and was physically present during key portions of the service furnished by the resident/fellow. I agree with the history, physical and assessment/plan as stated above, with the following additional remarks:    total discharge time: 43 minutes.
Hospitalist- Tristin Phelps MD  Pager: [phone]  If no response or off-hours, page [phone]  -------------------------------------  I personally performed the E/M service provided and was physically present during key portions of the service furnished by the resident/fellow. I agree with the history, physical and assessment/plan as stated above, with the following additional remarks:  - pt s/p midline placement today, continue abx as per ID for acute cholecystitis.  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 Mentation at a new baseline. Pt able to cooperate and work with PT today, will plan for dispo to rehab, likely tomorrow if patient remains stable.
Hospitalist- Tristin Phelps MD  Pager: [phone]  If no response or off-hours, page [phone]  -------------------------------------  I personally performed the E/M service provided and was physically present during key portions of the service furnished by the resident/fellow. I agree with the history, physical and assessment/plan as stated above.  - if patient remains stable through the weekend, likely discharge to rehab.
chronically ill female patient with multiple co-morbid conditions; high risk for future complications despite optimal medical therapy.  chronic aspiration risk.  PEG tube  aspiration precautions.  cholecystostomy tube.  antibiotic choice and duration as per Infectious Disease physician   discharge from hospital to [facility] when acute medical issues resolved.   d/w  at bedside.  poor long term prognosis.
awake, follows simple commands.  2 more day of abx as per ID.  extensive d/w daughter at bedside/over phone - acute rehab not possible given current condition.  agreed to [facility] at [facility] subsequently.  Overall prognosis is poor long term but currently no further inpatient needs.
Hospitalist- Tristin Phelps MD  Pager: [phone]  If no response or off-hours, page [phone]  -------------------------------------  I personally performed the E/M service provided and was physically present during key portions of the service furnished by the resident/fellow. I agree with the history, physical and assessment/plan as stated above.

## 2019-08-27 NOTE — PROGRESS NOTE ADULT - ASSESSMENT
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   LP not s/o bacterial process-? 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  Monitor temp and for s/s any other nosocomial process  Will tailor plan for ID issues  per course,results.Will defer to primary team on management of other issues.  case d/w Med team   Will Follow.  Beeper [phone]-jhpq/afterhours/No response-[phone]

## 2019-08-27 NOTE — PROGRESS NOTE ADULT - PROBLEM SELECTOR PROBLEM 1
Toxic metabolic encephalopathy

## 2019-08-27 NOTE — PROGRESS NOTE ADULT - PROBLEM SELECTOR PLAN 4
Sodium of 132 on 8/23 AM labs. In the setting of PEG tube feedings, may be due to hypovolemia. Resolved.  - Na was 136 this AM  - will continue to monitor

## 2019-08-27 NOTE — PROGRESS NOTE ADULT - PROBLEM SELECTOR PLAN 6
-DVT ppx: [facility]   -PT/OT  -GOC discussion with palliative; daughters will be health care proxies and patient is FULL CODE -DVT ppx: [facility]   -PT/OT  -GOC discussion with palliative; daughters will be health care proxies and patient is FULL CODE  -DISPO: PMR consulted, but patient will likely require discharge to [facility]

## 2019-08-27 NOTE — PROGRESS NOTE ADULT - PROBLEM SELECTOR PROBLEM 2
Septic shock
Septic shock
Acute cholecystitis
Septic shock

## 2019-08-27 NOTE — PROGRESS NOTE ADULT - SUBJECTIVE AND OBJECTIVE BOX
Patient is a 69y old  Female who presents with a chief complaint of Hypotension (27 Aug 2019 08:56)    Being followed by ID for cholecystitis     Interval history:minimal response to stimuli  family at bedside  No acute events      ROS:  Not obtainable     Antimicrobials:    nystatin    Suspension 341376 Unit(s) Oral two times a day  piperacillin/tazobactam IVPB.. 3.375 Gram(s) IV Intermittent every 8 hours    Other medications reviewed    Vital Signs Last 24 Hrs  T(C): 36.8 (08-27-19 @ 06:00), Max: 37.1 (08-26-19 @ 22:00)  T(F): 98.3 (08-27-19 @ 06:00), Max: 98.7 (08-26-19 @ 22:00)  HR: 110 (08-27-19 @ 06:00) (110 - 111)  BP: 108/69 (08-27-19 @ 06:00) (108/69 - 127/67)  BP(mean): --  RR: 18 (08-27-19 @ 06:00) (18 - 18)  SpO2: 93% (08-27-19 @ 06:00) (93% - 98%)    Physical Exam:        Cranial incisions CDI no tenderness   HEENT PERRLA EOMI,No pallor or icterus    No oral exudate or erythema    Neck supple no JVD or LN    Chest Good AE,CTA    CVS RRR S1 S2 WNl No murmur or rub or gallop    Abd soft BS normal No tendernessfeeding tube  RUQ cholecystostomy-mild tenderness at site   Obese   Ext No cyanosis clubbing or edema    left arm midline  site no erythema tenderness or discharge    Joints no swelling or LOM    CNS as above   Lab Data:                          8.3    9.6   )-----------( 228      ( 27 Aug 2019 07:40 )             26.9       08-27    136  |  94<L>  |  17  ----------------------------<  131<H>  3.9   |  35<H>  |  0.46<L>    Ca    9.8      27 Aug 2019 07:40  Phos  2.6     08-26  Mg     1.8     08-26

## 2019-08-27 NOTE — PROGRESS NOTE ADULT - PROBLEM SELECTOR PLAN 3
-Acute cholecystitis shown on CTA and HIDA s/p perc kylee placement by IR  -Surgery consulted: no acute surgical intervention  -Continuing w/ ABx as above   -Biliary culture NGTD  - Midline catheter placed, will switch to ertapenem if patient discharged to [facility]. -Acute cholecystitis shown on CTA and HIDA s/p perc kylee placement by IR  -Surgery consulted: no acute surgical intervention  -Continuing w/ ABx as above   -Biliary culture negative  - Midline catheter placed, will switch to ertapenem if patient discharged to [facility].

## 2019-08-27 NOTE — CONSULT NOTE ADULT - SUBJECTIVE AND OBJECTIVE BOX
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  Per EMS, nurse at [facility] dx'd pt w/ pneumonia and started pt on IV zosyn (pt arrives with IV in left hand). In the ED was marked, with the pt becoming acutely obtunded and nonverbal. Pt has been moaning all morning without making any discernibly intelligent words. Family denies hx of F/C/N/V, CP, diarrhea. States she appeared short of breath earlier in the AM and earlier in the ED, now resolved.     Patient was admitted 08/11/19 for AMS and  found to be septic 2/2 acute cholecystitis, now s/p percutaneous cholecystostomy tube placement on 8/15. Patient treated for metabolic encephalopathy. Per ID, will continue abx for total of 14 days from the day she received her percutaneous cholecystostomy, which was 8/15. To continue abx until 8/29. On discharge, will switch to ertapenem. Patient currently on PEG tube for feeds.          REVIEW OF SYSTEMS  [X] Constitutional WNL       [X] HEENT WNL  [X] Cardio WNL              [X] Resp WNL            [X] GI WNL                            [X]  WNL                               [X] MSK WNL            [X] Neuro WNL   [X] Pain WNL  [X] Cognitive WNL   [X] Psych WNL  [X] Skin WNL      [X] Heme WNL              [X] Endo WNL    PAST MEDICAL & SURGICAL HISTORY  Intracranial mass  Light-headedness  Glaucoma  Asthma  HTN (hypertension)  History of ankle fracture  Fractured elbow  H/O appendicitis      SOCIAL HISTORY  Smoking - Denied  EtOH - Denied   Drugs - Denied    FUNCTIONAL HISTORY   __Patient admitted from [facility], where she has been briefly for DEMARCUS. Patient's spouse requests that patient does not return to [facility] if she requires DEMARCUS placement at time of discharge. Prior to DEMARCUS, patient was at [facility] for Acute rehab for about 3 weeks. Per spouse, patient was able to take a few steps with assist prior to this admission. Prior to recent admissions, patient was living independently with her spouse, daughter/Salina ([phone]), and grandchildren in a private home in [place]. 6 steps to enter home.    Independent prior with Ambulation and ADLs.     CURRENT FUNCTIONAL STATUS 08/25  - Bed Mobility: max assistance  - Transfers: max assistance   - Gait: unable to assess   - ADLs:     ALLERGIES  No Known Drug Allergies  shellfish (Angioedema; Rash)      FAMILY HISTORY       MEDICATIONS   acetaminophen    Suspension .. 650 milliGRAM(s) Enteral Tube every 6 hours PRN  artificial tears (preservative free) Ophthalmic Solution 1 Drop(s) Both EYES every 6 hours  buDESOnide    Inhalation Suspension 0.5 milliGRAM(s) Inhalation two times a day  chlorhexidine 2% Cloths 1 Application(s) Topical daily  enoxaparin Injectable 40 milliGRAM(s) SubCutaneous daily  insulin lispro (HumaLOG) corrective regimen sliding scale   SubCutaneous every 6 hours  latanoprost 0.005% Ophthalmic Solution 1 Drop(s) Both EYES at bedtime  nystatin    Suspension 630328 Unit(s) Oral two times a day  petrolatum Ophthalmic Ointment 1 Application(s) Both EYES at bedtime  piperacillin/tazobactam IVPB.. 3.375 Gram(s) IV Intermittent every 8 hours  prednisoLONE acetate 1% Suspension 1 Drop(s) Both EYES four times a day      VITALS  T(C): 36.8 (08-27-19 @ 06:00), Max: 37.1 (08-26-19 @ 22:00)  HR: 110 (08-27-19 @ 06:00) (110 - 111)  BP: 108/69 (08-27-19 @ 06:00) (108/69 - 127/67)  RR: 18 (08-27-19 @ 06:00) (18 - 18)  SpO2: 93% (08-27-19 @ 06:00) (93% - 98%)  Wt(kg): --    RECENT LABS/IMAGING  CBC Full  -  ( 27 Aug 2019 07:40 )  WBC Count : 9.6 K/uL  RBC Count : 2.92 M/uL  Hemoglobin : 8.3 g/dL  Hematocrit : 26.9 %  Platelet Count - Automated : 228 K/uL  Mean Cell Volume : 92.2 fl  Mean Cell Hemoglobin : 28.5 pg  Mean Cell Hemoglobin Concentration : 30.9 gm/dL  Auto Neutrophil # : x  Auto Lymphocyte # : x  Auto Monocyte # : x  Auto Eosinophil # : x  Auto Basophil # : x  Auto Neutrophil % : x  Auto Lymphocyte % : x  Auto Monocyte % : x  Auto Eosinophil % : x  Auto Basophil % : x    08-27    136  |  94<L>  |  17  ----------------------------<  131<H>  3.9   |  35<H>  |  0.46<L>    Ca    9.8      27 Aug 2019 07:40  Phos  2.6     08-26  Mg     1.8     08-26          ---------------------------------------------------------------------------------------- HPI:  Pt is a 69F hx obesity, htn, asthma, 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  Per EMS, nurse at [facility] dx'd pt w/ pneumonia and started pt on IV zosyn (pt arrives with IV in left hand). In the ED was marked, with the pt becoming acutely obtunded and nonverbal. Pt has been moaning all morning without making any discernibly intelligent words. Family denies hx of F/C/N/V, CP, diarrhea. States she appeared short of breath earlier in the AM and earlier in the ED, now resolved.     Patient was admitted 08/11/19 for AMS and  found to be septic 2/2 acute cholecystitis, now s/p percutaneous cholecystostomy tube placement on 8/15. Patient treated for metabolic encephalopathy. Per ID, will continue abx for total of 14 days from the day she received her percutaneous cholecystostomy, which was 8/15. To continue abx until 8/29. On discharge, will switch to ertapenem. Patient currently on PEG tube for feeds. On evaluation, patient non-verbal, open eyes on command.          REVIEW OF SYSTEMS  PEG tube in place, lethargic   Unable to further communicate complains     PAST MEDICAL & SURGICAL HISTORY  Intracranial mass  Light-headedness  Glaucoma  Asthma  HTN (hypertension)  History of ankle fracture  Fractured elbow  H/O appendicitis      SOCIAL HISTORY  Smoking - Denied  EtOH - Denied   Drugs - Denied    FUNCTIONAL HISTORY   __Patient admitted from [facility], where she has been briefly for DEMARCUS. Patient's spouse requests that patient does not return to [facility] if she requires DEMARCUS placement at time of discharge. Prior to DEMARCUS, patient was at [facility] for Acute rehab for about 3 weeks. Per spouse, patient was able to take a few steps with assist prior to this admission. Prior to recent admissions, patient was living independently with her spouse, daughter/Salina ([phone]), and grandchildren in a private home in [place]. 6 steps to enter home.    Independent prior with Ambulation and ADLs.     CURRENT FUNCTIONAL STATUS 08/25  - Bed Mobility: max assistance  - Transfers: max assistance   - Gait: unable to assess   - ADLs:     ALLERGIES  No Known Drug Allergies  shellfish (Angioedema; Rash)      FAMILY HISTORY       MEDICATIONS   acetaminophen    Suspension .. 650 milliGRAM(s) Enteral Tube every 6 hours PRN  artificial tears (preservative free) Ophthalmic Solution 1 Drop(s) Both EYES every 6 hours  buDESOnide    Inhalation Suspension 0.5 milliGRAM(s) Inhalation two times a day  chlorhexidine 2% Cloths 1 Application(s) Topical daily  enoxaparin Injectable 40 milliGRAM(s) SubCutaneous daily  insulin lispro (HumaLOG) corrective regimen sliding scale   SubCutaneous every 6 hours  latanoprost 0.005% Ophthalmic Solution 1 Drop(s) Both EYES at bedtime  nystatin    Suspension 498842 Unit(s) Oral two times a day  petrolatum Ophthalmic Ointment 1 Application(s) Both EYES at bedtime  piperacillin/tazobactam IVPB.. 3.375 Gram(s) IV Intermittent every 8 hours  prednisoLONE acetate 1% Suspension 1 Drop(s) Both EYES four times a day      VITALS  T(C): 36.8 (08-27-19 @ 06:00), Max: 37.1 (08-26-19 @ 22:00)  HR: 110 (08-27-19 @ 06:00) (110 - 111)  BP: 108/69 (08-27-19 @ 06:00) (108/69 - 127/67)  RR: 18 (08-27-19 @ 06:00) (18 - 18)  SpO2: 93% (08-27-19 @ 06:00) (93% - 98%)  Wt(kg): --    RECENT LABS/IMAGING  CBC Full  -  ( 27 Aug 2019 07:40 )  WBC Count : 9.6 K/uL  RBC Count : 2.92 M/uL  Hemoglobin : 8.3 g/dL  Hematocrit : 26.9 %  Platelet Count - Automated : 228 K/uL  Mean Cell Volume : 92.2 fl  Mean Cell Hemoglobin : 28.5 pg  Mean Cell Hemoglobin Concentration : 30.9 gm/dL  Auto Neutrophil # : x  Auto Lymphocyte # : x  Auto Monocyte # : x  Auto Eosinophil # : x  Auto Basophil # : x  Auto Neutrophil % : x  Auto Lymphocyte % : x  Auto Monocyte % : x  Auto Eosinophil % : x  Auto Basophil % : x    08-27    136  |  94<L>  |  17  ----------------------------<  131<H>  3.9   |  35<H>  |  0.46<L>    Ca    9.8      27 Aug 2019 07:40  Phos  2.6     08-26  Mg     1.8     08-26          ----------------------------------------------------------------------------------------    < from: CT Head No Cont (08.20.19 @ 16:34) >  IMPRESSION: Large pseudomeningocele unchanged since 8/11/2019.   Suboccipital craniotomy. Mildly enlarged ventricles. Small vessel white   matter ischemic changes No evidence of acute intracranial pathology. No   significant change since prior examination.    < end of copied text >    < from: MR Head w/wo IV Cont (08.16.19 @ 19:40) >  IMPRESSION:     The suboccipital extracranial fluid collection has increased in size   compared to previous MRI most consistent with pseudomeningocele. There is   no evidence for underlying abscess.    Evolving postoperative changes are again noted status post resection of   fourth ventricular mass, without evidence for residual or recurrent tumor.    Slight interval increase in size of the lateral ventricles compared to   the prior MRI study. Serial imaging follow-up of the ventricular size   over time is recommended.    < end of copied text >      PHYSICAL EXAM  Constitutional - NAD, Comfortable  Cardio:  no edema  Pulm: no respiratory distress  GI: Non-distended, +PEG  Neuro Exam:                    Cognitive - Non Verbal      Motor - No focal deficits, 2/5 b/l upper extremities, able to move toes      Sensory - Unable to answer   Extremities - No calf tenderness      ASSESSMENT/PLAN  69y Female with toxic metabolic encephalopathy who is now with gait, ADLs, and functional deficits.       Recommend - DEMARCUS  - Disposition: Subacute Rehabilitation, patient DOES NOT meet acute inpatient rehabilitation criteria. Additionally the patient will not be able to tolerate intensive therapy.       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  Per EMS, nurse at [facility] diagnosed pt w/ pneumonia and started pt on IV zosyn.  In the ED pt become acutely obtunded and nonverbal.  Patient was found to be septic 2/2 acute cholecystitis, now s/p percutaneous cholecystostomy tube placement on 8/15. Patient treated for metabolic encephalopathy. Patient is on antibiotics per ID.  Patient currently on PEG tube for feeds. On evaluation, patient non-verbal, open eyes on command.  Patients  and daughter at bedside.           REVIEW OF SYSTEMS  PEG tube in place, lethargic   Unable to participate in ROS    PAST MEDICAL & SURGICAL HISTORY  Intracranial mass  Light-headedness  Glaucoma  Asthma  HTN (hypertension)  History of ankle fracture  Fractured elbow  H/O appendicitis      SOCIAL HISTORY  (per family)  Smoking - Denied  EtOH - Denied   Drugs - Denied    FUNCTIONAL HISTORY  Patient admitted from [facility], where she has been briefly for DEMARCUS. Prior to DEMARCUS, patient was at [facility] for Acute rehab for about 3 weeks. Per spouse, patient was able to take a few steps with assist prior to this admission. Prior to recent admissions, patient was living independently with her spouse, daughter/Salina ([phone]), and grandchildren in a private home in [place]. 6 steps to enter home.    Independent prior with Ambulation and ADLs.     CURRENT FUNCTIONAL STATUS 08/25  - Bed Mobility: max assistance  - Transfers: max assistance   - Gait: unable to assess       ALLERGIES  No Known Drug Allergies  shellfish (Angioedema; Rash)      FAMILY HISTORY       MEDICATIONS   acetaminophen    Suspension .. 650 milliGRAM(s) Enteral Tube every 6 hours PRN  artificial tears (preservative free) Ophthalmic Solution 1 Drop(s) Both EYES every 6 hours  buDESOnide    Inhalation Suspension 0.5 milliGRAM(s) Inhalation two times a day  chlorhexidine 2% Cloths 1 Application(s) Topical daily  enoxaparin Injectable 40 milliGRAM(s) SubCutaneous daily  insulin lispro (HumaLOG) corrective regimen sliding scale   SubCutaneous every 6 hours  latanoprost 0.005% Ophthalmic Solution 1 Drop(s) Both EYES at bedtime  nystatin    Suspension 555169 Unit(s) Oral two times a day  petrolatum Ophthalmic Ointment 1 Application(s) Both EYES at bedtime  piperacillin/tazobactam IVPB.. 3.375 Gram(s) IV Intermittent every 8 hours  prednisoLONE acetate 1% Suspension 1 Drop(s) Both EYES four times a day      VITALS  T(C): 36.8 (08-27-19 @ 06:00), Max: 37.1 (08-26-19 @ 22:00)  HR: 110 (08-27-19 @ 06:00) (110 - 111)  BP: 108/69 (08-27-19 @ 06:00) (108/69 - 127/67)  RR: 18 (08-27-19 @ 06:00) (18 - 18)  SpO2: 93% (08-27-19 @ 06:00) (93% - 98%)  Wt(kg): --    RECENT LABS/IMAGING  CBC Full  -  ( 27 Aug 2019 07:40 )  WBC Count : 9.6 K/uL  RBC Count : 2.92 M/uL  Hemoglobin : 8.3 g/dL  Hematocrit : 26.9 %  Platelet Count - Automated : 228 K/uL  Mean Cell Volume : 92.2 fl  Mean Cell Hemoglobin : 28.5 pg  Mean Cell Hemoglobin Concentration : 30.9 gm/dL  Auto Neutrophil # : x  Auto Lymphocyte # : x  Auto Monocyte # : x  Auto Eosinophil # : x  Auto Basophil # : x  Auto Neutrophil % : x  Auto Lymphocyte % : x  Auto Monocyte % : x  Auto Eosinophil % : x  Auto Basophil % : x    08-27    136  |  94<L>  |  17  ----------------------------<  131<H>  3.9   |  35<H>  |  0.46<L>    Ca    9.8      27 Aug 2019 07:40  Phos  2.6     08-26  Mg     1.8     08-26          ----------------------------------------------------------------------------------------    < from: CT Head No Cont (08.20.19 @ 16:34) >  IMPRESSION: Large pseudomeningocele unchanged since 8/11/2019.   Suboccipital craniotomy. Mildly enlarged ventricles. Small vessel white   matter ischemic changes No evidence of acute intracranial pathology. No   significant change since prior examination.    < end of copied text >    < from: MR Head w/wo IV Cont (08.16.19 @ 19:40) >  IMPRESSION:     The suboccipital extracranial fluid collection has increased in size   compared to previous MRI most consistent with pseudomeningocele. There is   no evidence for underlying abscess.    Evolving postoperative changes are again noted status post resection of   fourth ventricular mass, without evidence for residual or recurrent tumor.    Slight interval increase in size of the lateral ventricles compared to   the prior MRI study. Serial imaging follow-up of the ventricular size   over time is recommended.    < end of copied text >      PHYSICAL EXAM  Constitutional - NAD, Comfortable  Cardio:  no edema  Pulm: no respiratory distress  GI: Non-distended, +PEG  Neuro Exam:                    Cognitive - Non Verbal, unable to assess orientation however patient follows commands to move hands and feet      Motor -  2/5 b/l upper extremities, able to move toes      Sensory - Unable to assess due to lack of verbal response   Extremities - No calf tenderness      ASSESSMENT/PLAN  69y Female with brain neoplasm resection in June 2019, presented from subacute rehab now being treated for toxic metabolic encephalopathy, who is now with gait, ADLs, and functional deficits.       Recommend - DEMARCUS  - Disposition: Subacute Rehabilitation, as discussed with family who agrees, the patient will not be able to tolerate intensive therapy 3 hours a day.   - c/w PT for Bed Mobility, Transfers, Ambulation with AD   - c/w OT for ADLs  - Turn Q2hrs while in bed to prevent Pressure Injury and OOB to chair when possible

## 2019-09-06 NOTE — PATIENT PROFILE ADULT - SAFE PLACE TO LIVE
[facility]  [address] [place] [id number] [phone]          September 6, 2019    RE:  Joseph Kay                                                                                                                                                       [address] [place] [address]            To whom it may concern:    Starmario Rahul is under my professional care and was seen by me today 9/6/19.  Please excuse Joseph from work today.      Sincerely,          Nichole Mak, KEERTHI, APRN, CNP    
no

## 2019-09-13 ENCOUNTER — APPOINTMENT (OUTPATIENT)
Dept: OPHTHALMOLOGY | Facility: CLINIC | Age: 69
End: 2019-09-13

## 2019-09-13 ENCOUNTER — APPOINTMENT (OUTPATIENT)
Dept: OTOLARYNGOLOGY | Facility: CLINIC | Age: 69
End: 2019-09-13
Payer: MEDICARE

## 2019-09-13 VITALS — WEIGHT: 270 LBS | HEIGHT: 62 IN | BODY MASS INDEX: 49.69 KG/M2

## 2019-09-13 DIAGNOSIS — R22.0 LOCALIZED SWELLING, MASS AND LUMP, HEAD: ICD-10-CM

## 2019-09-13 PROCEDURE — 31575 DIAGNOSTIC LARYNGOSCOPY: CPT

## 2019-09-13 PROCEDURE — 99213 OFFICE O/P EST LOW 20 MIN: CPT | Mod: 25

## 2019-09-13 NOTE — CONSULT LETTER
[Dear  ___] : Dear  [unfilled], [Courtesy Letter:] : I had the pleasure of seeing your patient, [unfilled], in my office today. [Consult Closing:] : Thank you very much for allowing me to participate in the care of this patient.  If you have any questions, please do not hesitate to contact me. [Please see my note below.] : Please see my note below. [Sincerely,] : Sincerely, [FreeTextEntry3] : Miley Thakur MD\par Otolaryngology and Cranial Base Surgery\par Attending Physician - Department of Otolaryngology and Head & Neck Surgery\par [facility]\par  - [employer] [facility]\par Office: [phone]\par Fax: [phone]\par

## 2019-09-13 NOTE — PHYSICAL EXAM
[FreeTextEntry1] : Nonresponsive in wheelchair.  [Normal] : inferior turbinates and middle turbinates are normal

## 2019-09-13 NOTE — PROCEDURE
[de-identified] : Afrin 0.05% and lidocaine 4% sprayed into both nasal passages. Flexible scope #2 used. Passed through nasal passage and nasopharynx clear. Oropharynx with left base of tongue asymmetric fullness that touches epiglottis. Mucosa is normal in appearance. Hypopharynx clear. Supraglottis normal. Glottis with fully mobile vocal cords without lesions or masses. Visualized subglottis clear. Postcricoid area without erythema or edema. No pooling of secretions.

## 2019-09-13 NOTE — ASSESSMENT
[FreeTextEntry1] : base of tongue asymmetry:\par - will repeat CT neck with contrast to compare to previous CT and r/o mass\par - will call daughter with CT results and plan

## 2019-09-14 LAB
CULTURE RESULTS: SIGNIFICANT CHANGE UP
SPECIMEN SOURCE: SIGNIFICANT CHANGE UP

## 2019-09-16 ENCOUNTER — APPOINTMENT (OUTPATIENT)
Dept: NEUROSURGERY | Facility: CLINIC | Age: 69
End: 2019-09-16
Payer: MEDICARE

## 2019-09-16 VITALS
DIASTOLIC BLOOD PRESSURE: 85 MMHG | HEART RATE: 108 BPM | OXYGEN SATURATION: 96 % | WEIGHT: 270 LBS | RESPIRATION RATE: 16 BRPM | BODY MASS INDEX: 49.69 KG/M2 | HEIGHT: 62 IN | SYSTOLIC BLOOD PRESSURE: 145 MMHG | TEMPERATURE: 98.1 F

## 2019-09-16 DIAGNOSIS — K81.9 CHOLECYSTITIS, UNSPECIFIED: ICD-10-CM

## 2019-09-16 DIAGNOSIS — R90.0 INTRACRANIAL SPACE-OCCUPYING LESION FOUND ON DIAGNOSTIC IMAGING OF CENTRAL NERVOUS SYSTEM: ICD-10-CM

## 2019-09-16 PROCEDURE — 99024 POSTOP FOLLOW-UP VISIT: CPT

## 2019-09-16 RX ORDER — ACETAMINOPHEN 500 MG/1
TABLET ORAL
Refills: 0 | Status: ACTIVE | COMMUNITY

## 2019-09-16 RX ORDER — FLUORIDE TOOTHPASTE
TOOTHPASTE DENTAL
Refills: 0 | Status: ACTIVE | COMMUNITY

## 2019-09-16 RX ORDER — ENOXAPARIN SODIUM 100 MG/ML
40 INJECTION SUBCUTANEOUS
Refills: 0 | Status: ACTIVE | COMMUNITY

## 2019-09-16 RX ORDER — PREDNISOLONE ACETATE 10 MG/ML
1 SUSPENSION/ DROPS OPHTHALMIC
Qty: 5 | Refills: 0 | Status: ACTIVE | COMMUNITY
Start: 2019-06-13

## 2019-09-16 RX ORDER — NYSTATIN 100000 1/G
100000 POWDER TOPICAL
Refills: 0 | Status: ACTIVE | COMMUNITY

## 2019-09-16 RX ORDER — ALBUTEROL SULFATE 2.5 MG/3ML
(2.5 MG/3ML) SOLUTION RESPIRATORY (INHALATION)
Refills: 0 | Status: ACTIVE | COMMUNITY

## 2019-09-16 RX ORDER — VANCOMYCIN 1 G/200ML
1000 INJECTION, SOLUTION INTRAVENOUS
Refills: 0 | Status: ACTIVE | COMMUNITY

## 2019-09-16 RX ORDER — LOSARTAN POTASSIUM 50 MG/1
50 TABLET, FILM COATED ORAL DAILY
Qty: 90 | Refills: 0 | Status: DISCONTINUED | COMMUNITY
Start: 2019-03-26 | End: 2019-09-16

## 2019-09-16 RX ORDER — CARBOXYMETHYLCELLULOSE SODIUM AND GLYCERIN 5; 9 MG/ML; MG/ML
0.5-0.9 SOLUTION/ DROPS OPHTHALMIC
Refills: 0 | Status: ACTIVE | COMMUNITY

## 2019-09-16 RX ORDER — FERROUS SULFATE 220 (44)/5
220 (44 FE) SOLUTION, ORAL ORAL
Refills: 0 | Status: ACTIVE | COMMUNITY

## 2019-09-16 RX ORDER — MEROPENEM AND SODIUM CHLORIDE 1 G/50ML
1 INJECTION, SOLUTION INTRAVENOUS
Refills: 0 | Status: ACTIVE | COMMUNITY

## 2019-09-16 RX ORDER — BUDESONIDE 0.5 MG/2ML
0.5 SUSPENSION RESPIRATORY (INHALATION)
Refills: 0 | Status: ACTIVE | COMMUNITY

## 2019-09-16 RX ORDER — SILVER SULFADIAZINE 10 MG/G
1 CREAM TOPICAL
Refills: 0 | Status: ACTIVE | COMMUNITY

## 2019-09-17 NOTE — HISTORY OF PRESENT ILLNESS
[FreeTextEntry1] : Hospital Course: \par Discharge Date	10-Jul-2019 \par Admission Date	24-Jun-2019 05:46 \par Reason for Admission	6/24 sub-occipital craniotomy for 4th ventricular tumor \par resection \par 7/5 PEG placement \par Hospital Course	 \par 69 year old right handed morbidly obese female , PMH of extrinsic asthma, glaucoma, HTN & OA,  reports having recent light headed ness, dizziness & some balance disturbance for about three months,  PMD ordered a brain MRI which showed a 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 \par \par Pt was admitted on 6/24/19 for stereotactic suboccipital craniotomy for 4th ventricular tumor resection. On 6/25/19, Pt transferred to neurosurgical ICU post-op and CT scan showed new bilateral intraventricular hemorrhage. ICU team delayed extubated due to swelling on tongue. Pathology results showed \par subependymoma. On 6/26/19, pt had a CXR which showed mild congestion and was given duonebs and lasix 10 mg once. Pt was started on lovenox 40 for DVT ppx. \par \par On 6/27/19, pt was extubated and tolerated it well. On 6/28/19, patient was agitated and was given seroquel 12.5 prn at bedtime as well as started on precedex. On 6/30/19, pt was started on nystatin due to oral thrush. Pt transferred out of the unit to the floor on 6/30/19. On 7/1, pt failed bedside swallow evaluation. Pt was on a slow taper of decadron. On 7/2 oculoplastics determined pt did not need gold weight on Rt eye and recommended lacrilube. Pt scheduled for PEG placement on 7/5 due to \par dysphagia. On 7/3, ENT was consulted for complaint of ear pain and pain with swallowing. ENT scoped and noted tongue mass. On 7/3, MR soft tissue of the neck revealed back of the tongue swelling but it was of poor quality due to patient motion.  On 7/8, bleeding around the PEG site was noted and controlled \par with silver nitrate sticks. There were no incidences of bleeding the day after. On 7/9, the CT neck soft tissue noted swelling on the back of the tongue but can not exclude a mass as well as soft tissue mass in the left thyroid gland. On 7/10, pt's CT soft tissue neck was read by ENT doctor, Dr. Thakur, which \par cleared her for discharge and to start keflex for 5 days, discontinue decadron, and follow up thyroid ultrasound outpatient. At the end of hospital course, pt has completed decadron taper. Additionally, pt was seen and followed with Hospitalist medicine. \par \par Pt was seen by Rehab Medicine, Occupational Therapist, and Physical Therapist, \par that recommend Acute Rehab upon discharge. Pt was on Glucerna 1.2 for PEG feeds and tolerated it well. \par On day of discharge, pt is medically and neurologically cleared for discharge. 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  Neuro work up was negative, (LP negative).  She presents today via wheelchair and is on O2 2L via NC.  She is accompanied by her  who states that patient has not been following commands, non verbal. She resides in [facility].  Per spouse, PT was stopped as patient is unable to follow commands.  \par \par \par

## 2019-09-17 NOTE — PHYSICAL EXAM
[] : no respiratory distress [Respiration, Rhythm And Depth] : normal respiratory rhythm and effort [Exaggerated Use Of Accessory Muscles For Inspiration] : no accessory muscle use [Heart Rate And Rhythm] : heart rate was normal and rhythm regular [Involuntary Movements] : no involuntary movements were seen [FreeTextEntry1] : pt is unresponsive [Person] : disoriented to person [Place] : disoriented to place [Time] : disoriented to time [FreeTextEntry8] : came to office via wheelchair

## 2019-09-17 NOTE — ASSESSMENT
[FreeTextEntry1] : IMPRESSION:\par 1. Diminished level of understanding and collaboration.\par 2. Neuro decline due to septic shock.\par \par 1. F/U with Neurology consult\par 2. MRI in 1 month\par 3. Continue iv antibiotics - vancomycin, meropenem\par 4. F/U with Dr. Villalobos.\par 5. Children participated in visit via phone.\par 6. F/U with Infectious disease doctor.

## 2020-01-24 NOTE — PROGRESS NOTE ADULT - PROBLEM SELECTOR PROBLEM 6
The patient arrives via private vehicle with her father, URBANO, with a C/C of pain and redness in the left ear, whereat the clasp on the back of her earring post appears to be partially grown over and/or surrounded by red and inflamed skin. The patient only complains of pain with touch.     Functional quadriplegia

## 2020-01-28 NOTE — PRE-ANESTHESIA EVALUATION ADULT - NSANTHOBSERVEDRD_ENT_A_CORE
No Doxycycline Counseling:  Patient counseled regarding possible photosensitivity and increased risk for sunburn.  Patient instructed to avoid sunlight, if possible.  When exposed to sunlight, patients should wear protective clothing, sunglasses, and sunscreen.  The patient was instructed to call the office immediately if the following severe adverse effects occur:  hearing changes, easy bruising/bleeding, severe headache, or vision changes.  The patient verbalized understanding of the proper use and possible adverse effects of doxycycline.  All of the patient's questions and concerns were addressed.

## 2021-09-08 NOTE — PRE-OP CHECKLIST - BSA (M2)
Clinic Care Coordination Contact  Community Health Worker Initial Outreach  Spoke with Zuri (Mother)     Patient accepts CC: No, Zuri declined need for CC and only wanted contact for school placement in her area. Patient will be sent Care Coordination introduction letter for future reference.     Chart Review: Referral from PCP.  Reason for Referral: Other - Use Comments  Additional Information: Just moved and wants some guidance on options for     CHW introduced self and role with CC. CHW inquired if patient needs any additional support or resources. Zuri declined need for CC and only wanted resources for school placement in her area.     CHW reviewed contact for [employer] [phone]. She can call Student Information department if she has any additional questions. CHW explained that she will need to register patient online at [email or web address].     CHW encouraged calling CC if any additional support is needed or if she'd like to enroll patient in CC.    Fadumo Oliva  Clinic Care Coordination  [facility], [facility], and [facility]    Phone: [phone]        
2.17

## 2022-02-21 NOTE — PATIENT PROFILE ADULT - NSPROPTRIGHTREPNAME_GEN_A__NUR
Normal results and recommendations added to MyChart.     Occupational Therapy    Precautions:  Medical precautions:  fall risk; standard precautions.    Lines:     Basic: capped IV  Lower Extremity:    Left:  weight bearing: as tolerated.  Hearing: no hearing deficits  Vision:     Current vision: wears glasses for distance only, wears glasses only for reading and wears glasses all the time  Safety Measures: bed alarm, chair alarm and bed rails         OBJECTIVE                                                                                                              Level of consciousness: alert    Oriented to person, place, time and situation     Arousal alertness: appropriate responses to stimuli    Affect/Behavior: alert, appropriate, cooperative, flat and pleasant  Patient activity tolerance: 1 to 1 activity to rest  Upper Extremity Function: Left: good assist  Right: good assist  Range of Motion (measured in degrees unless otherwise indicated)  WFL: LUE RUE  Strength (out of 5 unless otherwise indicated)  WFL: LUE, RUE  Balance  Sitting:    Dynamic:  supervision single upper extremity support  Standing - Firm Surface - Eyes Open:     Dynamic:  contact guard/touching/steadying assist single upper extremity support    Edema:  Left LE = min/mod ,would benefit from tubi- for edema reduction/fall prevention.   Bed mobility:      Supine to sit: moderate assist and maximal assist (HOB 50 degree's for transfer ease)  Training completed:    Tasks: all aspects of bed mobility and supine to sit (assist to chuk to advance hips ,assist to left LE due to weakness, pain. )    Education details: body mechanics, patient demonstrates understanding and patient requires additional training  Transfers:    Assistive devices: gait belt and 2-wheeled walker    Sit to stand: minimal assist  Sit to stand trial 2: x5 trials wtih min A     Stand to sit: minimal assist  Training completed:    Tasks: sit to stand, stand to sit and stand pivot    Education details: body mechanics, patient  safety, patient verbalizes precautions, patient demonstrates understanding and patient requires additional training  Functional Ambulation:    Assistance:minimal assist   Assistive device:2-wheeled walker and gait belt    Distance (ft): 20  Details/Comments: Increased time for transfers, patient fearful of left knee pain with movement,reports 9 of 10 pain with transfers and ambulation,despite pre-pain medication. Verbal cues for hand and left LE placement with transfers. 2ww use cues to unweight left LE PRN.,slowed pace.,tends to panic if position of left LE causes increased pain with transfers.,verbal cues needed for breathing, encouragement.     Activities of Daily Living (ADLs):  Grooming/Oral Hygiene:     Grooming assist: supervision and set up    Position: chair    Assist needed for: set up, wash/dry hands, wash/dry face and denture care  Upper Body Dressing:    Assist: supervision and set up    Assist needed for: increased time to complete, verbal cueing, pull down in back and pull around back  Lower Body Dressing:     Assist: moderate assist    Position: chair    Footwear assistance: moderate assist, supervision and maximal assist (Mod/Max to don Lshoe/tie,supervision R shoe,able to tie)    Footwear position: chair    Assist needed for: verbal cueing, set up, increased time to complete, don/doff left sock, don/doff right sock, tie shoes, thread left lower extremity into underwear, thread right lower extremity into underwear, pull up over hips, don/doff left shoe, don/doff right shoe and use of adaptive equipment (reacher cues to don breifs,with Min/Mod A, total assist left sock,supervison right sock.)    Equipment: reacher  Toilet transfer:     Assist: minimal assist (per other data)  Toileting:     Assist: minimal assist and moderate assist (per other data)  Bathing:     Assist: supervision, moderate assist and minimal assist (supervision uppers,min/mod A lowers.)    Position: chair    Assist needed for:  steadying, verbal cueing, increased time to complete and supervision/safety             ASSESSMENT                                                                                                                Impairments: activity tolerance, balance, bed mobility, coordination/proprioception, pain, safety awareness and strength  Functional Limitations: bed mobility, functional mobility, bathing, toileting, functional transfers, IADLs, dressing and showering  Personal Occupations Profile Affected: bathing/showering, functional mobility/transfers, toileting/toilet hygiene, lower body dressing, home establishment/managements, rest/sleep preparation, safety/emergency maintenance    Patient seen on [facility], POD#1 left TKR./WBAT.  Prior function resides in Apt with brother /sister ,18 steps to enter, Mod I to independent with ADL,transfers and gait -SPC use, does light IADL's-cooks,family provides balance of IADL's including transportation.    Patient requires Mod/Max A to transfer from supine to sit ,asisst to advance hips and left leg,reports higher level pain 9 of 10 despite pre-medication for pain ,during transfers and ambulation,  Is Min A for sit<>stand ,increased time to advance left leg,tends to panic if pain starts to increase during transfer, needs encouragement and increased time to position LE. Is Mod to Max A with Lower body bathing and dressing, verbal cues needed for remedial method and reacher use.  Oriented x 4, UE strength WFL.      Anticipate will need DAYRON, to progress function, reduce pain level.   OT Frequency: Once a day, 7 days/week  Frequency Comments: orthonoe,1/7 by 9/1(8-26,kw)        Skilled therapy is required to address these limitations in attempt to maximize the patient's independence.  Clinical decision making: Moderate - Patient has several limitations (3-5), comorbidities and/or complexities, as noted in detailed assessment above, that impact their occupational profile.   Resulting in several treatment options and minimal to moderate task modification consistent with moderate clinical decision making complexity.    End of Session:   Location: in bed  Safety measures: call light within reach  Handoff to: nurse    PLAN                                                                                                                          Suggestions for next session as indicated: Toilet use, 2ww item retrieval, LE dressing AE PRN.  OT Frequency: Once a day, 7 days/week  Frequency Comments: ortho,kwh,1/7 by 9/1(8-26,kwh)    Interventions: ADL retraining, activity tolerance training, balance, bed mobility training, functional transfer training, energy conservation, IADL, neuromuscular reeducation, patient/family training, safety training, therapeutic activity, compensatory technique education, equipment eval/education, gait training, positioning, therapeutic exercise, use of adaptive equipment, work simplification, body mechanics, compensatory techniques, edema management, patient education and transfer training  Agreement to plan and goals: patient agrees with goals and treatment plan      GOALS:  Long Term Goals: (to be met by time of discharge from hospital)  Grooming: Patient will complete grooming tasks in standing supervision.  Upper body dressing: Patient will complete upper body dressing in sitting modified independent.  Lower body dressing: Patient will complete lower body dressing in sitting minimal assist.  Toileting: Patient will complete toileting supervision.  Bathing: Patient will complete bathing standing at sink and sitting at sinkminimal assist Toilet transfer: Patient will complete toilet transfer with gait belt and 2-wheeled walker, supervision.   Home setting transfer: Patient will complete home setting transfers with gait belt and 2-wheeled walker (item retreival/transports with 2ww), supervision.         Documented in the chart in the following areas: Assessment.  Plan. Patient Education.       [place], azucena lucero (daughter)

## 2022-08-15 NOTE — PROGRESS NOTE ADULT - PROVIDER SPECIALTY LIST ADULT
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 recomm refrain from future pegasparaginase.  MRCP 7/11- neg for acute cholecystitis or choledocholithiasis. 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 Started Levocarnitine 1gm tid.  ammonia level 8/7 slightly elevated.   Monitor encephalopathy. 8/7 Start lactulose until 3 loose BM's  FU with GI 8/8 (email sent) patient with esophageal pain. Want to inquire about EGD. Concern for herpes esophagitis.

## 2022-10-12 NOTE — PROGRESS NOTE ADULT - PROBLEM SELECTOR PLAN 1
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  Patient instructed to avoid sunlight, if possible.  When exposed to sunlight, patients should wear protective clothing, sunglasses, and sunscreen.  The patient was instructed to call the office immediately if the following severe adverse effects occur:  hearing changes, easy bruising/bleeding, severe headache, or vision changes.  The patient verbalized understanding of the proper use and possible adverse effects of sarecycline.  All of the patient's questions and concerns were addressed.

## 2023-04-17 NOTE — PROGRESS NOTE ADULT - PROBLEM SELECTOR PLAN 5
-DVT ppx: [facility]   -PT/OT  -Palliative following, will have GOC discussion at their subsequent encounter  FULL CODE 74 -DVT ppx: [facility]   -PT/OT  -GOC discussion with palliative; daughters will be health care proxies and patient is FULL CODE

## 2023-12-23 NOTE — OCCUPATIONAL THERAPY INITIAL EVALUATION ADULT - STRENGTHENING, PT EVAL
Alert and oriented to person, place, time/situation. normal mood and affect. no apparent risk to self or others.
GOAL: Pt will increase BUE/BLE strength to 4/5 to increase participation in functional tasks in 4 weeks

## 2025-02-04 NOTE — DIETITIAN INITIAL EVALUATION ADULT. - +GENDER
Reached out to pharmacy, pharmacy stated last refill was 1/7/25. Reached out to pt via E-Advice to see if pt needs a refill awaiting on a reply  
Statement Selected